# Patient Record
Sex: MALE | Race: WHITE | NOT HISPANIC OR LATINO | Employment: OTHER | URBAN - METROPOLITAN AREA
[De-identification: names, ages, dates, MRNs, and addresses within clinical notes are randomized per-mention and may not be internally consistent; named-entity substitution may affect disease eponyms.]

---

## 2017-01-06 DIAGNOSIS — Z79.4 TYPE 2 DIABETES MELLITUS WITHOUT COMPLICATION, WITH LONG-TERM CURRENT USE OF INSULIN (H): Primary | ICD-10-CM

## 2017-01-06 DIAGNOSIS — E11.9 TYPE 2 DIABETES MELLITUS WITHOUT COMPLICATION, WITH LONG-TERM CURRENT USE OF INSULIN (H): Primary | ICD-10-CM

## 2017-01-06 NOTE — TELEPHONE ENCOUNTER
Novolog          Last Written Prescription Date: 7/13/2016  Last Fill Quantity: 1, # refills: 1  Last Office Visit with G, P or  Health prescribing provider:  12/14/2016   Next 5 appointments (look out 90 days)     Jan 19, 2017  8:00 AM   [facility]ard [facility]    [address] [place] [id number] [phone]                   BP Readings from Last 3 Encounters:   12/19/16 108/72   12/14/16 112/62   11/09/16 130/84     MICROL        9   12/14/2016  No results found for this basename: microalbumin  CREATININE   Date Value Ref Range Status   10/13/2016 0.79 0.66 - 1.25 mg/dL Final   ]  GFR ESTIMATE   Date Value Ref Range Status   10/13/2016 >90  Non  GFR Calc   >60 mL/min/1.7m2 Final   07/13/2016 >90  Non  GFR Calc   >60 mL/min/1.7m2 Final   12/14/2015 >90  Non  GFR Calc   >60 mL/min/1.7m2 Final     GFR ESTIMATE IF BLACK   Date Value Ref Range Status   10/13/2016 >90   GFR Calc   >60 mL/min/1.7m2 Final   07/13/2016 >90   GFR Calc   >60 mL/min/1.7m2 Final   12/14/2015 >90   GFR Calc   >60 mL/min/1.7m2 Final     CHOL      119   12/14/2016  HDL       51   12/14/2016  LDL       49   12/14/2016  TRIG       96   12/14/2016  CHOLHDLRATIO      4.3   11/6/2015  AST       26   12/14/2016  ALT       32   12/14/2016  A1C      8.6   10/13/2016  A1C      8.7   5/12/2016  A1C      9.4   11/8/2015  A1C      9.1   11/6/2015  A1C      9.4   11/5/2015  POTASSIUM   Date Value Ref Range Status   10/13/2016 4.3 3.4 - 5.3 mmol/L Final

## 2017-01-09 RX ORDER — SYRINGE-NEEDLE,INSULIN,0.5 ML 27GX1/2"
SYRINGE, EMPTY DISPOSABLE MISCELLANEOUS
Qty: 100 EACH | Status: SHIPPED | OUTPATIENT
Start: 2017-01-09 | End: 2019-04-10

## 2017-01-09 RX ORDER — INSULIN ASPART 100 [IU]/ML
INJECTION, SOLUTION INTRAVENOUS; SUBCUTANEOUS
Qty: 15 ML | Refills: 1 | Status: SHIPPED | OUTPATIENT
Start: 2017-01-09 | End: 2017-01-11

## 2017-01-09 NOTE — TELEPHONE ENCOUNTER
Routing refill request to provider for review/approval because:  Medication is reported/historical  Aleyda Ma RN

## 2017-01-11 DIAGNOSIS — E11.9 TYPE 2 DIABETES MELLITUS WITHOUT COMPLICATION, WITH LONG-TERM CURRENT USE OF INSULIN (H): Primary | ICD-10-CM

## 2017-01-11 DIAGNOSIS — Z79.4 TYPE 2 DIABETES MELLITUS WITHOUT COMPLICATION, WITH LONG-TERM CURRENT USE OF INSULIN (H): Primary | ICD-10-CM

## 2017-01-11 NOTE — TELEPHONE ENCOUNTER
Patient states he is on 10 - 12 units twice daily of Novolog. Pharmacy wants to know if this is the correct dose. Geneva PUGH

## 2017-01-18 DIAGNOSIS — E11.65 TYPE 2 DIABETES MELLITUS WITH HYPERGLYCEMIA, WITH LONG-TERM CURRENT USE OF INSULIN (H): Primary | ICD-10-CM

## 2017-01-18 DIAGNOSIS — Z79.4 TYPE 2 DIABETES MELLITUS WITH HYPERGLYCEMIA, WITH LONG-TERM CURRENT USE OF INSULIN (H): Primary | ICD-10-CM

## 2017-01-18 NOTE — TELEPHONE ENCOUNTER
LOV  11/9/16  A1C      8.6   10/13/2016  A1C      8.7   5/12/2016  A1C      9.4   11/8/2015  A1C      9.1   11/6/2015  A1C      9.4   11/5/2015

## 2017-01-19 ENCOUNTER — OFFICE VISIT (OUTPATIENT)
Dept: INTERNAL MEDICINE | Facility: CLINIC | Age: 67
End: 2017-01-19
Payer: MEDICARE

## 2017-01-19 VITALS
TEMPERATURE: 97.6 F | HEART RATE: 82 BPM | OXYGEN SATURATION: 99 % | DIASTOLIC BLOOD PRESSURE: 72 MMHG | BODY MASS INDEX: 33.46 KG/M2 | SYSTOLIC BLOOD PRESSURE: 112 MMHG | WEIGHT: 220 LBS

## 2017-01-19 DIAGNOSIS — Z95.2 S/P AORTIC VALVE REPLACEMENT: ICD-10-CM

## 2017-01-19 DIAGNOSIS — E11.9 TYPE 2 DIABETES MELLITUS WITHOUT COMPLICATION, WITH LONG-TERM CURRENT USE OF INSULIN (H): Primary | ICD-10-CM

## 2017-01-19 DIAGNOSIS — R94.30 CARDIAC LV EJECTION FRACTION 30-35%: ICD-10-CM

## 2017-01-19 DIAGNOSIS — I50.42 CHRONIC COMBINED SYSTOLIC AND DIASTOLIC CONGESTIVE HEART FAILURE (H): ICD-10-CM

## 2017-01-19 DIAGNOSIS — Z79.4 TYPE 2 DIABETES MELLITUS WITHOUT COMPLICATION, WITH LONG-TERM CURRENT USE OF INSULIN (H): Primary | ICD-10-CM

## 2017-01-19 DIAGNOSIS — Z13.6 SCREENING FOR AAA (ABDOMINAL AORTIC ANEURYSM): ICD-10-CM

## 2017-01-19 PROCEDURE — 99214 OFFICE O/P EST MOD 30 MIN: CPT | Performed by: INTERNAL MEDICINE

## 2017-01-19 ASSESSMENT — PAIN SCALES - GENERAL: PAINLEVEL: NO PAIN (0)

## 2017-01-19 NOTE — PROGRESS NOTES
Chief Complaint   Patient presents with     Diabetes     CHIEF COMPLAINT:    The patient is a pleasant 66-year-old woman who has a history of diabetes. He also has coronary artery disease and a prior aortic valvuloplasty. He presents today to follow-up on his blood sugars which are markedly improved with his increased dietary restriction. He notes that he did take some liberties over the holidays and these are now resolving. He saw his cardiology/PA in some medication changes were made. We spent an extended period time discussing the medications and the reasoning behind changes. No significant changes were made. He notes a little bit of chest pain but believes it is primarily musculoskeletal. He's had no cardiac symptoms. He denies polyuria or polydipsia. 3 times a day blood sugar log shows most values in the mid 100 range.                         PAST, FAMILY,SOCIAL HISTORY:     Medical  History:   has a past medical history of Aortic valve stenosis (11/2/2015); Personal history of tobacco use, presenting hazards to health (11/2/2015); Type 2 diabetes mellitus without complication (H) (11/2/2015); Hyperlipidemia LDL goal <100 (11/2/2015); Hiatal hernia (11/2/2015); Chest pain, unspecified chest pain type (11/2/2015); and Cardiac LV ejection fraction 30-35% (11/2/2015).     Surgical History:   has past surgical history that includes knee surgery (2009); Replace valve aortic (N/A, 11/7/2015); and Bypass graft artery coronary (N/A, 11/7/2015).     Social History:   reports that he quit smoking about 8 months ago. His smoking use included Pipe. He has never used smokeless tobacco. He reports that he drinks alcohol. He reports that he does not use illicit drugs.     Family History:  family history is not on file.            MEDICATIONS  Current Outpatient Prescriptions   Medication Sig Dispense Refill     insulin pen needle 31G X 8 MM Use three pen needles daily or as directed. 100 each 5     insulin aspart (NOVOLOG  "FLEXPEN) 100 UNIT/ML injection INJECT 10 - 12  UNITS UNDER THE SK IN TWICE A DAY 15 mL 1     insulin syringe-needle U-100 (BD INSULIN SYRINGE ULTRAFINE) 31G X 5/16\" 0.5 ML Use one syringe 3 daily or as directed. 100 each prn     metoprolol (TOPROL-XL) 25 MG 24 hr tablet Take 1 tablet (25 mg) by mouth daily 30 tablet 1     isosorbide mononitrate (IMDUR) 30 MG 24 hr tablet Take half tablet one time daily. 30 tablet 1     insulin glargine (LANTUS) 100 UNIT/ML injection 40 units am, 10 units pm 3 Month 3     senna-docusate (SENOKOT-S;PERICOLACE) 8.6-50 MG per tablet Take 1-2 tablets by mouth 2 times daily 100 tablet 11     lisinopril (PRINIVIL,ZESTRIL) 5 MG tablet TAKE 1 TABLET BY MOUTH EVER Y DAY 30 tablet 7     atorvastatin (LIPITOR) 40 MG tablet TAKE 1 TABLET BY MOUTH DALE Y AT BEDTIME 30 tablet 8     blood glucose monitoring ([employer] CONTOUR MONITOR) meter device kit Use to test blood sugars 3 times daily or as directed. 1 kit 0     order for DME Equipment being ordered: bp cuff 1 Device 0     aspirin EC 81 MG EC tablet Take 1 tablet (81 mg) by mouth daily           --------------------------------------------------------------------------------------------------------------------                          REVIEW OF SYSTEMS:           LUNGS: Pt denies: cough,excess sputum, hemoptysis, or shortness of breath.   HEART: Pt denies: chest pain, arrythmia, syncope, tachy or bradyarrhythmia or excess edema.   GI: Pt denies: nausea, vomitting, diarrhea, constipation, melena, or hematochezia.   NEURO: Pt denies: seizures, strokes, diplopia, weakness, paraesthesias, or paralysis.   SKIN: Pt denies: itching, rashes, discoloration, or specific lesions of concern. Denies recent hair loss.                          EXAMINATION:          Vital Signs:  B/P: 112/72, T: 97.6, P: 82, R: Data Unavailable, BMI: Body mass index is 33.46 kg/(m^2).   Constitutional: The patient appears to be in no acute distress. The patient appears to be " adequately hydrated. No acute respiratory or hemodynamic distress is noted at this time.   LUNGS: clear bilaterally, airflow is brisk, no intercostal retraction or stridor is noted. No coughing is noted during visit.   HEART:  regular without rubs,  gallops, or murmurs. PMI is nondisplaced. Upstrokes are brisk. S1,S2 are heard.    GI: Abdomen is soft, without rebound, guarding or tenderness. Bowel sounds are appropriate. No renal bruits are heard.    NEURO: Pt is alert and appropriate. No neurologic lateralization is noted. Cranial nerves 2-12 are intact. Peripheral sensory function is slightly decreased in the feet                          DECISION MAKING:       #1 type 2 diabetes on long-term insulin care   Continue current insulin dosage   Continue current diet   Continue aspirin, statin, ACE inhibitor  #2 need for abdominal aortic aneurysm screening   Done  #3 chronic congestive heart failure combined with ejection fraction of 35%   Continue beta blocker recently started on Toprol for convenience   Continue ACE inhibitor   Continue lipid control                           FOLLOW UP    I have asked the patient to make an appointment for follow up with me in 3 months or as needed    I have carefully explained the diagnosis and treatment options with the patient. The patient has displayed an understanding of the above, and all subsequent questions were answered.         DO RADHA Horn    Portions of this note were produced using [email or web address]  Although every attempt at real-time proof reading has been made, occasional grammar/syntax errors may have been missed.

## 2017-01-19 NOTE — NURSING NOTE
"Chief Complaint   Patient presents with     Diabetes       Initial /72 mmHg  Pulse 82  Temp(Src) 97.6  F (36.4  C) (Temporal)  Wt 220 lb (99.791 kg)  SpO2 99% Estimated body mass index is 33.46 kg/(m^2) as calculated from the following:    Height as of 12/19/16: 5' 8\" (1.727 m).    Weight as of this encounter: 220 lb (99.791 kg).  BP completed using cuff size: patti PUGH      "

## 2017-01-19 NOTE — MR AVS SNAPSHOT
After Visit Summary   1/19/2017    Reinaldo Barrios    MRN: [id number]           Patient Information     Date Of Birth          1950        Visit Information        Provider Department      1/19/2017 8:00 AM Mark Blanchard DO [facility]        Today's Diagnoses     Type 2 diabetes mellitus without complication, with long-term current use of insulin (H)    -  1     Screening for AAA (abdominal aortic aneurysm)         Cardiac LV ejection fraction 30-35%         Chronic combined systolic and diastolic congestive heart failure (H)         S/P aortic valve replacement            Follow-ups after your visit        Your next 10 appointments already scheduled     Jan 26, 2017  7:30 AM   US ABDOMINAL AORTIC LIMITED with PHUS1   [facility] ([facility])    [address] [place] [id number] [phone]           Please bring a list of your medicines (including vitamins, minerals and over-the-counter drugs). Also, tell your doctor about any allergies you may have. Wear comfortable clothes and leave your valuables at home.  Adults: No eating or drinking for 8 hours before the exam. You may take medicine with a small sip of water.  Children: - Children 6+ years: No food or drink for 6 hours before exam. - Children 1-5 years: No food or drink for 4 hours before exam. - Infants, breast-fed: may have breast milk up to 2 hours before exam. - Infants, formula: may have bottle until 4 hours before exam.  Please call the Imaging Department at your exam site with any questions.              Future tests that were ordered for you today     Open Future Orders        Priority Expected Expires Ordered    US abdominal aorta limited Routine  1/19/2018 1/19/2017            Who to contact     If you have questions or need follow up information about today's clinic visit or your schedule please contact [facility] directly at  "[phone].  Normal or non-critical lab and imaging results will be communicated to you by OluKaihart, letter or phone within 4 business days after the clinic has received the results. If you do not hear from us within 7 days, please contact the clinic through OluKaihart or phone. If you have a critical or abnormal lab result, we will notify you by phone as soon as possible.  Submit refill requests through Guestmob or call your pharmacy and they will forward the refill request to us. Please allow 3 business days for your refill to be completed.          Additional Information About Your Visit        OluKaihart Information     Guestmob lets you send messages to your doctor, view your test results, renew your prescriptions, schedule appointments and more. To sign up, go to [email or web address] . Click on \"Log in\" on the left side of the screen, which will take you to the Welcome page. Then click on \"Sign up Now\" on the right side of the page.     You will be asked to enter the access code listed below, as well as some personal information. Please follow the directions to create your username and password.     Your access code is: [email or web address]  Expires: 2017  7:34 AM     Your access code will  in 90 days. If you need help or a new code, please call your [place] clinic or [phone].        Care EveryWhere ID     This is your Care EveryWhere ID. This could be used by other organizations to access your [place] medical records  [id number]        Your Vitals Were     Pulse Temperature Pulse Oximetry             82 97.6  F (36.4  C) (Temporal) 99%          Blood Pressure from Last 3 Encounters:   17 112/72   16 108/72   16 112/62    Weight from Last 3 Encounters:   17 220 lb (99.791 kg)   16 217 lb 9.6 oz (98.703 kg)   16 219 lb (99.338 kg)               Primary Care Provider    Physician No Ref-Primary       No address on file        Thank you!     Thank you for choosing " "[facility]  for your care. Our goal is always to provide you with excellent care. Hearing back from our patients is one way we can continue to improve our services. Please take a few minutes to complete the written survey that you may receive in the mail after your visit with us. Thank you!             Your Updated Medication List - Protect others around you: Learn how to safely use, store and throw away your medicines at www.disposemymeds.org.          This list is accurate as of: 1/19/17  3:22 PM.  Always use your most recent med list.                   Brand Name Dispense Instructions for use    aspirin 81 MG EC tablet      Take 1 tablet (81 mg) by mouth daily       atorvastatin 40 MG tablet    LIPITOR    30 tablet    TAKE 1 TABLET BY MOUTH DALE Y AT BEDTIME       blood glucose monitoring meter device kit     1 kit    Use to test blood sugars 3 times daily or as directed.       insulin aspart 100 UNIT/ML injection    NovoLOG FLEXPEN    15 mL    INJECT 10 - 12  UNITS UNDER THE SK IN TWICE A DAY       insulin glargine 100 UNIT/ML injection    LANTUS    3 Month    40 units am, 10 units pm       insulin pen needle 31G X 8 MM     100 each    Use three pen needles daily or as directed.       insulin syringe-needle U-100 31G X 5/16\" 0.5 ML    BD insulin syringe ultrafine    100 each    Use one syringe 3 daily or as directed.       isosorbide mononitrate 30 MG 24 hr tablet    IMDUR    30 tablet    Take half tablet one time daily.       lisinopril 5 MG tablet    PRINIVIL/ZESTRIL    30 tablet    TAKE 1 TABLET BY MOUTH EVER Y DAY       metoprolol 25 MG 24 hr tablet    TOPROL-XL    30 tablet    Take 1 tablet (25 mg) by mouth daily       order for DME     1 Device    Equipment being ordered: bp cuff       senna-docusate 8.6-50 MG per tablet    SENOKOT-S;PERICOLACE    100 tablet    Take 1-2 tablets by mouth 2 times daily         "

## 2017-01-27 DIAGNOSIS — E11.65 TYPE 2 DIABETES MELLITUS WITH HYPERGLYCEMIA, WITH LONG-TERM CURRENT USE OF INSULIN (H): Primary | ICD-10-CM

## 2017-01-27 DIAGNOSIS — Z79.4 TYPE 2 DIABETES MELLITUS WITH HYPERGLYCEMIA, WITH LONG-TERM CURRENT USE OF INSULIN (H): Primary | ICD-10-CM

## 2017-01-27 NOTE — TELEPHONE ENCOUNTER
Sergio Tinoco is calling stating that patient came into the [facility] and was told that these would be to the pharmacy in a half hour. He is now at the pharmacy waiting and the pharmacy has not received this yet. Please advise

## 2017-01-31 DIAGNOSIS — E78.5 HYPERLIPIDEMIA LDL GOAL <100: Primary | ICD-10-CM

## 2017-01-31 NOTE — TELEPHONE ENCOUNTER
atorvastatin (LIPITOR) 40 MG tablet     Last Written Prescription Date: 4/28/16  Last Fill Quantity: 30, # refills: 0  Last Office Visit with [facility], [facility] or [facility] prescribing provider: 1/19/17       CHOL      119   12/14/2016  HDL       51   12/14/2016  LDL       49   12/14/2016  TRIG       96   12/14/2016  CHOLHDLRATIO      4.3   11/6/2015

## 2017-02-02 RX ORDER — ATORVASTATIN CALCIUM 40 MG/1
TABLET, FILM COATED ORAL
Qty: 30 TABLET | Refills: 11 | Status: SHIPPED | OUTPATIENT
Start: 2017-02-02 | End: 2018-02-21

## 2017-02-03 NOTE — TELEPHONE ENCOUNTER
Prescription approved per [facility] Refill Protocol............MANNIE Bobo

## 2017-03-02 DIAGNOSIS — I50.42 SYSTOLIC AND DIASTOLIC CHF, CHRONIC (H): ICD-10-CM

## 2017-03-02 DIAGNOSIS — Z79.4 TYPE 2 DIABETES MELLITUS WITHOUT COMPLICATION, WITH LONG-TERM CURRENT USE OF INSULIN (H): ICD-10-CM

## 2017-03-02 DIAGNOSIS — E11.9 TYPE 2 DIABETES MELLITUS WITHOUT COMPLICATION, WITH LONG-TERM CURRENT USE OF INSULIN (H): ICD-10-CM

## 2017-03-02 NOTE — TELEPHONE ENCOUNTER
lisinopril (PRINIVIL,ZESTRIL) 5 MG tablet      Last Written Prescription Date: 6/8/16  Last Fill Quantity: 30, # refills: 7  Last Office Visit with G, P or [facility] prescribing provider: 1/19/17       Potassium   Date Value Ref Range Status   10/13/2016 4.3 3.4 - 5.3 mmol/L Final     Creatinine   Date Value Ref Range Status   10/13/2016 0.79 0.66 - 1.25 mg/dL Final     BP Readings from Last 3 Encounters:   01/19/17 112/72   12/19/16 108/72   12/14/16 112/62

## 2017-03-06 PROBLEM — E11.9 TYPE 2 DIABETES MELLITUS WITHOUT COMPLICATION, WITHOUT LONG-TERM CURRENT USE OF INSULIN (H): Status: ACTIVE | Noted: 2017-03-06

## 2017-03-06 RX ORDER — LISINOPRIL 5 MG/1
TABLET ORAL
Qty: 30 TABLET | Refills: 11 | Status: SHIPPED | OUTPATIENT
Start: 2017-03-06 | End: 2018-03-17

## 2017-03-06 NOTE — TELEPHONE ENCOUNTER
Routing refill request to provider for review/approval because:  Drug not on the [facility] refill protocol for associated diagnosis    Laura Dominguez RN  [facility]

## 2017-05-03 DIAGNOSIS — Z79.4 TYPE 2 DIABETES MELLITUS WITHOUT COMPLICATION, WITH LONG-TERM CURRENT USE OF INSULIN (H): ICD-10-CM

## 2017-05-03 DIAGNOSIS — R07.2 PRECORDIAL PAIN: ICD-10-CM

## 2017-05-03 DIAGNOSIS — E11.9 TYPE 2 DIABETES MELLITUS WITHOUT COMPLICATION, WITH LONG-TERM CURRENT USE OF INSULIN (H): ICD-10-CM

## 2017-05-03 NOTE — TELEPHONE ENCOUNTER
Novolog        Last Written Prescription Date: 1/11/17  Last Fill Quantity: 15, # refills: 1  Last Office Visit with [facility], [facility] or  [employer] prescribing provider:  1/19/17        BP Readings from Last 3 Encounters:   01/19/17 112/72   12/19/16 108/72   12/14/16 112/62     Lab Results   Component Value Date    MICROL 9 12/14/2016     Lab Results   Component Value Date    UMALCR 4.83 12/14/2016     Creatinine   Date Value Ref Range Status   10/13/2016 0.79 0.66 - 1.25 mg/dL Final   ]  GFR Estimate   Date Value Ref Range Status   10/13/2016 >90  Non  GFR Calc   >60 mL/min/1.7m2 Final   07/13/2016 >90  Non  GFR Calc   >60 mL/min/1.7m2 Final   12/14/2015 >90  Non  GFR Calc   >60 mL/min/1.7m2 Final     GFR Estimate If Black   Date Value Ref Range Status   10/13/2016 >90   GFR Calc   >60 mL/min/1.7m2 Final   07/13/2016 >90   GFR Calc   >60 mL/min/1.7m2 Final   12/14/2015 >90   GFR Calc   >60 mL/min/1.7m2 Final     Lab Results   Component Value Date    CHOL 119 12/14/2016     Lab Results   Component Value Date    HDL 51 12/14/2016     Lab Results   Component Value Date    LDL 49 12/14/2016     Lab Results   Component Value Date    TRIG 96 12/14/2016     Lab Results   Component Value Date    CHOLHDLRATIO 4.3 11/06/2015     Lab Results   Component Value Date    AST 26 12/14/2016     Lab Results   Component Value Date    ALT 32 12/14/2016     Lab Results   Component Value Date    A1C 8.6 10/13/2016    A1C 8.7 05/12/2016    A1C 9.4 11/08/2015    A1C 9.1 11/06/2015    A1C 9.4 11/05/2015     Potassium   Date Value Ref Range Status   10/13/2016 4.3 3.4 - 5.3 mmol/L Final       Imdur        Last Written Prescription Date: 2/21/17  Last Fill Quantity: 30,  # refills: 1   Last Office Visit with [facility], [facility] or [employer] prescribing provider: 1/19/17

## 2017-05-05 RX ORDER — INSULIN ASPART 100 [IU]/ML
INJECTION, SOLUTION INTRAVENOUS; SUBCUTANEOUS
Qty: 15 ML | Refills: 0 | Status: SHIPPED | OUTPATIENT
Start: 2017-05-05 | End: 2017-07-03

## 2017-05-05 RX ORDER — ISOSORBIDE MONONITRATE 30 MG/1
TABLET, EXTENDED RELEASE ORAL
Qty: 30 TABLET | Refills: 0 | Status: SHIPPED | OUTPATIENT
Start: 2017-05-05 | End: 2017-06-07

## 2017-05-05 NOTE — TELEPHONE ENCOUNTER
Novolog  Routing refill request to covering provider for review/approval because:  Labs out of range:  Hgb A1C  Patient needs to be seen because:  Last OV 1/19/17 supposed to f/up in 3 months, no f/up    Imdur  Medication is being filled for 1 time refill only due to:  Patient needs to be seen because Last OV 1/19/17 supposed to f/up in 3 months, no f/up.      Marco Antonio Snell RN, BSN

## 2017-06-07 DIAGNOSIS — Z79.4 TYPE 2 DIABETES MELLITUS WITH HYPERGLYCEMIA, WITH LONG-TERM CURRENT USE OF INSULIN (H): ICD-10-CM

## 2017-06-07 DIAGNOSIS — R07.2 PRECORDIAL PAIN: ICD-10-CM

## 2017-06-07 DIAGNOSIS — E11.65 TYPE 2 DIABETES MELLITUS WITH HYPERGLYCEMIA, WITH LONG-TERM CURRENT USE OF INSULIN (H): ICD-10-CM

## 2017-06-07 NOTE — TELEPHONE ENCOUNTER
Chief Complaint   Patient presents with     Medication Refill     Isosorbide Monitrate          Last Written Prescription Date: 3/5/17  Last Fill Quantity: 30,  # refills: 0   Last Office Visit with [facility], [facility] or [facility] prescribing provider: 1/1/9/17

## 2017-06-08 ENCOUNTER — TELEPHONE (OUTPATIENT)
Dept: INTERNAL MEDICINE | Facility: CLINIC | Age: 67
End: 2017-06-08

## 2017-06-08 NOTE — LETTER
[address]   [address]  Tel. [phone] / Fax [phone]    June 26, 2017        Reinaldo Barrios  [address]   [facility] [address]          Dear Reinaldo,    Our records indicate you are due for a colon cancer screening. You are also due for Diabetic follow up.    -Colon Cancer Screening- Recommended every 5-10 years, depending on your history, in order to prevent and detect colon cancer at its earliest stages.  Colon cancer is now the second leading cause of death in the [place] for both men and women and there are over 130,000 new cases and 50,000 deaths per year from colon cancer.  Colonoscopies can prevent 90-95% of these deaths.  Problem lesions can be removed before they ever become cancer.  This test is not only looking for cancer, but also getting rid of precancerious lesions.  You are usually given some sedation which makes the test very comfortable for most people.      If you do not wish to do a colonoscopy or cannot afford to do one, at this time, there is another option. It is called a FIT test or Fecal Immunochemical Occult Blood Test (take home stool sample kit).  It does not replace the colonoscopy for colorectal cancer screening, but it can detect hidden bleeding in the lower colon.  It does need to be repeated every year and if a positive result is obtained, you would be referred for a colonoscopy. The FIT test is really easy to do and does not require any  diet or medication restrictions and involves only one collection sample.      If you have completed either one of these tests or had a flexible sigmoidoscopy in the past five years at another facility, please have the records sent to our clinic so that we can best coordinate your care.  Please call us ([phone]) if you have questions or would like arrange either to do a colonoscopy or obtain the necessary test kit for the Fecal Occult Test.      Sincerely,    Mark  AYO Blanchard.

## 2017-06-08 NOTE — TELEPHONE ENCOUNTER
Attempted outreach to patient: Left message    Patient is due for:  Colonoscopy  Diabetes follow up    If patient had this completed elsewhere, please obtain approximate date, clinic/hospital where test was done and the result (abnormal/normal).    Please assist in scheduling if not completed.      Panel Management Review      Patient has the following on his problem list:     Diabetes    ASA: Passed    Last A1C  Lab Results   Component Value Date    A1C 8.6 10/13/2016    A1C 8.7 05/12/2016    A1C 9.4 11/08/2015    A1C 9.1 11/06/2015    A1C 9.4 11/05/2015     A1C tested: FAILED    Last LDL:    Lab Results   Component Value Date    CHOL 119 12/14/2016     Lab Results   Component Value Date    HDL 51 12/14/2016     Lab Results   Component Value Date    LDL 49 12/14/2016     Lab Results   Component Value Date    TRIG 96 12/14/2016     Lab Results   Component Value Date    CHOLHDLRATIO 4.3 11/06/2015     Lab Results   Component Value Date    NHDL 68 12/14/2016       Is the patient on a Statin? YES             Is the patient on Aspirin? YES    Medications     HMG CoA Reductase Inhibitors    atorvastatin (LIPITOR) 40 MG tablet    Salicylates    aspirin EC 81 MG EC tablet          Last three blood pressure readings:  BP Readings from Last 3 Encounters:   01/19/17 112/72   12/19/16 108/72   12/14/16 112/62       Date of last diabetes office visit: 1/19/17     Tobacco History:     History   Smoking Status     Former Smoker     Types: Pipe     Quit date: 5/15/2016   Smokeless Tobacco     Never Used     Comment: smokes a pipe in good weather           Composite cancer screening  Chart review shows that this patient is due/due soon for the following Colonoscopy  Summary:    Patient is due/failing the following:   A1C and COLONOSCOPY    Action needed:   Patient needs office visit for diabetes.    Type of outreach:    Phone, left message for patient to call back.     Questions for provider review:    None                                                                                                                                     Geneva Goodman A       Chart routed to Care Team .

## 2017-06-12 NOTE — TELEPHONE ENCOUNTER
blood glucose monitoring (NO BRAND SPECIFIED) test strip      Last Written Prescription Date: 1/27/2017  Last Fill Quantity: 100, # refills: 3  Last Office Visit with [facility], [facility] or  Health prescribing provider:  1/19/2017   Next 5 appointments (look out 90 days)     Adama 15, 2017  8:00 AM CDT   Office Visit with Mark Blanchard DO   [facility]    [address] [place] [id number] [phone]                   BP Readings from Last 3 Encounters:   01/19/17 112/72   12/19/16 108/72   12/14/16 112/62     Lab Results   Component Value Date    MICROL 9 12/14/2016     Lab Results   Component Value Date    UMALCR 4.83 12/14/2016     Creatinine   Date Value Ref Range Status   10/13/2016 0.79 0.66 - 1.25 mg/dL Final   ]  GFR Estimate   Date Value Ref Range Status   10/13/2016 >90  Non  GFR Calc   >60 mL/min/1.7m2 Final   07/13/2016 >90  Non  GFR Calc   >60 mL/min/1.7m2 Final   12/14/2015 >90  Non  GFR Calc   >60 mL/min/1.7m2 Final     GFR Estimate If Black   Date Value Ref Range Status   10/13/2016 >90   GFR Calc   >60 mL/min/1.7m2 Final   07/13/2016 >90   GFR Calc   >60 mL/min/1.7m2 Final   12/14/2015 >90   GFR Calc   >60 mL/min/1.7m2 Final     Lab Results   Component Value Date    CHOL 119 12/14/2016     Lab Results   Component Value Date    HDL 51 12/14/2016     Lab Results   Component Value Date    LDL 49 12/14/2016     Lab Results   Component Value Date    TRIG 96 12/14/2016     Lab Results   Component Value Date    CHOLHDLRATIO 4.3 11/06/2015     Lab Results   Component Value Date    AST 26 12/14/2016     Lab Results   Component Value Date    ALT 32 12/14/2016     Lab Results   Component Value Date    A1C 8.6 10/13/2016    A1C 8.7 05/12/2016    A1C 9.4 11/08/2015    A1C 9.1 11/06/2015    A1C 9.4 11/05/2015     Potassium   Date Value Ref Range Status    10/13/2016 4.3 3.4 - 5.3 mmol/L Final     Kaia Mariscal CMA

## 2017-06-13 RX ORDER — ISOSORBIDE MONONITRATE 30 MG/1
TABLET, EXTENDED RELEASE ORAL
Qty: 30 TABLET | Refills: 3 | Status: SHIPPED | OUTPATIENT
Start: 2017-06-13 | End: 2017-10-25

## 2017-06-13 NOTE — TELEPHONE ENCOUNTER
Test strips  Prescription approved per G Refill Protocol.    Imdur  Routing refill request to provider for review/approval because:  A break in medication  Medication is reported/historical    Marco Antonio Snell RN, BSN

## 2017-06-15 ENCOUNTER — OFFICE VISIT (OUTPATIENT)
Dept: INTERNAL MEDICINE | Facility: CLINIC | Age: 67
End: 2017-06-15
Payer: MEDICARE

## 2017-06-15 DIAGNOSIS — Z95.1 S/P CABG (CORONARY ARTERY BYPASS GRAFT): ICD-10-CM

## 2017-06-15 DIAGNOSIS — R06.02 SOB (SHORTNESS OF BREATH): Primary | ICD-10-CM

## 2017-06-15 DIAGNOSIS — I48.4 ATYPICAL ATRIAL FLUTTER (H): ICD-10-CM

## 2017-06-15 DIAGNOSIS — I50.42 CHRONIC COMBINED SYSTOLIC AND DIASTOLIC CONGESTIVE HEART FAILURE (H): ICD-10-CM

## 2017-06-15 DIAGNOSIS — I21.4 NSTEMI (NON-ST ELEVATED MYOCARDIAL INFARCTION) (H): ICD-10-CM

## 2017-06-15 DIAGNOSIS — I10 ESSENTIAL HYPERTENSION WITH GOAL BLOOD PRESSURE LESS THAN 140/90: ICD-10-CM

## 2017-06-15 DIAGNOSIS — E11.9 TYPE 2 DIABETES MELLITUS WITHOUT COMPLICATION, WITHOUT LONG-TERM CURRENT USE OF INSULIN (H): ICD-10-CM

## 2017-06-15 DIAGNOSIS — E78.5 HYPERLIPIDEMIA LDL GOAL <100: ICD-10-CM

## 2017-06-15 DIAGNOSIS — Z95.2 S/P AORTIC VALVE REPLACEMENT: ICD-10-CM

## 2017-06-15 LAB
CHOLEST SERPL-MCNC: 155 MG/DL
CREAT UR-MCNC: 83 MG/DL
HBA1C MFR BLD: 7.7 % (ref 4.3–6)
HDLC SERPL-MCNC: 55 MG/DL
LDLC SERPL CALC-MCNC: 64 MG/DL
MICROALBUMIN UR-MCNC: 6 MG/L
MICROALBUMIN/CREAT UR: 6.88 MG/G CR (ref 0–17)
NONHDLC SERPL-MCNC: 100 MG/DL
TRIGL SERPL-MCNC: 182 MG/DL

## 2017-06-15 PROCEDURE — 82043 UR ALBUMIN QUANTITATIVE: CPT | Performed by: INTERNAL MEDICINE

## 2017-06-15 PROCEDURE — 36415 COLL VENOUS BLD VENIPUNCTURE: CPT | Performed by: INTERNAL MEDICINE

## 2017-06-15 PROCEDURE — 83036 HEMOGLOBIN GLYCOSYLATED A1C: CPT | Performed by: INTERNAL MEDICINE

## 2017-06-15 PROCEDURE — 99214 OFFICE O/P EST MOD 30 MIN: CPT | Performed by: INTERNAL MEDICINE

## 2017-06-15 PROCEDURE — 80061 LIPID PANEL: CPT | Performed by: INTERNAL MEDICINE

## 2017-06-15 NOTE — LETTER
[address] [address]  Phone: [phone]          June 22, 2017    Reinaldo Barrios  [address]   [facility] [address]          Dear Reinaldo,      LAB RESULTS:     The microalbumin is normal.   Cholesterol is well-controlled with an LDL of 64.   The A1c is much better with a value of 7.7.  This shows improved blood sugar control over the previous 9.4 A little over a year ago. If you have any further questions or problems, please contact our office.          Sincerely,      Mark Blanchard, DO

## 2017-06-15 NOTE — MR AVS SNAPSHOT
After Visit Summary   6/15/2017    Reinaldo Barrios    MRN: [id number]           Patient Information     Date Of Birth          1950        Visit Information        Provider Department      6/15/2017 8:00 AM Mark Blanchard DO [facility]        Today's Diagnoses     SOB (shortness of breath)    -  1    Chronic combined systolic and diastolic congestive heart failure (H)        S/P CABG (coronary artery bypass graft)        S/P aortic valve replacement        Type 2 diabetes mellitus without complication, without long-term current use of insulin (H)        NSTEMI (non-ST elevated myocardial infarction) (H)        Hyperlipidemia LDL goal <100        Essential hypertension with goal blood pressure less than 140/90        Atypical atrial flutter (H)           Follow-ups after your visit        Your next 10 appointments already scheduled     Jun 22, 2017  9:00 AM CDT   Pulmonary Function with [facility] RESPIRATORY THERAPY   [facility] ([facility])    [address] [place] [address]   [phone]           No Inhalers for 6 hours prior to test No Smoking 2 hours prior to test              Who to contact     If you have questions or need follow up information about today's clinic visit or your schedule please contact [facility] directly at [phone].  Normal or non-critical lab and imaging results will be communicated to you by MyChart, letter or phone within 4 business days after the clinic has received the results. If you do not hear from us within 7 days, please contact the clinic through MyChart or phone. If you have a critical or abnormal lab result, we will notify you by phone as soon as possible.  Submit refill requests through [employer]linetours or call your pharmacy and they will forward the refill request to us. Please allow 3 business days for your refill to be completed.          Additional Information  "About Your Visit        [employer] Information     [employer] lets you send messages to your doctor, view your test results, renew your prescriptions, schedule appointments and more. To sign up, go to [email or web address] . Click on \"Log in\" on the left side of the screen, which will take you to the Welcome page. Then click on \"Sign up Now\" on the right side of the page.     You will be asked to enter the access code listed below, as well as some personal information. Please follow the directions to create your username and password.     Your access code is: [email or web address]  Expires: 2017  7:49 PM     Your access code will  in 90 days. If you need help or a new code, please call your [place] clinic or [phone].        Care EveryWhere ID     This is your Care EveryWhere ID. This could be used by other organizations to access your [facility] medical records  [id number]         Blood Pressure from Last 3 Encounters:   06/15/17 (P) 148/80   17 112/72   16 108/72    Weight from Last 3 Encounters:   06/15/17 (P) 225 lb (102.1 kg)   17 220 lb (99.8 kg)   16 217 lb 9.6 oz (98.7 kg)              We Performed the Following     Albumin Random Urine Quantitative     Hemoglobin A1c     Lipid Profile        Primary Care Provider    Physician No Ref-Primary       No address on file        Thank you!     Thank you for choosing [facility]  for your care. Our goal is always to provide you with excellent care. Hearing back from our patients is one way we can continue to improve our services. Please take a few minutes to complete the written survey that you may receive in the mail after your visit with us. Thank you!             Your Updated Medication List - Protect others around you: Learn how to safely use, store and throw away your medicines at www.disposemymeds.org.          This list is accurate as of: 6/15/17 11:59 PM.  Always use your most recent med list.                   " "Brand Name Dispense Instructions for use    aspirin 81 MG EC tablet      Take 1 tablet (81 mg) by mouth daily       atorvastatin 40 MG tablet    LIPITOR    30 tablet    TAKE 1 TABLET BY MOUTH DALE Y AT BEDTIME       blood glucose monitoring meter device kit     1 kit    Use to test blood sugars 3 times daily or as directed.       blood glucose monitoring test strip    no brand specified    100 strip    Use to test blood sugars 3 times daily or as directed       carvedilol 3.125 MG tablet    COREG    60 tablet    TAKE 1 TABLET BY MOUTH TWIC E A DAY WITH MEALS       insulin glargine 100 UNIT/ML injection    LANTUS    3 Month    40 units am, 10 units pm       insulin pen needle 31G X 8 MM     100 each    Use three pen needles daily or as directed.       insulin syringe-needle U-100 31G X 5/16\" 0.5 ML    BD insulin syringe ultrafine    100 each    Use one syringe 3 daily or as directed.       * isosorbide mononitrate 30 MG 24 hr tablet    IMDUR    30 tablet    Take half tablet one time daily.       * isosorbide mononitrate 30 MG 24 hr tablet    IMDUR    30 tablet    TAKE 1 TABLET BY MOUTH EVERY DAY       lisinopril 5 MG tablet    PRINIVIL/ZESTRIL    30 tablet    TAKE 1 TABLET BY MOUTH EVER Y DAY       metoprolol 25 MG 24 hr tablet    TOPROL-XL    30 tablet    Take 1 tablet (25 mg) by mouth daily       NovoLOG FLEXPEN 100 UNIT/ML injection   Generic drug:  insulin aspart     15 mL    INJECT 10 - 12 UNITS UNDER THE SKIN TWICE A DAY       order for DME     1 Device    Equipment being ordered: bp cuff       senna-docusate 8.6-50 MG per tablet    SENOKOT-S;PERICOLACE    100 tablet    Take 1-2 tablets by mouth 2 times daily       * Notice:  This list has 2 medication(s) that are the same as other medications prescribed for you. Read the directions carefully, and ask your doctor or other care provider to review them with you.      "

## 2017-06-15 NOTE — PROGRESS NOTES
SUBJECTIVE:                                                    Reinaldo Barrios is a 66 year old male who presents to clinic today for the following health issues:      Diabetes Follow-up    Patient is checking blood sugars: three times daily.   Blood sugar testing frequency justification:   Results are as follows:        Diabetic concerns: None     Symptoms of hypoglycemia (low blood sugar): shaky, dizzy     Paresthesias (numbness or burning in feet) or sores: Yes      Date of last diabetic eye exam: over 2 years ago     Hyperlipidemia Follow-Up      Rate your low fat/cholesterol diet?: fair    Taking statin?  Yes, no muscle aches from statin    Other lipid medications/supplements?:  none     Hypertension Follow-up      Outpatient blood pressures are not being checked.    Low Salt Diet: no added salt         Amount of exercise or physical activity:     Problems taking medications regularly: No    Medication side effects: none    Diet: low salt and low fat/cholesterol    CHIEF COMPLAINT:    The patient is a pleasant 66-year-old gentleman who has a history of coronary artery disease as well as prior bypass, aortic valvuloplasty, and previous myocardial infarction. He is currently on medications as listed in doing fairly well without significant medication side effect.. He notes he is having more and more difficulty with ambulation due to shortness of breath. He states that he has no chest pain with this but his level activity is decreasing. He is unable to determine whether this is a cardiac or pulmonary concern. He notes that he has no significant edema in the lower extremities but does have a little bit toward the end of the day. He does have a documented ejection fraction of only about 30% previously. He states he is doing worse than he had before he questions whether this might be getting worse. With respect to his diabetes, his blood sugars have been fairly well controlled. Denies any polyuria or  "polydipsia. He continues to take both the Lantus and NovoLog twice daily. He is appropriately on a statin as well as aspirin and an ACE inhibitor.                         PAST, FAMILY,SOCIAL HISTORY:     Medical  History:   has a past medical history of Aortic valve stenosis (11/2/2015); Cardiac LV ejection fraction 30-35% (11/2/2015); Chest pain, unspecified chest pain type (11/2/2015); Hiatal hernia (11/2/2015); Hyperlipidemia LDL goal <100 (11/2/2015); Personal history of tobacco use, presenting hazards to health (11/2/2015); and Type 2 diabetes mellitus without complication (H) (11/2/2015).     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     Social History:   reports that he quit smoking about 13 months ago. His smoking use included Pipe. He has never used smokeless tobacco. He reports that he drinks alcohol. He reports that he does not use illicit drugs.     Family History:  family history is not on file.            MEDICATIONS  Current Outpatient Prescriptions   Medication Sig Dispense Refill     isosorbide mononitrate (IMDUR) 30 MG 24 hr tablet TAKE 1 TABLET BY MOUTH EVERY DAY 30 tablet 3     blood glucose monitoring (NO BRAND SPECIFIED) test strip Use to test blood sugars 3 times daily or as directed 100 strip 1     NOVOLOG FLEXPEN 100 UNIT/ML soln INJECT 10 - 12 UNITS UNDER THE SKIN TWICE A DAY 15 mL 0     lisinopril (PRINIVIL/ZESTRIL) 5 MG tablet TAKE 1 TABLET BY MOUTH EVER Y DAY 30 tablet 11     carvedilol (COREG) 3.125 MG tablet TAKE 1 TABLET BY MOUTH TWIC E A DAY WITH MEALS 60 tablet 1     atorvastatin (LIPITOR) 40 MG tablet TAKE 1 TABLET BY MOUTH DALE Y AT BEDTIME 30 tablet 11     insulin pen needle 31G X 8 MM Use three pen needles daily or as directed. 100 each 5     insulin syringe-needle U-100 (BD INSULIN SYRINGE ULTRAFINE) 31G X 5/16\" 0.5 ML Use one syringe 3 daily or as " "directed. 100 each prn     metoprolol (TOPROL-XL) 25 MG 24 hr tablet Take 1 tablet (25 mg) by mouth daily 30 tablet 1     isosorbide mononitrate (IMDUR) 30 MG 24 hr tablet Take half tablet one time daily. 30 tablet 1     insulin glargine (LANTUS) 100 UNIT/ML injection 40 units am, 10 units pm 3 Month 3     senna-docusate (SENOKOT-S;PERICOLACE) 8.6-50 MG per tablet Take 1-2 tablets by mouth 2 times daily 100 tablet 11     blood glucose monitoring (Dr. Z CONTOUR MONITOR) meter device kit Use to test blood sugars 3 times daily or as directed. 1 kit 0     order for DME Equipment being ordered: bp cuff 1 Device 0     aspirin EC 81 MG EC tablet Take 1 tablet (81 mg) by mouth daily           --------------------------------------------------------------------------------------------------------------------                          REVIEW OF SYSTEMS:         LUNGS: Pt denies: cough,excess sputum, hemoptysis, or shortness of breath at rest. He does have dyspnea with any exertion..   HEART: Pt denies: chest pain, arrythmia, syncope, tachy or bradyarrhythmia or excess edema.   GI: Pt denies: nausea, vomitting, diarrhea, constipation, melena, or hematochezia.   NEURO: Pt denies: seizures, strokes, diplopia, weakness, paraesthesias, or paralysis.   SKIN: Pt denies: itching, rashes, discoloration, or specific lesions of concern. Denies recent hair loss.                          EXAMINATION:         BP (P) 148/80 (BP Location: Left arm, Patient Position: Chair, Cuff Size: Adult Large)  Pulse (P) 72  Temp (P) 96.7  F (35.9  C) (Temporal)  Ht (P) 5' 8\" (1.727 m)  Wt (P) 225 lb (102.1 kg)  SpO2 (P) 99%  BMI (P) 34.21 kg/m2   Constitutional: The patient appears to be in no acute distress. The patient appears to be adequately hydrated. No acute respiratory or hemodynamic distress is noted at this time.   LUNGS: Breath sounds are markedly decreased bilaterally, airflow is slow, no intercostal retraction or stridor is noted. No " coughing is noted during visit.   HEART:  regular without rubs,  gallops, or murmurs. PMI is nondisplaced. Upstrokes are brisk. S1,S2 are heard. Bioprosthetic valve is in place. GI: Abdomen is soft, without rebound, guarding or tenderness. Bowel sounds are appropriate. No renal bruits are heard.    NEURO: Pt is alert and appropriate. No neurologic lateralization is noted. Cranial nerves 2-12 are intact. Peripheral sensory and motor function are grossly normal                        DECISION MAKIN. SOB (shortness of breath)  Will rule out primary pulmonary disease such as obstructive lung disease  - General PFT Lab (Please always keep checked); Future  - Pulmonary Function Test; Future    2. Chronic combined systolic and diastolic congestive heart failure (H)  Continue current medications going beta blocker and ACE inhibitor    3. S/P CABG (coronary artery bypass graft)  Currently stable    4. S/P aortic valve replacement  Continue  - Echocardiogram Complete; Future    5. Type 2 diabetes mellitus without complication, without long-term current use of insulin (H)    - Hemoglobin A1c  - Albumin Random Urine Quantitative    6. NSTEMI (non-ST elevated myocardial infarction) (H)      7. Hyperlipidemia LDL goal <100    - Lipid Profile    8. Essential hypertension with goal blood pressure less than 140/90  Continue current medication    9. Atypical atrial flutter (H)  No signs of recurrence.                           FOLLOW UP    I have asked the patient to make an appointment for follow up with me following the testing        I have carefully explained the diagnosis and treatment options with the patient. The patient has displayed an understanding of the above, and all subsequent questions were answered.         DO RADHA Horn    Portions of this note were produced using [employer]  Although every attempt at real-time proof reading has been made, occasional grammar/syntax errors may have been  missed.

## 2017-06-16 ENCOUNTER — HOSPITAL ENCOUNTER (OUTPATIENT)
Dept: CARDIOLOGY | Facility: CLINIC | Age: 67
Discharge: HOME | End: 2017-06-16
Attending: INTERNAL MEDICINE | Admitting: INTERNAL MEDICINE
Payer: MEDICARE

## 2017-06-16 DIAGNOSIS — Z95.2 S/P AORTIC VALVE REPLACEMENT: ICD-10-CM

## 2017-06-16 PROCEDURE — 93306 TTE W/DOPPLER COMPLETE: CPT

## 2017-06-16 PROCEDURE — 93306 TTE W/DOPPLER COMPLETE: CPT | Mod: 26 | Performed by: INTERNAL MEDICINE

## 2017-06-22 ENCOUNTER — TELEPHONE (OUTPATIENT)
Dept: INTERNAL MEDICINE | Facility: CLINIC | Age: 67
End: 2017-06-22

## 2017-06-22 ENCOUNTER — HOSPITAL ENCOUNTER (OUTPATIENT)
Dept: RESPIRATORY THERAPY | Facility: CLINIC | Age: 67
Discharge: HOME | End: 2017-06-22
Attending: INTERNAL MEDICINE | Admitting: INTERNAL MEDICINE
Payer: MEDICARE

## 2017-06-22 DIAGNOSIS — R06.02 SOB (SHORTNESS OF BREATH): ICD-10-CM

## 2017-06-22 PROCEDURE — 94729 DIFFUSING CAPACITY: CPT

## 2017-06-22 PROCEDURE — 94726 PLETHYSMOGRAPHY LUNG VOLUMES: CPT

## 2017-06-22 PROCEDURE — 94060 EVALUATION OF WHEEZING: CPT

## 2017-06-22 PROCEDURE — 25000125 ZZHC RX 250: Performed by: INTERNAL MEDICINE

## 2017-06-22 RX ORDER — ALBUTEROL SULFATE 0.83 MG/ML
2.5 SOLUTION RESPIRATORY (INHALATION)
Status: COMPLETED | OUTPATIENT
Start: 2017-06-22 | End: 2017-06-22

## 2017-06-22 RX ADMIN — ALBUTEROL SULFATE 2.5 MG: 2.5 SOLUTION RESPIRATORY (INHALATION) at 09:13

## 2017-06-22 NOTE — TELEPHONE ENCOUNTER
----- Message from Mark Blanchard DO sent at 6/21/2017  9:53 PM CDT -----    Please contact the patient and notify him of the following:  Cardiac ejection fraction is reduced at 40%.  This suggests a decreased heart function of about half.    Thank you.  Mark Blanchard DO FACOI

## 2017-06-22 NOTE — PROGRESS NOTES
The FVC is reduced, but the FEV1/FVC ratio is increased.  The inspiratory flow rates are reduced.  The slow vital capacity is reduced.  Following administration of bronchodilators, there is no significant response.  The reduced diffusing capacity indicates a mild loss of functional alveolar capillary surface.  However, the diffusing capacity was not corrected for the patient's hemoglobin.    The diffusion defect is consistent with a pulmonary vascular process.  Anemia cannot be excluded as a potential cause of the diffusion defect without correcting the observed diffusing capacity for hemoglobin.    IMPRESSION:  Mild Diffusion Defect -Pulmonary Vascular        This preliminary report should not be used clinically unless reviewed and signed by a physician.

## 2017-06-22 NOTE — PROGRESS NOTES
Please contact the patient and notify him of the following:  The microalbumin is normal.  Cholesterol is well-controlled with an LDL of 64.  The A1c is much better with a value of 7.7.  This shows improved blood sugar control over the previous 9.4 A little over a year ago.  Thank you.  DO RADHA Horn

## 2017-06-22 NOTE — DISCHARGE INSTRUCTIONS
Thank you for completing pulmonary function testing today.  All results will be scanned into your epic results for your doctor to review.  Please resume taking all your current prescribed medications and diet as directed by your provider.   If you have not heard from your provider about your testing within two weeks and do not have a follow-up appointment scheduled with them please contact your provider about any questions you have concerning your testing.   Thank you  [facility] Pulmonary Function Lab

## 2017-06-22 NOTE — PROGRESS NOTES
Pre-Bronch    Post-Bronch         Actual Pred %Pred  Actual %Pred %Chng       ---- SPIROMETRY ----                          FVC (L)  3.04 4.01 75   3.24 80 +6            FEV1 (L)  2.64 3.06 86   2.68 87 +1            FEV1/FVC (%) 87 77     83   -4            FEV1/SVC (%) 88 69                      FEV1/FEV6 (%) 87 78     83   -4            FEF Max (L/sec) 6.69 8.04 83   6.59 81 -1            FEF 25-75% (L/sec) 3.32 2.40 138   3.65 152 +10            FIVC (L) 3.10       2.64   -14            FIF Max (L/sec) 2.15 6.75 31   1.27 18 -40            Expiratory Time (sec) 6.19       7.10   +14            ----LUNG MECHANICS                           MVV (L/min)   123                      MEP (cmH2O)                          MIP (cmH2O)                          ---- LUNG VOLUMES ----                          SVC (L) 3.00 4.44 67                    IC (L) 2.57 3.85 66                    ERV (L) 0.43 0.59 73                    FRC(Pleth) (L)   3.57     3.16 88              RV (Pleth) (L)   2.54                      TLC (Pleth) (L)   6.72                      RV/TLC (Pleth) (%)   41                      ---- DIFFUSION ----                          DLCOunc (ml/min/mmHg) 16.45 25.61 64                    DLCOcor (ml/min/mmHg)   25.61                      DL/VA (ml/min/mmHg/L) 3.45 3.98                      VA (L) 4.76 6.43 74                    IVC (L) 2.87                        Hgb (gm/dL)   12-18                      ---- BLOOD GASES ----

## 2017-06-22 NOTE — PROGRESS NOTES
Please contact the patient and notify him of the following:  Cardiac ejection fraction is reduced at 40%.  This suggests a decreased heart function of about half.    Thank you.  DO RADHA Horn

## 2017-06-28 LAB
DLCOUNC-%PRED-PRE: 64 %
DLCOUNC-PRE: 16.45 ML/MIN/MMHG
DLCOUNC-PRED: 25.61 ML/MIN/MMHG
ERV-%PRED-PRE: 73 %
ERV-PRE: 0.43 L
ERV-PRED: 0.59 L
EXPTIME-PRE: 6.19 SEC
FEF2575-%PRED-POST: 152 %
FEF2575-%PRED-PRE: 138 %
FEF2575-POST: 3.65 L/SEC
FEF2575-PRE: 3.32 L/SEC
FEF2575-PRED: 2.4 L/SEC
FEFMAX-%PRED-PRE: 83 %
FEFMAX-PRE: 6.69 L/SEC
FEFMAX-PRED: 8.04 L/SEC
FEV1-%PRED-PRE: 86 %
FEV1-PRE: 2.64 L
FEV1FEV6-PRE: 87 %
FEV1FEV6-PRED: 78 %
FEV1FVC-PRE: 87 %
FEV1FVC-PRED: 77 %
FEV1SVC-PRE: 88 %
FEV1SVC-PRED: 69 %
FIFMAX-PRE: 2.15 L/SEC
FRCPLETH-PRED: 3.57 L
FVC-%PRED-PRE: 75 %
FVC-PRE: 3.04 L
FVC-PRED: 4.01 L
GAW-PRED: 1.03 L/S/CMH2O
IC-%PRED-PRE: 66 %
IC-PRE: 2.57 L
IC-PRED: 3.85 L
SGAW-PRED: 0.08 1/CMH2O*S
SRAW-PRED: 4.76 CMH2O*S
VA-%PRED-PRE: 74 %
VA-PRE: 4.76 L
VC-%PRED-PRE: 67 %
VC-PRE: 3 L
VC-PRED: 4.44 L

## 2017-07-03 ENCOUNTER — OFFICE VISIT (OUTPATIENT)
Dept: INTERNAL MEDICINE | Facility: CLINIC | Age: 67
End: 2017-07-03
Payer: MEDICARE

## 2017-07-03 ENCOUNTER — HOSPITAL ENCOUNTER (OUTPATIENT)
Dept: GENERAL RADIOLOGY | Facility: CLINIC | Age: 67
Discharge: HOME | End: 2017-07-03
Attending: INTERNAL MEDICINE | Admitting: INTERNAL MEDICINE
Payer: MEDICARE

## 2017-07-03 VITALS
OXYGEN SATURATION: 98 % | SYSTOLIC BLOOD PRESSURE: 130 MMHG | DIASTOLIC BLOOD PRESSURE: 72 MMHG | WEIGHT: 225 LBS | TEMPERATURE: 97.2 F | BODY MASS INDEX: 34.21 KG/M2 | HEART RATE: 74 BPM

## 2017-07-03 DIAGNOSIS — E66.09 NON MORBID OBESITY DUE TO EXCESS CALORIES: ICD-10-CM

## 2017-07-03 DIAGNOSIS — Z95.2 S/P AORTIC VALVE REPLACEMENT: Primary | ICD-10-CM

## 2017-07-03 DIAGNOSIS — R06.09 DOE (DYSPNEA ON EXERTION): ICD-10-CM

## 2017-07-03 DIAGNOSIS — E11.9 TYPE 2 DIABETES MELLITUS WITHOUT COMPLICATION, WITH LONG-TERM CURRENT USE OF INSULIN (H): ICD-10-CM

## 2017-07-03 DIAGNOSIS — Z95.1 S/P CABG (CORONARY ARTERY BYPASS GRAFT): ICD-10-CM

## 2017-07-03 DIAGNOSIS — I10 ESSENTIAL HYPERTENSION WITH GOAL BLOOD PRESSURE LESS THAN 140/90: ICD-10-CM

## 2017-07-03 DIAGNOSIS — Z95.2 S/P AVR (AORTIC VALVE REPLACEMENT): ICD-10-CM

## 2017-07-03 DIAGNOSIS — K59.04 CHRONIC IDIOPATHIC CONSTIPATION: ICD-10-CM

## 2017-07-03 DIAGNOSIS — Z79.4 TYPE 2 DIABETES MELLITUS WITHOUT COMPLICATION, WITH LONG-TERM CURRENT USE OF INSULIN (H): ICD-10-CM

## 2017-07-03 PROCEDURE — 99214 OFFICE O/P EST MOD 30 MIN: CPT | Performed by: INTERNAL MEDICINE

## 2017-07-03 PROCEDURE — 71020 XR CHEST 2 VW: CPT | Mod: TC

## 2017-07-03 RX ORDER — CARVEDILOL 3.12 MG/1
3.12 TABLET ORAL 2 TIMES DAILY WITH MEALS
Qty: 60 TABLET | Refills: 11 | Status: SHIPPED | OUTPATIENT
Start: 2017-07-03 | End: 2018-07-19

## 2017-07-03 RX ORDER — AMOXICILLIN 250 MG
1-2 CAPSULE ORAL 2 TIMES DAILY
Qty: 100 TABLET | Refills: 11 | Status: SHIPPED | OUTPATIENT
Start: 2017-07-03 | End: 2018-08-31

## 2017-07-03 ASSESSMENT — PAIN SCALES - GENERAL: PAINLEVEL: NO PAIN (0)

## 2017-07-03 NOTE — MR AVS SNAPSHOT
"              After Visit Summary   7/3/2017    Reinaldo Barrios    MRN: [id number]           Patient Information     Date Of Birth          1950        Visit Information        Provider Department      7/3/2017 7:40 AM Mark Blanchard DO [facility]        Today's Diagnoses     S/P aortic valve replacement    -  1    MARTINEZ (dyspnea on exertion)        Type 2 diabetes mellitus without complication, with long-term current use of insulin (H)        S/P AVR (aortic valve replacement)        S/P CABG (coronary artery bypass graft)        Non morbid obesity due to excess calories        Essential hypertension with goal blood pressure less than 140/90        Chronic idiopathic constipation           Follow-ups after your visit        Future tests that were ordered for you today     Open Future Orders        Priority Expected Expires Ordered    XR Chest 2 Views Routine 7/3/2017 7/3/2018 7/3/2017            Who to contact     If you have questions or need follow up information about today's clinic visit or your schedule please contact [facility] directly at [phone].  Normal or non-critical lab and imaging results will be communicated to you by [employer], letter or phone within 4 business days after the clinic has received the results. If you do not hear from us within 7 days, please contact the clinic through [email or web address] or phone. If you have a critical or abnormal lab result, we will notify you by phone as soon as possible.  Submit refill requests through [employer] or call your pharmacy and they will forward the refill request to us. Please allow 3 business days for your refill to be completed.          Additional Information About Your Visit        [employer] Information     [employer] lets you send messages to your doctor, view your test results, renew your prescriptions, schedule appointments and more. To sign up, go to [email or web address] . Click on \"Log in\" on the left " "side of the screen, which will take you to the Welcome page. Then click on \"Sign up Now\" on the right side of the page.     You will be asked to enter the access code listed below, as well as some personal information. Please follow the directions to create your username and password.     Your access code is: [email or web address]  Expires: 2017  7:49 PM     Your access code will  in 90 days. If you need help or a new code, please call your [place] clinic or [phone].        Care EveryWhere ID     This is your Care EveryWhere ID. This could be used by other organizations to access your [place] medical records  [id number]        Your Vitals Were     Pulse Temperature Pulse Oximetry BMI (Body Mass Index)          74 97.2  F (36.2  C) (Temporal) 98% 34.21 kg/m2         Blood Pressure from Last 3 Encounters:   17 130/72   06/15/17 (P) 148/80   17 112/72    Weight from Last 3 Encounters:   17 225 lb (102.1 kg)   06/15/17 (P) 225 lb (102.1 kg)   17 220 lb (99.8 kg)                 Today's Medication Changes          These changes are accurate as of: 7/3/17  8:12 AM.  If you have any questions, ask your nurse or doctor.               These medicines have changed or have updated prescriptions.        Dose/Directions    carvedilol 3.125 MG tablet   Commonly known as:  COREG   This may have changed:  See the new instructions.   Used for:  Essential hypertension with goal blood pressure less than 140/90   Changed by:  Mark Blanchard DO        Dose:  3.125 mg   Take 1 tablet (3.125 mg) by mouth 2 times daily (with meals)   Quantity:  60 tablet   Refills:  11       insulin aspart 100 UNIT/ML injection   Commonly known as:  NovoLOG FLEXPEN   This may have changed:  See the new instructions.   Used for:  Type 2 diabetes mellitus without complication, with long-term current use of insulin (H)   Changed by:  Mark Blanchard DO        INJECT 10 - 12 UNITS UNDER THE SKIN TWICE " A DAY   Quantity:  15 mL   Refills:  11            Where to get your medicines      These medications were sent to [id number] - [place], [place] - [address]   [address] [place] [address]    Hours:  M-F 8:30-6:30; Sat 9-4; closed Sunday Phone:  [phone]     carvedilol 3.125 MG tablet    insulin aspart 100 UNIT/ML injection    senna-docusate 8.6-50 MG per tablet                Primary Care Provider Office Phone # Fax #    Mark Blanchard,  [phone]       [address]   [place] [address]        Equal Access to Services     ERICA GUERRERO : Hadii teressa vizcaino hadasho Soomaali, waaxda luqadaha, qaybta kaalmada adeegyada, ansley mcgee. So [facility] [phone].    ATENCIÓN: Si habla español, tiene a templeton disposición servicios gratuitos de asistencia lingüística. Llame al [phone].    We comply with applicable federal civil rights laws and [place] laws. We do not discriminate on the basis of race, color, national origin, age, disability sex, sexual orientation or gender identity.            Thank you!     Thank you for choosing [facility]  for your care. Our goal is always to provide you with excellent care. Hearing back from our patients is one way we can continue to improve our services. Please take a few minutes to complete the written survey that you may receive in the mail after your visit with us. Thank you!             Your Updated Medication List - Protect others around you: Learn how to safely use, store and throw away your medicines at www.disposemymeds.org.          This list is accurate as of: 7/3/17  8:12 AM.  Always use your most recent med list.                   Brand Name Dispense Instructions for use Diagnosis    aspirin 81 MG EC tablet      Take 1 tablet (81 mg) by mouth daily    S/P CABG (coronary artery bypass graft)       atorvastatin 40 MG tablet    LIPITOR    30 tablet    TAKE 1 TABLET BY  "MOUTH DALE Y AT BEDTIME    Hyperlipidemia LDL goal <100       blood glucose monitoring meter device kit     1 kit    Use to test blood sugars 3 times daily or as directed.    Type 2 diabetes mellitus without complication (H)       blood glucose monitoring test strip    no brand specified    100 strip    Use to test blood sugars 3 times daily or as directed    Type 2 diabetes mellitus with hyperglycemia, with long-term current use of insulin (H)       carvedilol 3.125 MG tablet    COREG    60 tablet    Take 1 tablet (3.125 mg) by mouth 2 times daily (with meals)    Essential hypertension with goal blood pressure less than 140/90       insulin aspart 100 UNIT/ML injection    NovoLOG FLEXPEN    15 mL    INJECT 10 - 12 UNITS UNDER THE SKIN TWICE A DAY    Type 2 diabetes mellitus without complication, with long-term current use of insulin (H)       insulin glargine 100 UNIT/ML injection    LANTUS    3 Month    40 units am, 10 units pm    Type 2 diabetes mellitus without complication, with long-term current use of insulin (H)       insulin pen needle 31G X 8 MM     100 each    Use three pen needles daily or as directed.    Type 2 diabetes mellitus with hyperglycemia, with long-term current use of insulin (H)       insulin syringe-needle U-100 31G X 5/16\" 0.5 ML    BD insulin syringe ultrafine    100 each    Use one syringe 3 daily or as directed.    Type 2 diabetes mellitus without complication, with long-term current use of insulin (H)       isosorbide mononitrate 30 MG 24 hr tablet    IMDUR    30 tablet    TAKE 1 TABLET BY MOUTH EVERY DAY    Precordial pain       lisinopril 5 MG tablet    PRINIVIL/ZESTRIL    30 tablet    TAKE 1 TABLET BY MOUTH EVER Y DAY    Type 2 diabetes mellitus without complication, with long-term current use of insulin (H), Systolic and diastolic CHF, chronic (H)       metoprolol 25 MG 24 hr tablet    TOPROL-XL    30 tablet    Take 1 tablet (25 mg) by mouth daily    Ischemic cardiomyopathy       " order for DME     1 Device    Equipment being ordered: bp cuff    Essential hypertension       senna-docusate 8.6-50 MG per tablet    SENOKOT-S;PERICOLACE    100 tablet    Take 1-2 tablets by mouth 2 times daily    Chronic idiopathic constipation

## 2017-07-03 NOTE — PROGRESS NOTES
Chief Complaint   Patient presents with     RECHECK     CHIEF COMPLAINT:    The patient is a pleasant 66-year-old gentleman who presents today for review of his diabetes as well as his previous heart disease, hypertension and some recent dyspnea upon exertion. He notes that the shortness of breath has worsened lately but only occurs after he is pushed mowed his lawn for one half of an hour or so. He states that after his surgery this was not a problem when he is concerned that this might be a change. He does not have any cardiac chest pain but occasionally has some musculoskeletal discomfort with positioning of the sternum. He notes that he has gained significant weight as a result of his eating habits and is working on this aggressively. He notes his blood sugars have been fairly well-controlled though he does have values are so elevated. His most recent glycosylated hemoglobin was actually improved at 7.7%. He is status post aortic valve replacement and quadruple bypass, he notes he is doing well from the standpoint. He takes his medications compliantly and has had no side effects from the medication.                         PAST, FAMILY,SOCIAL HISTORY:     Medical  History:   has a past medical history of Aortic valve stenosis (11/2/2015); Cardiac LV ejection fraction 30-35% (11/2/2015); Chest pain, unspecified chest pain type (11/2/2015); Hiatal hernia (11/2/2015); Hyperlipidemia LDL goal <100 (11/2/2015); Personal history of tobacco use, presenting hazards to health (11/2/2015); and Type 2 diabetes mellitus without complication (H) (11/2/2015).     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     Social History:   reports that he quit smoking about 13 months ago. His smoking use included Pipe. He has never used smokeless tobacco. He reports that he drinks alcohol. He reports that he  "does not use illicit drugs.     Family History:  family history is not on file.            MEDICATIONS  Current Outpatient Prescriptions   Medication Sig Dispense Refill     insulin aspart (NOVOLOG FLEXPEN) 100 UNIT/ML injection INJECT 10 - 12 UNITS UNDER THE SKIN TWICE A DAY 15 mL 11     senna-docusate (SENOKOT-S;PERICOLACE) 8.6-50 MG per tablet Take 1-2 tablets by mouth 2 times daily 100 tablet 11     carvedilol (COREG) 3.125 MG tablet Take 1 tablet (3.125 mg) by mouth 2 times daily (with meals) 60 tablet 11     isosorbide mononitrate (IMDUR) 30 MG 24 hr tablet TAKE 1 TABLET BY MOUTH EVERY DAY 30 tablet 3     blood glucose monitoring (NO BRAND SPECIFIED) test strip Use to test blood sugars 3 times daily or as directed 100 strip 1     lisinopril (PRINIVIL/ZESTRIL) 5 MG tablet TAKE 1 TABLET BY MOUTH EVER Y DAY 30 tablet 11     atorvastatin (LIPITOR) 40 MG tablet TAKE 1 TABLET BY MOUTH DALE Y AT BEDTIME 30 tablet 11     insulin pen needle 31G X 8 MM Use three pen needles daily or as directed. 100 each 5     insulin syringe-needle U-100 (BD INSULIN SYRINGE ULTRAFINE) 31G X 5/16\" 0.5 ML Use one syringe 3 daily or as directed. 100 each prn     metoprolol (TOPROL-XL) 25 MG 24 hr tablet Take 1 tablet (25 mg) by mouth daily 30 tablet 1     insulin glargine (LANTUS) 100 UNIT/ML injection 40 units am, 10 units pm 3 Month 3     blood glucose monitoring ([employer] CONTOUR MONITOR) meter device kit Use to test blood sugars 3 times daily or as directed. 1 kit 0     order for DME Equipment being ordered: bp cuff 1 Device 0     aspirin EC 81 MG EC tablet Take 1 tablet (81 mg) by mouth daily       [DISCONTINUED] NOVOLOG FLEXPEN 100 UNIT/ML soln INJECT 10 - 12 UNITS UNDER THE SKIN TWICE A DAY 15 mL 0     [DISCONTINUED] carvedilol (COREG) 3.125 MG tablet TAKE 1 TABLET BY MOUTH TWIC E A DAY WITH MEALS 60 tablet 1     [DISCONTINUED] isosorbide mononitrate (IMDUR) 30 MG 24 hr tablet Take half tablet one time daily. 30 tablet 1 "         --------------------------------------------------------------------------------------------------------------------                          REVIEW OF SYSTEMS:         LUNGS: Pt denies: cough,excess sputum, hemoptysis, or shortness of breath at rest. Does have a little bit of dyspnea as described with continued exertion..   HEART: Pt denies: chest pain, arrythmia, syncope, tachy or bradyarrhythmia or excess edema.   GI: Pt denies: nausea, vomitting, diarrhea, constipation, melena, or hematochezia.   NEURO: Pt denies: seizures, strokes, diplopia, weakness, paraesthesias, or paralysis.                          EXAMINATION:         /72 (BP Location: Left arm, Patient Position: Chair, Cuff Size: Adult Regular)  Pulse 74  Temp 97.2  F (36.2  C) (Temporal)  Wt 225 lb (102.1 kg)  SpO2 98%  BMI (P) 34.21 kg/m2   Constitutional: The patient appears to be in no acute distress. The patient appears to be adequately hydrated. No acute respiratory or hemodynamic distress is noted at this time.   LUNGS: clear bilaterally, airflow is brisk, no intercostal retraction or stridor is noted. No coughing is noted during visit.   HEART:  regular without rubs, clicks, gallops, or murmurs. PMI is nondisplaced. Upstrokes are brisk. S1,S2 are heard.   GI: Abdomen is soft, without rebound, guarding or tenderness. Bowel sounds are appropriate. No renal bruits are heard. Abdomen is obese.   NEURO: Pt is alert and appropriate. No neurologic lateralization is noted. Cranial nerves 2-12 are intact. Peripheral sensory and motor function are grossly normal   SKIN:  warm and dry. No erythema, or rashes are noted. No specific lesions of concern are noted.                           DECISION MAKIN. MARTINEZ (dyspnea on exertion)  Check chest x-ray  - XR Chest 2 Views; Future    2. Type 2 diabetes mellitus without complication, with long-term current use of insulin (H)  Continue current diabetic/insulin program  - insulin aspart  (NOVOLOG FLEXPEN) 100 UNIT/ML injection; INJECT 10 - 12 UNITS UNDER THE SKIN TWICE A DAY  Dispense: 15 mL; Refill: 11    3. S/P AVR (aortic valve replacement)  Stable  -   4. S/P CABG (coronary artery bypass graft)  Stable  - 5. Non morbid obesity due to excess calories  Recommend caloric restriction and exercise as tolerated    6. Essential hypertension with goal blood pressure less than 140/90    - carvedilol (COREG) 3.125 MG tablet; Take 1 tablet (3.125 mg) by mouth 2 times daily (with meals)  Dispense: 60 tablet; Refill: 11    7. S/P aortic valve replacement                               FOLLOW UP    I have asked the patient to make an appointment for follow up with me in 3 months or as needed        I have carefully explained the diagnosis and treatment options with the patient. The patient has displayed an understanding of the above, and all subsequent questions were answered.         DO RADHA Horn    Portions of this note were produced using [employer]  Although every attempt at real-time proof reading has been made, occasional grammar/syntax errors may have been missed.

## 2017-07-03 NOTE — NURSING NOTE
"Chief Complaint   Patient presents with     RECHECK       Initial /72 (BP Location: Left arm, Patient Position: Chair, Cuff Size: Adult Regular)  Pulse 74  Temp 97.2  F (36.2  C) (Temporal)  Wt 225 lb (102.1 kg)  SpO2 98%  BMI (P) 34.21 kg/m2 Estimated body mass index is 34.21 kg/(m^2) (pended) as calculated from the following:    Height as of 6/15/17: (P) 5' 8\" (1.727 m).    Weight as of this encounter: 225 lb (102.1 kg).  Medication Reconciliation: complete  Health Maintenance reviewed at today's visit patient asked to schedule/complete:   Colon Cancer:  Patient declined   [facility]      "

## 2017-10-18 DIAGNOSIS — Z79.4 TYPE 2 DIABETES MELLITUS WITH HYPERGLYCEMIA, WITH LONG-TERM CURRENT USE OF INSULIN (H): ICD-10-CM

## 2017-10-18 DIAGNOSIS — E11.65 TYPE 2 DIABETES MELLITUS WITH HYPERGLYCEMIA, WITH LONG-TERM CURRENT USE OF INSULIN (H): ICD-10-CM

## 2017-10-18 NOTE — TELEPHONE ENCOUNTER
Test strips      Last Written Prescription Date: 6/13/17  Last Fill Quantity: 100,  # refills: 1   Last Office Visit with [facility], [facility] or [facility] prescribing provider: 7/3/17

## 2017-10-19 NOTE — TELEPHONE ENCOUNTER
Prescription approved per [facility] Refill Protocol.    Laura Dominguez RN  [facility]

## 2017-11-06 ENCOUNTER — APPOINTMENT (OUTPATIENT)
Dept: CT IMAGING | Facility: CLINIC | Age: 67
End: 2017-11-06
Attending: EMERGENCY MEDICINE
Payer: MEDICARE

## 2017-11-06 ENCOUNTER — HOSPITAL ENCOUNTER (EMERGENCY)
Facility: CLINIC | Age: 67
Discharge: HOME | End: 2017-11-06
Attending: EMERGENCY MEDICINE | Admitting: EMERGENCY MEDICINE
Payer: MEDICARE

## 2017-11-06 VITALS
OXYGEN SATURATION: 97 % | BODY MASS INDEX: 34.21 KG/M2 | RESPIRATION RATE: 20 BRPM | SYSTOLIC BLOOD PRESSURE: 176 MMHG | HEART RATE: 80 BPM | WEIGHT: 225 LBS | DIASTOLIC BLOOD PRESSURE: 90 MMHG | TEMPERATURE: 98.2 F

## 2017-11-06 DIAGNOSIS — S22.41XA CLOSED FRACTURE OF MULTIPLE RIBS OF RIGHT SIDE, INITIAL ENCOUNTER: ICD-10-CM
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
## 2017-11-06 LAB
ANION GAP SERPL CALCULATED.3IONS-SCNC: 8 MMOL/L (ref 3–14)
BUN SERPL-MCNC: 13 MG/DL (ref 7–30)
CALCIUM SERPL-MCNC: 8.8 MG/DL (ref 8.5–10.1)
CHLORIDE SERPL-SCNC: 104 MMOL/L (ref 94–109)
CO2 SERPL-SCNC: 26 MMOL/L (ref 20–32)
CREAT SERPL-MCNC: 0.93 MG/DL (ref 0.66–1.25)
GFR SERPL CREATININE-BSD FRML MDRD: 81 ML/MIN/1.7M2
GLUCOSE SERPL-MCNC: 206 MG/DL (ref 70–99)
POTASSIUM SERPL-SCNC: 4 MMOL/L (ref 3.4–5.3)
SODIUM SERPL-SCNC: 138 MMOL/L (ref 133–144)

## 2017-11-06 PROCEDURE — 99285 EMERGENCY DEPT VISIT HI MDM: CPT | Mod: Z6 | Performed by: EMERGENCY MEDICINE

## 2017-11-06 PROCEDURE — 25000125 ZZHC RX 250: Performed by: RADIOLOGY

## 2017-11-06 PROCEDURE — 25000128 H RX IP 250 OP 636: Performed by: EMERGENCY MEDICINE

## 2017-11-06 PROCEDURE — A9270 NON-COVERED ITEM OR SERVICE: HCPCS | Mod: GY | Performed by: EMERGENCY MEDICINE

## 2017-11-06 PROCEDURE — 71260 CT THORAX DX C+: CPT

## 2017-11-06 PROCEDURE — 99285 EMERGENCY DEPT VISIT HI MDM: CPT | Mod: 25 | Performed by: EMERGENCY MEDICINE

## 2017-11-06 PROCEDURE — 25000128 H RX IP 250 OP 636: Performed by: RADIOLOGY

## 2017-11-06 PROCEDURE — 80048 BASIC METABOLIC PNL TOTAL CA: CPT | Performed by: EMERGENCY MEDICINE

## 2017-11-06 PROCEDURE — 25000132 ZZH RX MED GY IP 250 OP 250 PS 637: Mod: GY | Performed by: EMERGENCY MEDICINE

## 2017-11-06 RX ORDER — LIDOCAINE 50 MG/G
1 PATCH TOPICAL ONCE
Status: COMPLETED | OUTPATIENT
Start: 2017-11-06 | End: 2017-11-06

## 2017-11-06 RX ORDER — IOPAMIDOL 755 MG/ML
500 INJECTION, SOLUTION INTRAVASCULAR ONCE
Status: COMPLETED | OUTPATIENT
Start: 2017-11-06 | End: 2017-11-06

## 2017-11-06 RX ORDER — OXYCODONE HYDROCHLORIDE 5 MG/1
5-10 TABLET ORAL EVERY 6 HOURS PRN
Qty: 20 TABLET | Refills: 0 | Status: SHIPPED | OUTPATIENT
Start: 2017-11-06 | End: 2017-11-13

## 2017-11-06 RX ADMIN — SODIUM CHLORIDE 1000 ML: 9 INJECTION, SOLUTION INTRAVENOUS at 13:32

## 2017-11-06 RX ADMIN — IOPAMIDOL 75 ML: 755 INJECTION, SOLUTION INTRAVENOUS at 12:41

## 2017-11-06 RX ADMIN — LIDOCAINE 1 PATCH: 50 PATCH TOPICAL at 13:31

## 2017-11-06 RX ADMIN — SODIUM CHLORIDE 75 ML: 9 INJECTION, SOLUTION INTRAVENOUS at 12:41

## 2017-11-06 NOTE — ED AVS SNAPSHOT
[facility]    [address] [place] [address]    Phone:  [phone]    Fax:  [phone]                                       Reinaldo Barrios   MRN: [id number]    Department:  [facility]   Date of Visit:  11/6/2017           After Visit Summary Signature Page     I have received my discharge instructions, and my questions have been answered. I have discussed any challenges I see with this plan with the nurse or doctor.    ..........................................................................................................................................  Patient/Patient Representative Signature      ..........................................................................................................................................  Patient Representative Print Name and Relationship to Patient    ..................................................               ................................................  Date                                            Time    ..........................................................................................................................................  Reviewed by Signature/Title    ...................................................              ..............................................  Date                                                            Time

## 2017-11-06 NOTE — ED AVS SNAPSHOT
[facility] Emergency Department    [address] [place] [address]    Phone:  [phone]    Fax:  [phone]                                       Reianldo Barrios   MRN: [id number]    Department:  [facility] Emergency Department   Date of Visit:  11/6/2017           Patient Information     Date Of Birth          1950        Your diagnoses for this visit were:     Closed fracture of multiple ribs of right side, initial encounter     Fall due to slipping on ice or snow, initial encounter        You were seen by Geno Flanagan MD.      Follow-up Information     Follow up with Mark Blanchard DO.    Specialty:  Internal Medicine    Contact information:    [address] [place] [id number] [phone]          Discharge Instructions       Oxycodone as needed for severe pain.    Okay to use sparingly use Aleve or ibuprofen for pain.    Lidocaine patch over the area of greatest pain as needed. You can buy this over-the-counter at the pharmacy. Lidocaine 4%.    Use your incentive spirometer hourly.    Rib belt if desired.    Follow-up in clinic at scheduled appointment. Return at any time for uncontrolled pain, worsening, changes or concerns.    I hope you heal quickly!!    Discharge References/Attachments     RIB BELT (ENGLISH)    RIB FRACTURE (ENGLISH)    FRACTURE, RIB (BROKEN RIB) (ENGLISH)      24 Hour Appointment Hotline       To make an appointment at any [facility] clinic, call [phone] ([phone]). If you don't have a family doctor or clinic, we will help you find one. [facility] are conveniently located to serve the needs of you and your family.          ED Discharge Orders     RIB BELT ELASTIC STRGT XL 43                    Review of your medicines      START taking        Dose / Directions Last dose taken    oxyCODONE IR 5 MG tablet   Commonly known as:  ROXICODONE   Dose:  5-10 mg   Quantity:  20 tablet        Take 1-2  "tablets (5-10 mg) by mouth every 6 hours as needed for pain   Refills:  0          Our records show that you are taking the medicines listed below. If these are incorrect, please call your family doctor or clinic.        Dose / Directions Last dose taken    aspirin 81 MG EC tablet   Dose:  81 mg        Take 1 tablet (81 mg) by mouth daily   Refills:  0        atorvastatin 40 MG tablet   Commonly known as:  LIPITOR   Quantity:  30 tablet        TAKE 1 TABLET BY MOUTH DALE Y AT BEDTIME   Refills:  11        blood glucose monitoring meter device kit   Quantity:  1 kit        Use to test blood sugars 3 times daily or as directed.   Refills:  0        * blood glucose monitoring test strip   Commonly known as:  no brand specified   Quantity:  100 strip        Use to test blood sugars 3 times daily or as directed   Refills:  1        * ONETOUCH ULTRA test strip   Quantity:  100 each   Generic drug:  blood glucose monitoring        USE TO TEST BLOOD SUGAR 3 TIMES A DAY   Refills:  3        carvedilol 3.125 MG tablet   Commonly known as:  COREG   Dose:  3.125 mg   Quantity:  60 tablet        Take 1 tablet (3.125 mg) by mouth 2 times daily (with meals)   Refills:  11        insulin aspart 100 UNIT/ML injection   Commonly known as:  NovoLOG FLEXPEN   Quantity:  15 mL        INJECT 10 - 12 UNITS UNDER THE SKIN TWICE A DAY   Refills:  11        insulin glargine 100 UNIT/ML injection   Commonly known as:  LANTUS   Quantity:  3 Month        40 units am, 10 units pm   Refills:  3        insulin pen needle 31G X 8 MM   Quantity:  100 each        Use three pen needles daily or as directed.   Refills:  5        insulin syringe-needle U-100 31G X 5/16\" 0.5 ML   Commonly known as:  BD insulin syringe ultrafine   Quantity:  100 each        Use one syringe 3 daily or as directed.   Refills:  prn        isosorbide mononitrate 30 MG 24 hr tablet   Commonly known as:  IMDUR   Quantity:  30 tablet        TAKE 1 TABLET BY MOUTH EVERY DAY "   Refills:  8        lisinopril 5 MG tablet   Commonly known as:  PRINIVIL/ZESTRIL   Quantity:  30 tablet        TAKE 1 TABLET BY MOUTH EVER Y DAY   Refills:  11        metoprolol 25 MG 24 hr tablet   Commonly known as:  TOPROL-XL   Dose:  25 mg   Quantity:  30 tablet        Take 1 tablet (25 mg) by mouth daily   Refills:  1        order for DME   Quantity:  1 Device        Equipment being ordered: bp cuff   Refills:  0        senna-docusate 8.6-50 MG per tablet   Commonly known as:  SENOKOT-S;PERICOLACE   Dose:  1-2 tablet   Quantity:  100 tablet        Take 1-2 tablets by mouth 2 times daily   Refills:  11        * Notice:  This list has 2 medication(s) that are the same as other medications prescribed for you. Read the directions carefully, and ask your doctor or other care provider to review them with you.            Prescriptions were sent or printed at these locations (1 Prescription)                   Other Prescriptions                Printed at Department/Unit printer (1 of 1)         oxyCODONE IR (ROXICODONE) 5 MG tablet                Procedures and tests performed during your visit     Basic metabolic panel    CT CHEST W CONTRAST      Orders Needing Specimen Collection     None      Pending Results     Date and Time Order Name Status Description    11/6/2017 1125 CT CHEST W CONTRAST Preliminary             Pending Culture Results     No orders found from 11/4/2017 to 11/7/2017.            Pending Results Instructions     If you had any lab results that were not finalized at the time of your Discharge, you can call the ED Lab Result RN at [phone]. You will be contacted by this team for any positive Lab results or changes in treatment. The nurses are available 7 days a week from 10A to 6:30P.  You can leave a message 24 hours per day and they will return your call.        Thank you for choosing [facility]       Thank you for choosing [facility] for your care. Our goal is always to provide you with  "excellent care. Hearing back from our patients is one way we can continue to improve our services. Please take a few minutes to complete the written survey that you may receive in the mail after you visit with us. Thank you!        [employer] Information     [employer] lets you send messages to your doctor, view your test results, renew your prescriptions, schedule appointments and more. To sign up, go to [email or web address] . Click on \"Log in\" on the left side of the screen, which will take you to the Welcome page. Then click on \"Sign up Now\" on the right side of the page.     You will be asked to enter the access code listed below, as well as some personal information. Please follow the directions to create your username and password.     Your access code is: [email or web address]  Expires: 2018  2:22 PM     Your access code will  in 90 days. If you need help or a new code, please call your [facility] clinic or [phone].        Care EveryWhere ID     This is your Care EveryWhere ID. This could be used by other organizations to access your [facility] medical records  [id number]        Equal Access to Services     JAUN GUERRERO : Hadii teressa Syed, vipul browne, qahilary kaalmajennifer wiseman, ansley rodriguez . [facility] [phone].    ATENCIÓN: Si habla español, tiene a [place]ton disposición servicios gratuitos de asistencia lingüística. Llame al [phone].    We comply with applicable federal civil rights laws and [place] laws. We do not discriminate on the basis of race, color, national origin, age, disability, sex, sexual orientation, or gender identity.            After Visit Summary       This is your record. Keep this with you and show to your community pharmacist(s) and doctor(s) at your next visit.                  "

## 2017-11-06 NOTE — DISCHARGE INSTRUCTIONS
Oxycodone as needed for severe pain.    Okay to use sparingly use Aleve or ibuprofen for pain.    Lidocaine patch over the area of greatest pain as needed. You can buy this over-the-counter at the pharmacy. Lidocaine 4%.    Use your incentive spirometer hourly.    Rib belt if desired.    Follow-up in clinic at scheduled appointment. Return at any time for uncontrolled pain, worsening, changes or concerns.    I hope you heal quickly!!

## 2017-11-07 NOTE — ED PROVIDER NOTES
"  History     Chief Complaint   Patient presents with     Fall     The history is provided by the patient and medical records.     This is a 67-year-old male with history of coronary disease status CABG ×4, type II diabetes, hypertension, hyperlipidemia, aortic valve replacement, CHF, presenting after a fall. Patient accidentally slipped on some ice that was on the stairs outside his house today. He landed on his right posterior side. He notes pain in this area. He did hear a \"crack\" when he fell. Pain increases with movement and deep breath. He denies any headache or head trauma, neck pain, shoulder or arm pain, lower extremity pain. No abdominal pain.  No other injuries or complaints.    Problem List:    Patient Active Problem List    Diagnosis Date Noted     Non morbid obesity due to excess calories 07/03/2017     Priority: Medium     Type 2 diabetes mellitus without complication, without long-term current use of insulin (H) 03/06/2017     Priority: Medium     Type 2 diabetes mellitus with hyperglycemia (H) 08/10/2016     Priority: Medium     Essential hypertension with goal blood pressure less than 140/90 05/22/2016     Priority: Medium     S/P aortic valve replacement 04/27/2016     Priority: Medium     Atypical atrial flutter (H) 04/27/2016     Priority: Medium     Long-term (current) use of anticoagulants [Z79.01] 03/16/2016     Priority: Medium     Heart valve replaced [Z95.2] 03/16/2016     Priority: Medium     Advanced directives, counseling/discussion 01/12/2016     Priority: Medium     declined       S/P CABG (coronary artery bypass graft) 12/04/2015     Priority: Medium     Aortic stenosis 11/07/2015     Priority: Medium     CHF (congestive heart failure) (H) 11/05/2015     Priority: Medium     NSTEMI (non-ST elevated myocardial infarction) (H) 11/03/2015     Priority: Medium     ACS (acute coronary syndrome) (H) 11/03/2015     Priority: Medium     Aortic valve stenosis 11/02/2015     Priority: Medium "     Personal history of tobacco use, presenting hazards to health 11/02/2015     Priority: Medium     Hyperlipidemia LDL goal <100 11/02/2015     Priority: Medium     Hiatal hernia 11/02/2015     Priority: Medium     Chest pain, unspecified chest pain type 11/02/2015     Priority: Medium     Cardiac LV ejection fraction 30-35% 11/02/2015     Priority: Medium        Past Medical History:    Past Medical History:   Diagnosis Date     Aortic valve stenosis 11/2/2015     Cardiac LV ejection fraction 30-35% 11/2/2015     Chest pain, unspecified chest pain type 11/2/2015     Hiatal hernia 11/2/2015     Hyperlipidemia LDL goal <100 11/2/2015     Personal history of tobacco use, presenting hazards to health 11/2/2015     Type 2 diabetes mellitus without complication (H) 11/2/2015       Past Surgical History:    Past Surgical History:   Procedure Laterality Date     BYPASS GRAFT ARTERY CORONARY N/A 11/7/2015    Procedure: BYPASS GRAFT ARTERY CORONARY;  Surgeon: Sia Caldera MD;  Location: [facility] OR     KNEE SURGERY  2009     REPLACE VALVE AORTIC N/A 11/7/2015    Procedure: REPLACE VALVE AORTIC;  Surgeon: Sia Caldera MD;  Location:  OR       Family History:    No family history on file.    Social History:  Marital Status:  Single [1]  Social History   Substance Use Topics     Smoking status: Former Smoker     Types: Pipe     Quit date: 5/15/2016     Smokeless tobacco: Never Used      Comment: smokes a pipe in good weather     Alcohol use 0.0 oz/week     0 Standard drinks or equivalent per week      Comment: rare        Medications:      oxyCODONE IR (ROXICODONE) 5 MG tablet   insulin aspart (NOVOLOG FLEXPEN) 100 UNIT/ML injection   senna-docusate (SENOKOT-S;PERICOLACE) 8.6-50 MG per tablet   carvedilol (COREG) 3.125 MG tablet   blood glucose monitoring (NO BRAND SPECIFIED) test strip   lisinopril (PRINIVIL/ZESTRIL) 5 MG tablet   atorvastatin (LIPITOR) 40 MG tablet   aspirin EC 81 MG EC tablet   isosorbide  "mononitrate (IMDUR) 30 MG 24 hr tablet   ONE TOUCH ULTRA test strip   insulin pen needle 31G X 8 MM   insulin syringe-needle U-100 (BD INSULIN SYRINGE ULTRAFINE) 31G X 5/16\" 0.5 ML   metoprolol (TOPROL-XL) 25 MG 24 hr tablet   insulin glargine (LANTUS) 100 UNIT/ML injection   blood glucose monitoring (NAOMI CONTOUR MONITOR) meter device kit   order for DME         Review of Systems   All other ROS reviewed and are negative or non-contributory except as stated in HPI.     Physical Exam   BP: (!) 170/95  Pulse: 80  Temp: 98.2  F (36.8  C)  Resp: 20  Weight: 102.1 kg (225 lb)  SpO2: 97 %      Physical Exam   Constitutional: He appears well-developed and well-nourished.   Very pleasant, talkative, obese older male. Initially walking around in the room.   HENT:   Head: Normocephalic.   Nose: Nose normal.   Eyes: Conjunctivae and EOM are normal.   Neck: Normal range of motion. Neck supple.   No neck pain or tenderness   Cardiovascular: Normal rate, regular rhythm, normal heart sounds and intact distal pulses.    Pulmonary/Chest: He exhibits tenderness (Right lateral and posterior wall tenderness without obvious bony step-off, crepitus, bruising or skin changes.).   Mild splinting. Breath sounds clear.   Abdominal: Soft. There is no tenderness.   Musculoskeletal: Normal range of motion.        Back:    Neurological: He is alert. He exhibits normal muscle tone.   Skin: Skin is warm and dry. He is not diaphoretic.   Psychiatric: He has a normal mood and affect. His behavior is normal.   Vitals reviewed.      ED Course (with Medical Decision Making)    Pt seen and examined by me.  RN and EPIC notes reviewed.      Patient with right posterior chest wall tenderness after a fall on some steps. Contusion versus fractures. IV placed for fluids and pain medications if needed. CT scan.     CT scan shows 3 rib fractures as noted below.      These results were discussed at length with the patient. Lidoderm patch was placed over the " area. Discussed options.   Option #1. Try pain control at home with oral pain medications. Rib belt if desired.   Option #2. Admit observation status for IV pain control with plan for transition to oral meds.   Option #3. Discussed transfer with [facility] for interventional pain management with anesthesia for pain pump.     All these options were evaluated by the patient and he chooses to try to manage at home at this point. He was given an Rx for oxycodone.  We discussed Lidoderm patches that be bought over-the-counter. He notes that he has an incentive spirometer at home. He is given a rib belt to try for comfort.     If the patient has any worsening, changes or concerns he should return to the ED promptly at any time. He is comfortable with this plan.      ED Course     Procedures  Results for orders placed or performed during the hospital encounter of 11/06/17   CT CHEST W CONTRAST    Narrative    CT CHEST WITH CONTRAST November 6, 2017 12:54 PM    HISTORY: Fall; right pain.    TECHNIQUE: Scans obtained from the apices through the diaphragm with  IV contrast. 75mL, Isovue 370 mL injected. Radiation dose for this  scan was reduced using automated exposure control, adjustment of the  mA and/or kV according to patient size, or iterative reconstruction  technique.    COMPARISON: None.    FINDINGS: Mildly comminuted posterior right 10th rib, subtle right  ninth rib and mildly displaced lateral right eighth rib fractures are  noted. There is a tiny right pleural fluid collection which likely  represents hemothorax. No pneumothorax is identified. Lungs are  otherwise clear without nodule, mass, infiltrate, effusion, or  pneumothorax.    Postop changes of the chest likely from prior aortic valve replacement  are noted. There are coronary artery calcifications and/or stents.  Sternal cerclage wires are noted. No mediastinal, hilar, or axillary  lymphadenopathy is seen.    Aorta is grossly of normal caliber. No  central pulmonary artery  filling defects to suggest pulmonary artery embolism. Visualized  portions of the thyroid are normal in appearance.    Visualized portions of the upper abdominal contents are grossly  unremarkable other than atherosclerotic calcifications.      Impression    IMPRESSION:  1. Mildly comminuted and minimally displaced right posterior 10th rib  fracture, subtle posterior right ninth rib fracture and mildly  displaced right lateral eighth rib fracture are noted. Probable tiny  right hemothorax. No pneumothorax.  2. Postoperative changes of the chest as described. Atherosclerosis.  3. No other acute traumatic injury is identified.    CAM DOWNING MD   Basic metabolic panel   Result Value Ref Range    Sodium 138 133 - 144 mmol/L    Potassium 4.0 3.4 - 5.3 mmol/L    Chloride 104 94 - 109 mmol/L    Carbon Dioxide 26 20 - 32 mmol/L    Anion Gap 8 3 - 14 mmol/L    Glucose 206 (H) 70 - 99 mg/dL    Urea Nitrogen 13 7 - 30 mg/dL    Creatinine 0.93 0.66 - 1.25 mg/dL    GFR Estimate 81 >60 mL/min/1.7m2    GFR Estimate If Black >90 >60 mL/min/1.7m2    Calcium 8.8 8.5 - 10.1 mg/dL        Assessments & Plan   I have reviewed the findings, diagnosis, plan and need for follow up with the patient.    Discharge Medication List as of 11/6/2017  2:22 PM      START taking these medications    Details   oxyCODONE IR (ROXICODONE) 5 MG tablet Take 1-2 tablets (5-10 mg) by mouth every 6 hours as needed for pain, Disp-20 tablet, R-0, Local Print             Final diagnoses:   Closed fracture of multiple ribs of right side, initial encounter   Fall due to slipping on ice or snow, initial encounter     Disposition: Patient discharged home in stable condition. Plan as above. Return for concerns.    Note: Chart documentation done in part with Dragon Voice Recognition software. Although reviewed after completion, some word and grammatical errors may remain.     11/6/2017   [facility] EMERGENCY DEPARTMENT     Masha  Geno Mcarthur MD  11/07/17 0041

## 2017-11-13 ENCOUNTER — OFFICE VISIT (OUTPATIENT)
Dept: INTERNAL MEDICINE | Facility: CLINIC | Age: 67
End: 2017-11-13
Payer: MEDICARE

## 2017-11-13 VITALS
HEART RATE: 84 BPM | BODY MASS INDEX: 34.61 KG/M2 | WEIGHT: 227.6 LBS | SYSTOLIC BLOOD PRESSURE: 112 MMHG | OXYGEN SATURATION: 97 % | DIASTOLIC BLOOD PRESSURE: 62 MMHG | TEMPERATURE: 97.1 F

## 2017-11-13 DIAGNOSIS — Z95.2 HEART VALVE REPLACED: ICD-10-CM

## 2017-11-13 DIAGNOSIS — S22.41XD CLOSED FRACTURE OF MULTIPLE RIBS OF RIGHT SIDE WITH ROUTINE HEALING, SUBSEQUENT ENCOUNTER: ICD-10-CM

## 2017-11-13 DIAGNOSIS — E11.9 TYPE 2 DIABETES MELLITUS WITHOUT COMPLICATION, WITHOUT LONG-TERM CURRENT USE OF INSULIN (H): Primary | ICD-10-CM

## 2017-11-13 DIAGNOSIS — Z95.1 S/P CABG (CORONARY ARTERY BYPASS GRAFT): ICD-10-CM

## 2017-11-13 LAB — HBA1C MFR BLD: 7.3 % (ref 4.3–6)

## 2017-11-13 PROCEDURE — 83036 HEMOGLOBIN GLYCOSYLATED A1C: CPT | Performed by: INTERNAL MEDICINE

## 2017-11-13 PROCEDURE — 36415 COLL VENOUS BLD VENIPUNCTURE: CPT | Performed by: INTERNAL MEDICINE

## 2017-11-13 PROCEDURE — 99214 OFFICE O/P EST MOD 30 MIN: CPT | Performed by: INTERNAL MEDICINE

## 2017-11-13 RX ORDER — OXYCODONE HYDROCHLORIDE 5 MG/1
5-10 TABLET ORAL EVERY 6 HOURS PRN
Qty: 30 TABLET | Refills: 0 | Status: SHIPPED | OUTPATIENT
Start: 2017-11-13 | End: 2018-05-03

## 2017-11-13 ASSESSMENT — PAIN SCALES - GENERAL: PAINLEVEL: EXTREME PAIN (8)

## 2017-11-13 NOTE — MR AVS SNAPSHOT
"              After Visit Summary   11/13/2017    Reinaldo Barrios    MRN: [id number]           Patient Information     Date Of Birth          1950        Visit Information        Provider Department      11/13/2017 9:40 AM Mark Blanchard DO [facility]        Today's Diagnoses     Type 2 diabetes mellitus without complication, without long-term current use of insulin (H)    -  1    Closed fracture of multiple ribs of right side with routine healing, subsequent encounter        Heart valve replaced [Z95.2]        S/P CABG (coronary artery bypass graft)           Follow-ups after your visit        Who to contact     If you have questions or need follow up information about today's clinic visit or your schedule please contact [facility] directly at [phone].  Normal or non-critical lab and imaging results will be communicated to you by MyChart, letter or phone within 4 business days after the clinic has received the results. If you do not hear from us within 7 days, please contact the clinic through MyChart or phone. If you have a critical or abnormal lab result, we will notify you by phone as soon as possible.  Submit refill requests through Motionbox or call your pharmacy and they will forward the refill request to us. Please allow 3 business days for your refill to be completed.          Additional Information About Your Visit        MyChart Information     Motionbox lets you send messages to your doctor, view your test results, renew your prescriptions, schedule appointments and more. To sign up, go to [email or web address] . Click on \"Log in\" on the left side of the screen, which will take you to the Welcome page. Then click on \"Sign up Now\" on the right side of the page.     You will be asked to enter the access code listed below, as well as some personal information. Please follow the directions to create your username and password.     Your access code " is: [email or web address]  Expires: 2018  2:22 PM     Your access code will  in 90 days. If you need help or a new code, please call your [facility] clinic or [phone].        Care EveryWhere ID     This is your Care EveryWhere ID. This could be used by other organizations to access your [facility] medical records  [id number]        Your Vitals Were     Pulse Temperature Pulse Oximetry BMI (Body Mass Index)          84 97.1  F (36.2  C) (Temporal) 97% 34.61 kg/m2         Blood Pressure from Last 3 Encounters:   17 112/62   17 176/90   17 130/72    Weight from Last 3 Encounters:   17 227 lb 9.6 oz (103.2 kg)   17 225 lb (102.1 kg)   17 225 lb (102.1 kg)              We Performed the Following     Hemoglobin A1c          Where to get your medicines      Some of these will need a paper prescription and others can be bought over the counter.  Ask your nurse if you have questions.     Bring a paper prescription for each of these medications     oxyCODONE IR 5 MG tablet          Primary Care Provider Office Phone # Fax #    Mark Maciel Blanchard [phone]       [address] [place] [address]        Equal Access to Services     ERICA GUERRERO AH: Hadii teressa vizcaino hadasho Soomaali, waaxda luqadaha, qaybta kaalmada adeegyada, ansley garner hayayde rodriguez . So [facility] [phone].    ATENCIÓN: Si habla español, tiene a templeton disposición servicios gratuitos de asistencia lingüística. Llame al [phone].    We comply with applicable federal civil rights laws and [place] laws. We do not discriminate on the basis of race, color, national origin, age, disability, sex, sexual orientation, or gender identity.            Thank you!     Thank you for choosing [facility]  for your care. Our goal is always to provide you with excellent care. Hearing back from our patients is one way we can continue to improve our services. Please take a few minutes  "to complete the written survey that you may receive in the mail after your visit with us. Thank you!             Your Updated Medication List - Protect others around you: Learn how to safely use, store and throw away your medicines at www.disposemymeds.org.          This list is accurate as of: 11/13/17 11:59 PM.  Always use your most recent med list.                   Brand Name Dispense Instructions for use Diagnosis    aspirin 81 MG EC tablet      Take 1 tablet (81 mg) by mouth daily    S/P CABG (coronary artery bypass graft)       atorvastatin 40 MG tablet    LIPITOR    30 tablet    TAKE 1 TABLET BY MOUTH DALE Y AT BEDTIME    Hyperlipidemia LDL goal <100       blood glucose monitoring test strip    no brand specified    100 strip    Use to test blood sugars 3 times daily or as directed    Type 2 diabetes mellitus with hyperglycemia, with long-term current use of insulin (H)       carvedilol 3.125 MG tablet    COREG    60 tablet    Take 1 tablet (3.125 mg) by mouth 2 times daily (with meals)    Essential hypertension with goal blood pressure less than 140/90       insulin aspart 100 UNIT/ML injection    NovoLOG FLEXPEN    15 mL    INJECT 10 - 12 UNITS UNDER THE SKIN TWICE A DAY    Type 2 diabetes mellitus without complication, with long-term current use of insulin (H)       insulin glargine 100 UNIT/ML injection    LANTUS    3 Month    40 units am, 10 units pm    Type 2 diabetes mellitus without complication, with long-term current use of insulin (H)       insulin pen needle 31G X 8 MM     100 each    Use three pen needles daily or as directed.    Type 2 diabetes mellitus with hyperglycemia, with long-term current use of insulin (H)       insulin syringe-needle U-100 31G X 5/16\" 0.5 ML    BD insulin syringe ultrafine    100 each    Use one syringe 3 daily or as directed.    Type 2 diabetes mellitus without complication, with long-term current use of insulin (H)       isosorbide mononitrate 30 MG 24 hr tablet    " IMDUR    30 tablet    TAKE 1 TABLET BY MOUTH EVERY DAY    Precordial pain       lisinopril 5 MG tablet    PRINIVIL/ZESTRIL    30 tablet    TAKE 1 TABLET BY MOUTH EVER Y DAY    Type 2 diabetes mellitus without complication, with long-term current use of insulin (H), Systolic and diastolic CHF, chronic (H)       metoprolol 25 MG 24 hr tablet    TOPROL-XL    30 tablet    Take 1 tablet (25 mg) by mouth daily    Ischemic cardiomyopathy       oxyCODONE IR 5 MG tablet    ROXICODONE    30 tablet    Take 1-2 tablets (5-10 mg) by mouth every 6 hours as needed for pain    Closed fracture of multiple ribs of right side with routine healing, subsequent encounter       senna-docusate 8.6-50 MG per tablet    SENOKOT-S;PERICOLACE    100 tablet    Take 1-2 tablets by mouth 2 times daily    Chronic idiopathic constipation

## 2017-11-13 NOTE — PROGRESS NOTES
SUBJECTIVE:   Reinaldo Barrios is a 67 year old male who presents to clinic today for the following health issues:    ED/UC Followup:    Facility:  [facility]  Date of visit: 11/6/17  Reason for visit: fracture of ribs  Current Status: still in pain                       Chief Complaint           The patient is a pleasant 67-year-old gentleman who was walking down his front steps when he slipped and fell backwards onto the steps.  He has two broken ribs.  These are noted in the CAT scan performed in the emergency department.  There is no significant displacement and a little bit of posterior combination.  He notes that the pain is ongoing.  He was given a small dose of oxycodone and notes that this did help but was inadequate.  He is having prom sleeping as he cannot lay on either side without pain.  He also has a history of type II diabetes and his blood sugars have been fairly well controlled though he does not check them regularly.  He denies any polyurea or polydipsia.  He denies symptoms of hypoglycemia.  He is Using insulin as described for his management.  He is appropriately on a statin as well as aspirin and And ACE inhibitor.  He has history of coronary artery disease and valvular plasty and these are stable.  Denies any chest pain or dyspnea on exertion.  He went hunting prior to this recent fall and had no problem walking through the woods.                       PAST, FAMILY,SOCIAL HISTORY:     Medical  History:   has a past medical history of Aortic valve stenosis (11/2/2015); Cardiac LV ejection fraction 30-35% (11/2/2015); Chest pain, unspecified chest pain type (11/2/2015); Hiatal hernia (11/2/2015); Hyperlipidemia LDL goal <100 (11/2/2015); Personal history of tobacco use, presenting hazards to health (11/2/2015); and Type 2 diabetes mellitus without complication (H) (11/2/2015).     Surgical History:   has a past surgical history that includes knee surgery (2009); Replace valve  "aortic (N/A, 11/7/2015); and Bypass graft artery coronary (N/A, 11/7/2015).     Social History:   reports that he quit smoking about 17 months ago. His smoking use included Pipe. He has never used smokeless tobacco. He reports that he drinks alcohol. He reports that he does not use illicit drugs.     Family History:  family history is not on file.            MEDICATIONS  Current Outpatient Prescriptions   Medication Sig Dispense Refill     oxyCODONE IR (ROXICODONE) 5 MG tablet Take 1-2 tablets (5-10 mg) by mouth every 6 hours as needed for pain 30 tablet 0     isosorbide mononitrate (IMDUR) 30 MG 24 hr tablet TAKE 1 TABLET BY MOUTH EVERY DAY 30 tablet 8     insulin aspart (NOVOLOG FLEXPEN) 100 UNIT/ML injection INJECT 10 - 12 UNITS UNDER THE SKIN TWICE A DAY 15 mL 11     senna-docusate (SENOKOT-S;PERICOLACE) 8.6-50 MG per tablet Take 1-2 tablets by mouth 2 times daily 100 tablet 11     carvedilol (COREG) 3.125 MG tablet Take 1 tablet (3.125 mg) by mouth 2 times daily (with meals) 60 tablet 11     blood glucose monitoring (NO BRAND SPECIFIED) test strip Use to test blood sugars 3 times daily or as directed 100 strip 1     lisinopril (PRINIVIL/ZESTRIL) 5 MG tablet TAKE 1 TABLET BY MOUTH EVER Y DAY 30 tablet 11     atorvastatin (LIPITOR) 40 MG tablet TAKE 1 TABLET BY MOUTH DALE Y AT BEDTIME 30 tablet 11     insulin pen needle 31G X 8 MM Use three pen needles daily or as directed. 100 each 5     insulin syringe-needle U-100 (BD INSULIN SYRINGE ULTRAFINE) 31G X 5/16\" 0.5 ML Use one syringe 3 daily or as directed. 100 each prn     metoprolol (TOPROL-XL) 25 MG 24 hr tablet Take 1 tablet (25 mg) by mouth daily 30 tablet 1     insulin glargine (LANTUS) 100 UNIT/ML injection 40 units am, 10 units pm 3 Month 3     aspirin EC 81 MG EC tablet Take 1 tablet (81 mg) by mouth daily           --------------------------------------------------------------------------------------------------------------------                  Review " of Systems     LUNGS: Pt denies: cough,excess sputum, hemoptysis, or shortness of breath.   HEART: Pt denies: chest pain, arrythmia, syncope, tachy or bradyarrhythmia.   GI: Pt denies: nausea, vomitting, diarrhea, constipation, melena, or hematochezia.   NEURO: Pt denies: seizures, strokes, diplopia, weakness, paraesthesias, or paralysis.   SKIN: Pt denies: itching, rashes, discoloration, or specific lesions of concern. Denies recent hair loss.                        Examination       Vital Signs:  B/P: 112/62, T: 97.1, P: 84, R: Data Unavailable, BMI: Body mass index is 34.61 kg/(m^2) (pended).   Constitutional: The patient appears to be in no acute distress. The patient appears to be adequately hydrated. No acute respiratory or hemodynamic distress is noted at this time.   LUNGS: clear bilaterally, airflow is brisk, no intercostal retraction or stridor is noted. No coughing is noted during visit.   HEART:  regular without rubs, clicks, gallops, or murmurs. PMI is nondisplaced. Upstrokes are brisk. S1,S2 are heard.   GI: Abdomen is soft, without rebound, guarding or tenderness. Bowel sounds are appropriate. No renal bruits are heard.    MS: Minimal crepitance is noted in the Rib cage. No deformity is present. Muscle strength is appropriate and equal bilaterally. No acute joint erythema or swelling is present.Palpable tenderness over the fractures posteriorly and laterally are noted.                     Decision-Making        1. Closed fracture of multiple ribs of right side with routine healing, subsequent encounter  Will continue the oxycodone for a couple more days.  Recommend moist heat and rest  - oxyCODONE IR (ROXICODONE) 5 MG tablet; Take 1-2 tablets (5-10 mg) by mouth every 6 hours as needed for pain  Dispense: 30 tablet; Refill: 0    2. Type 2 diabetes mellitus without complication, without long-term current use of insulin (H)  Continue current medications as outlined.  Check A1c and adjust as necessary  -  Hemoglobin A1c    3. Heart valve replaced [Z95.2]  Bio prosthetic valve in place and functioning.  Recent echocardiogram reviewed    4. S/P CABG (coronary artery bypass graft)  Stable without chest pain  Continue current medications                         FOLLOW UP   I have asked the patient to make an appointment for followup with me  In one week if not markedly improved        I have carefully explained the diagnosis and treatment options with the patient. The patient has displayed an understanding of the above, and all subsequent questions were answered.               DO RADHA Horn    Portions of this note were produced using rankdesk  Although every attempt at real-time proof reading has been made, occasional grammar/syntax errors may have been missed.

## 2017-11-13 NOTE — NURSING NOTE
"Chief Complaint   Patient presents with     Diabetes     ER F/U       Initial /62 (BP Location: Left arm, Patient Position: Chair, Cuff Size: Adult Regular)  Pulse 84  Temp 97.1  F (36.2  C) (Temporal)  Wt 227 lb 9.6 oz (103.2 kg)  SpO2 97%  BMI (P) 34.61 kg/m2 Estimated body mass index is 34.61 kg/(m^2) (pended) as calculated from the following:    Height as of 6/15/17: (P) 5' 8\" (1.727 m).    Weight as of this encounter: 227 lb 9.6 oz (103.2 kg).  Medication Reconciliation: complete  Health Maintenance reviewed at today's visit patient asked to schedule/complete:   Colon Cancer:  Patient declined   [place] [facility]      "

## 2017-11-15 NOTE — PROGRESS NOTES
Please contact the patient and notify him of the following:  The hemoglobin A1c has decreased from 8.6 one year ago down to 7.3. This is markedly improved.    Thank you.  DO RADHA Horn

## 2017-11-21 ENCOUNTER — RADIANT APPOINTMENT (OUTPATIENT)
Dept: GENERAL RADIOLOGY | Facility: OTHER | Age: 67
End: 2017-11-21
Attending: INTERNAL MEDICINE
Payer: MEDICARE

## 2017-11-21 ENCOUNTER — OFFICE VISIT (OUTPATIENT)
Dept: FAMILY MEDICINE | Facility: OTHER | Age: 67
End: 2017-11-21
Payer: MEDICARE

## 2017-11-21 DIAGNOSIS — E11.9 TYPE 2 DIABETES MELLITUS WITHOUT COMPLICATION, WITHOUT LONG-TERM CURRENT USE OF INSULIN (H): ICD-10-CM

## 2017-11-21 DIAGNOSIS — S22.41XD CLOSED FRACTURE OF MULTIPLE RIBS OF RIGHT SIDE WITH ROUTINE HEALING, SUBSEQUENT ENCOUNTER: ICD-10-CM

## 2017-11-21 DIAGNOSIS — S22.41XD CLOSED FRACTURE OF MULTIPLE RIBS OF RIGHT SIDE WITH ROUTINE HEALING, SUBSEQUENT ENCOUNTER: Primary | ICD-10-CM

## 2017-11-21 DIAGNOSIS — Z23 NEED FOR PROPHYLACTIC VACCINATION AND INOCULATION AGAINST INFLUENZA: ICD-10-CM

## 2017-11-21 PROCEDURE — 71101 X-RAY EXAM UNILAT RIBS/CHEST: CPT | Mod: RT

## 2017-11-21 PROCEDURE — 90662 IIV NO PRSV INCREASED AG IM: CPT | Performed by: INTERNAL MEDICINE

## 2017-11-21 PROCEDURE — 99214 OFFICE O/P EST MOD 30 MIN: CPT | Mod: 25 | Performed by: INTERNAL MEDICINE

## 2017-11-21 PROCEDURE — G0008 ADMIN INFLUENZA VIRUS VAC: HCPCS | Performed by: INTERNAL MEDICINE

## 2017-11-21 ASSESSMENT — PAIN SCALES - GENERAL: PAINLEVEL: EXTREME PAIN (9)

## 2017-11-21 NOTE — MR AVS SNAPSHOT
"              After Visit Summary   11/21/2017    Reinaldo Barrios    MRN: [id number]           Patient Information     Date Of Birth          1950        Visit Information        Provider Department      11/21/2017 9:00 AM Mark Blanchard DO [facility]        Today's Diagnoses     Closed fracture of multiple ribs of right side with routine healing, subsequent encounter    -  1    Need for prophylactic vaccination and inoculation against influenza        Type 2 diabetes mellitus without complication, without long-term current use of insulin (H)           Follow-ups after your visit        Who to contact     If you have questions or need follow up information about today's clinic visit or your schedule please contact [facility] directly at [phone].  Normal or non-critical lab and imaging results will be communicated to you by Vinopolishart, letter or phone within 4 business days after the clinic has received the results. If you do not hear from us within 7 days, please contact the clinic through Vinopolishart or phone. If you have a critical or abnormal lab result, we will notify you by phone as soon as possible.  Submit refill requests through Snaptee or call your pharmacy and they will forward the refill request to us. Please allow 3 business days for your refill to be completed.          Additional Information About Your Visit        MyChart Information     Snaptee lets you send messages to your doctor, view your test results, renew your prescriptions, schedule appointments and more. To sign up, go to [email or web address] . Click on \"Log in\" on the left side of the screen, which will take you to the Welcome page. Then click on \"Sign up Now\" on the right side of the page.     You will be asked to enter the access code listed below, as well as some personal information. Please follow the directions to create your username and password.     Your access code is: " "[email or web address]  Expires: 2018  2:22 PM     Your access code will  in 90 days. If you need help or a new code, please call your [facility] clinic or [phone].        Care EveryWhere ID     This is your Care EveryWhere ID. This could be used by other organizations to access your [facility] medical records  [id number]        Your Vitals Were     Pulse Temperature Respirations Height Pulse Oximetry BMI (Body Mass Index)    80 96.8  F (36  C) (Temporal) 16 5' 8\" (1.727 m) 98% 34.93 kg/m2       Blood Pressure from Last 3 Encounters:   17 138/84   17 112/62   17 176/90    Weight from Last 3 Encounters:   17 229 lb 11.2 oz (104.2 kg)   17 227 lb 9.6 oz (103.2 kg)   17 225 lb (102.1 kg)              We Performed the Following     ADMIN INFLUENZA (For MEDICARE Patients ONLY) []     FLU VACCINE, INCREASED ANTIGEN, PRESV FREE, AGE 65+ [41333]        Primary Care Provider Office Phone # Fax #    Aovhchen Maciel Blanchard [phone]        [address] [place] [address]        Equal Access to Services     ERICA GUERRERO AH: Hadii teressa ku hadasho Soomaali, waaxda luqadaha, qaybta kaalmada adeegyada, waxtoni garner hayayde rodriguez . So [facility] [phone].    ATENCIÓN: Si habla español, tiene a templeton disposición servicios gratuitos de asistencia lingüística. Llame al [phone].    We comply with applicable federal civil rights laws and [place] laws. We do not discriminate on the basis of race, color, national origin, age, disability, sex, sexual orientation, or gender identity.            Thank you!     Thank you for choosing [facility]  for your care. Our goal is always to provide you with excellent care. Hearing back from our patients is one way we can continue to improve our services. Please take a few minutes to complete the written survey that you may receive in the mail after your visit with us. Thank you!             Your Updated " "Medication List - Protect others around you: Learn how to safely use, store and throw away your medicines at www.disposemymeds.org.          This list is accurate as of: 11/21/17 11:59 PM.  Always use your most recent med list.                   Brand Name Dispense Instructions for use Diagnosis    aspirin 81 MG EC tablet      Take 1 tablet (81 mg) by mouth daily    S/P CABG (coronary artery bypass graft)       atorvastatin 40 MG tablet    LIPITOR    30 tablet    TAKE 1 TABLET BY MOUTH DALE Y AT BEDTIME    Hyperlipidemia LDL goal <100       blood glucose monitoring test strip    no brand specified    100 strip    Use to test blood sugars 3 times daily or as directed    Type 2 diabetes mellitus with hyperglycemia, with long-term current use of insulin (H)       carvedilol 3.125 MG tablet    COREG    60 tablet    Take 1 tablet (3.125 mg) by mouth 2 times daily (with meals)    Essential hypertension with goal blood pressure less than 140/90       insulin aspart 100 UNIT/ML injection    NovoLOG FLEXPEN    15 mL    INJECT 10 - 12 UNITS UNDER THE SKIN TWICE A DAY    Type 2 diabetes mellitus without complication, with long-term current use of insulin (H)       insulin glargine 100 UNIT/ML injection    LANTUS    3 Month    40 units am, 10 units pm    Type 2 diabetes mellitus without complication, with long-term current use of insulin (H)       insulin pen needle 31G X 8 MM     100 each    Use three pen needles daily or as directed.    Type 2 diabetes mellitus with hyperglycemia, with long-term current use of insulin (H)       insulin syringe-needle U-100 31G X 5/16\" 0.5 ML    BD insulin syringe ultrafine    100 each    Use one syringe 3 daily or as directed.    Type 2 diabetes mellitus without complication, with long-term current use of insulin (H)       isosorbide mononitrate 30 MG 24 hr tablet    IMDUR    30 tablet    TAKE 1 TABLET BY MOUTH EVERY DAY    Precordial pain       lisinopril 5 MG tablet    PRINIVIL/ZESTRIL    " 30 tablet    TAKE 1 TABLET BY MOUTH EVER Y DAY    Type 2 diabetes mellitus without complication, with long-term current use of insulin (H), Systolic and diastolic CHF, chronic (H)       metoprolol 25 MG 24 hr tablet    TOPROL-XL    30 tablet    Take 1 tablet (25 mg) by mouth daily    Ischemic cardiomyopathy       oxyCODONE IR 5 MG tablet    ROXICODONE    30 tablet    Take 1-2 tablets (5-10 mg) by mouth every 6 hours as needed for pain    Closed fracture of multiple ribs of right side with routine healing, subsequent encounter       senna-docusate 8.6-50 MG per tablet    SENOKOT-S;PERICOLACE    100 tablet    Take 1-2 tablets by mouth 2 times daily    Chronic idiopathic constipation

## 2017-11-21 NOTE — NURSING NOTE
"Chief Complaint   Patient presents with     ER F/U     11/06 [facility]; fracture of multiple ribs on right side       Initial /84  Pulse 80  Temp 96.8  F (36  C) (Temporal)  Resp 16  Ht 5' 8\" (1.727 m)  Wt 229 lb 11.2 oz (104.2 kg)  SpO2 98%  BMI 34.93 kg/m2 Estimated body mass index is 34.93 kg/(m^2) as calculated from the following:    Height as of this encounter: 5' 8\" (1.727 m).    Weight as of this encounter: 229 lb 11.2 oz (104.2 kg).  Medication Reconciliation: complete  Destiney Soriano CMA (AAMA)    "

## 2017-11-21 NOTE — PROGRESS NOTES
"  SUBJECTIVE:   Reinaldo Barrios is a 67 year old male who presents to clinic today for the following health issues:    ED/UC Followup:    Facility:  [facility]   Date of visit: 11/06/2017  Reason for visit: fracture of multiple ribs on right side  Current Status: not improving; feels like the ribs \"shifted\"                     Chief Complaint           The patient is a pleasant 67-year-old gentleman who recently experienced a fracture of a couple ribs.  He was evaluated with CAT scan and no displacement of the ribs was noted.  He now notes that he recently coughed and felt something move and the pain became quite sharp.Concerns regarding the possible displacement of the fracture are entertained.  We discussed the possibility, yet unlikelihood that the bone is now potentially able to cause a pneumothorax. He's had no significant shortness of breath of that associated with the discomfort.  No bruising to the anterior abdomen or flank is noted. Does have a history of type II diabetes which has been fairly well controlled.  His most recent glycosylated hemoglobin was 7.3.                       PAST, FAMILY,SOCIAL HISTORY:     Medical  History:   has a past medical history of Aortic valve stenosis (11/2/2015); Cardiac LV ejection fraction 30-35% (11/2/2015); Chest pain, unspecified chest pain type (11/2/2015); Hiatal hernia (11/2/2015); Hyperlipidemia LDL goal <100 (11/2/2015); Personal history of tobacco use, presenting hazards to health (11/2/2015); and Type 2 diabetes mellitus without complication (H) (11/2/2015).     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     Social History:   reports that he quit smoking about 18 months ago. His smoking use included Pipe. He has never used smokeless tobacco. He reports that he drinks alcohol. He reports that he does not use illicit " "drugs.     Family History:  family history is not on file.            MEDICATIONS  Current Outpatient Prescriptions   Medication Sig Dispense Refill     oxyCODONE IR (ROXICODONE) 5 MG tablet Take 1-2 tablets (5-10 mg) by mouth every 6 hours as needed for pain 30 tablet 0     isosorbide mononitrate (IMDUR) 30 MG 24 hr tablet TAKE 1 TABLET BY MOUTH EVERY DAY 30 tablet 8     insulin aspart (NOVOLOG FLEXPEN) 100 UNIT/ML injection INJECT 10 - 12 UNITS UNDER THE SKIN TWICE A DAY 15 mL 11     senna-docusate (SENOKOT-S;PERICOLACE) 8.6-50 MG per tablet Take 1-2 tablets by mouth 2 times daily 100 tablet 11     carvedilol (COREG) 3.125 MG tablet Take 1 tablet (3.125 mg) by mouth 2 times daily (with meals) 60 tablet 11     lisinopril (PRINIVIL/ZESTRIL) 5 MG tablet TAKE 1 TABLET BY MOUTH EVER Y DAY 30 tablet 11     atorvastatin (LIPITOR) 40 MG tablet TAKE 1 TABLET BY MOUTH DALE Y AT BEDTIME 30 tablet 11     metoprolol (TOPROL-XL) 25 MG 24 hr tablet Take 1 tablet (25 mg) by mouth daily 30 tablet 1     insulin glargine (LANTUS) 100 UNIT/ML injection 40 units am, 10 units pm 3 Month 3     aspirin EC 81 MG EC tablet Take 1 tablet (81 mg) by mouth daily       blood glucose monitoring (NO BRAND SPECIFIED) test strip Use to test blood sugars 3 times daily or as directed 100 strip 1     insulin pen needle 31G X 8 MM Use three pen needles daily or as directed. 100 each 5     insulin syringe-needle U-100 (BD INSULIN SYRINGE ULTRAFINE) 31G X 5/16\" 0.5 ML Use one syringe 3 daily or as directed. 100 each prn         --------------------------------------------------------------------------------------------------------------------                  Review of Systems     LUNGS: Pt john: cough,excess sputum, hemoptysis, or shortness of breath.   HEART: Pt denies: chest pain, arrythmia, syncope, tachy or bradyarrhythmia.   GI: Pt denies: nausea, vomitting, diarrhea, constipation, melena, or hematochezia.   NEURO: Pt denies: seizures, strokes, " diplopia, weakness, paraesthesias, or paralysis.                        Examination       Vital Signs:  B/P: 138/84, T: 96.8, P: 80, R: 16, BMI: Body mass index is 34.93 kg/(m^2).   Constitutional: The patient appears to be in no acute distress. The patient appears to be adequately hydrated. No acute respiratory or hemodynamic distress is noted at this time.   LUNGS: clear bilaterally, airflow is brisk, no intercostal retraction or stridor is noted. No coughing is noted during visit.   HEART:  regular without rubs, clicks, gallops, or murmurs. PMI is nondisplaced. Upstrokes are brisk. S1,S2 are heard.   GI: Abdomen is soft, without rebound, guarding or tenderness. Bowel sounds are appropriate. No renal bruits are heard.    NEURO: Pt is alert and appropriate. No neurologic lateralization is noted. Cranial nerves 2-12 are intact. Peripheral sensory and motor function are grossly normal.                        Decision-Making    1. Closed fracture of multiple ribs of right side with routine healing, subsequent encounter  X-ray confirms minimal distraction of the fragments.  No increased risk for pneumothorax is noted  - XR Ribs & Chest Right G/E 3 Views; Future    2. Need for prophylactic vaccination and inoculation against influenza  Given  - FLU VACCINE, INCREASED ANTIGEN, PRESV FREE, AGE 65+ [07793]  - ADMIN INFLUENZA (For MEDICARE Patients ONLY) []    3. Type 2 diabetes mellitus without complication, without long-term current use of insulin (H)  Continue current medication                             FOLLOW UP   I have asked the patient to make an appointment for followup with me In one month        I have carefully explained the diagnosis and treatment options with the patient. The patient has displayed an understanding of the above, and all subsequent questions were answered.               DO RADHA Horn    Portions of this note were produced using [employer]  Although every attempt at  real-time proof reading has been made, occasional grammar/syntax errors may have been missed.                         Injectable Influenza Immunization Documentation    1.  Is the person to be vaccinated sick today?   No    2. Does the person to be vaccinated have an allergy to a component   of the vaccine?   No  Egg Allergy Algorithm Link    3. Has the person to be vaccinated ever had a serious reaction   to influenza vaccine in the past?   No    4. Has the person to be vaccinated ever had Guillain-Barré syndrome?   No    Form completed by Destiney Soriano CMA ([facility])

## 2017-11-21 NOTE — NURSING NOTE
Prior to injection verified patient identity using patient's name and date of birth.  Per orders of Dr. Blanchard, injection of influenza given by Destiney Soriano CMA. Patient instructed to remain in clinic for 15 minutes afterwards, and to report any adverse reaction to me immediately.

## 2017-11-25 VITALS
TEMPERATURE: 96.8 F | SYSTOLIC BLOOD PRESSURE: 138 MMHG | HEIGHT: 68 IN | DIASTOLIC BLOOD PRESSURE: 84 MMHG | HEART RATE: 80 BPM | RESPIRATION RATE: 16 BRPM | BODY MASS INDEX: 34.81 KG/M2 | OXYGEN SATURATION: 98 % | WEIGHT: 229.7 LBS

## 2017-12-09 ENCOUNTER — HOSPITAL ENCOUNTER (EMERGENCY)
Facility: CLINIC | Age: 67
Discharge: HOME | End: 2017-12-09
Attending: FAMILY MEDICINE | Admitting: FAMILY MEDICINE
Payer: MEDICARE

## 2017-12-09 ENCOUNTER — APPOINTMENT (OUTPATIENT)
Dept: CT IMAGING | Facility: CLINIC | Age: 67
End: 2017-12-09
Attending: FAMILY MEDICINE
Payer: MEDICARE

## 2017-12-09 ENCOUNTER — APPOINTMENT (OUTPATIENT)
Dept: GENERAL RADIOLOGY | Facility: CLINIC | Age: 67
End: 2017-12-09
Attending: FAMILY MEDICINE
Payer: MEDICARE

## 2017-12-09 VITALS
DIASTOLIC BLOOD PRESSURE: 95 MMHG | TEMPERATURE: 98.1 F | OXYGEN SATURATION: 97 % | RESPIRATION RATE: 16 BRPM | HEART RATE: 77 BPM | SYSTOLIC BLOOD PRESSURE: 157 MMHG

## 2017-12-09 DIAGNOSIS — G45.9 TRANSIENT CEREBRAL ISCHEMIA, UNSPECIFIED TYPE: ICD-10-CM

## 2017-12-09 LAB
ALBUMIN SERPL-MCNC: 3.5 G/DL (ref 3.4–5)
ALP SERPL-CCNC: 137 U/L (ref 40–150)
ALT SERPL W P-5'-P-CCNC: 26 U/L (ref 0–70)
ANION GAP SERPL CALCULATED.3IONS-SCNC: 7 MMOL/L (ref 3–14)
APTT PPP: 28 SEC (ref 22–37)
AST SERPL W P-5'-P-CCNC: 20 U/L (ref 0–45)
BASOPHILS # BLD AUTO: 0.1 10E9/L (ref 0–0.2)
BASOPHILS NFR BLD AUTO: 0.9 %
BILIRUB SERPL-MCNC: 0.4 MG/DL (ref 0.2–1.3)
BUN SERPL-MCNC: 18 MG/DL (ref 7–30)
CALCIUM SERPL-MCNC: 8.7 MG/DL (ref 8.5–10.1)
CHLORIDE SERPL-SCNC: 104 MMOL/L (ref 94–109)
CO2 SERPL-SCNC: 26 MMOL/L (ref 20–32)
CREAT SERPL-MCNC: 1 MG/DL (ref 0.66–1.25)
DIFFERENTIAL METHOD BLD: ABNORMAL
EOSINOPHIL # BLD AUTO: 0.3 10E9/L (ref 0–0.7)
EOSINOPHIL NFR BLD AUTO: 4.8 %
ERYTHROCYTE [DISTWIDTH] IN BLOOD BY AUTOMATED COUNT: 12.8 % (ref 10–15)
GFR SERPL CREATININE-BSD FRML MDRD: 74 ML/MIN/1.7M2
GLUCOSE SERPL-MCNC: 163 MG/DL (ref 70–99)
HCT VFR BLD AUTO: 38.5 % (ref 40–53)
HGB BLD-MCNC: 12.4 G/DL (ref 13.3–17.7)
IMM GRANULOCYTES # BLD: 0 10E9/L (ref 0–0.4)
IMM GRANULOCYTES NFR BLD: 0.1 %
INR PPP: 0.86 (ref 0.86–1.14)
LYMPHOCYTES # BLD AUTO: 1.5 10E9/L (ref 0.8–5.3)
LYMPHOCYTES NFR BLD AUTO: 22.1 %
MCH RBC QN AUTO: 29.5 PG (ref 26.5–33)
MCHC RBC AUTO-ENTMCNC: 32.2 G/DL (ref 31.5–36.5)
MCV RBC AUTO: 91 FL (ref 78–100)
MONOCYTES # BLD AUTO: 0.7 10E9/L (ref 0–1.3)
MONOCYTES NFR BLD AUTO: 9.6 %
NEUTROPHILS # BLD AUTO: 4.3 10E9/L (ref 1.6–8.3)
NEUTROPHILS NFR BLD AUTO: 62.5 %
PLATELET # BLD AUTO: 219 10E9/L (ref 150–450)
POTASSIUM SERPL-SCNC: 3.8 MMOL/L (ref 3.4–5.3)
PROT SERPL-MCNC: 7.4 G/DL (ref 6.8–8.8)
RBC # BLD AUTO: 4.21 10E12/L (ref 4.4–5.9)
SODIUM SERPL-SCNC: 137 MMOL/L (ref 133–144)
TROPONIN I SERPL-MCNC: <0.015 UG/L (ref 0–0.04)
WBC # BLD AUTO: 6.9 10E9/L (ref 4–11)

## 2017-12-09 PROCEDURE — 80053 COMPREHEN METABOLIC PANEL: CPT | Performed by: FAMILY MEDICINE

## 2017-12-09 PROCEDURE — 93010 ELECTROCARDIOGRAM REPORT: CPT | Mod: Z6 | Performed by: FAMILY MEDICINE

## 2017-12-09 PROCEDURE — 70450 CT HEAD/BRAIN W/O DYE: CPT

## 2017-12-09 PROCEDURE — 85730 THROMBOPLASTIN TIME PARTIAL: CPT | Performed by: FAMILY MEDICINE

## 2017-12-09 PROCEDURE — 93005 ELECTROCARDIOGRAM TRACING: CPT | Performed by: FAMILY MEDICINE

## 2017-12-09 PROCEDURE — 71010 XR CHEST PORT 1 VW: CPT | Mod: TC

## 2017-12-09 PROCEDURE — 99285 EMERGENCY DEPT VISIT HI MDM: CPT | Mod: 25 | Performed by: FAMILY MEDICINE

## 2017-12-09 PROCEDURE — 85610 PROTHROMBIN TIME: CPT | Performed by: FAMILY MEDICINE

## 2017-12-09 PROCEDURE — 85025 COMPLETE CBC W/AUTO DIFF WBC: CPT | Performed by: FAMILY MEDICINE

## 2017-12-09 PROCEDURE — 84484 ASSAY OF TROPONIN QUANT: CPT | Performed by: FAMILY MEDICINE

## 2017-12-09 ASSESSMENT — ENCOUNTER SYMPTOMS
ABDOMINAL PAIN: 0
HEADACHES: 1
DIZZINESS: 1
NUMBNESS: 1
NAUSEA: 0
VOMITING: 0

## 2017-12-09 NOTE — ED NOTES
Pt arrives to ED with complaints of left facial numbness and left arm numbness started 1 hour ago. Some headache. Numbness getting better.

## 2017-12-09 NOTE — ED NOTES
Dr Mendieta in room doing assessment.States pt does not have to have a stat CT head scan. He is finishing assessment. CT is ready to do scan when ordered.

## 2017-12-09 NOTE — ED NOTES
INITIAL RN STROKE SCREEN    Symptoms:Left face and arm numbness  Has headache, fell one week ago    Last Known Well (time,date):today 1 hour ago.   Code Stroke called at [phone]    IF LAST KNOWN WELL IS < OR = TO 8 HOURS SEND TO CT STAT.  Dr Mendieta to the room 1500.

## 2017-12-09 NOTE — DISCHARGE INSTRUCTIONS
Discharge Instructions for Transient Ischemic Attack (TIA)  You have been diagnosed with a transient ischemic attack (TIA). You can think of a TIA as a temporary or mini-stroke. Blood temporarily could not reach part of your brain. Unlike a stroke, TIAs usually cause no lasting damage. If you think you are having symptoms of a TIA or stroke, get medical help right away--even if the symptoms go away.   Prevention    Take your medicines exactly as directed. Don t skip doses.    Learn to take your blood pressure. Keep a log for your doctor.    Change your diet if your doctor tells you to. Your doctor may suggest that you cut back on salt. If so, here are some tips:    Limit canned, dried, packaged, and fast foods.    Don t add salt to your food at the table.    Season foods with herbs instead of salt when you cook.    Maintain a healthy weight. Get help to lose any extra pounds.    Begin an exercise program. Ask your doctor how to get started. You can benefit from simple activities, such as walking or gardening.    Limit your alcohol intake to no more than 2 drinks a day.    Know your cholesterol level. Follow your doctor s advice about how to keep cholesterol under control.    If you are a smoker, you need to quit now. Enroll in a stop-smoking program to improve your chances of success. Ask your doctor about medicines or other methods to help you quit.    Your healthcare provider will give you information on dietary changes that you may need to make, based on your situation. Your provider may recommend that you see a registered dietitian for help with diet changes. Changes may include:    Reducing fat and cholesterol intake    Reducing sodium (salt) intake, especially if you have high blood pressure    Increasing your intake of fresh vegetables and fruits    Eating lean proteins, such as fish, poultry, and legumes (beans and peas) and eating less red meat and processed meats    Using low-fat dairy products    Using  vegetable and nut oils in limited amounts    Limiting sweets and processed foods such as chips, cookies, and baked goods    If you are overweight, your healthcare provider will work with you to lose weight and lower your body mass index (BMI) to a normal or near-normal level. Making diet changes and increasing physical activity can help.    Begin an exercise program. Ask your doctor how to get started and how much activity you should try to get on a daily or weekly basis. You can benefit from simple activities such as walking or gardening.    Learn stress-management techniques to help you deal with stress in your home and work life.  Follow-up care    Some medicines require blood tests to check for progress or problems. Keep follow-up appointments for any blood tests ordered by your doctors.     When to call 911  Call 911 right away if you have any of the following:    Weakness, tingling, or loss of feeling on one side of your face or body    Sudden double vision, or trouble seeing in one or both eyes    Sudden trouble talking, or slurring your speech    Trouble understanding others    Sudden, severe headache    Dizziness, loss of balance, or a spinning feeling, a sense of falling    Blackouts or seizures   Date Last Reviewed: 5/1/2017 2000-2017 "[employer]. [address] [place] [address], [place] [address]. All rights reserved. This information is not intended as a substitute for professional medical care. Always follow your healthcare professional's instructions.

## 2017-12-09 NOTE — ED PROVIDER NOTES
"  History     Chief Complaint   Patient presents with     Cerebrovascular Accident     The history is provided by the patient.     Reinaldo Barrios is a 67 year old male with a history of Congestive Heart Failure, Atrial Flutter, Acute Coronary Syndrome, Aortic Valve Stenosis, NSTEMI, Hypertension, Hyperlipidemia, Type II Diabetes and SHx of CABG and Aortic Valve Replacement, who presents to the ED complaining of left sided numbness. The patient states that he was sitting at home watching TV and eating lunch about 1 hour PTA when he suddenly developed numbness in his left hand and arm. He attempted to \"flex this\" to see if this numbness would improve but it did not. Soon after, the numbness began to radiate into the left side of his face and the left side of his lip. He attempted to stand up but felt extremely dizzy, \"like his equilibrium was off\". Patient also notes intermittent throbbing in his left temporal region. His numbness improved after about 10 minutes, and his only residual numbness is in his left fingers. Patient did have a fall 1 month ago resulting in 3 broken ribs on the right. Patient denies any chest pain, abdominal pain, nausea, vomiting, visual blurriness, or other associated symptoms.     Problem List:    Patient Active Problem List    Diagnosis Date Noted     Non morbid obesity due to excess calories 07/03/2017     Priority: Medium     Type 2 diabetes mellitus without complication, without long-term current use of insulin (H) 03/06/2017     Priority: Medium     Type 2 diabetes mellitus with hyperglycemia (H) 08/10/2016     Priority: Medium     Essential hypertension with goal blood pressure less than 140/90 05/22/2016     Priority: Medium     S/P aortic valve replacement 04/27/2016     Priority: Medium     Atypical atrial flutter (H) 04/27/2016     Priority: Medium     Long-term (current) use of anticoagulants [Z79.01] 03/16/2016     Priority: Medium     Heart valve replaced [Z95.2] " 03/16/2016     Priority: Medium     Advanced directives, counseling/discussion 01/12/2016     Priority: Medium     declined       S/P CABG (coronary artery bypass graft) 12/04/2015     Priority: Medium     Aortic stenosis 11/07/2015     Priority: Medium     CHF (congestive heart failure) (H) 11/05/2015     Priority: Medium     NSTEMI (non-ST elevated myocardial infarction) (H) 11/03/2015     Priority: Medium     ACS (acute coronary syndrome) (H) 11/03/2015     Priority: Medium     Aortic valve stenosis 11/02/2015     Priority: Medium     Personal history of tobacco use, presenting hazards to health 11/02/2015     Priority: Medium     Hyperlipidemia LDL goal <100 11/02/2015     Priority: Medium     Hiatal hernia 11/02/2015     Priority: Medium     Chest pain, unspecified chest pain type 11/02/2015     Priority: Medium     Cardiac LV ejection fraction 30-35% 11/02/2015     Priority: Medium        Past Medical History:    Past Medical History:   Diagnosis Date     Aortic valve stenosis 11/2/2015     Cardiac LV ejection fraction 30-35% 11/2/2015     Chest pain, unspecified chest pain type 11/2/2015     Hiatal hernia 11/2/2015     Hyperlipidemia LDL goal <100 11/2/2015     Personal history of tobacco use, presenting hazards to health 11/2/2015     Type 2 diabetes mellitus without complication (H) 11/2/2015       Past Surgical History:    Past Surgical History:   Procedure Laterality Date     BYPASS GRAFT ARTERY CORONARY N/A 11/7/2015    Procedure: BYPASS GRAFT ARTERY CORONARY;  Surgeon: Sia Caldera MD;  Location:  OR     KNEE SURGERY  2009     REPLACE VALVE AORTIC N/A 11/7/2015    Procedure: REPLACE VALVE AORTIC;  Surgeon: Sia Caldera MD;  Location:  OR       Family History:    No family history on file.    Social History:  Marital Status:  Single [1]  Social History   Substance Use Topics     Smoking status: Former Smoker     Types: Pipe     Quit date: 5/15/2016     Smokeless tobacco: Never Used       "Comment: smokes a pipe in good weather     Alcohol use 0.0 oz/week     0 Standard drinks or equivalent per week      Comment: rare        Medications:      oxyCODONE IR (ROXICODONE) 5 MG tablet   isosorbide mononitrate (IMDUR) 30 MG 24 hr tablet   insulin aspart (NOVOLOG FLEXPEN) 100 UNIT/ML injection   senna-docusate (SENOKOT-S;PERICOLACE) 8.6-50 MG per tablet   carvedilol (COREG) 3.125 MG tablet   lisinopril (PRINIVIL/ZESTRIL) 5 MG tablet   atorvastatin (LIPITOR) 40 MG tablet   insulin glargine (LANTUS) 100 UNIT/ML injection   aspirin EC 81 MG EC tablet   blood glucose monitoring (NO BRAND SPECIFIED) test strip   insulin pen needle 31G X 8 MM   insulin syringe-needle U-100 (BD INSULIN SYRINGE ULTRAFINE) 31G X 5/16\" 0.5 ML         Review of Systems   Eyes: Negative for visual disturbance.   Cardiovascular: Negative for chest pain.   Gastrointestinal: Negative for abdominal pain, nausea and vomiting.   Neurological: Positive for dizziness, numbness (left sided, arm and face) and headaches (throbbing, left temporal, intermittent).   All other systems reviewed and are negative.      Physical Exam   BP: (!) 132/99  Pulse: 90  Heart Rate: 84  Temp: 98.1  F (36.7  C)  Resp: 16  SpO2: 94 %      Physical Exam   Constitutional: He is oriented to person, place, and time. No distress.   HENT:   Head: Normocephalic and atraumatic.   Right Ear: Tympanic membrane, external ear and ear canal normal.   Left Ear: Tympanic membrane, external ear and ear canal normal.   Nose: Nose normal.   Mouth/Throat: Oropharynx is clear and moist.   Eyes: Conjunctivae and EOM are normal. Pupils are equal, round, and reactive to light.   Neck: Normal range of motion. Neck supple.   Cardiovascular: Normal rate, regular rhythm, normal heart sounds and intact distal pulses.    No murmur heard.  Pulmonary/Chest: Effort normal and breath sounds normal. No respiratory distress. He has no wheezes. He has no rales. He exhibits no tenderness.   Abdominal: " Soft. Bowel sounds are normal. He exhibits no distension. There is no tenderness. There is no rebound and no guarding.   Musculoskeletal: Normal range of motion. He exhibits no edema.   Lymphadenopathy:     He has no cervical adenopathy.   Neurological: He is alert and oriented to person, place, and time. He has normal strength and normal reflexes. No sensory deficit.   Skin: Skin is warm and dry. No rash noted. He is not diaphoretic. No pallor.   Psychiatric: He has a normal mood and affect. His behavior is normal.   Vitals reviewed.      ED Course     ED Course     Procedures                EKG Interpretation:      Interpreted by Kody Mendieta  Time reviewed: now   Symptoms at time of EKG: now   Rhythm: normal sinus   Rate: normal  Axis: NORMAL  Ectopy: none  Conduction: normal  ST Segments/ T Waves: No ST-T wave changes  Q Waves: none  Comparison to prior: No old EKG available    Clinical Impression: normal EKG         Results for orders placed or performed during the hospital encounter of 12/09/17 (from the past 24 hour(s))   CBC with platelets differential   Result Value Ref Range    WBC 6.9 4.0 - 11.0 10e9/L    RBC Count 4.21 (L) 4.4 - 5.9 10e12/L    Hemoglobin 12.4 (L) 13.3 - 17.7 g/dL    Hematocrit 38.5 (L) 40.0 - 53.0 %    MCV 91 78 - 100 fl    MCH 29.5 26.5 - 33.0 pg    MCHC 32.2 31.5 - 36.5 g/dL    RDW 12.8 10.0 - 15.0 %    Platelet Count 219 150 - 450 10e9/L    Diff Method Automated Method     % Neutrophils 62.5 %    % Lymphocytes 22.1 %    % Monocytes 9.6 %    % Eosinophils 4.8 %    % Basophils 0.9 %    % Immature Granulocytes 0.1 %    Absolute Neutrophil 4.3 1.6 - 8.3 10e9/L    Absolute Lymphocytes 1.5 0.8 - 5.3 10e9/L    Absolute Monocytes 0.7 0.0 - 1.3 10e9/L    Absolute Eosinophils 0.3 0.0 - 0.7 10e9/L    Absolute Basophils 0.1 0.0 - 0.2 10e9/L    Abs Immature Granulocytes 0.0 0 - 0.4 10e9/L   Comprehensive metabolic panel   Result Value Ref Range    Sodium 137 133 - 144 mmol/L    Potassium  3.8 3.4 - 5.3 mmol/L    Chloride 104 94 - 109 mmol/L    Carbon Dioxide 26 20 - 32 mmol/L    Anion Gap 7 3 - 14 mmol/L    Glucose 163 (H) 70 - 99 mg/dL    Urea Nitrogen 18 7 - 30 mg/dL    Creatinine 1.00 0.66 - 1.25 mg/dL    GFR Estimate 74 >60 mL/min/1.7m2    GFR Estimate If Black >90 >60 mL/min/1.7m2    Calcium 8.7 8.5 - 10.1 mg/dL    Bilirubin Total 0.4 0.2 - 1.3 mg/dL    Albumin 3.5 3.4 - 5.0 g/dL    Protein Total 7.4 6.8 - 8.8 g/dL    Alkaline Phosphatase 137 40 - 150 U/L    ALT 26 0 - 70 U/L    AST 20 0 - 45 U/L   INR   Result Value Ref Range    INR 0.86 0.86 - 1.14   Partial thromboplastin time   Result Value Ref Range    PTT 28 22 - 37 sec   Troponin I   Result Value Ref Range    Troponin I ES <0.015 0.000 - 0.045 ug/L   CT Head w/o Contrast    Narrative    CT HEAD W/O CONTRAST   12/9/2017 3:18 PM     HISTORY: left arm numbness;     TECHNIQUE: Axial images of the head without IV contrast material.  Radiation dose for this scan was reduced using automated exposure  control, adjustment of the mA and/or kV according to patient size, or  iterative reconstruction technique.    COMPARISON: None.    FINDINGS:  There is generalized atrophy of the brain.  Areas of low  attenuation are present in the white matter of the cerebral  hemispheres that are consistent with small vessel ischemic disease in  this age patient. There is no evidence of intracranial hemorrhage,  mass, acute infarct or anomaly. The visualized portions of the sinuses  and mastoids appear normal. There is no evidence of trauma.      Impression    IMPRESSION:   1. No acute pathology, no bleed, mass, or acute infarcts are seen.  2. Age related changes including diffuse brain atrophy.  White matter  changes consistent with small vessel ischemic disease.    EDWARD HERMAN MD   XR Chest Port 1 View    Narrative    XR CHEST PORT 1 VW  12/9/2017 3:28 PM     HISTORY:  dizziness, left arm numbness;     COMPARISON: Film dated 11/21/2017    FINDINGS:  Midline  sternotomy. Heart and pulmonary vasculature are  normal. Lungs are clear.      Impression    IMPRESSION: No active infiltrate.    EDWARD HERMAN MD     Medications - No data to display     MDM: Reinaldo is a 67 year old male who presents to the ED complaining of left sided facial and arm numbness 1 hour PTA. Patient's diastolic blood pressure is elevated at 132/99 mmHg. He is otherwise afebrile with normal vitals upon presentation to the ED. Exam is completely unremarkable. Patient is neurologically nl, no acute finding noted.  CT was normal.  Patient had no recurrence of the numbness.  Patient states that this did start while he was sitting in a chair, I wonder if it could be more of a peripheral nerve palsy.  This certainly could be a TIA.  Patient has not been taking his aspirin so I meant to have him start back on his aspirin.  He will see his doctor on Monday to discuss more of a workup for this.  They may want to consider ultrasound of the carotid vessels and other TIA workup.  Patient was told to return if any of the symptoms do return.    Assessments & Plan   TIA     I have reviewed the nursing notes.    I have reviewed the findings, diagnosis, plan and need for follow up with the patient.            This document serves as a record of services personally performed by Kody Mendieta MD. It was created on their behalf by Lexy Richards, a trained medical scribe. The creation of this record is based on the provider's personal observations and the statements of the patient. This document has been checked and approved by the attending provider.    Note: Chart documentation done in part with Dragon Voice Recognition software. Although reviewed after completion, some word and grammatical errors may remain.    12/9/2017   [facility] EMERGENCY DEPARTMENT     Kody Mendieta MD  12/09/17 [id number]

## 2017-12-09 NOTE — ED AVS SNAPSHOT
[facility] Emergency Department    [address] [place] [address]    Phone:  [phone]    Fax:  [phone]                                       Reinaldo Barrios   MRN: [id number]    Department:  [facility] Emergency Department   Date of Visit:  12/9/2017           After Visit Summary Signature Page     I have received my discharge instructions, and my questions have been answered. I have discussed any challenges I see with this plan with the nurse or doctor.    ..........................................................................................................................................  Patient/Patient Representative Signature      ..........................................................................................................................................  Patient Representative Print Name and Relationship to Patient    ..................................................               ................................................  Date                                            Time    ..........................................................................................................................................  Reviewed by Signature/Title    ...................................................              ..............................................  Date                                                            Time

## 2017-12-09 NOTE — ED AVS SNAPSHOT
[facility] Emergency Department    [address] [place] [address]    Phone:  [phone]    Fax:  [phone]                                       Reinaldo Barrios   MRN: [id number]    Department:  [facility] Emergency Department   Date of Visit:  12/9/2017           Patient Information     Date Of Birth          1950        Your diagnoses for this visit were:     Transient cerebral ischemia, unspecified type        You were seen by Kody Mendieta MD.      Follow-up Information     Schedule an appointment as soon as possible for a visit with Mark Blanchard DO.    Specialty:  Internal Medicine    Why:  on Monday for follow up at 3pm    Contact information:    [address] [place] [id number] [phone]          Discharge Instructions         Discharge Instructions for Transient Ischemic Attack (TIA)  You have been diagnosed with a transient ischemic attack (TIA). You can think of a TIA as a temporary or mini-stroke. Blood temporarily could not reach part of your brain. Unlike a stroke, TIAs usually cause no lasting damage. If you think you are having symptoms of a TIA or stroke, get medical help right away--even if the symptoms go away.   Prevention    Take your medicines exactly as directed. Don t skip doses.    Learn to take your blood pressure. Keep a log for your doctor.    Change your diet if your doctor tells you to. Your doctor may suggest that you cut back on salt. If so, here are some tips:    Limit canned, dried, packaged, and fast foods.    Don t add salt to your food at the table.    Season foods with herbs instead of salt when you cook.    Maintain a healthy weight. Get help to lose any extra pounds.    Begin an exercise program. Ask your doctor how to get started. You can benefit from simple activities, such as walking or gardening.    Limit your alcohol intake to no more than 2 drinks a day.    Know your cholesterol level. Follow  your doctor s advice about how to keep cholesterol under control.    If you are a smoker, you need to quit now. Enroll in a stop-smoking program to improve your chances of success. Ask your doctor about medicines or other methods to help you quit.    Your healthcare provider will give you information on dietary changes that you may need to make, based on your situation. Your provider may recommend that you see a registered dietitian for help with diet changes. Changes may include:    Reducing fat and cholesterol intake    Reducing sodium (salt) intake, especially if you have high blood pressure    Increasing your intake of fresh vegetables and fruits    Eating lean proteins, such as fish, poultry, and legumes (beans and peas) and eating less red meat and processed meats    Using low-fat dairy products    Using vegetable and nut oils in limited amounts    Limiting sweets and processed foods such as chips, cookies, and baked goods    If you are overweight, your healthcare provider will work with you to lose weight and lower your body mass index (BMI) to a normal or near-normal level. Making diet changes and increasing physical activity can help.    Begin an exercise program. Ask your doctor how to get started and how much activity you should try to get on a daily or weekly basis. You can benefit from simple activities such as walking or gardening.    Learn stress-management techniques to help you deal with stress in your home and work life.  Follow-up care    Some medicines require blood tests to check for progress or problems. Keep follow-up appointments for any blood tests ordered by your doctors.     When to call 911  Call 911 right away if you have any of the following:    Weakness, tingling, or loss of feeling on one side of your face or body    Sudden double vision, or trouble seeing in one or both eyes    Sudden trouble talking, or slurring your speech    Trouble understanding others    Sudden, severe  headache    Dizziness, loss of balance, or a spinning feeling, a sense of falling    Blackouts or seizures   Date Last Reviewed: 5/1/2017 2000-2017 [employer]. [address] [place] [address], [place] [address]. All rights reserved. This information is not intended as a substitute for professional medical care. Always follow your healthcare professional's instructions.          Future Appointments        Provider Department Dept Phone Center    12/11/2017 3:20 PM Mark Blanchard, DO [facility] [phone] [facility]      24 Hour Appointment Hotline       To make an appointment at any [facility], call [phone] ([phone]). If you don't have a family doctor or clinic, we will help you find one. [facility] are conveniently located to serve the needs of you and your family.             Review of your medicines      Our records show that you are taking the medicines listed below. If these are incorrect, please call your family doctor or clinic.        Dose / Directions Last dose taken    aspirin 81 MG EC tablet   Dose:  81 mg        Take 1 tablet (81 mg) by mouth daily   Refills:  0        atorvastatin 40 MG tablet   Commonly known as:  LIPITOR   Quantity:  30 tablet        TAKE 1 TABLET BY MOUTH DALE Y AT BEDTIME   Refills:  11        blood glucose monitoring test strip   Commonly known as:  no brand specified   Quantity:  100 strip        Use to test blood sugars 3 times daily or as directed   Refills:  1        carvedilol 3.125 MG tablet   Commonly known as:  COREG   Dose:  3.125 mg   Quantity:  60 tablet        Take 1 tablet (3.125 mg) by mouth 2 times daily (with meals)   Refills:  11        insulin aspart 100 UNIT/ML injection   Commonly known as:  NovoLOG FLEXPEN   Quantity:  15 mL        INJECT 10 - 12 UNITS UNDER THE SKIN TWICE A DAY   Refills:  11        insulin glargine 100 UNIT/ML injection   Commonly known as:  LANTUS   Quantity:  3 Month        40  "units am, 10 units pm   Refills:  3        insulin pen needle 31G X 8 MM   Quantity:  100 each        Use three pen needles daily or as directed.   Refills:  5        insulin syringe-needle U-100 31G X 5/16\" 0.5 ML   Commonly known as:  BD insulin syringe ultrafine   Quantity:  100 each        Use one syringe 3 daily or as directed.   Refills:  prn        isosorbide mononitrate 30 MG 24 hr tablet   Commonly known as:  IMDUR   Quantity:  30 tablet        TAKE 1 TABLET BY MOUTH EVERY DAY   Refills:  8        lisinopril 5 MG tablet   Commonly known as:  PRINIVIL/ZESTRIL   Quantity:  30 tablet        TAKE 1 TABLET BY MOUTH EVER Y DAY   Refills:  11        oxyCODONE IR 5 MG tablet   Commonly known as:  ROXICODONE   Dose:  5-10 mg   Quantity:  30 tablet        Take 1-2 tablets (5-10 mg) by mouth every 6 hours as needed for pain   Refills:  0        senna-docusate 8.6-50 MG per tablet   Commonly known as:  SENOKOT-S;PERICOLACE   Dose:  1-2 tablet   Quantity:  100 tablet        Take 1-2 tablets by mouth 2 times daily   Refills:  11                Procedures and tests performed during your visit     CBC with platelets differential    CT Head w/o Contrast    Cardiac Continuous Monitoring    Comprehensive metabolic panel    EKG 12-lead, tracing only    INR    Partial thromboplastin time    Peripheral IV: Standard    Troponin I    XR Chest Port 1 View      Orders Needing Specimen Collection     None      Pending Results     No orders found from 12/7/2017 to 12/10/2017.            Pending Culture Results     No orders found from 12/7/2017 to 12/10/2017.            Pending Results Instructions     If you had any lab results that were not finalized at the time of your Discharge, you can call the ED Lab Result RN at [phone]. You will be contacted by this team for any positive Lab results or changes in treatment. The nurses are available 7 days a week from 10A to 6:30P.  You can leave a message 24 hours per day and they will " "return your call.        Thank you for choosing [facility]       Thank you for choosing [facility] for your care. Our goal is always to provide you with excellent care. Hearing back from our patients is one way we can continue to improve our services. Please take a few minutes to complete the written survey that you may receive in the mail after you visit with us. Thank you!        [employer] Information     [employer] lets you send messages to your doctor, view your test results, renew your prescriptions, schedule appointments and more. To sign up, go to [email or web address] . Click on \"Log in\" on the left side of the screen, which will take you to the Welcome page. Then click on \"Sign up Now\" on the right side of the page.     You will be asked to enter the access code listed below, as well as some personal information. Please follow the directions to create your username and password.     Your access code is: [email or web address]  Expires: 2018  2:22 PM     Your access code will  in 90 days. If you need help or a new code, please call your [facility] clinic or [phone].        Care EveryWhere ID     This is your Care EveryWhere ID. This could be used by other organizations to access your [facility] medical records  [id number]        Equal Access to Services     ERICA GUERRERO : Amber Syed, waaxda luqadaha, qaybta kaalmada adeegyada, ansley mcgee. So [facility] [phone].    ATENCIÓN: Si habla español, tiene a templeton disposición servicios gratuitos de asistencia lingüística. Llame al [phone].    We comply with applicable federal civil rights laws and [place] laws. We do not discriminate on the basis of race, color, national origin, age, disability, sex, sexual orientation, or gender identity.            After Visit Summary       This is your record. Keep this with you and show to your community pharmacist(s) and doctor(s) at your next visit.                  "

## 2017-12-11 ENCOUNTER — OFFICE VISIT (OUTPATIENT)
Dept: INTERNAL MEDICINE | Facility: CLINIC | Age: 67
End: 2017-12-11
Payer: MEDICARE

## 2017-12-11 VITALS
SYSTOLIC BLOOD PRESSURE: 132 MMHG | DIASTOLIC BLOOD PRESSURE: 72 MMHG | TEMPERATURE: 97.9 F | BODY MASS INDEX: 34.06 KG/M2 | WEIGHT: 224 LBS | OXYGEN SATURATION: 97 % | HEART RATE: 85 BPM

## 2017-12-11 DIAGNOSIS — E11.9 TYPE 2 DIABETES MELLITUS WITHOUT COMPLICATION, WITHOUT LONG-TERM CURRENT USE OF INSULIN (H): ICD-10-CM

## 2017-12-11 DIAGNOSIS — G45.8 OTHER SPECIFIED TRANSIENT CEREBRAL ISCHEMIAS: Primary | ICD-10-CM

## 2017-12-11 DIAGNOSIS — I10 ESSENTIAL HYPERTENSION WITH GOAL BLOOD PRESSURE LESS THAN 140/90: ICD-10-CM

## 2017-12-11 DIAGNOSIS — Z95.2 S/P AORTIC VALVE REPLACEMENT: ICD-10-CM

## 2017-12-11 PROCEDURE — 99214 OFFICE O/P EST MOD 30 MIN: CPT | Performed by: INTERNAL MEDICINE

## 2017-12-11 ASSESSMENT — PAIN SCALES - GENERAL: PAINLEVEL: MODERATE PAIN (4)

## 2017-12-11 NOTE — PROGRESS NOTES
SUBJECTIVE:   Reinaldo Barrios is a 67 year old male who presents to clinic today for the following health issues:    ED/UC Followup:    Facility:  [facility]  Date of visit: 12/9/17  Reason for visit: Transient cerebral ischemia  Current Status:                      Chief Complaint           The patient is a pleasant 67-year-old gentleman who was sitting on the couch eating Doritos when he began to experience a tingling sensation in his left hand.  This did not seem to alarm him until the tingling sensation started affecting the left side of his face.  At that time, he decided that this was a concern and drove to the emergency department.  Workup there was performed which demonstrated no significant abnormality.  His symptoms spontaneously resolved and this appears to be most likely consistent with a TIA.  He does have a history of coronary artery disease in his post revascularization.  Also has decreased ejection fraction of 35%.  He has A prosthetic valve and is not on anticoagulation.  Overall, he's been feeling quite well up until this episode.  He does have diabetes and does control his blood sugar fairly well. His last A1c was 7.3.                       PAST, FAMILY,SOCIAL HISTORY:     Medical  History:   has a past medical history of Aortic valve stenosis (11/2/2015); Cardiac LV ejection fraction 30-35% (11/2/2015); Chest pain, unspecified chest pain type (11/2/2015); Hiatal hernia (11/2/2015); Hyperlipidemia LDL goal <100 (11/2/2015); Personal history of tobacco use, presenting hazards to health (11/2/2015); and Type 2 diabetes mellitus without complication (H) (11/2/2015).     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     Social History:   reports that he quit smoking about 18 months ago. His smoking use included Pipe. He has never used smokeless tobacco. He  "reports that he drinks alcohol. He reports that he does not use illicit drugs.     Family History:  family history is not on file.            MEDICATIONS  Current Outpatient Prescriptions   Medication Sig Dispense Refill     oxyCODONE IR (ROXICODONE) 5 MG tablet Take 1-2 tablets (5-10 mg) by mouth every 6 hours as needed for pain 30 tablet 0     isosorbide mononitrate (IMDUR) 30 MG 24 hr tablet TAKE 1 TABLET BY MOUTH EVERY DAY 30 tablet 8     insulin aspart (NOVOLOG FLEXPEN) 100 UNIT/ML injection INJECT 10 - 12 UNITS UNDER THE SKIN TWICE A DAY 15 mL 11     senna-docusate (SENOKOT-S;PERICOLACE) 8.6-50 MG per tablet Take 1-2 tablets by mouth 2 times daily 100 tablet 11     carvedilol (COREG) 3.125 MG tablet Take 1 tablet (3.125 mg) by mouth 2 times daily (with meals) 60 tablet 11     blood glucose monitoring (NO BRAND SPECIFIED) test strip Use to test blood sugars 3 times daily or as directed 100 strip 1     lisinopril (PRINIVIL/ZESTRIL) 5 MG tablet TAKE 1 TABLET BY MOUTH EVER Y DAY 30 tablet 11     atorvastatin (LIPITOR) 40 MG tablet TAKE 1 TABLET BY MOUTH DALE Y AT BEDTIME 30 tablet 11     insulin pen needle 31G X 8 MM Use three pen needles daily or as directed. 100 each 5     insulin syringe-needle U-100 (BD INSULIN SYRINGE ULTRAFINE) 31G X 5/16\" 0.5 ML Use one syringe 3 daily or as directed. 100 each prn     insulin glargine (LANTUS) 100 UNIT/ML injection 40 units am, 10 units pm 3 Month 3     aspirin EC 81 MG EC tablet Take 1 tablet (81 mg) by mouth daily           --------------------------------------------------------------------------------------------------------------------                  Review of Systems     LUNGS: Pt denies: cough,excess sputum, hemoptysis, or shortness of breath.   HEART: Pt denies: chest pain, arrythmia, syncope, tachy or bradyarrhythmia.   GI: Pt denies: nausea, vomitting, diarrhea, constipation, melena, or hematochezia.   NEURO: Pt denies: seizures, strokes, diplopia, weakness, " current paraesthesias, or paralysis.   SKIN: Pt denies: itching, rashes, discoloration, or specific lesions of concern. Denies recent hair loss.                        Examination   Vital Signs:  B/P: 132/72, T: 97.9, P: 85, R: Data Unavailable, BMI: Body mass index is 34.06 kg/(m^2).   Constitutional: The patient appears to be in no acute distress. The patient appears to be adequately hydrated. No acute respiratory or hemodynamic distress is noted at this time.   LUNGS: clear bilaterally, airflow is brisk, no intercostal retraction or stridor is noted. No coughing is noted during visit.   HEART:  regular without rubs, clicks, gallops, or murmurs. PMI is nondisplaced. Upstrokes are brisk. S1,S2 are heard.   GI: Abdomen is soft, without rebound, guarding or tenderness. Bowel sounds are appropriate. No renal bruits are heard.    NEURO: Pt is alert and appropriate. No neurologic lateralization is noted. Cranial nerves 2-12 are intact. Peripheral sensory and motor function are grossly normal.    SKIN:  warm and dry. No erythema, or rashes are noted. No specific lesions of concern are noted.                        Decision-Making  1. Other specified transient cerebral ischemias  Restart aspirin, patient had run out five days before this episode.  Continue blood pressure and blood sugar control as current    2. Type 2 diabetes mellitus without complication, without long-term current use of insulin (H)  Continue current blood sugar management  Continued ACE inhibitor, statin, aspirin    3. Essential hypertension with goal blood pressure less than 140/90  Controlled    4. S/P aortic valve replacement  Bio prosthetic valve, not on anticoagulant                           FOLLOW UP   I have asked the patient to make an appointment for followup with me in one month or sooner as needed        I have carefully explained the diagnosis and treatment options with the patient. The patient has displayed an understanding of the above,  and all subsequent questions were answered.               DO RADHA Horn    Portions of this note were produced using [employer]  Although every attempt at real-time proof reading has been made, occasional grammar/syntax errors may have been missed.

## 2017-12-11 NOTE — MR AVS SNAPSHOT
"              After Visit Summary   12/11/2017    Reinaldo Barrios    MRN: [id number]           Patient Information     Date Of Birth          1950        Visit Information        Provider Department      12/11/2017 3:20 PM Mark Blanchard DO [facility]        Today's Diagnoses     Other specified transient cerebral ischemias    -  1    Type 2 diabetes mellitus without complication, without long-term current use of insulin (H)        Essential hypertension with goal blood pressure less than 140/90        S/P aortic valve replacement           Follow-ups after your visit        Who to contact     If you have questions or need follow up information about today's clinic visit or your schedule please contact [facility] directly at [phone].  Normal or non-critical lab and imaging results will be communicated to you by MyChart, letter or phone within 4 business days after the clinic has received the results. If you do not hear from us within 7 days, please contact the clinic through [employer] or phone. If you have a critical or abnormal lab result, we will notify you by phone as soon as possible.  Submit refill requests through [employer] or call your pharmacy and they will forward the refill request to us. Please allow 3 business days for your refill to be completed.          Additional Information About Your Visit        MyChart Information     [employer] lets you send messages to your doctor, view your test results, renew your prescriptions, schedule appointments and more. To sign up, go to [email or web address] . Click on \"Log in\" on the left side of the screen, which will take you to the Welcome page. Then click on \"Sign up Now\" on the right side of the page.     You will be asked to enter the access code listed below, as well as some personal information. Please follow the directions to create your username and password.     Your access code is: " [email or web address]  Expires: 2018  2:22 PM     Your access code will  in 90 days. If you need help or a new code, please call your [place] clinic or [phone].        Care EveryWhere ID     This is your Care EveryWhere ID. This could be used by other organizations to access your [place] medical records  [id number]        Your Vitals Were     Pulse Temperature Pulse Oximetry BMI (Body Mass Index)          85 97.9  F (36.6  C) (Temporal) 97% 34.06 kg/m2         Blood Pressure from Last 3 Encounters:   17 132/72   17 (!) 157/95   17 138/84    Weight from Last 3 Encounters:   17 224 lb (101.6 kg)   17 229 lb 11.2 oz (104.2 kg)   17 227 lb 9.6 oz (103.2 kg)              Today, you had the following     No orders found for display       Primary Care Provider Office Phone # Fax #    Mark Maciel Blanchard,  [phone] [address] [place] [address]        Equal Access to Services     [facility] PeninsulaJ[facility] AH: Hadii aad ku hadasho Soomaali, waaxda luqadaha, qaybta kaalmada adeegyada, ansley mcgee. So [facility] [phone].    ATENCIÓN: Si habla español, tiene a templeton disposición servicios gratuitos de asistencia lingüística. Llame al [phone].    We comply with applicable federal civil rights laws and [place] laws. We do not discriminate on the basis of race, color, national origin, age, disability, sex, sexual orientation, or gender identity.            Thank you!     Thank you for choosing [facility]  for your care. Our goal is always to provide you with excellent care. Hearing back from our patients is one way we can continue to improve our services. Please take a few minutes to complete the written survey that you may receive in the mail after your visit with us. Thank you!             Your Updated Medication List - Protect others around you: Learn how to safely use, store and throw away your medicines at  "www.disposemymeds.org.          This list is accurate as of: 12/11/17  4:39 PM.  Always use your most recent med list.                   Brand Name Dispense Instructions for use Diagnosis    aspirin 81 MG EC tablet      Take 1 tablet (81 mg) by mouth daily    S/P CABG (coronary artery bypass graft)       atorvastatin 40 MG tablet    LIPITOR    30 tablet    TAKE 1 TABLET BY MOUTH DALE Y AT BEDTIME    Hyperlipidemia LDL goal <100       blood glucose monitoring test strip    no brand specified    100 strip    Use to test blood sugars 3 times daily or as directed    Type 2 diabetes mellitus with hyperglycemia, with long-term current use of insulin (H)       carvedilol 3.125 MG tablet    COREG    60 tablet    Take 1 tablet (3.125 mg) by mouth 2 times daily (with meals)    Essential hypertension with goal blood pressure less than 140/90       insulin aspart 100 UNIT/ML injection    NovoLOG FLEXPEN    15 mL    INJECT 10 - 12 UNITS UNDER THE SKIN TWICE A DAY    Type 2 diabetes mellitus without complication, with long-term current use of insulin (H)       insulin glargine 100 UNIT/ML injection    LANTUS    3 Month    40 units am, 10 units pm    Type 2 diabetes mellitus without complication, with long-term current use of insulin (H)       insulin pen needle 31G X 8 MM     100 each    Use three pen needles daily or as directed.    Type 2 diabetes mellitus with hyperglycemia, with long-term current use of insulin (H)       insulin syringe-needle U-100 31G X 5/16\" 0.5 ML    BD insulin syringe ultrafine    100 each    Use one syringe 3 daily or as directed.    Type 2 diabetes mellitus without complication, with long-term current use of insulin (H)       isosorbide mononitrate 30 MG 24 hr tablet    IMDUR    30 tablet    TAKE 1 TABLET BY MOUTH EVERY DAY    Precordial pain       lisinopril 5 MG tablet    PRINIVIL/ZESTRIL    30 tablet    TAKE 1 TABLET BY MOUTH EVER Y DAY    Type 2 diabetes mellitus without complication, with " long-term current use of insulin (H), Systolic and diastolic CHF, chronic (H)       oxyCODONE IR 5 MG tablet    ROXICODONE    30 tablet    Take 1-2 tablets (5-10 mg) by mouth every 6 hours as needed for pain    Closed fracture of multiple ribs of right side with routine healing, subsequent encounter       senna-docusate 8.6-50 MG per tablet    SENOKOT-S;PERICOLACE    100 tablet    Take 1-2 tablets by mouth 2 times daily    Chronic idiopathic constipation

## 2017-12-11 NOTE — NURSING NOTE
"Chief Complaint   Patient presents with     ER F/U       Initial /72 (BP Location: Right arm, Patient Position: Chair, Cuff Size: Adult Regular)  Pulse 85  Temp 97.9  F (36.6  C) (Temporal)  Wt 224 lb (101.6 kg)  SpO2 97%  BMI 34.06 kg/m2 Estimated body mass index is 34.06 kg/(m^2) as calculated from the following:    Height as of 11/21/17: 5' 8\" (1.727 m).    Weight as of this encounter: 224 lb (101.6 kg).  Medication Reconciliation: complete  Health Maintenance reviewed at today's visit patient asked to schedule/complete:   Colon Cancer:  Patient declined   [facility]      "

## 2018-02-21 DIAGNOSIS — E78.5 HYPERLIPIDEMIA LDL GOAL <100: ICD-10-CM

## 2018-02-21 RX ORDER — ATORVASTATIN CALCIUM 40 MG/1
TABLET, FILM COATED ORAL
Qty: 30 TABLET | Refills: 1 | Status: SHIPPED | OUTPATIENT
Start: 2018-02-21 | End: 2018-04-17

## 2018-02-21 NOTE — TELEPHONE ENCOUNTER
Routing refill request to provider for review/approval because:  Labs out of range:  FLP in June 2017  Inez Garcia RN

## 2018-03-17 DIAGNOSIS — Z79.4 TYPE 2 DIABETES MELLITUS WITHOUT COMPLICATION, WITH LONG-TERM CURRENT USE OF INSULIN (H): ICD-10-CM

## 2018-03-17 DIAGNOSIS — E11.9 TYPE 2 DIABETES MELLITUS WITHOUT COMPLICATION, WITH LONG-TERM CURRENT USE OF INSULIN (H): ICD-10-CM

## 2018-03-17 DIAGNOSIS — I50.42 SYSTOLIC AND DIASTOLIC CHF, CHRONIC (H): ICD-10-CM

## 2018-03-17 DIAGNOSIS — E11.65 TYPE 2 DIABETES MELLITUS WITH HYPERGLYCEMIA, WITH LONG-TERM CURRENT USE OF INSULIN (H): ICD-10-CM

## 2018-03-17 DIAGNOSIS — Z79.4 TYPE 2 DIABETES MELLITUS WITH HYPERGLYCEMIA, WITH LONG-TERM CURRENT USE OF INSULIN (H): ICD-10-CM

## 2018-03-19 NOTE — TELEPHONE ENCOUNTER
"Requested Prescriptions   Pending Prescriptions Disp Refills     ONETOUCH ULTRA test strip [Pharmacy Med Name: ONETOUCH ULTRA TEST STRIP] 100 each      Sig: USE TO TEST BLOOD SUGAR 3 TIMES A DAY    Diabetic Supplies Protocol Passed    3/17/2018 10:02 AM       Passed - Patient is 18 years of age or older       Passed - Recent (6 mo) or future (30 days) visit within the authorizing provider's specialty    Patient had office visit in the last 6 months or has a visit in the next 30 days with authorizing provider.  See \"Patient Info\" tab in inbasket, or \"Choose Columns\" in Meds & Orders section of the refill encounter.            lisinopril (PRINIVIL/ZESTRIL) 5 MG tablet [Pharmacy Med Name: LISINOPRIL 5MG TAB] 30 tablet      Sig: TAKE 1 TABLET BY MOUTH EVERY DAY    ACE Inhibitors (Including Combos) Protocol Passed    3/17/2018 10:02 AM       Passed - Blood pressure under 140/90 in past 12 months    BP Readings from Last 3 Encounters:   12/11/17 132/72   12/09/17 (!) 157/95   11/21/17 138/84                Passed - Recent (12 mo) or future (30 days) visit within the authorizing provider's specialty    Patient had office visit in the last 12 months or has a visit in the next 30 days with authorizing provider or within the authorizing provider's specialty.  See \"Patient Info\" tab in inbasket, or \"Choose Columns\" in Meds & Orders section of the refill encounter.           Passed - Patient is age 18 or older       Passed - Normal serum creatinine on file in past 12 months    Recent Labs   Lab Test  12/09/17   1509   CR  1.00            Passed - Normal serum potassium on file in past 12 months    Recent Labs   Lab Test  12/09/17   1509   POTASSIUM  3.8               "

## 2018-03-20 DIAGNOSIS — E11.9 TYPE 2 DIABETES MELLITUS WITHOUT COMPLICATION, WITH LONG-TERM CURRENT USE OF INSULIN (H): ICD-10-CM

## 2018-03-20 DIAGNOSIS — I50.42 SYSTOLIC AND DIASTOLIC CHF, CHRONIC (H): ICD-10-CM

## 2018-03-20 DIAGNOSIS — Z79.4 TYPE 2 DIABETES MELLITUS WITHOUT COMPLICATION, WITH LONG-TERM CURRENT USE OF INSULIN (H): ICD-10-CM

## 2018-03-20 RX ORDER — LISINOPRIL 5 MG/1
TABLET ORAL
Qty: 30 TABLET | Refills: 8 | Status: SHIPPED | OUTPATIENT
Start: 2018-03-20 | End: 2018-04-13

## 2018-03-20 RX ORDER — LISINOPRIL 5 MG/1
TABLET ORAL
Qty: 30 TABLET | Refills: 11 | OUTPATIENT
Start: 2018-03-20

## 2018-03-20 NOTE — TELEPHONE ENCOUNTER
Prescription approved per [facility] Refill Protocol.  Lynn Wade RN

## 2018-03-20 NOTE — TELEPHONE ENCOUNTER
"Requested Prescriptions   Pending Prescriptions Disp Refills     lisinopril (PRINIVIL/ZESTRIL) 5 MG tablet 30 tablet 11    ACE Inhibitors (Including Combos) Protocol Passed    3/20/2018 11:48 AM       Passed - Blood pressure under 140/90 in past 12 months    BP Readings from Last 3 Encounters:   12/11/17 132/72   12/09/17 (!) 157/95   11/21/17 138/84                Passed - Recent (12 mo) or future (30 days) visit within the authorizing provider's specialty    Patient had office visit in the last 12 months or has a visit in the next 30 days with authorizing provider or within the authorizing provider's specialty.  See \"Patient Info\" tab in inbasket, or \"Choose Columns\" in Meds & Orders section of the refill encounter.           Passed - Patient is age 18 or older       Passed - Normal serum creatinine on file in past 12 months    Recent Labs   Lab Test  12/09/17   1509   CR  1.00            Passed - Normal serum potassium on file in past 12 months    Recent Labs   Lab Test  12/09/17   1509   POTASSIUM  3.8               Last Written Prescription Date:  3/6/17  Last Fill Quantity: 30,  # refills: 11   Last Office Visit with G, P or [facility] prescribing provider:  11/21/17   Future Office Visit:       "

## 2018-03-30 DIAGNOSIS — Z79.4 TYPE 2 DIABETES MELLITUS WITHOUT COMPLICATION, WITH LONG-TERM CURRENT USE OF INSULIN (H): ICD-10-CM

## 2018-03-30 DIAGNOSIS — E11.9 TYPE 2 DIABETES MELLITUS WITHOUT COMPLICATION, WITH LONG-TERM CURRENT USE OF INSULIN (H): ICD-10-CM

## 2018-03-30 NOTE — TELEPHONE ENCOUNTER
"Requested Prescriptions   Pending Prescriptions Disp Refills     LANTUS SOLOSTAR 100 UNIT/ML soln [Pharmacy Med Name: LANTUS SOLOSTAR 100UNIT/ML PEN] 45 mL      Sig: INJECT 40 UNITS UNDER THE SKIN EVERY MORNING AND 10 UNITS EVERY EVENING    Long Acting Insulin Protocol Passed    3/30/2018  9:59 AM       Passed - Blood pressure less than 140/90 in past 6 months    BP Readings from Last 3 Encounters:   12/11/17 132/72   12/09/17 (!) 157/95   11/21/17 138/84                Passed - LDL on file in past 12 months    Recent Labs   Lab Test  06/15/17   0852   LDL  64            Passed - Microalbumin on file in past 12 months    Recent Labs   Lab Test  06/15/17   0902   MICROL  6   UMALCR  6.88            Passed - Serum creatinine on file in past 12 months    Recent Labs   Lab Test  12/09/17   1509   CR  1.00            Passed - HgbA1C in past 3 or 6 months    Recent Labs   Lab Test  11/13/17   1011   A1C  7.3*            Passed - Patient is age 18 or older       Passed - Recent (6 mo) or future (30 days) visit within the authorizing provider's specialty    Patient had office visit in the last 6 months or has a visit in the next 30 days with authorizing provider or within the authorizing provider's specialty.  See \"Patient Info\" tab in inbasket, or \"Choose Columns\" in Meds & Orders section of the refill encounter.              Last Written Prescription Date:  12/14/16  Last Fill Quantity: 3,  # refills: 3   Last Office Visit with [facility], [facility] or [facility] prescribing provider:  **12/11/18*   Future Office Visit:       "

## 2018-04-03 RX ORDER — INSULIN GLARGINE 100 [IU]/ML
INJECTION, SOLUTION SUBCUTANEOUS
Qty: 45 ML | Refills: 1 | Status: SHIPPED | OUTPATIENT
Start: 2018-04-03 | End: 2018-04-04

## 2018-04-03 NOTE — TELEPHONE ENCOUNTER
Routing refill request to provider for review/approval because:  A break in medication- last prescribed 12/14/16  Aleyda Ma, RN, BSN

## 2018-04-04 ENCOUNTER — TELEPHONE (OUTPATIENT)
Dept: FAMILY MEDICINE | Facility: OTHER | Age: 68
End: 2018-04-04

## 2018-04-04 DIAGNOSIS — Z79.4 TYPE 2 DIABETES MELLITUS WITHOUT COMPLICATION, WITH LONG-TERM CURRENT USE OF INSULIN (H): ICD-10-CM

## 2018-04-04 DIAGNOSIS — E11.9 TYPE 2 DIABETES MELLITUS WITHOUT COMPLICATION, WITH LONG-TERM CURRENT USE OF INSULIN (H): ICD-10-CM

## 2018-04-04 RX ORDER — INSULIN GLARGINE 100 [IU]/ML
INJECTION, SOLUTION SUBCUTANEOUS
Qty: 45 ML | Refills: 1 | Status: SHIPPED | OUTPATIENT
Start: 2018-04-04 | End: 2018-05-03

## 2018-04-11 ENCOUNTER — TELEPHONE (OUTPATIENT)
Dept: FAMILY MEDICINE | Facility: OTHER | Age: 68
End: 2018-04-11

## 2018-04-13 DIAGNOSIS — E11.9 TYPE 2 DIABETES MELLITUS WITHOUT COMPLICATION, WITH LONG-TERM CURRENT USE OF INSULIN (H): ICD-10-CM

## 2018-04-13 DIAGNOSIS — Z79.4 TYPE 2 DIABETES MELLITUS WITHOUT COMPLICATION, WITH LONG-TERM CURRENT USE OF INSULIN (H): ICD-10-CM

## 2018-04-13 DIAGNOSIS — I50.42 SYSTOLIC AND DIASTOLIC CHF, CHRONIC (H): ICD-10-CM

## 2018-04-13 RX ORDER — LISINOPRIL 5 MG/1
5 TABLET ORAL DAILY
Qty: 90 TABLET | Refills: 2 | Status: SHIPPED | OUTPATIENT
Start: 2018-04-13 | End: 2018-09-13

## 2018-04-17 DIAGNOSIS — E78.5 HYPERLIPIDEMIA LDL GOAL <100: ICD-10-CM

## 2018-04-17 RX ORDER — ATORVASTATIN CALCIUM 40 MG/1
TABLET, FILM COATED ORAL
Qty: 30 TABLET | Refills: 0 | Status: SHIPPED | OUTPATIENT
Start: 2018-04-17 | End: 2018-05-03

## 2018-04-23 NOTE — TELEPHONE ENCOUNTER
[facility] [employer] calling and states that the patient does not want anything other than the Lantus. Please advise and Sameer will also be faxing a form to the [facility].

## 2018-04-23 NOTE — TELEPHONE ENCOUNTER
"It would seem that his insurance carrier does not cover the Lantus. As such, there is always grimm.    Santo MITCHELL.......Is \"Sameer\" a typographical error?  "

## 2018-05-03 ENCOUNTER — OFFICE VISIT (OUTPATIENT)
Dept: INTERNAL MEDICINE | Facility: CLINIC | Age: 68
End: 2018-05-03
Payer: MEDICARE

## 2018-05-03 VITALS
TEMPERATURE: 97.4 F | DIASTOLIC BLOOD PRESSURE: 70 MMHG | OXYGEN SATURATION: 97 % | HEART RATE: 71 BPM | BODY MASS INDEX: 34.14 KG/M2 | WEIGHT: 224.5 LBS | SYSTOLIC BLOOD PRESSURE: 112 MMHG

## 2018-05-03 DIAGNOSIS — Z95.2 S/P AORTIC VALVE REPLACEMENT: ICD-10-CM

## 2018-05-03 DIAGNOSIS — R94.30 CARDIAC LV EJECTION FRACTION 30-35%: ICD-10-CM

## 2018-05-03 DIAGNOSIS — E11.9 TYPE 2 DIABETES MELLITUS WITHOUT COMPLICATION, WITH LONG-TERM CURRENT USE OF INSULIN (H): ICD-10-CM

## 2018-05-03 DIAGNOSIS — E11.65 TYPE 2 DIABETES MELLITUS WITH HYPERGLYCEMIA, WITH LONG-TERM CURRENT USE OF INSULIN (H): Primary | ICD-10-CM

## 2018-05-03 DIAGNOSIS — Z79.4 TYPE 2 DIABETES MELLITUS WITHOUT COMPLICATION, WITH LONG-TERM CURRENT USE OF INSULIN (H): ICD-10-CM

## 2018-05-03 DIAGNOSIS — E78.5 HYPERLIPIDEMIA LDL GOAL <100: ICD-10-CM

## 2018-05-03 DIAGNOSIS — Z95.1 S/P CABG (CORONARY ARTERY BYPASS GRAFT): ICD-10-CM

## 2018-05-03 DIAGNOSIS — Z79.4 TYPE 2 DIABETES MELLITUS WITH HYPERGLYCEMIA, WITH LONG-TERM CURRENT USE OF INSULIN (H): Primary | ICD-10-CM

## 2018-05-03 LAB
ANION GAP SERPL CALCULATED.3IONS-SCNC: 6 MMOL/L (ref 3–14)
BUN SERPL-MCNC: 17 MG/DL (ref 7–30)
CALCIUM SERPL-MCNC: 8.6 MG/DL (ref 8.5–10.1)
CHLORIDE SERPL-SCNC: 106 MMOL/L (ref 94–109)
CO2 SERPL-SCNC: 28 MMOL/L (ref 20–32)
CREAT SERPL-MCNC: 0.95 MG/DL (ref 0.66–1.25)
CREAT UR-MCNC: 53 MG/DL
GFR SERPL CREATININE-BSD FRML MDRD: 79 ML/MIN/1.7M2
GLUCOSE SERPL-MCNC: 163 MG/DL (ref 70–99)
HBA1C MFR BLD: 6.8 % (ref 0–6.4)
MICROALBUMIN UR-MCNC: <5 MG/L
MICROALBUMIN/CREAT UR: NORMAL MG/G CR (ref 0–17)
POTASSIUM SERPL-SCNC: 4.1 MMOL/L (ref 3.4–5.3)
SODIUM SERPL-SCNC: 140 MMOL/L (ref 133–144)

## 2018-05-03 PROCEDURE — 82043 UR ALBUMIN QUANTITATIVE: CPT | Performed by: INTERNAL MEDICINE

## 2018-05-03 PROCEDURE — 99207 C FOOT EXAM  NO CHARGE: CPT | Performed by: INTERNAL MEDICINE

## 2018-05-03 PROCEDURE — 80048 BASIC METABOLIC PNL TOTAL CA: CPT | Performed by: INTERNAL MEDICINE

## 2018-05-03 PROCEDURE — 99214 OFFICE O/P EST MOD 30 MIN: CPT | Performed by: INTERNAL MEDICINE

## 2018-05-03 PROCEDURE — 83036 HEMOGLOBIN GLYCOSYLATED A1C: CPT | Performed by: INTERNAL MEDICINE

## 2018-05-03 PROCEDURE — 36415 COLL VENOUS BLD VENIPUNCTURE: CPT | Performed by: INTERNAL MEDICINE

## 2018-05-03 RX ORDER — INSULIN GLARGINE 100 [IU]/ML
INJECTION, SOLUTION SUBCUTANEOUS
Qty: 45 ML | Refills: 1 | Status: SHIPPED | OUTPATIENT
Start: 2018-05-03 | End: 2018-05-18 | Stop reason: SINTOL

## 2018-05-03 RX ORDER — ATORVASTATIN CALCIUM 40 MG/1
40 TABLET, FILM COATED ORAL DAILY
Qty: 90 TABLET | Refills: 3 | Status: SHIPPED | OUTPATIENT
Start: 2018-05-03 | End: 2019-04-10

## 2018-05-03 ASSESSMENT — PAIN SCALES - GENERAL: PAINLEVEL: NO PAIN (0)

## 2018-05-03 NOTE — MR AVS SNAPSHOT
After Visit Summary   5/3/2018    Reinaldo Barrios    MRN: [id number]           Patient Information     Date Of Birth          1950        Visit Information        Provider Department      5/3/2018 8:20 AM Mark Blanchard DO [facility]        Today's Diagnoses     Type 2 diabetes mellitus with hyperglycemia, with long-term current use of insulin (H)    -  1    Hyperlipidemia LDL goal <100        Cardiac LV ejection fraction 30-35%        S/P CABG (coronary artery bypass graft)        S/P aortic valve replacement        Type 2 diabetes mellitus without complication, with long-term current use of insulin (H)           Follow-ups after your visit        Follow-up notes from your care team     Return in about 2 weeks (around 5/17/2018).      Future tests that were ordered for you today     Open Future Orders        Priority Expected Expires Ordered    Echocardiogram Complete Routine  5/3/2019 5/3/2018            Who to contact     If you have questions or need follow up information about today's clinic visit or your schedule please contact [facility] directly at [phone].  Normal or non-critical lab and imaging results will be communicated to you by MyChart, letter or phone within 4 business days after the clinic has received the results. If you do not hear from us within 7 days, please contact the clinic through [employer] or phone. If you have a critical or abnormal lab result, we will notify you by phone as soon as possible.  Submit refill requests through [employer] or call your pharmacy and they will forward the refill request to us. Please allow 3 business days for your refill to be completed.          Additional Information About Your Visit        MyChart Information     [employer] lets you send messages to your doctor, view your test results, renew your prescriptions, schedule appointments and more. To sign up, go to [email or web address] . Click  "on \"Log in\" on the left side of the screen, which will take you to the Welcome page. Then click on \"Sign up Now\" on the right side of the page.     You will be asked to enter the access code listed below, as well as some personal information. Please follow the directions to create your username and password.     Your access code is: [email or web address]  Expires: 2018  9:05 AM     Your access code will  in 90 days. If you need help or a new code, please call your [place] clinic or [phone].        Care EveryWhere ID     This is your Care EveryWhere ID. This could be used by other organizations to access your [place] medical records  [id number]        Your Vitals Were     Pulse Temperature Pulse Oximetry BMI (Body Mass Index)          71 97.4  F (36.3  C) (Temporal) 97% 34.14 kg/m2         Blood Pressure from Last 3 Encounters:   18 112/70   17 132/72   17 (!) 157/95    Weight from Last 3 Encounters:   18 224 lb 8 oz (101.8 kg)   17 224 lb (101.6 kg)   17 229 lb 11.2 oz (104.2 kg)              We Performed the Following     Albumin Random Urine Quantitative with Creat Ratio     Basic metabolic panel     FOOT EXAM     Hemoglobin A1c          Today's Medication Changes          These changes are accurate as of 5/3/18  9:05 AM.  If you have any questions, ask your nurse or doctor.               These medicines have changed or have updated prescriptions.        Dose/Directions    atorvastatin 40 MG tablet   Commonly known as:  LIPITOR   This may have changed:  See the new instructions.   Used for:  Hyperlipidemia LDL goal <100   Changed by:  Mark Blanchard DO        Dose:  40 mg   Take 1 tablet (40 mg) by mouth daily   Quantity:  90 tablet   Refills:  3       BASAGLAR 100 UNIT/ML injection   This may have changed:  additional instructions   Used for:  Type 2 diabetes mellitus without complication, with long-term current use of insulin (H)   Changed by:  Santo" Mark [address] DO        20 units every morning   Quantity:  45 mL   Refills:  1            Where to get your medicines      These medications were sent to [facility] - [place], [place] - [address]   [address] [place] [address]    Hours:  M-F 8:30-6:30; Sat 9-4; closed Sunday Phone:  [phone]     atorvastatin 40 MG tablet    BASAGLAR 100 UNIT/ML injection                Primary Care Provider Office Phone # Fax #    Mark Blanchard  [phone]       [address] [place] [address]        Equal Access to Services     [facility]: Hadii aad ku hadasho Soomaali, waaxda luqadaha, qaybta kaalmada adeegyada, waxay idiin hayaan adeeg andrea rodriguez . So [facility] [phone].    ATENCIÓN: Si habla español, tiene a templeton disposición servicios gratuitos de asistencia lingüística. [facility] [phone].    We comply with applicable federal civil rights laws and [place] laws. We do not discriminate on the basis of race, color, national origin, age, disability, sex, sexual orientation, or gender identity.            Thank you!     Thank you for choosing [facility]  for your care. Our goal is always to provide you with excellent care. Hearing back from our patients is one way we can continue to improve our services. Please take a few minutes to complete the written survey that you may receive in the mail after your visit with us. Thank you!             Your Updated Medication List - Protect others around you: Learn how to safely use, store and throw away your medicines at www.disposemymeds.org.          This list is accurate as of 5/3/18  9:05 AM.  Always use your most recent med list.                   Brand Name Dispense Instructions for use Diagnosis    aspirin 81 MG EC tablet      Take 1 tablet (81 mg) by mouth daily    S/P CABG (coronary artery bypass graft)       atorvastatin 40 MG tablet    LIPITOR    90 tablet    Take 1 tablet (40 mg) by mouth daily     "Hyperlipidemia LDL goal <100       BASAGLAR 100 UNIT/ML injection     45 mL    20 units every morning    Type 2 diabetes mellitus without complication, with long-term current use of insulin (H)       * blood glucose monitoring test strip    no brand specified    100 strip    Use to test blood sugars 3 times daily or as directed    Type 2 diabetes mellitus with hyperglycemia, with long-term current use of insulin (H)       * ONETOUCH ULTRA test strip   Generic drug:  blood glucose monitoring     100 each    USE TO TEST BLOOD SUGAR 3 TIMES A DAY    Type 2 diabetes mellitus with hyperglycemia, with long-term current use of insulin (H)       carvedilol 3.125 MG tablet    COREG    60 tablet    Take 1 tablet (3.125 mg) by mouth 2 times daily (with meals)    Essential hypertension with goal blood pressure less than 140/90       insulin aspart 100 UNIT/ML injection    NovoLOG FLEXPEN    15 mL    INJECT 10 - 12 UNITS UNDER THE SKIN TWICE A DAY    Type 2 diabetes mellitus without complication, with long-term current use of insulin (H)       insulin pen needle 31G X 8 MM    B-D U/F    100 each    USE THREE PEN NEEDLES DAILY OR AS DIRECTED.  Appointment needed for additional refills.    Type 2 diabetes mellitus with hyperglycemia, with long-term current use of insulin (H)       insulin syringe-needle U-100 31G X 5/16\" 0.5 ML    BD insulin syringe ultrafine    100 each    Use one syringe 3 daily or as directed.    Type 2 diabetes mellitus without complication, with long-term current use of insulin (H)       isosorbide mononitrate 30 MG 24 hr tablet    IMDUR    30 tablet    TAKE 1 TABLET BY MOUTH EVERY DAY    Precordial pain       lisinopril 5 MG tablet    PRINIVIL/ZESTRIL    90 tablet    Take 1 tablet (5 mg) by mouth daily    Type 2 diabetes mellitus without complication, with long-term current use of insulin (H), Systolic and diastolic CHF, chronic (H)       senna-docusate 8.6-50 MG per tablet    SENOKOT-S;PERICOLACE    100 " tablet    Take 1-2 tablets by mouth 2 times daily    Chronic idiopathic constipation       * Notice:  This list has 2 medication(s) that are the same as other medications prescribed for you. Read the directions carefully, and ask your doctor or other care provider to review them with you.

## 2018-05-03 NOTE — LETTER
May 4, 2018      Reinaldo Barrios     [facility] [address]        Dear ChrissieSophie,    We are writing to inform you of your test results.    Your test results fall within the expected range(s) or remain unchanged from previous results.  Please continue with current treatment plan.    Resulted Orders   Hemoglobin A1c   Result Value Ref Range    Hemoglobin A1C 6.8 (H) 0 - 6.4 %      Comment:      Normal <5.7% Prediabetes 5.7-6.4%  Diabetes 6.5% or higher - adopted from ADA   consensus guidelines.     Albumin Random Urine Quantitative with Creat Ratio   Result Value Ref Range    Creatinine Urine 53 mg/dL    Albumin Urine mg/L <5 mg/L    Albumin Urine mg/g Cr Unable to calculate due to low value 0 - 17 mg/g Cr   Basic metabolic panel   Result Value Ref Range    Sodium 140 133 - 144 mmol/L    Potassium 4.1 3.4 - 5.3 mmol/L    Chloride 106 94 - 109 mmol/L    Carbon Dioxide 28 20 - 32 mmol/L    Anion Gap 6 3 - 14 mmol/L    Glucose 163 (H) 70 - 99 mg/dL    Urea Nitrogen 17 7 - 30 mg/dL    Creatinine 0.95 0.66 - 1.25 mg/dL    GFR Estimate 79 >60 mL/min/1.7m2      Comment:      Non  GFR Calc    GFR Estimate If Black >90 >60 mL/min/1.7m2      Comment:       GFR Calc    Calcium 8.6 8.5 - 10.1 mg/dL       If you have any questions or concerns, please call the clinic at the number listed above.       Sincerely,        Mark Blanchard,

## 2018-05-03 NOTE — NURSING NOTE
"Chief Complaint   Patient presents with     Diabetes       Initial /70 (BP Location: Right arm, Patient Position: Chair, Cuff Size: Adult Regular)  Pulse 71  Temp 97.4  F (36.3  C) (Temporal)  Wt 224 lb 8 oz (101.8 kg)  SpO2 97%  BMI 34.14 kg/m2 Estimated body mass index is 34.14 kg/(m^2) as calculated from the following:    Height as of 11/21/17: 5' 8\" (1.727 m).    Weight as of this encounter: 224 lb 8 oz (101.8 kg).  Medication Reconciliation: complete  Health Maintenance reviewed at today's visit patient asked to schedule/complete:   Colon Cancer:  Patient declined   [facility]      "

## 2018-05-04 NOTE — PROGRESS NOTES
Please contact the patient and notify him of the following:    Percy normal.  The chemistry panel shows elevated blood sugar 163 with normal kidney function.  The glycosylated hemoglobin is down from 8.6 one year ago down to 6.8.  This is a substantial improvement.  Blood sugar management is markedly improved.  Thank you.   DO RADHA Horn

## 2018-05-09 ENCOUNTER — HOSPITAL ENCOUNTER (OUTPATIENT)
Dept: CARDIOLOGY | Facility: CLINIC | Age: 68
Discharge: HOME | End: 2018-05-09
Attending: INTERNAL MEDICINE | Admitting: INTERNAL MEDICINE
Payer: MEDICARE

## 2018-05-09 DIAGNOSIS — Z95.2 S/P AORTIC VALVE REPLACEMENT: ICD-10-CM

## 2018-05-09 DIAGNOSIS — R94.30 CARDIAC LV EJECTION FRACTION 30-35%: ICD-10-CM

## 2018-05-09 PROCEDURE — 93306 TTE W/DOPPLER COMPLETE: CPT

## 2018-05-09 PROCEDURE — 93306 TTE W/DOPPLER COMPLETE: CPT | Mod: 26 | Performed by: INTERNAL MEDICINE

## 2018-05-09 NOTE — LETTER
May 14, 2018      Nga Barrios  [address]   [place] [address]        Dear ,    We are writing to inform you of your test results.    Your test results fall within the expected range(s) or remain unchanged from previous results.  Please continue with current treatment plan.    Resulted Orders   Echocardiogram Complete    Narrative    [id number]  [id number]  [id number]  [id number]^MOISE^GRAY^AMINAH           [facility]  Echocardiography Laboratory  [address] [place] [address]        Name: NGA BARRIOS  MRN: [id number]  : 1950  Study Date: 2018 09:57 AM  Age: 67 yrs  Gender: Male  Patient Location: [facility]  Reason For Study: S/P aortic valve replacement, Cardiac LV ejection fraction  30-  History: AS, Hyperlipid, NSTEMI, CHF, ACS, S/P CABG, AVR, HTN, DM  Ordering Physician: GRAY PHIPPS  Referring Physician: GRAY PHIPPS  Performed By: Jenny Carbajal     BSA: 2.1 m2  Height: 68 in  Weight: 224 lb  HR: 69  BP: 104/68 mmHg  _____________________________________________________________________________  __        Procedure  Complete Echo Adult.  _____________________________________________________________________________  __        Interpretation Summary     The left ventricle is normal in size.  There is mild to moderate concentric left ventricular hypertrophy.  The visual ejection fraction is estimated at 35-40%.  Grade I or early diastolic dysfunction.  Septal motion is consistent with post-operative state.  The right ventricle is normal in structure, function and size.  There is normal right ventricular wall thickness.  The left atrium is moderately dilated.  The mitral valve leaflets are mildly thickened.  There is trace mitral regurgitation.  There is trace tricuspid regurgitation.  The right ventricular systolic pressure is approximated at 13.2mmHg plus the  right atrial pressure.  There is a bioprosthetic aortic  valve.  The peak AoV pressure gradient is 12.7mmHg.  The mean AoV pressure gradient is 6.6mmHg.  The calculated aortic valve area is 2.5cm2.  Sinus rhythm was noted.  The study was technically difficult. Limited views were obtained. No contrast  used due to history of reaction. Compared to prior study, there is no  significant change.  _____________________________________________________________________________  __        Left Ventricle  The left ventricle is normal in size. There is mild to moderate concentric  left ventricular hypertrophy. The visual ejection fraction is estimated at 35-  40%. Left ventricular systolic function is moderately reduced. Grade I or  early diastolic dysfunction. Septal motion is consistent with post-operative  state. There is no thrombus seen in the left ventricle.     Right Ventricle  The right ventricle is normal in structure, function and size. There is normal  right ventricular wall thickness.     Atria  The left atrium is moderately dilated. Right atrial size is normal. Intact  atrial septum. Interatrial septal thickening is noted.     Mitral Valve  The mitral valve leaflets are mildly thickened. There is trace mitral  regurgitation.        Tricuspid Valve  The tricuspid valve is not well visualized, but is grossly normal. There is  trace tricuspid regurgitation. The right ventricular systolic pressure is  approximated at 13.2mmHg plus the right atrial pressure. Right ventricular  systolic pressure is normal.     Aortic Valve  No aortic regurgitation is present. The peak AoV pressure gradient is  12.7mmHg. The mean AoV pressure gradient is 6.6mmHg. The calculated aortic  valve area is 2.5cm2. There is a bioprosthetic aortic valve. The prosthetic  aortic valve is well-seated. The gradient is normal for this prosthetic aortic  valve. The prosthetic aortic valve is not well visualized.     Pulmonic Valve  The pulmonic valve is not well visualized. There is no pulmonic  valvular  regurgitation.     Vessels  The aortic root is normal size. Normal size ascending aorta. The inferior vena  cava is not dilated. Pulmonary arteries not well visualized.     Pericardium  The pericardium appears normal. There is no pericardial effusion.        Rhythm  Sinus rhythm was noted.  _____________________________________________________________________________  __  MMode/2D Measurements & Calculations  IVSd: 1.5 cm     LVIDd: 3.8 cm  LVIDs: 3.3 cm  LVPWd: 1.4 cm  FS: 14.2 %  LV mass(C)d: 198.7 grams  LV mass(C)dI: 92.6 grams/m2  Ao root diam: 3.0 cm  LA dimension: 5.4 cm  asc Aorta Diam: 3.1 cm  LA/Ao: 1.8  LVOT diam: 2.2 cm  LVOT area: 3.7 cm2  LA Volume (BP): 76.9 ml  LA Volume Index (BP): 35.9 ml/m2  RWT: 0.74           Doppler Measurements & Calculations  MV E max danny: 91.7 cm/sec  MV A max danny: 114.5 cm/sec  MV E/A: 0.80  MV dec time: 0.33 sec  Ao V2 max: 178.4 cm/sec  Ao max P.7 mmHg  Ao V2 mean: 118.5 cm/sec  Ao mean P.6 mmHg  Ao V2 VTI: 36.3 cm  TALIB(I,D): 2.6 cm2  TALIB(V,D): 2.5 cm2  LV V1 max P.6 mmHg  LV V1 max: 118.4 cm/sec  LV V1 VTI: 25.7 cm  SV(LVOT): 95.7 ml  SI(LVOT): 44.6 ml/m2  PA acc time: 0.13 sec  TR max danny: 181.9 cm/sec  TR max P.2 mmHg  AV Danny Ratio (DI): 0.66  TALIB Index (cm2/m2): 1.2  E/E' av.5  Lateral E/e': 8.2  Medial E/e': 18.7              _____________________________________________________________________________  __        Report approved by: Killian Gutierrez 2018 01:11 PM          If you have any questions or concerns, please call the clinic at the number listed above.       Sincerely,        [facility] ECHO ROOM 1

## 2018-05-14 NOTE — PROGRESS NOTES
Please contact the patient and notify him of the following:  Cardiac ejection fraction is 35-40%.  No significant change from previous echo is noted.    Thank you.   DO RADHA Horn

## 2018-05-18 ENCOUNTER — TELEPHONE (OUTPATIENT)
Dept: FAMILY MEDICINE | Facility: OTHER | Age: 68
End: 2018-05-18

## 2018-05-18 DIAGNOSIS — E11.65 TYPE 2 DIABETES MELLITUS WITH HYPERGLYCEMIA, WITH LONG-TERM CURRENT USE OF INSULIN (H): Primary | ICD-10-CM

## 2018-05-18 DIAGNOSIS — Z79.4 TYPE 2 DIABETES MELLITUS WITH HYPERGLYCEMIA, WITH LONG-TERM CURRENT USE OF INSULIN (H): Primary | ICD-10-CM

## 2018-05-18 NOTE — TELEPHONE ENCOUNTER
Reason for Call:  Other call back    Detailed comments: Pt states he was seen on 5/3 and you prescribed Basaglar due to insurance not paying for his Lantas anymore. He started taking Basaglar on 5/4 and has been sick since. He's vomiting, stomach turning all the time. He stopped taking this morning and wondering if you can prescribe something else? Do you need to see him? Can you work him in today if you do need to see him? He's wondering about Levemir and a dosage? Or can you do a PA for the Lantas? Please call him back and advise.     Phone Number Patient can be reached at: Home number on file [phone] (home)    Best Time: anytime    Can we leave a detailed message on this number? YES    Call taken on 5/18/2018 at 9:24 AM by Mariaa Doran

## 2018-05-18 NOTE — TELEPHONE ENCOUNTER
A prescription for Levemir has been sent to his pharmacy.    He should continue the 20 units daily.    Santo

## 2018-06-21 DIAGNOSIS — E11.65 TYPE 2 DIABETES MELLITUS WITH HYPERGLYCEMIA, WITH LONG-TERM CURRENT USE OF INSULIN (H): Primary | ICD-10-CM

## 2018-06-21 DIAGNOSIS — Z79.4 TYPE 2 DIABETES MELLITUS WITH HYPERGLYCEMIA, WITH LONG-TERM CURRENT USE OF INSULIN (H): Primary | ICD-10-CM

## 2018-06-21 RX ORDER — INSULIN DETEMIR 100 [IU]/ML
INJECTION, SOLUTION SUBCUTANEOUS
Qty: 20 ML | Refills: 1 | Status: SHIPPED | OUTPATIENT
Start: 2018-06-21 | End: 2018-06-28

## 2018-06-21 NOTE — TELEPHONE ENCOUNTER
"Requested Prescriptions   Pending Prescriptions Disp Refills     LEVEMIR FLEXTOUCH 100 UNIT/ML injection [Pharmacy Med Name: LEVEMIR FLEXTOUCH SOLN FOR INJ] 20 mL      Sig: INJECT 20 UNITS UNDER THE SKIN EVERY DAY    Long Acting Insulin Protocol Failed    6/21/2018 10:53 AM       Failed - LDL on file in past 12 months    Recent Labs   Lab Test  06/15/17   0852   LDL  64            Passed - Blood pressure less than 140/90 in past 6 months    BP Readings from Last 3 Encounters:   05/03/18 112/70   12/11/17 132/72   12/09/17 (!) 157/95                Passed - Microalbumin on file in past 12 months    Recent Labs   Lab Test  05/03/18   0905   MICROL  <5   UMALCR  Unable to calculate due to low value            Passed - Serum creatinine on file in past 12 months    Recent Labs   Lab Test  05/03/18   0900   CR  0.95            Passed - HgbA1C in past 3 or 6 months    If HgbA1C is 8 or greater, it needs to be on file within the past 3 months.  If less than 8, must be on file within the past 6 months.     Recent Labs   Lab Test  05/03/18   0900   A1C  6.8*            Passed - Patient is age 18 or older       Passed - Recent (6 mo) or future (30 days) visit within the authorizing provider's specialty    Patient had office visit in the last 6 months or has a visit in the next 30 days with authorizing provider or within the authorizing provider's specialty.  See \"Patient Info\" tab in inbasket, or \"Choose Columns\" in Meds & Orders section of the refill encounter.            Last Written Prescription Date:  5/18/18  Last Fill Quantity: 10,  # refills: 1   Last office visit: 11/21/2017 with prescribing provider:     Future Office Visit:      "

## 2018-06-21 NOTE — TELEPHONE ENCOUNTER
Routing refill request to provider for review/approval because:  Labs not current:  GONZALES Dominguez, RN  [facility]

## 2018-06-28 ENCOUNTER — OFFICE VISIT (OUTPATIENT)
Dept: INTERNAL MEDICINE | Facility: CLINIC | Age: 68
End: 2018-06-28
Payer: MEDICARE

## 2018-06-28 VITALS
TEMPERATURE: 96.7 F | HEART RATE: 73 BPM | OXYGEN SATURATION: 97 % | HEIGHT: 67 IN | BODY MASS INDEX: 34.11 KG/M2 | DIASTOLIC BLOOD PRESSURE: 70 MMHG | SYSTOLIC BLOOD PRESSURE: 108 MMHG | WEIGHT: 217.3 LBS

## 2018-06-28 DIAGNOSIS — I10 ESSENTIAL HYPERTENSION WITH GOAL BLOOD PRESSURE LESS THAN 140/90: ICD-10-CM

## 2018-06-28 DIAGNOSIS — Z79.4 TYPE 2 DIABETES MELLITUS WITH HYPERGLYCEMIA, WITH LONG-TERM CURRENT USE OF INSULIN (H): ICD-10-CM

## 2018-06-28 DIAGNOSIS — E11.65 TYPE 2 DIABETES MELLITUS WITH HYPERGLYCEMIA, WITH LONG-TERM CURRENT USE OF INSULIN (H): ICD-10-CM

## 2018-06-28 DIAGNOSIS — Z95.1 S/P CABG (CORONARY ARTERY BYPASS GRAFT): Primary | ICD-10-CM

## 2018-06-28 DIAGNOSIS — Z95.2 S/P AORTIC VALVE REPLACEMENT: ICD-10-CM

## 2018-06-28 PROCEDURE — 99214 OFFICE O/P EST MOD 30 MIN: CPT | Performed by: INTERNAL MEDICINE

## 2018-06-28 ASSESSMENT — PAIN SCALES - GENERAL: PAINLEVEL: NO PAIN (0)

## 2018-06-28 NOTE — PROGRESS NOTES
SUBJECTIVE:   Reinaldo Barrios is a 67 year old male who presents to clinic today for the following health issues:    Diabetes Follow-up    Patient is checking blood sugars: three times daily.   Blood sugar testing frequency justification: Uncontrolled diabetes, Adjustment of medication(s) and Risk of hypoglycemia with medication(s)  Results are as follows:         Higher than normal on there Levimir    Diabetic concerns: blood sugar frequently over 200     Symptoms of hypoglycemia (low blood sugar): none     Paresthesias (numbness or burning in feet) or sores: Yes      Date of last diabetic eye exam: 5 or 6 years    BP Readings from Last 2 Encounters:   06/28/18 108/70   05/03/18 112/70     Hemoglobin A1C (%)   Date Value   05/03/2018 6.8 (H)   11/13/2017 7.3 (H)     LDL Cholesterol Calculated (mg/dL)   Date Value   06/15/2017 64   12/14/2016 49       Diabetes Management Resources    Amount of exercise or physical activity: None    Problems taking medications regularly: No    Medication side effects: none    Diet: carbohydrate counting                     Chief Complaint    The patient is a pleasant 67-year-old gentleman who has a history of type 2 diabetes.  He was previously on background insulin and coverage.  There is some changes in his insurance, he was subsequently placed on Basaglar.  He did not tolerate this and has severe nausea and vomiting and subsequently had to discontinue it.  He was then placed on Levemir but notes that this did not work in the same potency as the Lantus did previously.  Subsequently, he has had to increase the dose from 20 units to 40 units and has run out prematurely.  He states that his insurance company would not give him more insulin so he has been off the basal insulin now for a week or so.  He has been increasing his regular coverage with the NovoLog and maintaining blood sugars generally in the mid 200 range.  He has a very good understanding of his diabetes and  treatment.  He has been auto adjusting his insulin in the past for years with generally successful results.  He has had no significant hypoglycemia or symptoms of acidosis.  He is on an ACE inhibitor as well as a statin and aspirin.  He does have a history of coronary artery disease which is stable, he has had no chest pain, arrhythmia or syncope.                       PAST, FAMILY,SOCIAL HISTORY:     Medical  History:   has a past medical history of Aortic valve stenosis (11/2/2015); Cardiac LV ejection fraction 30-35% (11/2/2015); Chest pain, unspecified chest pain type (11/2/2015); Hiatal hernia (11/2/2015); Hyperlipidemia LDL goal <100 (11/2/2015); Personal history of tobacco use, presenting hazards to health (11/2/2015); and Type 2 diabetes mellitus without complication (H) (11/2/2015).     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     Social History:   reports that he quit smoking about 2 years ago. His smoking use included Pipe. He has never used smokeless tobacco. He reports that he drinks alcohol. He reports that he does not use illicit drugs.     Family History:  family history is not on file.            MEDICATIONS  Current Outpatient Prescriptions   Medication Sig Dispense Refill     insulin detemir (LEVEMIR FLEXTOUCH) 100 UNIT/ML injection INJECT 60 UNITS UNDER THE SKIN EVERY DAY 30 mL 1     aspirin EC 81 MG EC tablet Take 1 tablet (81 mg) by mouth daily       atorvastatin (LIPITOR) 40 MG tablet Take 1 tablet (40 mg) by mouth daily 90 tablet 3     blood glucose monitoring (NO BRAND SPECIFIED) test strip Use to test blood sugars 3 times daily or as directed 100 strip 1     carvedilol (COREG) 3.125 MG tablet Take 1 tablet (3.125 mg) by mouth 2 times daily (with meals) 60 tablet 11     insulin aspart (NOVOLOG FLEXPEN) 100 UNIT/ML injection INJECT 10 - 12 UNITS UNDER THE SKIN  "TWICE A DAY 15 mL 11     insulin detemir (LEVEMIR VIAL) 100 UNIT/ML injection Inject 20 Units Subcutaneous daily 10 mL 1     insulin pen needle (B-D U/F) 31G X 8 MM USE THREE PEN NEEDLES DAILY OR AS DIRECTED.  Appointment needed for additional refills. 100 each 0     insulin syringe-needle U-100 (BD INSULIN SYRINGE ULTRAFINE) 31G X 5/16\" 0.5 ML Use one syringe 3 daily or as directed. 100 each prn     isosorbide mononitrate (IMDUR) 30 MG 24 hr tablet TAKE 1 TABLET BY MOUTH EVERY DAY 30 tablet 8     lisinopril (PRINIVIL/ZESTRIL) 5 MG tablet Take 1 tablet (5 mg) by mouth daily 90 tablet 2     ONETOUCH ULTRA test strip USE TO TEST BLOOD SUGAR 3 TIMES A  each 11     senna-docusate (SENOKOT-S;PERICOLACE) 8.6-50 MG per tablet Take 1-2 tablets by mouth 2 times daily 100 tablet 11     [DISCONTINUED] LEVEMIR FLEXTOUCH 100 UNIT/ML injection INJECT 20 UNITS UNDER THE SKIN EVERY DAY 20 mL 1         --------------------------------------------------------------------------------------------------------------------                              Review of Systems     LUNGS: Pt denies: cough,excess sputum, hemoptysis, or shortness of breath.   HEART: Pt denies: chest pain, arrythmia, syncope, tachy or bradyarrhythmia.   GI: Pt denies: nausea, vomitting, diarrhea, constipation, melena, or hematochezia.   NEURO: Pt denies: seizures, strokes, diplopia, weakness, decreased sensation in the feet is consistent with diabetic neuropathy.  Or paralysis.   SKIN: Pt denies: itching, rashes, discoloration, or specific lesions of concern. Denies recent hair loss.                                     Examination    /70 (BP Location: Left arm, Patient Position: Chair, Cuff Size: Adult Regular)  Pulse 73  Temp 96.7  F (35.9  C) (Temporal)  Ht 5' 7\" (1.702 m)  Wt 217 lb 4.8 oz (98.6 kg)  SpO2 97%  BMI 34.03 kg/m2   Constitutional: The patient appears to be in no acute distress. The patient appears to be adequately hydrated. No acute " respiratory or hemodynamic distress is noted at this time.   LUNGS: clear bilaterally, airflow is brisk, no intercostal retraction or stridor is noted. No coughing is noted during visit.   HEART:  regular without rubs, clicks, gallops, or murmurs. PMI is nondisplaced. Upstrokes are brisk. S1,S2 are heard.   GI: Abdomen is soft, without rebound, guarding or tenderness. Bowel sounds are appropriate. No renal bruits are heard.   NEURO: Pt is alert and appropriate. No neurologic lateralization is noted. Cranial nerves 2-12 are intact. Peripheral motor function is intact.  Sensation is decreased in the feet.                                        Decision Making    1. Type 2 diabetes mellitus with hyperglycemia, with long-term current use of insulin (H)  Restart Levemir.  Patient will start with his previous 40 units and we will titrate upward.  Anticipate a goal of 60 units daily based on history.  We will continue twice daily NovoLog coverage and adjust this based on his results  - insulin detemir (LEVEMIR FLEXTOUCH) 100 UNIT/ML injection; INJECT 60 UNITS UNDER THE SKIN EVERY DAY  Dispense: 30 mL; Refill: 1    2. S/P CABG (coronary artery bypass graft)  Currently stable.  No chest pain noted    3. S/P aortic valve replacement  Continue current medication.  Bioprosthetic valve not requiring anticoagulation    4. Essential hypertension with goal blood pressure less than 140/90  Controlled.  Continue current medications.                        FOLLOW UP   I have asked the patient to make an appointment for followup with me in 3-4 weeks with blood sugar log        I have carefully explained the diagnosis and treatment options to the patient.  The patient has displayed an understanding of the above, and all subsequent questions were answered.      DO RADHA Horn    Portions of this note were produced using [employer]  Although every attempt at real-time proof reading has been made, occasional  grammar/syntax errors may have been missed.

## 2018-06-28 NOTE — MR AVS SNAPSHOT
After Visit Summary   6/28/2018    Reinaldo Barrios    MRN: [id number]           Patient Information     Date Of Birth          1950        Visit Information        Provider Department      6/28/2018 8:00 AM Mark Blanchard DO [facility]        Today's Diagnoses     S/P CABG (coronary artery bypass graft)    -  1    Type 2 diabetes mellitus with hyperglycemia, with long-term current use of insulin (H)        S/P aortic valve replacement        Essential hypertension with goal blood pressure less than 140/90           Follow-ups after your visit        Follow-up notes from your care team     Return in about 1 month (around 7/28/2018).      Your next 10 appointments already scheduled     Jul 19, 2018  8:00 AM CDT   Office Visit with Mark Blanchard DO   [facility] ([facility]    [address] [place] [address] [id number] [phone]           Bring a current list of meds and any records pertaining to this visit. For Physicals, please bring immunization records and any forms needing to be filled out. Please arrive 10 minutes early to complete paperwork.              Who to contact     If you have questions or need follow up information about today's clinic visit or your schedule please contact [facility] directly at [phone].  Normal or non-critical lab and imaging results will be communicated to you by MyChart, letter or phone within 4 business days after the clinic has received the results. If you do not hear from us within 7 days, please contact the clinic through MyChart or phone. If you have a critical or abnormal lab result, we will notify you by phone as soon as possible.  Submit refill requests through [employer] or call your pharmacy and they will forward the refill request to us. Please allow 3 business days for your refill to be completed.          Additional Information About Your Visit    "     Care EveryWhere ID     This is your Care EveryWhere ID. This could be used by other organizations to access your [facility] medical records  [id number]        Your Vitals Were     Pulse Temperature Height Pulse Oximetry BMI (Body Mass Index)       73 96.7  F (35.9  C) (Temporal) 5' 7\" (1.702 m) 97% 34.03 kg/m2        Blood Pressure from Last 3 Encounters:   06/28/18 108/70   05/03/18 112/70   12/11/17 132/72    Weight from Last 3 Encounters:   06/28/18 217 lb 4.8 oz (98.6 kg)   05/03/18 224 lb 8 oz (101.8 kg)   12/11/17 224 lb (101.6 kg)              Today, you had the following     No orders found for display         Today's Medication Changes          These changes are accurate as of 6/28/18  9:02 AM.  If you have any questions, ask your nurse or doctor.               These medicines have changed or have updated prescriptions.        Dose/Directions    * insulin detemir 100 UNIT/ML injection   Commonly known as:  LEVEMIR VIAL   This may have changed:  Another medication with the same name was changed. Make sure you understand how and when to take each.   Used for:  Type 2 diabetes mellitus with hyperglycemia, with long-term current use of insulin (H)        Dose:  20 Units   Inject 20 Units Subcutaneous daily   Quantity:  10 mL   Refills:  1       * insulin detemir 100 UNIT/ML injection   Commonly known as:  LEVEMIR FLEXTOUCH   This may have changed:  See the new instructions.   Used for:  Type 2 diabetes mellitus with hyperglycemia, with long-term current use of insulin (H)        INJECT 60 UNITS UNDER THE SKIN EVERY DAY   Quantity:  30 mL   Refills:  1       * Notice:  This list has 2 medication(s) that are the same as other medications prescribed for you. Read the directions carefully, and ask your doctor or other care provider to review them with you.         Where to get your medicines      These medications were sent to [id number] - [place], [place] - [address] [place] [address] Avenue   [address] [place] [address] " [id number]    Hours:  M-F 8:30-6:30; Sat 9-4; closed Sunday Phone:  [phone]     insulin detemir 100 UNIT/ML injection                Primary Care Provider Office Phone # Fax #    Mark Blanchard [phone]       [address] [place] [address]        Equal Access to Services     [facility]: Hadii aad ku hadasho Soomaali, waaxda luqadaha, qaybta kaalmada adeegyada, waxay idiin hayaan adeeg kharash la'aan ah. So [facility] [phone].    ATENCIÓN: Si habla español, tiene a templeton disposición servicios gratuitos de asistencia lingüística. Llame al [phone].    We comply with applicable federal civil rights laws and [place] laws. We do not discriminate on the basis of race, color, national origin, age, disability, sex, sexual orientation, or gender identity.            Thank you!     Thank you for choosing [facility]  for your care. Our goal is always to provide you with excellent care. Hearing back from our patients is one way we can continue to improve our services. Please take a few minutes to complete the written survey that you may receive in the mail after your visit with us. Thank you!             Your Updated Medication List - Protect others around you: Learn how to safely use, store and throw away your medicines at www.disposemymeds.org.          This list is accurate as of 6/28/18  9:02 AM.  Always use your most recent med list.                   Brand Name Dispense Instructions for use Diagnosis    aspirin 81 MG EC tablet      Take 1 tablet (81 mg) by mouth daily    S/P CABG (coronary artery bypass graft)       atorvastatin 40 MG tablet    LIPITOR    90 tablet    Take 1 tablet (40 mg) by mouth daily    Hyperlipidemia LDL goal <100       * blood glucose monitoring test strip    no brand specified    100 strip    Use to test blood sugars 3 times daily or as directed    Type 2 diabetes mellitus with hyperglycemia, with long-term current use of insulin (H)        "* ONETOUCH ULTRA test strip   Generic drug:  blood glucose monitoring     100 each    USE TO TEST BLOOD SUGAR 3 TIMES A DAY    Type 2 diabetes mellitus with hyperglycemia, with long-term current use of insulin (H)       carvedilol 3.125 MG tablet    COREG    60 tablet    Take 1 tablet (3.125 mg) by mouth 2 times daily (with meals)    Essential hypertension with goal blood pressure less than 140/90       insulin aspart 100 UNIT/ML injection    NovoLOG FLEXPEN    15 mL    INJECT 10 - 12 UNITS UNDER THE SKIN TWICE A DAY    Type 2 diabetes mellitus without complication, with long-term current use of insulin (H)       * insulin detemir 100 UNIT/ML injection    LEVEMIR VIAL    10 mL    Inject 20 Units Subcutaneous daily    Type 2 diabetes mellitus with hyperglycemia, with long-term current use of insulin (H)       * insulin detemir 100 UNIT/ML injection    LEVEMIR FLEXTOUCH    30 mL    INJECT 60 UNITS UNDER THE SKIN EVERY DAY    Type 2 diabetes mellitus with hyperglycemia, with long-term current use of insulin (H)       insulin pen needle 31G X 8 MM    B-D U/F    100 each    USE THREE PEN NEEDLES DAILY OR AS DIRECTED.  Appointment needed for additional refills.    Type 2 diabetes mellitus with hyperglycemia, with long-term current use of insulin (H)       insulin syringe-needle U-100 31G X 5/16\" 0.5 ML    BD insulin syringe ultrafine    100 each    Use one syringe 3 daily or as directed.    Type 2 diabetes mellitus without complication, with long-term current use of insulin (H)       isosorbide mononitrate 30 MG 24 hr tablet    IMDUR    30 tablet    TAKE 1 TABLET BY MOUTH EVERY DAY    Precordial pain       lisinopril 5 MG tablet    PRINIVIL/ZESTRIL    90 tablet    Take 1 tablet (5 mg) by mouth daily    Type 2 diabetes mellitus without complication, with long-term current use of insulin (H), Systolic and diastolic CHF, chronic (H)       senna-docusate 8.6-50 MG per tablet    SENOKOT-S;PERICOLACE    100 tablet    Take 1-2 " tablets by mouth 2 times daily    Chronic idiopathic constipation       * Notice:  This list has 4 medication(s) that are the same as other medications prescribed for you. Read the directions carefully, and ask your doctor or other care provider to review them with you.

## 2018-07-19 ENCOUNTER — OFFICE VISIT (OUTPATIENT)
Dept: INTERNAL MEDICINE | Facility: CLINIC | Age: 68
End: 2018-07-19
Payer: MEDICARE

## 2018-07-19 VITALS
OXYGEN SATURATION: 99 % | SYSTOLIC BLOOD PRESSURE: 112 MMHG | HEART RATE: 68 BPM | BODY MASS INDEX: 33.67 KG/M2 | TEMPERATURE: 96.3 F | WEIGHT: 215 LBS | DIASTOLIC BLOOD PRESSURE: 70 MMHG

## 2018-07-19 DIAGNOSIS — Z79.4 TYPE 2 DIABETES MELLITUS WITH HYPERGLYCEMIA, WITH LONG-TERM CURRENT USE OF INSULIN (H): ICD-10-CM

## 2018-07-19 DIAGNOSIS — R94.30 CARDIAC LV EJECTION FRACTION 30-35%: ICD-10-CM

## 2018-07-19 DIAGNOSIS — Z95.2 S/P AORTIC VALVE REPLACEMENT: ICD-10-CM

## 2018-07-19 DIAGNOSIS — I10 ESSENTIAL HYPERTENSION WITH GOAL BLOOD PRESSURE LESS THAN 140/90: ICD-10-CM

## 2018-07-19 DIAGNOSIS — Z95.1 S/P CABG (CORONARY ARTERY BYPASS GRAFT): Primary | ICD-10-CM

## 2018-07-19 DIAGNOSIS — Z79.01 LONG-TERM (CURRENT) USE OF ANTICOAGULANTS: ICD-10-CM

## 2018-07-19 DIAGNOSIS — E11.65 TYPE 2 DIABETES MELLITUS WITH HYPERGLYCEMIA, WITH LONG-TERM CURRENT USE OF INSULIN (H): ICD-10-CM

## 2018-07-19 DIAGNOSIS — R07.2 PRECORDIAL PAIN: ICD-10-CM

## 2018-07-19 PROCEDURE — 99214 OFFICE O/P EST MOD 30 MIN: CPT | Performed by: INTERNAL MEDICINE

## 2018-07-19 RX ORDER — ISOSORBIDE MONONITRATE 30 MG/1
30 TABLET, EXTENDED RELEASE ORAL DAILY
Qty: 90 TABLET | Refills: 3 | Status: SHIPPED | OUTPATIENT
Start: 2018-07-19 | End: 2019-07-25

## 2018-07-19 ASSESSMENT — PAIN SCALES - GENERAL: PAINLEVEL: NO PAIN (0)

## 2018-07-19 NOTE — PROGRESS NOTES
SUBJECTIVE:   Reinaldo Barrios is a 67 year old male who presents to clinic today for the following health issues:  Health Maintenance reviewed at today's visit patient asked to schedule/complete:   Colon Cancer:  Patient declined      Diabetes Follow-up    Patient is checking blood sugars: three times daily.   Blood sugar testing frequency justification: ,  Results are as follows:         varies    Diabetic concerns: other - insulin is not good     Symptoms of hypoglycemia (low blood sugar): none     Paresthesias (numbness or burning in feet) or sores: Yes      Date of last diabetic eye exam: due    BP Readings from Last 2 Encounters:   07/19/18 112/70   06/28/18 108/70     Hemoglobin A1C (%)   Date Value   05/03/2018 6.8 (H)   11/13/2017 7.3 (H)     LDL Cholesterol Calculated (mg/dL)   Date Value   06/15/2017 64   12/14/2016 49       Diabetes Management Resources    Amount of exercise or physical activity: None    Problems taking medications regularly: No    Medication side effects: none                      Chief Complaint         The patient is a pleasant 67-year-old gentleman who presents today for follow-up of his diabetes.  He has an extensive history of aortic valvuloplasty and coronary artery disease.  Continues his anticoagulation and has had no chest pain.  His blood sugar log shows values generally around 150.  He notes that he has been taking only 45 units of the Levemir as he was concerned regarding the possibility of hypoglycemia.  That has not been the case and I recommended that he increases in 5 unit decrements until blood sugar is more controlled.                       PAST, FAMILY,SOCIAL HISTORY:     Medical  History:   has a past medical history of Aortic valve stenosis (11/2/2015); Cardiac LV ejection fraction 30-35% (11/2/2015); Chest pain, unspecified chest pain type (11/2/2015); Hiatal hernia (11/2/2015); Hyperlipidemia LDL goal <100 (11/2/2015); Personal history of tobacco use,  "presenting hazards to health (11/2/2015); and Type 2 diabetes mellitus without complication (H) (11/2/2015).     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     Social History:   reports that he quit smoking about 2 years ago. His smoking use included Pipe. He has never used smokeless tobacco. He reports that he drinks alcohol. He reports that he does not use illicit drugs.     Family History:  family history is not on file.            MEDICATIONS  Current Outpatient Prescriptions   Medication Sig Dispense Refill     aspirin EC 81 MG EC tablet Take 1 tablet (81 mg) by mouth daily       atorvastatin (LIPITOR) 40 MG tablet Take 1 tablet (40 mg) by mouth daily 90 tablet 3     blood glucose monitoring (NO BRAND SPECIFIED) test strip Use to test blood sugars 3 times daily or as directed 100 strip 1     carvedilol (COREG) 3.125 MG tablet Take 1 tablet (3.125 mg) by mouth 2 times daily (with meals) 60 tablet 11     insulin aspart (NOVOLOG FLEXPEN) 100 UNIT/ML injection INJECT 10 - 12 UNITS UNDER THE SKIN TWICE A DAY 15 mL 11     insulin detemir (LEVEMIR FLEXTOUCH) 100 UNIT/ML injection INJECT 60 UNITS UNDER THE SKIN EVERY DAY 30 mL 1     insulin detemir (LEVEMIR VIAL) 100 UNIT/ML injection Inject 20 Units Subcutaneous daily 10 mL 1     insulin pen needle (B-D U/F) 31G X 8 MM USE THREE PEN NEEDLES DAILY OR AS DIRECTED.  Appointment needed for additional refills. 100 each 0     insulin syringe-needle U-100 (BD INSULIN SYRINGE ULTRAFINE) 31G X 5/16\" 0.5 ML Use one syringe 3 daily or as directed. 100 each prn     isosorbide mononitrate (IMDUR) 30 MG 24 hr tablet Take 1 tablet (30 mg) by mouth daily 90 tablet 3     lisinopril (PRINIVIL/ZESTRIL) 5 MG tablet Take 1 tablet (5 mg) by mouth daily 90 tablet 2     ONETOUCH ULTRA test strip USE TO TEST BLOOD SUGAR 3 TIMES A  each 11     senna-docusate " (SENOKOT-S;PERICOLACE) 8.6-50 MG per tablet Take 1-2 tablets by mouth 2 times daily 100 tablet 11     [DISCONTINUED] isosorbide mononitrate (IMDUR) 30 MG 24 hr tablet TAKE 1 TABLET BY MOUTH EVERY DAY 30 tablet 8         --------------------------------------------------------------------------------------------------------------------                              Review of Systems     LUNGS: Pt denies: cough,excess sputum, hemoptysis, or shortness of breath at rest.  He does have a little dyspnea with exertion.  This is associated with his decreased ejection fraction of 35%..   HEART: Pt denies: chest pain, arrythmia, syncope, tachy or bradyarrhythmia.   GI: Pt denies: nausea, vomitting, diarrhea, constipation, melena, or hematochezia.   NEURO: Pt denies: seizures, strokes, diplopia, weakness,or paralysis.  Some modest diabetic neuropathy in the feet is noted.                                     Examination      /70 (BP Location: Left arm, Patient Position: Chair, Cuff Size: Adult Regular)  Pulse 68  Temp 96.3  F (35.7  C) (Temporal)  Wt 215 lb (97.5 kg)  SpO2 99%  BMI 33.67 kg/m2   Constitutional: The patient appears to be in no acute distress. The patient appears to be adequately hydrated. No acute respiratory or hemodynamic distress is noted at this time.   LUNGS: clear bilaterally, airflow is brisk, no intercostal retraction or stridor is noted. No coughing is noted during visit.   HEART:  regular without rubs, gallops, or murmurs. PMI is nondisplaced. Upstrokes are brisk. S1,S2 are heard.  Crisp valvular click is consistent with aortic valvuloplasty.   NEURO: Pt is alert and appropriate. No neurologic lateralization is noted. Cranial nerves 2-12 are intact. Peripheral  motor function is grossly normal.  Decreased sensation in the feet is noted.   SKIN:  warm and dry. No erythema, or rashes are noted. No specific lesions of concern are noted.                                           Decision  Making    1. S/P CABG (coronary artery bypass graft)  Continue current medications including statin, beta-blocker, Imdur.     2. Long-term (current) use of anticoagulants [Z79.01]   follow INR    3. S/P aortic valve replacement  Continue anticoagulation    4. Type 2 diabetes mellitus with hyperglycemia, with long-term current use of insulin (H)  Increase insulin to 50 units    5. Precordial pain  Continue nitrates  - isosorbide mononitrate (IMDUR) 30 MG 24 hr tablet; Take 1 tablet (30 mg) by mouth daily  Dispense: 90 tablet; Refill: 3    6. Cardiac LV ejection fraction 30-35%  Currently stable.                        FOLLOW UP   I have asked the patient to make an appointment for followup with me in 2 months or as needed        I have carefully explained the diagnosis and treatment options to the patient.  The patient has displayed an understanding of the above, and all subsequent questions were answered.      DO RADHA Horn    Portions of this note were produced using Pins  Although every attempt at real-time proof reading has been made, occasional grammar/syntax errors may have been missed.

## 2018-07-19 NOTE — MR AVS SNAPSHOT
After Visit Summary   7/19/2018    Reinaldo Barrios    MRN: [id number]           Patient Information     Date Of Birth          1950        Visit Information        Provider Department      7/19/2018 8:00 AM Mark Blanchard DO [facility]        Today's Diagnoses     S/P CABG (coronary artery bypass graft)    -  1    Long-term (current) use of anticoagulants [Z79.01]        S/P aortic valve replacement        Type 2 diabetes mellitus with hyperglycemia, with long-term current use of insulin (H)        Precordial pain        Cardiac LV ejection fraction 30-35%           Follow-ups after your visit        Your next 10 appointments already scheduled     Sep 13, 2018  8:00 AM CDT   [facility]    [address] [place] [id number] [phone]           Bring a current list of meds and any records pertaining to this visit. For Physicals, please bring immunization records and any forms needing to be filled out. Please arrive 10 minutes early to complete paperwork.              Who to contact     If you have questions or need follow up information about today's clinic visit or your schedule please contact [facility] directly at [phone].  Normal or non-critical lab and imaging results will be communicated to you by MyChart, letter or phone within 4 business days after the clinic has received the results. If you do not hear from us within 7 days, please contact the clinic through MyChart or phone. If you have a critical or abnormal lab result, we will notify you by phone as soon as possible.  Submit refill requests through [employer] or call your pharmacy and they will forward the refill request to us. Please allow 3 business days for your refill to be completed.          Additional Information About Your Visit        Care EveryWhere ID     This is  your Care EveryWhere ID. This could be used by other organizations to access your [facility] medical records  [id number]        Your Vitals Were     Pulse Temperature Pulse Oximetry BMI (Body Mass Index)          68 96.3  F (35.7  C) (Temporal) 99% 33.67 kg/m2         Blood Pressure from Last 3 Encounters:   07/19/18 112/70   06/28/18 108/70   05/03/18 112/70    Weight from Last 3 Encounters:   07/19/18 215 lb (97.5 kg)   06/28/18 217 lb 4.8 oz (98.6 kg)   05/03/18 224 lb 8 oz (101.8 kg)              Today, you had the following     No orders found for display         Today's Medication Changes          These changes are accurate as of 7/19/18  8:30 AM.  If you have any questions, ask your nurse or doctor.               These medicines have changed or have updated prescriptions.        Dose/Directions    isosorbide mononitrate 30 MG 24 hr tablet   Commonly known as:  IMDUR   This may have changed:  See the new instructions.   Used for:  Precordial pain   Changed by:  Mark Blanchard DO        Dose:  30 mg   Take 1 tablet (30 mg) by mouth daily   Quantity:  90 tablet   Refills:  3            Where to get your medicines      These medications were sent to [id number] - [place], [place] - [address] [place] [id number]    Hours:  M-F 8:30-6:30; Sat 9-4; closed Sunday Phone:  [phone]     isosorbide mononitrate 30 MG 24 hr tablet                Primary Care Provider Office Phone # Fax #    Mark Blanchard [phone] [address] [place] [address]        Equal Access to Services     JAUN GUERRERO AH: Hadii teressa Syed, waminervada luqadaha, qaybta kaalmada adeyessicayada, ansley mcgee. So [facility] [phone].    ATENCIÓN: Si habla español, tiene a templeton disposición servicios gratuitos de asistencia lingüística. Llame al [phone].    We comply with applicable federal civil rights laws and [place] laws. We do not  discriminate on the basis of race, color, national origin, age, disability, sex, sexual orientation, or gender identity.            Thank you!     Thank you for choosing [facility]  for your care. Our goal is always to provide you with excellent care. Hearing back from our patients is one way we can continue to improve our services. Please take a few minutes to complete the written survey that you may receive in the mail after your visit with us. Thank you!             Your Updated Medication List - Protect others around you: Learn how to safely use, store and throw away your medicines at www.disposemymeds.org.          This list is accurate as of 7/19/18  8:30 AM.  Always use your most recent med list.                   Brand Name Dispense Instructions for use Diagnosis    aspirin 81 MG EC tablet      Take 1 tablet (81 mg) by mouth daily    S/P CABG (coronary artery bypass graft)       atorvastatin 40 MG tablet    LIPITOR    90 tablet    Take 1 tablet (40 mg) by mouth daily    Hyperlipidemia LDL goal <100       * blood glucose monitoring test strip    no brand specified    100 strip    Use to test blood sugars 3 times daily or as directed    Type 2 diabetes mellitus with hyperglycemia, with long-term current use of insulin (H)       * ONETOUCH ULTRA test strip   Generic drug:  blood glucose monitoring     100 each    USE TO TEST BLOOD SUGAR 3 TIMES A DAY    Type 2 diabetes mellitus with hyperglycemia, with long-term current use of insulin (H)       carvedilol 3.125 MG tablet    COREG    60 tablet    Take 1 tablet (3.125 mg) by mouth 2 times daily (with meals)    Essential hypertension with goal blood pressure less than 140/90       insulin aspart 100 UNIT/ML injection    NovoLOG FLEXPEN    15 mL    INJECT 10 - 12 UNITS UNDER THE SKIN TWICE A DAY    Type 2 diabetes mellitus without complication, with long-term current use of insulin (H)       * insulin detemir 100 UNIT/ML injection    LEVEMIR VIAL     "10 mL    Inject 20 Units Subcutaneous daily    Type 2 diabetes mellitus with hyperglycemia, with long-term current use of insulin (H)       * insulin detemir 100 UNIT/ML injection    LEVEMIR FLEXTOUCH    30 mL    INJECT 60 UNITS UNDER THE SKIN EVERY DAY    Type 2 diabetes mellitus with hyperglycemia, with long-term current use of insulin (H)       insulin pen needle 31G X 8 MM    B-D U/F    100 each    USE THREE PEN NEEDLES DAILY OR AS DIRECTED.  Appointment needed for additional refills.    Type 2 diabetes mellitus with hyperglycemia, with long-term current use of insulin (H)       insulin syringe-needle U-100 31G X 5/16\" 0.5 ML    BD insulin syringe ultrafine    100 each    Use one syringe 3 daily or as directed.    Type 2 diabetes mellitus without complication, with long-term current use of insulin (H)       isosorbide mononitrate 30 MG 24 hr tablet    IMDUR    90 tablet    Take 1 tablet (30 mg) by mouth daily    Precordial pain       lisinopril 5 MG tablet    PRINIVIL/ZESTRIL    90 tablet    Take 1 tablet (5 mg) by mouth daily    Type 2 diabetes mellitus without complication, with long-term current use of insulin (H), Systolic and diastolic CHF, chronic (H)       senna-docusate 8.6-50 MG per tablet    SENOKOT-S;PERICOLACE    100 tablet    Take 1-2 tablets by mouth 2 times daily    Chronic idiopathic constipation       * Notice:  This list has 4 medication(s) that are the same as other medications prescribed for you. Read the directions carefully, and ask your doctor or other care provider to review them with you.      "

## 2018-07-19 NOTE — TELEPHONE ENCOUNTER
"Requested Prescriptions   Pending Prescriptions Disp Refills     carvedilol (COREG) 3.125 MG tablet [Pharmacy Med Name: CARVEDILOL 3.125MG TAB] 60 tablet     Last Written Prescription Date:  07/03/2017  Last Fill Quantity: 60,  # refills: 11   Last office visit: 7/19/2018 with prescribing provider:  07/19/2018  Future Office Visit:   Next 5 appointments (look out 90 days)     Sep 13, 2018  8:00 AM CDT   Office Visit with Mark Blanchard DO   [facility] ([address] [address] [phone]                  Sig: TAKE 1 TABLET (3.125 MG) BY MOUTH 2 TIMES DAILY (WITH MEALS)    Beta-Blockers Protocol Passed    7/19/2018 10:14 AM       Passed - Blood pressure under 140/90 in past 12 months    BP Readings from Last 3 Encounters:   07/19/18 112/70   06/28/18 108/70   05/03/18 112/70                Passed - Patient is age 6 or older       Passed - Recent (12 mo) or future (30 days) visit within the authorizing provider's specialty    Patient had office visit in the last 12 months or has a visit in the next 30 days with authorizing provider or within the authorizing provider's specialty.  See \"Patient Info\" tab in inbasket, or \"Choose Columns\" in Meds & Orders section of the refill encounter.              "

## 2018-07-20 RX ORDER — CARVEDILOL 3.12 MG/1
TABLET ORAL
Qty: 60 TABLET | Refills: 5 | Status: SHIPPED | OUTPATIENT
Start: 2018-07-20 | End: 2019-04-10

## 2018-07-20 NOTE — TELEPHONE ENCOUNTER
Prescription approved per [facility] Refill Protocol.    Laura Dominguez RN  [facility]

## 2018-07-27 DIAGNOSIS — Z79.4 TYPE 2 DIABETES MELLITUS WITHOUT COMPLICATION, WITH LONG-TERM CURRENT USE OF INSULIN (H): ICD-10-CM

## 2018-07-27 DIAGNOSIS — E11.9 TYPE 2 DIABETES MELLITUS WITHOUT COMPLICATION, WITH LONG-TERM CURRENT USE OF INSULIN (H): ICD-10-CM

## 2018-07-27 NOTE — TELEPHONE ENCOUNTER
"Requested Prescriptions   Pending Prescriptions Disp Refills     NOVOLOG FLEXPEN 100 UNIT/ML soln [Pharmacy Med Name: insulin aspart (NOVOLOG FLEXPEN) 100 UNIT/ML injection] 15 mL     Last Written Prescription Date:  07/03/2017  Last Fill Quantity: 15ml,  # refills: 11   Last office visit: 7/19/2018 with prescribing provider:  07/19/2018   Future Office Visit:   Next 5 appointments (look out 90 days)     Sep 13, 2018  8:00 AM CDT   Office Visit with Mark Blanchard DO   [facility]    [address] [place] [id number] [phone]                  Sig: INJECT 10 - 12 UNITS UNDER THE SKIN TWICE A DAY    Short Acting Insulin Protocol Failed    7/27/2018 11:20 AM       Failed - LDL on file in past 12 months    Recent Labs   Lab Test  06/15/17   0852   LDL  64            Passed - Blood pressure less than 140/90 in past 6 months    BP Readings from Last 3 Encounters:   07/19/18 112/70   06/28/18 108/70   05/03/18 112/70                Passed - Microalbumin on file in past 12 months    Recent Labs   Lab Test  05/03/18   0905   MICROL  <5   UMALCR  Unable to calculate due to low value            Passed - Serum creatinine on file in past 12 months    Recent Labs   Lab Test  05/03/18   0900   CR  0.95            Passed - HgbA1C in past 3 or 6 months    If HgbA1C is 8 or greater, it needs to be on file within the past 3 months.  If less than 8, must be on file within the past 6 months.     Recent Labs   Lab Test  05/03/18   0900   A1C  6.8*            Passed - Patient is age 18 or older       Passed - Recent (6 mo) or future (30 days) visit within the authorizing provider's specialty    Patient had office visit in the last 6 months or has a visit in the next 30 days with authorizing provider or within the authorizing provider's specialty.  See \"Patient Info\" tab in inbasket, or \"Choose Columns\" in Meds & Orders section of the refill encounter.              "

## 2018-07-31 RX ORDER — INSULIN ASPART 100 [IU]/ML
INJECTION, SOLUTION INTRAVENOUS; SUBCUTANEOUS
Qty: 15 ML | Refills: 2 | Status: SHIPPED | OUTPATIENT
Start: 2018-07-31 | End: 2019-02-22

## 2018-08-03 ENCOUNTER — TRANSFERRED RECORDS (OUTPATIENT)
Dept: HEALTH INFORMATION MANAGEMENT | Facility: CLINIC | Age: 68
End: 2018-08-03

## 2018-08-31 DIAGNOSIS — K59.04 CHRONIC IDIOPATHIC CONSTIPATION: ICD-10-CM

## 2018-08-31 NOTE — TELEPHONE ENCOUNTER
Prescription approved per [facility] Refill Protocol.    TERI HairstonN, RN  [facility]

## 2018-08-31 NOTE — TELEPHONE ENCOUNTER
"Requested Prescriptions   Pending Prescriptions Disp Refills     DOK PLUS 50-8.6 MG per tablet [Pharmacy Med Name: DOK PLUS TAB] 100 tablet     Last Written Prescription Date:  7/3/17  Last Fill Quantity: 100,  # refills: 11   Last office visit: 7/19/2018 with prescribing provider:  7/19/18   Future Office Visit:   Next 5 appointments (look out 90 days)     Sep 13, 2018  8:00 AM CDT   Office Visit with Mark Blanchard DO   [facility] ([address] [address]   [phone]                Sig: TAKE 1-2 TABLETS BY MOUTH 2 TIMES DAILY    Laxatives Protocol Passed    8/31/2018  8:55 AM       Passed - Patient is age 6 or older       Passed - Recent (12 mo) or future (30 days) visit within the authorizing provider's specialty    Patient had office visit in the last 12 months or has a visit in the next 30 days with authorizing provider or within the authorizing provider's specialty.  See \"Patient Info\" tab in inbasket, or \"Choose Columns\" in Meds & Orders section of the refill encounter.            "

## 2018-09-13 ENCOUNTER — OFFICE VISIT (OUTPATIENT)
Dept: INTERNAL MEDICINE | Facility: CLINIC | Age: 68
End: 2018-09-13
Payer: MEDICARE

## 2018-09-13 VITALS
DIASTOLIC BLOOD PRESSURE: 70 MMHG | TEMPERATURE: 96.3 F | HEART RATE: 60 BPM | RESPIRATION RATE: 20 BRPM | WEIGHT: 216 LBS | BODY MASS INDEX: 33.83 KG/M2 | OXYGEN SATURATION: 98 % | SYSTOLIC BLOOD PRESSURE: 110 MMHG

## 2018-09-13 DIAGNOSIS — Z12.11 SPECIAL SCREENING FOR MALIGNANT NEOPLASMS, COLON: ICD-10-CM

## 2018-09-13 DIAGNOSIS — Z79.4 TYPE 2 DIABETES MELLITUS WITH HYPERGLYCEMIA, WITH LONG-TERM CURRENT USE OF INSULIN (H): ICD-10-CM

## 2018-09-13 DIAGNOSIS — Z95.1 S/P CABG (CORONARY ARTERY BYPASS GRAFT): ICD-10-CM

## 2018-09-13 DIAGNOSIS — E11.65 TYPE 2 DIABETES MELLITUS WITH HYPERGLYCEMIA, WITH LONG-TERM CURRENT USE OF INSULIN (H): ICD-10-CM

## 2018-09-13 DIAGNOSIS — I50.42 SYSTOLIC AND DIASTOLIC CHF, CHRONIC (H): ICD-10-CM

## 2018-09-13 DIAGNOSIS — I10 ESSENTIAL HYPERTENSION WITH GOAL BLOOD PRESSURE LESS THAN 140/90: ICD-10-CM

## 2018-09-13 DIAGNOSIS — Z95.2 S/P AORTIC VALVE REPLACEMENT: Primary | ICD-10-CM

## 2018-09-13 LAB
ALBUMIN SERPL-MCNC: 3.4 G/DL (ref 3.4–5)
ALP SERPL-CCNC: 124 U/L (ref 40–150)
ALT SERPL W P-5'-P-CCNC: 31 U/L (ref 0–70)
ANION GAP SERPL CALCULATED.3IONS-SCNC: 8 MMOL/L (ref 3–14)
AST SERPL W P-5'-P-CCNC: 19 U/L (ref 0–45)
BILIRUB DIRECT SERPL-MCNC: 0.1 MG/DL (ref 0–0.2)
BILIRUB SERPL-MCNC: 0.5 MG/DL (ref 0.2–1.3)
BUN SERPL-MCNC: 19 MG/DL (ref 7–30)
CALCIUM SERPL-MCNC: 8.8 MG/DL (ref 8.5–10.1)
CHLORIDE SERPL-SCNC: 103 MMOL/L (ref 94–109)
CHOLEST SERPL-MCNC: 138 MG/DL
CO2 SERPL-SCNC: 29 MMOL/L (ref 20–32)
CREAT SERPL-MCNC: 0.99 MG/DL (ref 0.66–1.25)
GFR SERPL CREATININE-BSD FRML MDRD: 75 ML/MIN/1.7M2
GLUCOSE SERPL-MCNC: 191 MG/DL (ref 70–99)
HBA1C MFR BLD: 7.5 % (ref 0–5.6)
HDLC SERPL-MCNC: 47 MG/DL
LDLC SERPL CALC-MCNC: 63 MG/DL
NONHDLC SERPL-MCNC: 91 MG/DL
POTASSIUM SERPL-SCNC: 3.9 MMOL/L (ref 3.4–5.3)
PROT SERPL-MCNC: 7.2 G/DL (ref 6.8–8.8)
SODIUM SERPL-SCNC: 140 MMOL/L (ref 133–144)
TRIGL SERPL-MCNC: 140 MG/DL

## 2018-09-13 PROCEDURE — 80076 HEPATIC FUNCTION PANEL: CPT | Performed by: INTERNAL MEDICINE

## 2018-09-13 PROCEDURE — 99214 OFFICE O/P EST MOD 30 MIN: CPT | Performed by: INTERNAL MEDICINE

## 2018-09-13 PROCEDURE — 83036 HEMOGLOBIN GLYCOSYLATED A1C: CPT | Performed by: INTERNAL MEDICINE

## 2018-09-13 PROCEDURE — 80061 LIPID PANEL: CPT | Performed by: INTERNAL MEDICINE

## 2018-09-13 PROCEDURE — 36415 COLL VENOUS BLD VENIPUNCTURE: CPT | Performed by: INTERNAL MEDICINE

## 2018-09-13 PROCEDURE — 80048 BASIC METABOLIC PNL TOTAL CA: CPT | Performed by: INTERNAL MEDICINE

## 2018-09-13 RX ORDER — LISINOPRIL 5 MG/1
5 TABLET ORAL DAILY
Qty: 90 TABLET | Refills: 3 | Status: SHIPPED | OUTPATIENT
Start: 2018-09-13 | End: 2019-04-10

## 2018-09-13 ASSESSMENT — PAIN SCALES - GENERAL: PAINLEVEL: NO PAIN (0)

## 2018-09-13 NOTE — PROGRESS NOTES
Health Maintenance reviewed at today's visit patient asked to schedule/complete:   Colon Cancer:  Patient declined

## 2018-09-13 NOTE — LETTER
September 17, 2018      Reinaldo Barrios  [address]   [facility] [address]        Dear ,    We are writing to inform you of your test results.    The blood sugar somewhat elevated at 191 but the kidney function is normal.   Cholesterol is well controlled with an LDL of 63.   Liver tests are normal.   Hemoglobin A1c has gone up slightly from 6.8 only 4 months ago to the current 7.5.  This is not bad but would suggest worsening of the blood sugar control.     Resulted Orders   Hemoglobin A1c   Result Value Ref Range    Hemoglobin A1C 7.5 (H) 0 - 5.6 %      Comment:      Normal <5.7% Prediabetes 5.7-6.4%  Diabetes 6.5% or higher - adopted from ADA   consensus guidelines.     Basic metabolic panel   Result Value Ref Range    Sodium 140 133 - 144 mmol/L    Potassium 3.9 3.4 - 5.3 mmol/L    Chloride 103 94 - 109 mmol/L    Carbon Dioxide 29 20 - 32 mmol/L    Anion Gap 8 3 - 14 mmol/L    Glucose 191 (H) 70 - 99 mg/dL      Comment:      Non Fasting    Urea Nitrogen 19 7 - 30 mg/dL    Creatinine 0.99 0.66 - 1.25 mg/dL    GFR Estimate 75 >60 mL/min/1.7m2      Comment:      Non  GFR Calc    GFR Estimate If Black >90 >60 mL/min/1.7m2      Comment:       GFR Calc    Calcium 8.8 8.5 - 10.1 mg/dL   Lipid Profile   Result Value Ref Range    Cholesterol 138 <200 mg/dL    Triglycerides 140 <150 mg/dL      Comment:      Non Fasting    HDL Cholesterol 47 >39 mg/dL    LDL Cholesterol Calculated 63 <100 mg/dL      Comment:      Desirable:       <100 mg/dl    Non HDL Cholesterol 91 <130 mg/dL   Hepatic panel   Result Value Ref Range    Bilirubin Direct 0.1 0.0 - 0.2 mg/dL    Bilirubin Total 0.5 0.2 - 1.3 mg/dL    Albumin 3.4 3.4 - 5.0 g/dL    Protein Total 7.2 6.8 - 8.8 g/dL    Alkaline Phosphatase 124 40 - 150 U/L    ALT 31 0 - 70 U/L    AST 19 0 - 45 U/L       If you have any questions or concerns, please call the clinic at the number listed above.       Sincerely,        Mark St  DO [place]

## 2018-09-13 NOTE — MR AVS SNAPSHOT
After Visit Summary   9/13/2018    Reinaldo Barrios    MRN: [id number]           Patient Information     Date Of Birth          1950        Visit Information        Provider Department      9/13/2018 8:00 AM Mark Blanchard DO [facility]        Today's Diagnoses     S/P aortic valve replacement    -  1    Systolic and diastolic CHF, chronic (H)        Type 2 diabetes mellitus with hyperglycemia, with long-term current use of insulin (H)        Essential hypertension with goal blood pressure less than 140/90        S/P CABG (coronary artery bypass graft)        Special screening for malignant neoplasms, colon           Follow-ups after your visit        Follow-up notes from your care team     Return in about 1 month (around 10/13/2018) for Follow up.      Who to contact     If you have questions or need follow up information about today's clinic visit or your schedule please contact [facility] directly at [phone].  Normal or non-critical lab and imaging results will be communicated to you by MyChart, letter or phone within 4 business days after the clinic has received the results. If you do not hear from us within 7 days, please contact the clinic through MyChart or phone. If you have a critical or abnormal lab result, we will notify you by phone as soon as possible.  Submit refill requests through [employer] or call your pharmacy and they will forward the refill request to us. Please allow 3 business days for your refill to be completed.          Additional Information About Your Visit        Care EveryWhere ID     This is your Care EveryWhere ID. This could be used by other organizations to access your [place] medical records  [id number]        Your Vitals Were     Pulse Temperature Respirations Pulse Oximetry BMI (Body Mass Index)       60 96.3  F (35.7  C) (Temporal) 20 98% 33.83 kg/m2        Blood Pressure from Last 3 Encounters:    09/13/18 110/70   07/19/18 112/70   06/28/18 108/70    Weight from Last 3 Encounters:   09/13/18 216 lb (98 kg)   07/19/18 215 lb (97.5 kg)   06/28/18 217 lb 4.8 oz (98.6 kg)              We Performed the Following     Basic metabolic panel     Hemoglobin A1c     Hepatic panel     Lipid Profile          Today's Medication Changes          These changes are accurate as of 9/13/18 11:59 PM.  If you have any questions, ask your nurse or doctor.               These medicines have changed or have updated prescriptions.        Dose/Directions    insulin detemir 100 UNIT/ML injection   Commonly known as:  LEVEMIR FLEXTOUCH   This may have changed:  Another medication with the same name was removed. Continue taking this medication, and follow the directions you see here.   Used for:  Type 2 diabetes mellitus with hyperglycemia, with long-term current use of insulin (H)   Changed by:  Mark Blanchard DO        INJECT 60 UNITS UNDER THE SKIN EVERY DAY   Quantity:  60 mL   Refills:  3            Where to get your medicines      These medications were sent to [id number] - [place], [place] - [address] [place] [address]  [address] [place] [id number]    Hours:  M-F 8:30-6:30; Sat 9-4; closed Sunday Phone:  [phone]     insulin detemir 100 UNIT/ML injection    lisinopril 5 MG tablet                Primary Care Provider Office Phone # Fax #    Mark Blanchard [phone]       [address] [place] [address]        Equal Access to Services     JAUN GUERRERO AH: Hadii aad ku hadasho Soomaali, waaxda luqadaha, qaybta kaalmada adeegyada, waxay idiin hayninoskan terrell mcgee. So [facility] [phone].    ATENCIÓN: Si habla español, tiene a templeton disposición servicios gratuitos de asistencia lingüística. Llame al [phone].    We comply with applicable federal civil rights laws and [place] laws. We do not discriminate on the basis of race, color, national origin, age, disability, sex,  sexual orientation, or gender identity.            Thank you!     Thank you for choosing [facility]  for your care. Our goal is always to provide you with excellent care. Hearing back from our patients is one way we can continue to improve our services. Please take a few minutes to complete the written survey that you may receive in the mail after your visit with us. Thank you!             Your Updated Medication List - Protect others around you: Learn how to safely use, store and throw away your medicines at www.disposemymeds.org.          This list is accurate as of 9/13/18 11:59 PM.  Always use your most recent med list.                   Brand Name Dispense Instructions for use Diagnosis    aspirin 81 MG EC tablet      Take 1 tablet (81 mg) by mouth daily    S/P CABG (coronary artery bypass graft)       atorvastatin 40 MG tablet    LIPITOR    90 tablet    Take 1 tablet (40 mg) by mouth daily    Hyperlipidemia LDL goal <100       * blood glucose monitoring test strip    no brand specified    100 strip    Use to test blood sugars 3 times daily or as directed    Type 2 diabetes mellitus with hyperglycemia, with long-term current use of insulin (H)       * ONETOUCH ULTRA test strip   Generic drug:  blood glucose monitoring     100 each    USE TO TEST BLOOD SUGAR 3 TIMES A DAY    Type 2 diabetes mellitus with hyperglycemia, with long-term current use of insulin (H)       carvedilol 3.125 MG tablet    COREG    60 tablet    TAKE 1 TABLET (3.125 MG) BY MOUTH 2 TIMES DAILY (WITH MEALS)    Essential hypertension with goal blood pressure less than 140/90       DOK PLUS 8.6-50 MG per tablet   Generic drug:  senna-docusate     100 tablet    TAKE 1-2 TABLETS BY MOUTH 2 TIMES DAILY    Chronic idiopathic constipation       insulin detemir 100 UNIT/ML injection    LEVEMIR FLEXTOUCH    60 mL    INJECT 60 UNITS UNDER THE SKIN EVERY DAY    Type 2 diabetes mellitus with hyperglycemia, with long-term current use of  "insulin (H)       insulin pen needle 31G X 8 MM    B-D U/F    100 each    USE THREE PEN NEEDLES DAILY OR AS DIRECTED.  Appointment needed for additional refills.    Type 2 diabetes mellitus with hyperglycemia, with long-term current use of insulin (H)       insulin syringe-needle U-100 31G X 5/16\" 0.5 ML    BD insulin syringe ultrafine    100 each    Use one syringe 3 daily or as directed.    Type 2 diabetes mellitus without complication, with long-term current use of insulin (H)       isosorbide mononitrate 30 MG 24 hr tablet    IMDUR    90 tablet    Take 1 tablet (30 mg) by mouth daily    Precordial pain       lisinopril 5 MG tablet    PRINIVIL/ZESTRIL    90 tablet    Take 1 tablet (5 mg) by mouth daily    Systolic and diastolic CHF, chronic (H)       NovoLOG FLEXPEN 100 UNIT/ML injection   Generic drug:  insulin aspart     15 mL    INJECT 10 - 12 UNITS UNDER THE SKIN TWICE A DAY    Type 2 diabetes mellitus without complication, with long-term current use of insulin (H)       * Notice:  This list has 2 medication(s) that are the same as other medications prescribed for you. Read the directions carefully, and ask your doctor or other care provider to review them with you.      "

## 2018-09-13 NOTE — PROGRESS NOTES
SUBJECTIVE:   Reinaldo Barrios is a 67 year old male who presents to clinic today for the following health issues:    Diabetes Follow-up    Patient is checking blood sugars: three times daily.   Blood sugar testing frequency justification: Risk of hypoglycemia with medication(s)  Results are as follows:         200 +    Diabetic concerns: blood sugar frequently over 200     Symptoms of hypoglycemia (low blood sugar): none     Paresthesias (numbness or burning in feet) or sores: Yes feet tingle all the time     Date of last diabetic eye exam: with in 1 year    BP Readings from Last 2 Encounters:   09/13/18 110/70   07/19/18 112/70     Hemoglobin A1C (%)   Date Value   05/03/2018 6.8 (H)   11/13/2017 7.3 (H)     LDL Cholesterol Calculated (mg/dL)   Date Value   06/15/2017 64   12/14/2016 49       Diabetes Management Resources    Amount of exercise or physical activity: walks    Problems taking medications regularly: No    Medication side effects: none    Diet: regular (no restrictions)                     Chief Complaint    The patient is a pleasant 67-year-old gentleman who presents today for follow-up of his diabetes and coronary artery disease.  He notes no chest pain since his bypass, he takes his medications compliantly.  He is status post aortic valvuloplasty and has a bioprosthetic valve in place not requiring anticoagulation.  His blood sugars have been somewhat higher per his recollection, he believes that the Levemir is not as effective as the Lantus was.  He has had no hypoglycemic episodes however.  He continues his ACE inhibitor and statin regularly.  He is on aspirin daily.                         PAST, FAMILY,SOCIAL HISTORY:     Medical  History:   has a past medical history of Aortic valve stenosis (11/2/2015); Cardiac LV ejection fraction 30-35% (11/2/2015); Chest pain, unspecified chest pain type (11/2/2015); Hiatal hernia (11/2/2015); Hyperlipidemia LDL goal <100 (11/2/2015); Personal history  "of tobacco use, presenting hazards to health (11/2/2015); and Type 2 diabetes mellitus without complication (H) (11/2/2015).     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     Social History:   reports that he quit smoking about 2 years ago. His smoking use included Pipe. He has never used smokeless tobacco. He reports that he drinks alcohol. He reports that he does not use illicit drugs.     Family History:  family history is not on file.            MEDICATIONS  Current Outpatient Prescriptions   Medication Sig Dispense Refill     aspirin EC 81 MG EC tablet Take 1 tablet (81 mg) by mouth daily       atorvastatin (LIPITOR) 40 MG tablet Take 1 tablet (40 mg) by mouth daily 90 tablet 3     blood glucose monitoring (NO BRAND SPECIFIED) test strip Use to test blood sugars 3 times daily or as directed 100 strip 1     carvedilol (COREG) 3.125 MG tablet TAKE 1 TABLET (3.125 MG) BY MOUTH 2 TIMES DAILY (WITH MEALS) 60 tablet 5     DOK PLUS 50-8.6 MG per tablet TAKE 1-2 TABLETS BY MOUTH 2 TIMES DAILY 100 tablet 5     insulin detemir (LEVEMIR FLEXTOUCH) 100 UNIT/ML injection INJECT 60 UNITS UNDER THE SKIN EVERY DAY 60 mL 3     insulin pen needle (B-D U/F) 31G X 8 MM USE THREE PEN NEEDLES DAILY OR AS DIRECTED.  Appointment needed for additional refills. 100 each 0     insulin syringe-needle U-100 (BD INSULIN SYRINGE ULTRAFINE) 31G X 5/16\" 0.5 ML Use one syringe 3 daily or as directed. 100 each prn     isosorbide mononitrate (IMDUR) 30 MG 24 hr tablet Take 1 tablet (30 mg) by mouth daily 90 tablet 3     lisinopril (PRINIVIL/ZESTRIL) 5 MG tablet Take 1 tablet (5 mg) by mouth daily 90 tablet 3     NOVOLOG FLEXPEN 100 UNIT/ML soln INJECT 10 - 12 UNITS UNDER THE SKIN TWICE A DAY 15 mL 2     ONETOUCH ULTRA test strip USE TO TEST BLOOD SUGAR 3 TIMES A  each 11     [DISCONTINUED] insulin detemir " (LEVEMIR FLEXTOUCH) 100 UNIT/ML injection INJECT 60 UNITS UNDER THE SKIN EVERY DAY 30 mL 1     [DISCONTINUED] insulin detemir (LEVEMIR VIAL) 100 UNIT/ML injection Inject 20 Units Subcutaneous daily 10 mL 1     [DISCONTINUED] lisinopril (PRINIVIL/ZESTRIL) 5 MG tablet Take 1 tablet (5 mg) by mouth daily 90 tablet 2         --------------------------------------------------------------------------------------------------------------------                              Review of Systems     LUNGS: Pt denies: cough,excess sputum, hemoptysis, or shortness of breath.   HEART: Pt denies: chest pain, arrythmia, syncope, tachy or bradyarrhythmia.   GI: Pt denies: nausea, vomitting, diarrhea, constipation, melena, or hematochezia.   NEURO: Pt denies: seizures, strokes, diplopia, weakness,  or paralysis.  Patient does have some neuropathy in the feet.  Previously noted in his hands has resolved.  He is currently not on any gabapentin or therapy for this.   SKIN: Pt denies: itching, rashes, discoloration, or specific lesions of concern. Denies recent hair loss.                                     Examination      /70 (BP Location: Right arm, Patient Position: Chair, Cuff Size: Adult Regular)  Pulse 60  Temp 96.3  F (35.7  C) (Temporal)  Resp 20  Wt 216 lb (98 kg)  SpO2 98%  BMI 33.83 kg/m2   Constitutional: The patient appears to be in no acute distress. The patient appears to be adequately hydrated. No acute respiratory or hemodynamic distress is noted at this time.   LUNGS: clear bilaterally, airflow is brisk, no intercostal retraction or stridor is noted. No coughing is noted during visit.   HEART:  regular without rubs, clicks, gallops, or murmurs. PMI is nondisplaced. Upstrokes are brisk. S1,S2 are heard.   GI: Abdomen is soft, without rebound, guarding or tenderness. Bowel sounds are appropriate. No renal bruits are heard.   NEURO: Pt is alert and appropriate. No neurologic lateralization is noted. Cranial  nerves 2-12 are intact. Peripheral sensory function is decreased in the feet. SKIN:  warm and dry. No erythema, or rashes are noted. No specific lesions of concern are noted.                                           Decision Making  1. Systolic and diastolic CHF, chronic (H)  Currently controlled, continue medications including ACE inhibitor and beta-blocker  - lisinopril (PRINIVIL/ZESTRIL) 5 MG tablet; Take 1 tablet (5 mg) by mouth daily  Dispense: 90 tablet; Refill: 3    2. Type 2 diabetes mellitus with hyperglycemia, with long-term current use of insulin (H)  Will adjust insulin as needed.  Recommend blood sugar log and A1c evaluation.  - insulin detemir (LEVEMIR FLEXTOUCH) 100 UNIT/ML injection; INJECT 60 UNITS UNDER THE SKIN EVERY DAY  Dispense: 60 mL; Refill: 3  - Hemoglobin A1c  - Basic metabolic panel  - Lipid Profile  - Hepatic panel    3. S/P aortic valve replacement  Doing well, anticipate repeat echocardiogram next year.  Current ejection fraction 35-40%    4. Essential hypertension with goal blood pressure less than 140/90  Controlled with current medication.    5. S/P CABG (coronary artery bypass graft)  No chest pain, continue statin and blood pressure control.  Continue aspirin    6. Special screening for malignant neoplasms, colon  Refusing colonoscopy, recommend fecal occult blood screening  - Fecal colorectal cancer screen (FIT); Future                            FOLLOW UP   I have asked the patient to make an appointment for followup with me as predicated by his lab results.  Anticipate 3 months maximum.        I have carefully explained the diagnosis and treatment options to the patient.  The patient has displayed an understanding of the above, and all subsequent questions were answered.      DO RADHA Horn    Portions of this note were produced using [employer]  Although every attempt at real-time proof reading has been made, occasional grammar/syntax errors may have been  missed.

## 2018-09-14 NOTE — PROGRESS NOTES
Please contact the patient and notify him of the following:  The blood sugar somewhat elevated at 191 but the kidney function is normal.  Cholesterol is well controlled with an LDL of 63.  Liver tests are normal.  Hemoglobin A1c has gone up slightly from 6.8 only 4 months ago to the current 7.5.  This is not bad but would suggest worsening of the blood sugar control.    Thank you.   DO RADHA Horn

## 2019-02-22 DIAGNOSIS — Z79.4 TYPE 2 DIABETES MELLITUS WITHOUT COMPLICATION, WITH LONG-TERM CURRENT USE OF INSULIN (H): ICD-10-CM

## 2019-02-22 DIAGNOSIS — E11.9 TYPE 2 DIABETES MELLITUS WITHOUT COMPLICATION, WITH LONG-TERM CURRENT USE OF INSULIN (H): ICD-10-CM

## 2019-02-25 RX ORDER — INSULIN ASPART 100 [IU]/ML
INJECTION, SOLUTION INTRAVENOUS; SUBCUTANEOUS
Qty: 15 ML | Refills: 1 | Status: SHIPPED | OUTPATIENT
Start: 2019-02-25 | End: 2019-06-25

## 2019-02-25 NOTE — TELEPHONE ENCOUNTER
"Requested Prescriptions   Pending Prescriptions Disp Refills     NOVOLOG FLEXPEN 100 UNIT/ML soln [Pharmacy Med Name: insulin aspart (NOVOLOG FLEXPEN) 100 UNIT/ML injection] 15 mL 2    Last Written Prescription Date:  7/31/18  Last Fill Quantity: 15 mL,  # refills: 2   Last office visit: 9/13/2018 with prescribing provider:  9/13/18   Future Office Visit:     Sig: INJECT 10 - 12 UNITS UNDER THE SKIN TWICE A DAY    Short Acting Insulin Protocol Passed - 2/22/2019  1:20 PM       Passed - Blood pressure less than 140/90 in past 6 months    BP Readings from Last 3 Encounters:   09/13/18 110/70   07/19/18 112/70   06/28/18 108/70                Passed - LDL on file in past 12 months    Recent Labs   Lab Test 09/13/18  0844   LDL 63            Passed - Microalbumin on file in past 12 months    Recent Labs   Lab Test 05/03/18  0905   MICROL <5   UMALCR Unable to calculate due to low value            Passed - Serum creatinine on file in past 12 months    Recent Labs   Lab Test 09/13/18  0844   CR 0.99            Passed - HgbA1C in past 3 or 6 months    If HgbA1C is 8 or greater, it needs to be on file within the past 3 months.  If less than 8, must be on file within the past 6 months.     Recent Labs   Lab Test 09/13/18  0844   A1C 7.5*            Passed - Medication is active on med list       Passed - Patient is age 18 or older       Passed - Recent (6 mo) or future (30 days) visit within the authorizing provider's specialty    Patient had office visit in the last 6 months or has a visit in the next 30 days with authorizing provider or within the authorizing provider's specialty.  See \"Patient Info\" tab in inbasket, or \"Choose Columns\" in Meds & Orders section of the refill encounter.            "

## 2019-03-27 DIAGNOSIS — Z79.4 TYPE 2 DIABETES MELLITUS WITH HYPERGLYCEMIA, WITH LONG-TERM CURRENT USE OF INSULIN (H): ICD-10-CM

## 2019-03-27 DIAGNOSIS — E11.65 TYPE 2 DIABETES MELLITUS WITH HYPERGLYCEMIA, WITH LONG-TERM CURRENT USE OF INSULIN (H): ICD-10-CM

## 2019-03-27 NOTE — TELEPHONE ENCOUNTER
Medication is being filled for 1 time refill only due to:  Patient needs to be seen because overdue for 6 month diabetic check.     Will route to scheduling to call and schedule.     TERI HairstonN, RN  [facility]

## 2019-03-27 NOTE — TELEPHONE ENCOUNTER
"Requested Prescriptions   Pending Prescriptions Disp Refills     ONETOUCH ULTRA test strip [Pharmacy Med Name: ONETOUCH ULTRA TEST STRIP] 100 each 11    Last Written Prescription Date:  3/20/18  Last Fill Quantity: 100,  # refills: 11   Last office visit: 9/13/2018 with prescribing provider:  9/13/18   Future Office Visit:     Sig: USE TO TEST BLOOD SUGAR 3 TIMES A DAY    Diabetic Supplies Protocol Failed - 3/27/2019 10:39 AM       Failed - Recent (6 mo) or future (30 days) visit within the authorizing provider's specialty    Patient had office visit in the last 6 months or has a visit in the next 30 days with authorizing provider.  See \"Patient Info\" tab in inbasket, or \"Choose Columns\" in Meds & Orders section of the refill encounter.           Passed - Medication is active on med list       Passed - Patient is 18 years of age or older        "

## 2019-03-28 NOTE — TELEPHONE ENCOUNTER
Left message for patient to call back and speak with any .        Shannon Wade ~ Patient Representative  [address] [address]  [email or web address]  [email or web address]  Office:  [phone]  Fax:  [phone]

## 2019-04-10 ENCOUNTER — OFFICE VISIT (OUTPATIENT)
Dept: FAMILY MEDICINE | Facility: OTHER | Age: 69
End: 2019-04-10
Payer: MEDICARE

## 2019-04-10 VITALS
WEIGHT: 225 LBS | HEART RATE: 74 BPM | OXYGEN SATURATION: 98 % | BODY MASS INDEX: 35.31 KG/M2 | HEIGHT: 67 IN | TEMPERATURE: 98.1 F | RESPIRATION RATE: 18 BRPM | SYSTOLIC BLOOD PRESSURE: 120 MMHG | DIASTOLIC BLOOD PRESSURE: 60 MMHG

## 2019-04-10 DIAGNOSIS — Z95.2 S/P AORTIC VALVE REPLACEMENT: ICD-10-CM

## 2019-04-10 DIAGNOSIS — Z12.11 SPECIAL SCREENING FOR MALIGNANT NEOPLASMS, COLON: ICD-10-CM

## 2019-04-10 DIAGNOSIS — I10 ESSENTIAL HYPERTENSION WITH GOAL BLOOD PRESSURE LESS THAN 140/90: ICD-10-CM

## 2019-04-10 DIAGNOSIS — E66.01 MORBID OBESITY (H): ICD-10-CM

## 2019-04-10 DIAGNOSIS — E11.65 TYPE 2 DIABETES MELLITUS WITH HYPERGLYCEMIA, WITH LONG-TERM CURRENT USE OF INSULIN (H): Primary | ICD-10-CM

## 2019-04-10 DIAGNOSIS — I50.42 SYSTOLIC AND DIASTOLIC CHF, CHRONIC (H): ICD-10-CM

## 2019-04-10 DIAGNOSIS — E78.5 HYPERLIPIDEMIA LDL GOAL <100: ICD-10-CM

## 2019-04-10 DIAGNOSIS — R94.30 CARDIAC LV EJECTION FRACTION 30-35%: ICD-10-CM

## 2019-04-10 DIAGNOSIS — Z79.4 TYPE 2 DIABETES MELLITUS WITH HYPERGLYCEMIA, WITH LONG-TERM CURRENT USE OF INSULIN (H): Primary | ICD-10-CM

## 2019-04-10 DIAGNOSIS — Z13.89 SCREENING FOR DIABETIC PERIPHERAL NEUROPATHY: ICD-10-CM

## 2019-04-10 DIAGNOSIS — G45.9 TIA (TRANSIENT ISCHEMIC ATTACK): ICD-10-CM

## 2019-04-10 DIAGNOSIS — I48.4 ATYPICAL ATRIAL FLUTTER (H): ICD-10-CM

## 2019-04-10 DIAGNOSIS — Z95.1 S/P CABG (CORONARY ARTERY BYPASS GRAFT): ICD-10-CM

## 2019-04-10 DIAGNOSIS — Z23 NEED FOR PROPHYLACTIC VACCINATION WITH TETANUS-DIPHTHERIA (TD): ICD-10-CM

## 2019-04-10 LAB
ANION GAP SERPL CALCULATED.3IONS-SCNC: 5 MMOL/L (ref 3–14)
BUN SERPL-MCNC: 20 MG/DL (ref 7–30)
CALCIUM SERPL-MCNC: 8.4 MG/DL (ref 8.5–10.1)
CHLORIDE SERPL-SCNC: 104 MMOL/L (ref 94–109)
CO2 SERPL-SCNC: 27 MMOL/L (ref 20–32)
CREAT SERPL-MCNC: 1.01 MG/DL (ref 0.66–1.25)
CREAT UR-MCNC: 160 MG/DL
ERYTHROCYTE [DISTWIDTH] IN BLOOD BY AUTOMATED COUNT: 12.7 % (ref 10–15)
GFR SERPL CREATININE-BSD FRML MDRD: 75 ML/MIN/{1.73_M2}
GLUCOSE SERPL-MCNC: 175 MG/DL (ref 70–99)
HBA1C MFR BLD: 8.2 % (ref 0–5.6)
HCT VFR BLD AUTO: 38.7 % (ref 40–53)
HGB BLD-MCNC: 12.6 G/DL (ref 13.3–17.7)
MCH RBC QN AUTO: 29.9 PG (ref 26.5–33)
MCHC RBC AUTO-ENTMCNC: 32.6 G/DL (ref 31.5–36.5)
MCV RBC AUTO: 92 FL (ref 78–100)
MICROALBUMIN UR-MCNC: 9 MG/L
MICROALBUMIN/CREAT UR: 5.82 MG/G CR (ref 0–17)
PLATELET # BLD AUTO: 201 10E9/L (ref 150–450)
POTASSIUM SERPL-SCNC: 4.3 MMOL/L (ref 3.4–5.3)
RBC # BLD AUTO: 4.22 10E12/L (ref 4.4–5.9)
SODIUM SERPL-SCNC: 136 MMOL/L (ref 133–144)
TSH SERPL DL<=0.005 MIU/L-ACNC: 2.96 MU/L (ref 0.4–4)
WBC # BLD AUTO: 6.8 10E9/L (ref 4–11)

## 2019-04-10 PROCEDURE — 80048 BASIC METABOLIC PNL TOTAL CA: CPT | Performed by: INTERNAL MEDICINE

## 2019-04-10 PROCEDURE — 90471 IMMUNIZATION ADMIN: CPT | Performed by: INTERNAL MEDICINE

## 2019-04-10 PROCEDURE — 36415 COLL VENOUS BLD VENIPUNCTURE: CPT | Performed by: INTERNAL MEDICINE

## 2019-04-10 PROCEDURE — 82043 UR ALBUMIN QUANTITATIVE: CPT | Performed by: INTERNAL MEDICINE

## 2019-04-10 PROCEDURE — 99214 OFFICE O/P EST MOD 30 MIN: CPT | Mod: 25 | Performed by: INTERNAL MEDICINE

## 2019-04-10 PROCEDURE — 85027 COMPLETE CBC AUTOMATED: CPT | Performed by: INTERNAL MEDICINE

## 2019-04-10 PROCEDURE — 99207 C FOOT EXAM  NO CHARGE: CPT | Performed by: INTERNAL MEDICINE

## 2019-04-10 PROCEDURE — 83036 HEMOGLOBIN GLYCOSYLATED A1C: CPT | Performed by: INTERNAL MEDICINE

## 2019-04-10 PROCEDURE — 90715 TDAP VACCINE 7 YRS/> IM: CPT | Performed by: INTERNAL MEDICINE

## 2019-04-10 PROCEDURE — 84443 ASSAY THYROID STIM HORMONE: CPT | Performed by: INTERNAL MEDICINE

## 2019-04-10 RX ORDER — LISINOPRIL 5 MG/1
5 TABLET ORAL DAILY
Qty: 90 TABLET | Refills: 3 | Status: SHIPPED | OUTPATIENT
Start: 2019-04-10 | End: 2020-06-05

## 2019-04-10 RX ORDER — CARVEDILOL 3.12 MG/1
TABLET ORAL
Qty: 60 TABLET | Refills: 5 | Status: SHIPPED | OUTPATIENT
Start: 2019-04-10 | End: 2020-06-30

## 2019-04-10 RX ORDER — SYRINGE-NEEDLE,INSULIN,0.5 ML 27GX1/2"
SYRINGE, EMPTY DISPOSABLE MISCELLANEOUS
Qty: 100 EACH | Status: SHIPPED | OUTPATIENT
Start: 2019-04-10

## 2019-04-10 RX ORDER — ATORVASTATIN CALCIUM 40 MG/1
40 TABLET, FILM COATED ORAL DAILY
Qty: 90 TABLET | Refills: 3 | Status: SHIPPED | OUTPATIENT
Start: 2019-04-10 | End: 2020-03-18

## 2019-04-10 ASSESSMENT — PAIN SCALES - GENERAL: PAINLEVEL: NO PAIN (0)

## 2019-04-10 ASSESSMENT — MIFFLIN-ST. JEOR: SCORE: 1739.22

## 2019-04-10 NOTE — PROGRESS NOTES
SUBJECTIVE:   Reinaldo Barrios is a 68 year old male who presents to clinic today for the following   health issues:      Diabetes Follow-up    Patient is checking blood sugars: three times daily.   Blood sugar testing frequency justification: Uncontrolled diabetes  Results are as follows:       Some  highs    Diabetic concerns: None     Symptoms of hypoglycemia (low blood sugar): none     Paresthesias (numbness or burning in feet) or sores: Yes      Date of last diabetic eye exam: utd    BP Readings from Last 2 Encounters:   04/10/19 120/60   09/13/18 110/70     Hemoglobin A1C (%)   Date Value   09/13/2018 7.5 (H)   05/03/2018 6.8 (H)     LDL Cholesterol Calculated (mg/dL)   Date Value   09/13/2018 63   06/15/2017 64       Diabetes Management Resources                        Chief Complaint         The patient is a pleasant 68-year-old gentleman who presents today for follow-up of his congestive heart failure.  He does have a history of type 2 diabetes with some moderate control.  He is concerned that he is not getting the right type of insulin due to his insurance company.  He is going to investigate this.  He notes that he does have variable blood sugars and does have occasional hypoglycemic spells which he treats with candy and sugar.  He does have a history of previous aortic valvuloplasty with a bioprosthetic valve and has been maintaining adequate cardiac output since this time.  Prior to that, he had significant cardiac ischemia as well as cardiomyopathy and a decreased ejection fraction.  He is able to maintain his normal activities of daily living with modest compromise.  He states that he cannot do the things that he used to such as shovel his driveway etc.                         PAST, FAMILY,SOCIAL HISTORY:     Medical  History:   has a past medical history of Aortic valve stenosis (11/2/2015), Cardiac LV ejection fraction 30-35% (11/2/2015), Chest pain, unspecified chest pain type (11/2/2015),  "Hiatal hernia (11/2/2015), Hyperlipidemia LDL goal <100 (11/2/2015), Personal history of tobacco use, presenting hazards to health (11/2/2015), and Type 2 diabetes mellitus without complication (H) (11/2/2015).     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     Social History:   reports that he quit smoking about 2 years ago. His smoking use included pipe. He has never used smokeless tobacco. He reports that he drinks alcohol. He reports that he does not use drugs.     Family History:  family history is not on file.            MEDICATIONS  Current Outpatient Medications   Medication Sig Dispense Refill     aspirin EC 81 MG EC tablet Take 1 tablet (81 mg) by mouth daily       atorvastatin (LIPITOR) 40 MG tablet Take 1 tablet (40 mg) by mouth daily 90 tablet 3     blood glucose (NO BRAND SPECIFIED) test strip Use to test blood sugars 3 times daily or as directed 100 strip 1     carvedilol (COREG) 3.125 MG tablet TAKE 1 TABLET (3.125 MG) BY MOUTH 2 TIMES DAILY (WITH MEALS) 60 tablet 5     DOK PLUS 50-8.6 MG per tablet TAKE 1-2 TABLETS BY MOUTH 2 TIMES DAILY 100 tablet 5     insulin detemir (LEVEMIR FLEXTOUCH) 100 UNIT/ML injection INJECT 60 UNITS UNDER THE SKIN EVERY DAY 60 mL 3     insulin pen needle (B-D U/F) 31G X 8 MM USE THREE PEN NEEDLES DAILY OR AS DIRECTED.  Appointment needed for additional refills. 100 each 0     insulin syringe-needle U-100 (BD INSULIN SYRINGE ULTRAFINE) 31G X 5/16\" 0.5 ML miscellaneous Use one syringe 3 daily or as directed. 100 each prn     isosorbide mononitrate (IMDUR) 30 MG 24 hr tablet Take 1 tablet (30 mg) by mouth daily 90 tablet 3     lisinopril (PRINIVIL/ZESTRIL) 5 MG tablet Take 1 tablet (5 mg) by mouth daily 90 tablet 3     NOVOLOG FLEXPEN 100 UNIT/ML soln INJECT 10 - 12 UNITS UNDER THE SKIN TWICE A DAY 15 mL 1     ONETOUCH ULTRA test strip USE TO TEST " "BLOOD SUGAR 3 TIMES A  each 0         --------------------------------------------------------------------------------------------------------------------                              Review of Systems       LUNGS: Pt denies: cough, excess sputum, hemoptysis, or shortness of breath.   HEART: Pt denies: chest pain, arrhythmia, syncope, tachy or bradyarrhythmia.   GI: Pt denies: nausea, vomiting, diarrhea, constipation, melena, or hematochezia.   NEURO: Pt denies: seizures, strokes, diplopia, weakness, paraesthesias, or paralysis.  Patient has episodes of intermittent vertigo and sometimes has tingling in the left side of his face and left hand.  He does have a history of previous TIA.  These episodes last only for moments   SKIN: Pt denies: itching, rashes, discoloration, or specific lesions of concern. Denies recent hair loss.   PSYCH: The patient denies significant depression, anxiety, mood imbalance. Specifically denies any suicidal ideation.                                     Examination      /60 (BP Location: Left arm, Patient Position: Sitting, Cuff Size: Adult Large)   Pulse 74   Temp 98.1  F (36.7  C) (Temporal)   Resp 18   Ht 1.702 m (5' 7\")   Wt 102.1 kg (225 lb)   SpO2 98%   BMI 35.24 kg/m     Constitutional: The patient appears to be in no acute distress. The patient appears to be adequately hydrated. No acute respiratory or hemodynamic distress is noted at this time.   LUNGS: clear bilaterally, airflow is brisk, no intercostal retraction or stridor is noted. No coughing is noted during visit.   HEART:  regular without rubs, or gallops. PMI is nondisplaced. Upstrokes are brisk. S1,S2 are heard.  A slight systolic ejection murmur is heard with a valvular snap.   GI: Abdomen is soft, without rebound, guarding or tenderness. Bowel sounds are appropriate. No renal bruits are heard.   NEURO: Pt is alert and appropriate. No neurologic lateralization is noted. Cranial nerves 2-12 are intact. " "Peripheral sensory and motor function are grossly normal.    SKIN:  warm and dry. No erythema, or rashes are noted. No specific lesions of concern are noted.    PSYCH: The patient appears grossly appropriate. Maintains good eye contact, does not have any jittery or atypical motion. Displays appropriate affect.                                           Decision Making    1. Type 2 diabetes mellitus with hyperglycemia, with long-term current use of insulin (H)  Check lab work, continue current insulin program and adjust accordingly  - BASIC METABOLIC PANEL  - HEMOGLOBIN A1C  - Albumin Random Urine Quantitative with Creat Ratio  - CBC with Platelets    - blood glucose (NO BRAND SPECIFIED) test strip; Use to test blood sugars 3 times daily or as directed  Dispense: 100 strip; Refill: 1  - insulin syringe-needle U-100 (BD INSULIN SYRINGE ULTRAFINE) 31G X 5/16\" 0.5 ML miscellaneous; Use one syringe 3 daily or as directed.  Dispense: 100 each; Refill: prn    2. Hyperlipidemia LDL goal <100  Continue statin and check liver function  - atorvastatin (LIPITOR) 40 MG tablet; Take 1 tablet (40 mg) by mouth daily  Dispense: 90 tablet; Refill: 3    3. Essential hypertension with goal blood pressure less than 140/90  Continue current medications, check thyroid  - TSH WITH FREE T4 REFLEX  - carvedilol (COREG) 3.125 MG tablet; TAKE 1 TABLET (3.125 MG) BY MOUTH 2 TIMES DAILY (WITH MEALS)  Dispense: 60 tablet; Refill: 5    4. Systolic and diastolic CHF, chronic (H)  Set up echo, continue current medication  - lisinopril (PRINIVIL/ZESTRIL) 5 MG tablet; Take 1 tablet (5 mg) by mouth daily  Dispense: 90 tablet; Refill: 3    5. Morbid obesity (H)  Recommend weight loss    6. Atypical atrial flutter (H)  No recent recurrence    7. S/P aortic valve replacement  Check echo, anticoagulation not necessary  - Echocardiogram Complete; Future    8. S/P CABG (coronary artery bypass graft)  Stable, continue antiplatelet therapy, statin etc.    9. " Cardiac LV ejection fraction 30-35%  As above    10. Need for prophylactic vaccination with tetanus-diphtheria (Td)  Given  - TDAP VACCINE (ADACEL)    11. Screening for diabetic peripheral neuropathy  Done  - FOOT EXAM  NO CHARGE [87551.114]    12. Special screening for malignant neoplasms, colon  Given  - Fecal colorectal cancer screen (FIT); Future    13. TIA (transient ischemic attack)  Ordered  - US Carotid Bilateral; Future                           FOLLOW UP   I have asked the patient to make an appointment for followup with me in 2 weeks        I have carefully explained the diagnosis and treatment options to the patient.  The patient has displayed an understanding of the above, and all subsequent questions were answered.      DO RADHA Horn    Portions of this note were produced using [employer]  Although every attempt at real-time proof reading has been made, occasional grammar/syntax errors may have been missed.

## 2019-04-17 ENCOUNTER — HOSPITAL ENCOUNTER (OUTPATIENT)
Dept: ULTRASOUND IMAGING | Facility: CLINIC | Age: 69
Discharge: HOME | End: 2019-04-17
Attending: INTERNAL MEDICINE | Admitting: INTERNAL MEDICINE
Payer: MEDICARE

## 2019-04-17 ENCOUNTER — HOSPITAL ENCOUNTER (OUTPATIENT)
Dept: CARDIOLOGY | Facility: CLINIC | Age: 69
Discharge: HOME | End: 2019-04-17
Attending: INTERNAL MEDICINE | Admitting: INTERNAL MEDICINE
Payer: MEDICARE

## 2019-04-17 DIAGNOSIS — G45.9 TIA (TRANSIENT ISCHEMIC ATTACK): ICD-10-CM

## 2019-04-17 DIAGNOSIS — Z95.2 S/P AORTIC VALVE REPLACEMENT: ICD-10-CM

## 2019-04-17 PROCEDURE — 93306 TTE W/DOPPLER COMPLETE: CPT

## 2019-04-17 PROCEDURE — 93880 EXTRACRANIAL BILAT STUDY: CPT

## 2019-04-17 PROCEDURE — 93306 TTE W/DOPPLER COMPLETE: CPT | Mod: 26 | Performed by: INTERNAL MEDICINE

## 2019-04-17 NOTE — LETTER
April 29, 2019      Reinaldo Barrios  [address]   [facility] [address]        Dear Reinaldo, your recent test results are attached.   The carotid Doppler studies demonstrate less than 50% disease bilaterally.  This means normal.     Feel free to contact me via the office or My Chart if you have any questions regarding the above.   Resulted Orders   US Carotid Bilateral    Narrative    BILATERAL CAROTID ULTRASOUND   4/17/2019 1:18 PM     HISTORY:  TIA (transient ischemic attack).    COMPARISON: 11/4/2015    RIGHT CAROTID FINDINGS:  Minimal plaque.    Right ICA PSV:  63 cm/sec.  Right ICA EDV:  13 cm/sec.  Right ICA/CCA PSV Ratio:  1.2    These indicate less than 50% diameter stenosis of the right ICA.    Right Vertebral: Antegrade flow.   Right ECA: Antegrade flow.     LEFT CAROTID FINDINGS:  Minimal plaque.    Left ICA PSV:  70 cm/sec.  Left ICA EDV:  20 cm/sec.  Left ICA/CCA PSV Ratio:  1.6    These indicate less than 50% diameter stenosis of the left ICA.    Left Vertebral: Antegrade flow.   Left ECA: Antegrade flow.     Causes of Decreased Accuracy:   None.       Impression    IMPRESSION:    1. Less than 50% diameter stenosis of the right ICA relative to the  distal ICA diameter.   2. Less than 50% diameter stenosis of the left ICA relative to the  distal ICA diameter.     NAINA HAGAN MD       Sincerely,        Mark Blanchard DO

## 2019-04-19 PROCEDURE — 82274 ASSAY TEST FOR BLOOD FECAL: CPT | Performed by: INTERNAL MEDICINE

## 2019-04-19 NOTE — RESULT ENCOUNTER NOTE
Dear Reinaldo, your recent test results are attached.  The echocardiogram shows an ejection fraction of 35-40% which is decreased.  Evidence of the previous aortic valvuloplasty is noted.  No significant changes are noted from last year    Feel free to contact me via the office or My Chart if you have any questions regarding the above.

## 2019-04-21 LAB — HEMOCCULT STL QL IA: NEGATIVE

## 2019-04-22 DIAGNOSIS — Z12.11 SPECIAL SCREENING FOR MALIGNANT NEOPLASMS, COLON: ICD-10-CM

## 2019-04-22 NOTE — LETTER
April 29, 2019      Reinaldo Barrios  [address]   [facility] [address]        Dear ,    We are writing to inform you of your test results.    There was no blood in the stool sample as tested     Feel free to contact me via the office or My Chart if you have any questions regarding the above.     Resulted Orders   Fecal colorectal cancer screen (FIT)   Result Value Ref Range    Occult Blood Scn FIT Negative NEG^Negative       If you have any questions or concerns, please call the clinic at the number listed above.       Sincerely,        [facility]

## 2019-04-26 ENCOUNTER — HOSPITAL ENCOUNTER (OUTPATIENT)
Dept: CARDIOLOGY | Facility: CLINIC | Age: 69
Discharge: HOME | End: 2019-04-26
Attending: INTERNAL MEDICINE | Admitting: INTERNAL MEDICINE
Payer: MEDICARE

## 2019-04-26 ENCOUNTER — OFFICE VISIT (OUTPATIENT)
Dept: FAMILY MEDICINE | Facility: OTHER | Age: 69
End: 2019-04-26
Payer: MEDICARE

## 2019-04-26 VITALS
HEART RATE: 68 BPM | TEMPERATURE: 98.4 F | DIASTOLIC BLOOD PRESSURE: 80 MMHG | OXYGEN SATURATION: 97 % | WEIGHT: 228.1 LBS | RESPIRATION RATE: 18 BRPM | BODY MASS INDEX: 35.73 KG/M2 | SYSTOLIC BLOOD PRESSURE: 130 MMHG

## 2019-04-26 DIAGNOSIS — I35.0 NONRHEUMATIC AORTIC VALVE STENOSIS: ICD-10-CM

## 2019-04-26 DIAGNOSIS — Z95.2 HEART VALVE REPLACED: ICD-10-CM

## 2019-04-26 DIAGNOSIS — E11.65 TYPE 2 DIABETES MELLITUS WITH HYPERGLYCEMIA, WITH LONG-TERM CURRENT USE OF INSULIN (H): ICD-10-CM

## 2019-04-26 DIAGNOSIS — R55 SYNCOPE, UNSPECIFIED SYNCOPE TYPE: Primary | ICD-10-CM

## 2019-04-26 DIAGNOSIS — R55 SYNCOPE, UNSPECIFIED SYNCOPE TYPE: ICD-10-CM

## 2019-04-26 DIAGNOSIS — Z79.4 TYPE 2 DIABETES MELLITUS WITH HYPERGLYCEMIA, WITH LONG-TERM CURRENT USE OF INSULIN (H): ICD-10-CM

## 2019-04-26 DIAGNOSIS — Z79.01 LONG TERM CURRENT USE OF ANTICOAGULANT THERAPY: ICD-10-CM

## 2019-04-26 PROCEDURE — 93227 XTRNL ECG REC<48 HR R&I: CPT | Performed by: INTERNAL MEDICINE

## 2019-04-26 PROCEDURE — 99214 OFFICE O/P EST MOD 30 MIN: CPT | Performed by: INTERNAL MEDICINE

## 2019-04-26 PROCEDURE — 93226 XTRNL ECG REC<48 HR SCAN A/R: CPT

## 2019-04-26 ASSESSMENT — PAIN SCALES - GENERAL: PAINLEVEL: NO PAIN (0)

## 2019-04-26 NOTE — PROGRESS NOTES
Chief Complaint   Patient presents with     Results     echo     Refills on ONETOUCH                      Chief Complaint         The patient is a pleasant 68-year-old gentleman who has a history of dilated cardiomyopathy with intermittent congestive heart failure.  He is recently been having some lightheaded spells and near syncope.  Echocardiogram shows no worsening of his ejection fraction.  He does have a history of a prosthetic heart valve and this is looking well as well.  He has a history of diabetes and his A1c is slightly elevated though he is now much more physically active and is adjusting his insulins accordingly.  He notes the next A1c should be much better due to summer.                         PAST, FAMILY,SOCIAL HISTORY:     Medical  History:   has a past medical history of Aortic valve stenosis (11/2/2015), Cardiac LV ejection fraction 30-35% (11/2/2015), Chest pain, unspecified chest pain type (11/2/2015), Hiatal hernia (11/2/2015), Hyperlipidemia LDL goal <100 (11/2/2015), Personal history of tobacco use, presenting hazards to health (11/2/2015), and Type 2 diabetes mellitus without complication (H) (11/2/2015).     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     Social History:   reports that he quit smoking about 2 years ago. His smoking use included pipe. He has never used smokeless tobacco. He reports that he drinks alcohol. He reports that he does not use drugs.     Family History:  family history is not on file.            MEDICATIONS  Current Outpatient Medications   Medication Sig Dispense Refill     aspirin EC 81 MG EC tablet Take 1 tablet (81 mg) by mouth daily       atorvastatin (LIPITOR) 40 MG tablet Take 1 tablet (40 mg) by mouth daily 90 tablet 3     blood glucose (NO BRAND SPECIFIED) test strip Use to test blood sugars 3 times daily or as directed 100  "strip 1     carvedilol (COREG) 3.125 MG tablet TAKE 1 TABLET (3.125 MG) BY MOUTH 2 TIMES DAILY (WITH MEALS) 60 tablet 5     DOK PLUS 50-8.6 MG per tablet TAKE 1-2 TABLETS BY MOUTH 2 TIMES DAILY 100 tablet 5     insulin detemir (LEVEMIR FLEXTOUCH) 100 UNIT/ML injection INJECT 60 UNITS UNDER THE SKIN EVERY DAY 60 mL 3     insulin pen needle (B-D U/F) 31G X 8 MM USE THREE PEN NEEDLES DAILY OR AS DIRECTED.  Appointment needed for additional refills. 100 each 0     insulin syringe-needle U-100 (BD INSULIN SYRINGE ULTRAFINE) 31G X 5/16\" 0.5 ML miscellaneous Use one syringe 3 daily or as directed. 100 each prn     isosorbide mononitrate (IMDUR) 30 MG 24 hr tablet Take 1 tablet (30 mg) by mouth daily 90 tablet 3     lisinopril (PRINIVIL/ZESTRIL) 5 MG tablet Take 1 tablet (5 mg) by mouth daily 90 tablet 3     NOVOLOG FLEXPEN 100 UNIT/ML soln INJECT 10 - 12 UNITS UNDER THE SKIN TWICE A DAY 15 mL 1     ONETOUCH ULTRA test strip USE TO TEST BLOOD SUGAR 3 TIMES A  each 0         --------------------------------------------------------------------------------------------------------------------                              Review of Systems       LUNGS: Pt denies: cough, excess sputum, hemoptysis, or shortness of breath.   HEART: Pt denies: chest pain, arrhythmia, syncope, tachy or bradyarrhythmia.   GI: Pt denies: nausea, vomiting, diarrhea, constipation, melena, or hematochezia.   NEURO: Pt denies: seizures, strokes, diplopia, weakness, paraesthesias, or paralysis.   SKIN: Pt denies: itching, rashes, discoloration, or specific lesions of concern. Denies recent hair loss.   PSYCH: The patient denies significant depression, anxiety, mood imbalance. Specifically denies any suicidal ideation.                                     Examination         LUNGS: clear bilaterally, airflow is brisk, no intercostal retraction or stridor is noted. No coughing is noted during visit.   HEART:  regular without rubs, clicks, gallops, or " murmurs. PMI is nondisplaced. Upstrokes are brisk. S1,S2 are heard.   GI: Abdomen is soft, without rebound, guarding or tenderness. Bowel sounds are appropriate. No renal bruits are heard.   NEURO: Pt is alert and appropriate. No neurologic lateralization is noted. Cranial nerves 2-12 are intact. Peripheral sensory and motor function are grossly normal.    SKIN:  warm and dry. No erythema, or rashes are noted. No specific lesions of concern are noted.    PSYCH: The patient appears grossly appropriate. Maintains good eye contact, does not have any jittery or atypical motion. Displays appropriate affect.                                           Decision Making    1. Syncope, unspecified syncope type  Rule out arrhythmia/sinus pauses  - Holter Monitor 48 hour Adult Pediatric; Future    2. Type 2 diabetes mellitus with hyperglycemia, with long-term current use of insulin (H)  Continue current dose of insulin and increase activity    3.  Nonrheumatic aortic valve stenosis      5. Heart valve replaced [Z95.2]  Bioprosthetic valve not requiring anticoagulation.                           FOLLOW UP   I have asked the patient to make an appointment for followup with me following the Holter monitor        I have carefully explained the diagnosis and treatment options to the patient.  The patient has displayed an understanding of the above, and all subsequent questions were answered.          DO RADHA Horn    Portions of this note were produced using [employer]  Although every attempt at real-time proof reading has been made, occasional grammar/syntax errors may have been missed.

## 2019-04-29 ENCOUNTER — TELEPHONE (OUTPATIENT)
Dept: FAMILY MEDICINE | Facility: OTHER | Age: 69
End: 2019-04-29

## 2019-04-29 NOTE — TELEPHONE ENCOUNTER
----- Message from Mark Blanchard DO sent at 4/28/2019  8:21 PM CDT -----  Dear Reinaldo, your recent test results are attached.    The microalbumin is normal.  Thyroid is well adjusted.  Chemistry panel demonstrates elevated blood sugar 175.  Kidney function is normal.  The glycosylated hemoglobin has jumped from 7.5 up to 8.2.  This suggests a worsening in blood sugar control.  Please set up appointment so we may discuss further changes to improve your blood sugar control.  Feel free to contact me via the office or My Chart if you have any questions regarding the above.

## 2019-04-29 NOTE — TELEPHONE ENCOUNTER
I have attempted to contact this patient by phone with the following results: left message to return my call on answering machine.    Jeanna Swanson MA     4/29/2019

## 2019-04-29 NOTE — TELEPHONE ENCOUNTER
Reinaldo returns call.  He states he was just here on Friday, and does not want to come back in.  He states he felt like his A1C numbers were better controlled when using Lantus.   His Insurance does not want to pay for this. Also states due to financial reasons, he does not always eat the best, more carbs as they are affordable.   By end of conversation he states will schedule.

## 2019-04-29 NOTE — RESULT ENCOUNTER NOTE
Dear Reinaldo, your recent test results are attached.  The carotid Doppler studies demonstrate less than 50% disease bilaterally.  This means normal.    Feel free to contact me via the office or My Chart if you have any questions regarding the above.

## 2019-04-29 NOTE — RESULT ENCOUNTER NOTE
Dear Reinaldo, your recent test results are attached.  There was no blood in the stool sample as tested    Feel free to contact me via the office or My Chart if you have any questions regarding the above.

## 2019-04-29 NOTE — RESULT ENCOUNTER NOTE
Dear Reinaldo, your recent test results are attached.    The microalbumin is normal.  Thyroid is well adjusted.  Chemistry panel demonstrates elevated blood sugar 175.  Kidney function is normal.  The glycosylated hemoglobin has jumped from 7.5 up to 8.2.  This suggests a worsening in blood sugar control.  Please set up appointment so we may discuss further changes to improve your blood sugar control.  Feel free to contact me via the office or My Chart if you have any questions regarding the above.

## 2019-05-08 NOTE — RESULT ENCOUNTER NOTE
Dear eRinaldo, your recent test results are attached.    The Holter monitor is reviewed and demonstrates minimal significant cardiac arrhythmia.  Feel free to contact me via the office or My Chart if you have any questions regarding the above.  Sincerely,  DO RADHA Horn

## 2019-05-09 ENCOUNTER — OFFICE VISIT (OUTPATIENT)
Dept: FAMILY MEDICINE | Facility: OTHER | Age: 69
End: 2019-05-09
Payer: MEDICARE

## 2019-05-09 VITALS
TEMPERATURE: 97.1 F | WEIGHT: 226.1 LBS | DIASTOLIC BLOOD PRESSURE: 70 MMHG | RESPIRATION RATE: 18 BRPM | OXYGEN SATURATION: 100 % | HEART RATE: 68 BPM | SYSTOLIC BLOOD PRESSURE: 110 MMHG | BODY MASS INDEX: 35.41 KG/M2

## 2019-05-09 DIAGNOSIS — Z95.2 S/P AORTIC VALVE REPLACEMENT: ICD-10-CM

## 2019-05-09 DIAGNOSIS — I10 ESSENTIAL HYPERTENSION WITH GOAL BLOOD PRESSURE LESS THAN 140/90: ICD-10-CM

## 2019-05-09 DIAGNOSIS — I50.82 BIVENTRICULAR CONGESTIVE HEART FAILURE (H): ICD-10-CM

## 2019-05-09 DIAGNOSIS — E78.5 HYPERLIPIDEMIA LDL GOAL <100: ICD-10-CM

## 2019-05-09 DIAGNOSIS — Z79.4 TYPE 2 DIABETES MELLITUS WITH HYPERGLYCEMIA, WITH LONG-TERM CURRENT USE OF INSULIN (H): Primary | ICD-10-CM

## 2019-05-09 DIAGNOSIS — Z95.2 HEART VALVE REPLACED: ICD-10-CM

## 2019-05-09 DIAGNOSIS — E11.65 TYPE 2 DIABETES MELLITUS WITH HYPERGLYCEMIA, WITH LONG-TERM CURRENT USE OF INSULIN (H): Primary | ICD-10-CM

## 2019-05-09 DIAGNOSIS — Z87.891 PERSONAL HISTORY OF TOBACCO USE, PRESENTING HAZARDS TO HEALTH: ICD-10-CM

## 2019-05-09 PROCEDURE — 99213 OFFICE O/P EST LOW 20 MIN: CPT | Performed by: INTERNAL MEDICINE

## 2019-05-09 ASSESSMENT — PAIN SCALES - GENERAL: PAINLEVEL: NO PAIN (0)

## 2019-05-09 NOTE — PROGRESS NOTES
SUBJECTIVE:   Reinaldo Barrios is a 68 year old male who presents to clinic today for the following   health issues:        Chief Complaint   Patient presents with     Follow Up     heart test and A1C                            Chief Complaint         The patient is a pleasant 68-year-old gentleman who is been having some problems with intermittent lightheadedness.  He describes it not as a vertigo but as brief episodes where he feels almost like passing out.  Recent carotid Dopplers demonstrate no pathologic disease.  Holter monitor demonstrated no significant pathologic arrhythmia.  Additionally, echocardiogram shows that he has an ejection fraction of about 45% consistent with his known history of congestive heart failure.  He does have type 2 diabetes and notes that his blood sugars are very variable depending upon his exercise.  He attributes this to not being able to get Lantus from his insurance company.  He is currently negotiating with them.  At this time he is doing well and has had no further episodes.  He describes the episodes usually associated with short episodes of minimal exertion.                       PAST, FAMILY,SOCIAL HISTORY:     Medical  History:   has a past medical history of Aortic valve stenosis (11/2/2015), Cardiac LV ejection fraction 30-35% (11/2/2015), Chest pain, unspecified chest pain type (11/2/2015), Hiatal hernia (11/2/2015), Hyperlipidemia LDL goal <100 (11/2/2015), Personal history of tobacco use, presenting hazards to health (11/2/2015), and Type 2 diabetes mellitus without complication (H) (11/2/2015).     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     Social History:   reports that he quit smoking about 2 years ago. His smoking use included pipe. He has never used smokeless tobacco. He reports that he drinks alcohol. He reports that he does  "not use drugs.     Family History:  family history is not on file.            MEDICATIONS  Current Outpatient Medications   Medication Sig Dispense Refill     aspirin EC 81 MG EC tablet Take 1 tablet (81 mg) by mouth daily       atorvastatin (LIPITOR) 40 MG tablet Take 1 tablet (40 mg) by mouth daily 90 tablet 3     blood glucose (NO BRAND SPECIFIED) test strip Use to test blood sugars 3 times daily or as directed 100 strip 1     carvedilol (COREG) 3.125 MG tablet TAKE 1 TABLET (3.125 MG) BY MOUTH 2 TIMES DAILY (WITH MEALS) 60 tablet 5     DOK PLUS 50-8.6 MG per tablet TAKE 1-2 TABLETS BY MOUTH 2 TIMES DAILY 100 tablet 5     insulin detemir (LEVEMIR FLEXTOUCH) 100 UNIT/ML injection INJECT 60 UNITS UNDER THE SKIN EVERY DAY 60 mL 3     insulin pen needle (B-D U/F) 31G X 8 MM USE THREE PEN NEEDLES DAILY OR AS DIRECTED.  Appointment needed for additional refills. 100 each 0     insulin syringe-needle U-100 (BD INSULIN SYRINGE ULTRAFINE) 31G X 5/16\" 0.5 ML miscellaneous Use one syringe 3 daily or as directed. 100 each prn     isosorbide mononitrate (IMDUR) 30 MG 24 hr tablet Take 1 tablet (30 mg) by mouth daily 90 tablet 3     lisinopril (PRINIVIL/ZESTRIL) 5 MG tablet Take 1 tablet (5 mg) by mouth daily 90 tablet 3     NOVOLOG FLEXPEN 100 UNIT/ML soln INJECT 10 - 12 UNITS UNDER THE SKIN TWICE A DAY 15 mL 1     ONETOUCH ULTRA test strip USE TO TEST BLOOD SUGAR 3 TIMES A  each 0         --------------------------------------------------------------------------------------------------------------------                              Review of Systems       LUNGS: Pt denies: cough, excess sputum, hemoptysis, or shortness of breath.   HEART: Pt denies: chest pain, arrhythmia, syncope, tachy or bradyarrhythmia.   GI: Pt denies: nausea, vomiting, diarrhea, constipation, melena, or hematochezia.   NEURO: Pt denies: seizures, strokes, diplopia, weakness, paraesthesias, or paralysis.   SKIN: Pt denies: itching, rashes, " discoloration, or specific lesions of concern. Denies recent hair loss.   PSYCH: The patient denies significant depression, anxiety, mood imbalance. Specifically denies any suicidal ideation.                                     Examination      /70 (BP Location: Left arm, Patient Position: Sitting, Cuff Size: Adult Regular)   Pulse 68   Temp 97.1  F (36.2  C) (Temporal)   Resp 18   Wt 102.6 kg (226 lb 1.6 oz)   SpO2 100%   BMI 35.41 kg/m     Constitutional: The patient appears to be in no acute distress. The patient appears to be adequately hydrated. No acute respiratory or hemodynamic distress is noted at this time.   LUNGS: clear bilaterally, airflow is brisk, no intercostal retraction or stridor is noted. No coughing is noted during visit.   HEART:  regular without rubs, clicks, gallops, or murmurs. PMI is nondisplaced. Upstrokes are brisk. S1,S2 are heard.   GI: Abdomen is soft, without rebound, guarding or tenderness. Bowel sounds are appropriate. No renal bruits are heard.   NEURO: Pt is alert and appropriate. No neurologic lateralization is noted. Cranial nerves 2-12 are intact. Peripheral sensory and motor function are grossly normal.    SKIN:  warm and dry. No erythema, or rashes are noted. No specific lesions of concern are noted.    PSYCH: The patient appears grossly appropriate. Maintains good eye contact, does not have any jittery or atypical motion. Displays appropriate affect.                                           Decision Making    1. Type 2 diabetes mellitus with hyperglycemia, with long-term current use of insulin (H)  Recommend that he increases his Levemir by 5 units    2. S/P aortic valve replacement  Doing well post bioprosthetic valve without need for anticoagulation    3. Essential hypertension with goal blood pressure less than 140/90  Controlled    4. Hyperlipidemia LDL goal <100  Stable with ongoing statin usage    5. Personal history of tobacco use, presenting hazards to  "health  Discussed smoking cessation once again    6. Heart valve replaced [Z95.2]  Attached a redundant    7. Biventricular congestive heart failure (H)  Stable with ejection fraction of 45%                             FOLLOW UP   I have asked the patient to make an appointment for followup with me in 1 month for \"Annual Medicare Wellness Visit\".  I requested a blood sugar log.            I have carefully explained the diagnosis and treatment options to the patient.  The patient has displayed an understanding of the above, and all subsequent questions were answered.      DO RADHA Horn    Portions of this note were produced using [employer]  Although every attempt at real-time proof reading has been made, occasional grammar/syntax errors may have been missed.             "

## 2019-06-10 ENCOUNTER — OFFICE VISIT (OUTPATIENT)
Dept: FAMILY MEDICINE | Facility: OTHER | Age: 69
End: 2019-06-10
Payer: MEDICARE

## 2019-06-10 VITALS
DIASTOLIC BLOOD PRESSURE: 68 MMHG | BODY MASS INDEX: 34.77 KG/M2 | WEIGHT: 222 LBS | OXYGEN SATURATION: 97 % | TEMPERATURE: 97 F | SYSTOLIC BLOOD PRESSURE: 122 MMHG | HEART RATE: 68 BPM | RESPIRATION RATE: 18 BRPM

## 2019-06-10 DIAGNOSIS — Z79.4 TYPE 2 DIABETES MELLITUS WITH HYPERGLYCEMIA, WITH LONG-TERM CURRENT USE OF INSULIN (H): ICD-10-CM

## 2019-06-10 DIAGNOSIS — H90.6 MIXED CONDUCTIVE AND SENSORINEURAL HEARING LOSS OF BOTH EARS: Primary | ICD-10-CM

## 2019-06-10 DIAGNOSIS — E11.65 TYPE 2 DIABETES MELLITUS WITH HYPERGLYCEMIA, WITH LONG-TERM CURRENT USE OF INSULIN (H): ICD-10-CM

## 2019-06-10 DIAGNOSIS — Z13.6 SCREENING FOR AAA (ABDOMINAL AORTIC ANEURYSM): ICD-10-CM

## 2019-06-10 PROCEDURE — 99214 OFFICE O/P EST MOD 30 MIN: CPT | Performed by: INTERNAL MEDICINE

## 2019-06-10 ASSESSMENT — PAIN SCALES - GENERAL: PAINLEVEL: NO PAIN (0)

## 2019-06-10 NOTE — PROGRESS NOTES
Subjective     Reinaldo Barrios is a 68 year old male who presents to clinic today for the following health issues:    HPI   Chief Complaint   Patient presents with     Follow Up                       Chief Complaint         The patient is a pleasant 68-year-old gentleman who presents today for follow-up of his diabetes.  He notes his blood sugars have been markedly improved.  He brought in a very thorough blood sugar log which does show significant variability from day-to-day.  Most values are under 170.  Occasionally they go as high as a low he denies any double vision or blurred vision.  But he does have decreased hearing and notes that he would like to see an audiologist.  He states that it does not reach the point where it is becoming problematic.  He continues his medications compliantly and is on a statin, ACE inhibitor, and low-dose aspirin.  His blood sugars are controlled with background insulin using Levemir with NovoLog coverage.                         PAST, FAMILY,SOCIAL HISTORY:     Medical  History:   has a past medical history of Aortic valve stenosis (11/2/2015), Cardiac LV ejection fraction 30-35% (11/2/2015), Chest pain, unspecified chest pain type (11/2/2015), Hiatal hernia (11/2/2015), Hyperlipidemia LDL goal <100 (11/2/2015), Personal history of tobacco use, presenting hazards to health (11/2/2015), and Type 2 diabetes mellitus without complication (H) (11/2/2015).     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     Social History:   reports that he quit smoking about 3 years ago. His smoking use included pipe. He has never used smokeless tobacco. He reports that he drinks alcohol. He reports that he does not use drugs.     Family History:  family history is not on file.            MEDICATIONS  Current Outpatient Medications   Medication Sig Dispense Refill      "aspirin EC 81 MG EC tablet Take 1 tablet (81 mg) by mouth daily       atorvastatin (LIPITOR) 40 MG tablet Take 1 tablet (40 mg) by mouth daily 90 tablet 3     blood glucose (NO BRAND SPECIFIED) test strip Use to test blood sugars 3 times daily or as directed 100 strip 11     carvedilol (COREG) 3.125 MG tablet TAKE 1 TABLET (3.125 MG) BY MOUTH 2 TIMES DAILY (WITH MEALS) 60 tablet 5     DOK PLUS 50-8.6 MG per tablet TAKE 1-2 TABLETS BY MOUTH 2 TIMES DAILY 100 tablet 5     insulin detemir (LEVEMIR FLEXTOUCH) 100 UNIT/ML injection INJECT 60 UNITS UNDER THE SKIN EVERY DAY 60 mL 3     insulin pen needle (B-D U/F) 31G X 8 MM USE THREE PEN NEEDLES DAILY OR AS DIRECTED.  Appointment needed for additional refills. 100 each 0     insulin syringe-needle U-100 (BD INSULIN SYRINGE ULTRAFINE) 31G X 5/16\" 0.5 ML miscellaneous Use one syringe 3 daily or as directed. 100 each prn     isosorbide mononitrate (IMDUR) 30 MG 24 hr tablet Take 1 tablet (30 mg) by mouth daily 90 tablet 3     lisinopril (PRINIVIL/ZESTRIL) 5 MG tablet Take 1 tablet (5 mg) by mouth daily 90 tablet 3     NOVOLOG FLEXPEN 100 UNIT/ML soln INJECT 10 - 12 UNITS UNDER THE SKIN TWICE A DAY 15 mL 1     ONETOUCH ULTRA test strip USE TO TEST BLOOD SUGAR 3 TIMES A  each 0         --------------------------------------------------------------------------------------------------------------------                              Review of Systems       LUNGS: Pt denies: cough, excess sputum, hemoptysis, or shortness of breath.   HEART: Pt denies: chest pain, arrhythmia, syncope, tachy or bradyarrhythmia.   GI: Pt denies: nausea, vomiting, diarrhea, constipation, melena, or hematochezia.   NEURO: Pt denies: seizures, strokes, diplopia, weakness, paraesthesias, or paralysis.   SKIN: Pt denies: itching, rashes, discoloration, or specific lesions of concern. Denies recent hair loss.   PSYCH: The patient denies significant depression, anxiety, mood imbalance. Specifically " denies any suicidal ideation.                                     Examination       Constitutional: The patient appears to be in no acute distress. The patient appears to be adequately hydrated. No acute respiratory or hemodynamic distress is noted at this time.   LUNGS: clear bilaterally, airflow is brisk, no intercostal retraction or stridor is noted. No coughing is noted during visit.   HEART:  regular without rubs, clicks, gallops, or murmurs. PMI is nondisplaced. Upstrokes are brisk. S1,S2 are heard.   GI: Abdomen is soft, without rebound, guarding or tenderness. Bowel sounds are appropriate. No renal bruits are heard.   NEURO: Pt is alert and appropriate. No neurologic lateralization is noted. Cranial nerves 2-12 are intact. Peripheral sensory function is slightly decreased in the distal feet.    SKIN:  warm and dry. No erythema, or rashes are noted. No specific lesions of concern are noted.    PSYCH: The patient appears grossly appropriate. Maintains good eye contact, does not have any jittery or atypical motion. Displays appropriate affect.                                           Decision Making    /68 (BP Location: Left arm, Patient Position: Sitting, Cuff Size: Adult Regular)   Pulse 68   Temp 97  F (36.1  C) (Temporal)   Resp 18   Wt 100.7 kg (222 lb)   SpO2 97%   BMI 34.77 kg/m    1. Type 2 diabetes mellitus with hyperglycemia, with long-term current use of insulin (H)  Continue current medication.  Recheck A1c in 2 months.  - blood glucose (NO BRAND SPECIFIED) test strip; Use to test blood sugars 3 times daily or as directed  Dispense: 100 strip; Refill: 11    2. Mixed conductive and sensorineural hearing loss of both ears  Set up for audiology eval  - AUDIOLOGY ADULT REFERRAL                               FOLLOW UP   I have asked the patient to make an appointment for followup with me 2 months        I have carefully explained the diagnosis and treatment options to the patient.  The  patient has displayed an understanding of the above, and all subsequent questions were answered.      DO RADHA Horn    Portions of this note were produced using [employer]  Although every attempt at real-time proof reading has been made, occasional grammar/syntax errors may have been missed.

## 2019-06-19 ENCOUNTER — HOSPITAL ENCOUNTER (OUTPATIENT)
Dept: ULTRASOUND IMAGING | Facility: CLINIC | Age: 69
Discharge: HOME | End: 2019-06-19
Attending: INTERNAL MEDICINE | Admitting: INTERNAL MEDICINE
Payer: MEDICARE

## 2019-06-19 DIAGNOSIS — Z13.6 SCREENING FOR AAA (ABDOMINAL AORTIC ANEURYSM): ICD-10-CM

## 2019-06-19 PROCEDURE — 76775 US EXAM ABDO BACK WALL LIM: CPT

## 2019-06-25 DIAGNOSIS — Z79.4 TYPE 2 DIABETES MELLITUS WITHOUT COMPLICATION, WITH LONG-TERM CURRENT USE OF INSULIN (H): ICD-10-CM

## 2019-06-25 DIAGNOSIS — E11.9 TYPE 2 DIABETES MELLITUS WITHOUT COMPLICATION, WITH LONG-TERM CURRENT USE OF INSULIN (H): ICD-10-CM

## 2019-06-26 RX ORDER — INSULIN ASPART 100 [IU]/ML
INJECTION, SOLUTION INTRAVENOUS; SUBCUTANEOUS
Qty: 15 ML | Refills: 1 | Status: SHIPPED | OUTPATIENT
Start: 2019-06-26 | End: 2019-10-25

## 2019-06-26 NOTE — TELEPHONE ENCOUNTER
Pres[place] approved per [facility] [place].    TERI HairtsonN, RN  [facility]

## 2019-06-26 NOTE — TELEPHONE ENCOUNTER
"Requested Prescriptions   Pending Prescriptions Disp Refills     NOVOLOG FLEXPEN 100 UNIT/ML soln [Pharmacy Med Name: insulin aspart (NOVOLOG FLEXPEN) 100 UNIT/ML injection] 15 mL 1     Sig: INJECT 10 - 12 UNITS UNDER THE SKIN TWICE A DAY   Last Written Prescription Date:  2/25/19  Last Fill Quantity: 15 mL,  # refills: 1   Last office visit: 9/13/2018 with prescribing provider:  6/10/19   Future Office Visit:   Next 5 appointments (look out 90 days)    Aug 12, 2019  7:40 AM CDT  SHORT with Mark Blanchard DO  [facility] [address] [facility] [place] [address]  [phone]           Short Acting Insulin Protocol Passed - 6/25/2019 12:43 PM        Passed - Blood pressure less than 140/90 in past 6 months     BP Readings from Last 3 Encounters:   06/10/19 122/68   05/09/19 110/70   04/26/19 130/80                 Passed - LDL on file in past 12 months     Recent Labs   Lab Test 09/13/18  0844   LDL 63             Passed - Microalbumin on file in past 12 months     Recent Labs   Lab Test 04/10/19  0825   MICROL 9   UMALCR 5.82             Passed - Serum creatinine on file in past 12 months     Recent Labs   Lab Test 04/10/19  0815   CR 1.01             Passed - HgbA1C in past 3 or 6 months     If HgbA1C is 8 or greater, it needs to be on file within the past 3 months.  If less than 8, must be on file within the past 6 months.     Recent Labs   Lab Test 04/10/19  0815   A1C 8.2*             Passed - Medication is active on med list        Passed - Patient is age 18 or older        Passed - Recent (6 mo) or future (30 days) visit within the authorizing provider's specialty     Patient had office visit in the last 6 months or has a visit in the next 30 days with authorizing provider or within the authorizing provider's specialty.  See \"Patient Info\" tab in inbasket, or \"Choose Columns\" in Meds & Orders section of the refill encounter.            "

## 2019-06-28 ENCOUNTER — OFFICE VISIT (OUTPATIENT)
Dept: AUDIOLOGY | Facility: CLINIC | Age: 69
End: 2019-06-28
Payer: MEDICARE

## 2019-06-28 DIAGNOSIS — H90.3 SENSORINEURAL HEARING LOSS, BILATERAL: Primary | ICD-10-CM

## 2019-06-28 PROCEDURE — 99207 ZZC NO CHARGE LOS: CPT | Performed by: AUDIOLOGIST

## 2019-06-28 PROCEDURE — 92557 COMPREHENSIVE HEARING TEST: CPT | Performed by: AUDIOLOGIST

## 2019-06-28 PROCEDURE — 92550 TYMPANOMETRY & REFLEX THRESH: CPT | Performed by: AUDIOLOGIST

## 2019-06-28 NOTE — PROGRESS NOTES
"AUDIOLOGY REPORT    SUBJECTIVE:  Reinaldo Barrios is a 68 year old male who was seen in the Audiology Clinic at the [facility] for audiologic evaluation, referred by SHA Blanchard D.O. The patient reports a decreased hearing \"bad for years\", previous evaluation over 25 years ago revealed likely minimal high frequency hearing loss per patient description. The patient denies bilateral tinnitus, bilateral otalgia and bilateral drainage.  He reported brief infrequent episodes of lightheadedness which is currently being evaluated by Dr. Blanchard (currently looking at interaction / side affects of blood pressure and insulin medications). 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 The patient notes difficulty with communication in a variety of listening situations, including: mis-hears conversation especially children's voices, doesn't hear conversation clearly, difficulty in group situations and difficulty without visual cues.  They were accompanied today by their self.    OBJECTIVE:  Abuse Screening:  Do you feel unsafe at home or work/school? No  Do you feel threatened by someone? No  Does anyone try to keep you from having contact with others, or doing things outside of your home? No  Physical signs of abuse present? No     Otoscopic exam indicates ears are clear of cerumen bilaterally     Pure Tone Thresholds assessed using conventional audiometry with good  reliability from 250-8000 Hz bilaterally using insert earphones     RIGHT:  mild sloping to moderate-severe mixed at 250 Hz but primarily sensorineural beyond 500 Hz, right ear worse than left from 250 Hz to 1000 Hz    LEFT:    mild sloping to moderate-severe sensorineural hearing loss    Tympanogram:    RIGHT: restricted eardrum mobility     LEFT:   restricted eardrum mobility     Reflexes (reported by stimulus ear):  RIGHT: Ipsilateral is present at normal " levels  RIGHT: Contralateral is could not test equip issue  LEFT:   Ipsilateral is present at normal levels  LEFT:   Contralateral is could not test equip issue      Speech Reception Threshold:    RIGHT: 60 dB HL    LEFT:   35 dB HL    Word Recognition Score:     RIGHT: 64% at 90 dB HL using NU-6 recorded word list.    LEFT:   72% at 95 dB HL using NU-6 recorded word list.    BINURAL: 80% at 85 dB HL using NU-6 recorded work list.    Speech in Noise: QuickSIN:    BINAURAL: 80 dB HL scored 11 dB SNR loss, may require directional microphones.      ASSESSMENT:     ICD-10-CM    1. Sensorineural hearing loss, bilateral H90.3 COMPREHENSIVE HEARING TEST     TYMPANOMETRY AND REFLEX THRESHOLD MEASUREMENTS     Today s results were discussed with the patient in detail.     PLAN:    Patient was counseled regarding hearing loss and impact on communication.  Patient is a good candidate for amplification at this time. Handout on good communication strategies, and hearing aid use was given to patient. It is recommended that the patient schedule an ENT consultation given asymmetric low frequency hearing right ear worse than left for medical clearance and schedule hearing aid consultation appointment after.  Please call this clinic with questions regarding these results or recommendations.        Otto Chavez Licensed Audiologist #[id number]

## 2019-07-02 ENCOUNTER — TELEPHONE (OUTPATIENT)
Dept: FAMILY MEDICINE | Facility: OTHER | Age: 69
End: 2019-07-02

## 2019-07-02 NOTE — TELEPHONE ENCOUNTER
** Patient Call Attempt With Normal Lab or Test Results**    I have attempted to contact this patient by phone with the following results: left message to return my call on answering machine.    When patient returns call, please advise of the following result.  If patient has further questions or concerns route call back to team, thank you.    Dear Reinaldo, your recent test results are attached.  Ultrasound of the aorta demonstrates no aneurysm.  It was not completely visualized due to bowel gas.    Feel free to contact me via the office or My Chart if you have any questions regarding the above.    Destiney Soriano CMA ([facility])

## 2019-07-02 NOTE — RESULT ENCOUNTER NOTE
Dear Reinaldo, your recent test results are attached.  Ultrasound of the aorta demonstrates no aneurysm.  It was not completely visualized due to bowel gas.    Feel free to contact me via the office or My Chart if you have any questions regarding the above.

## 2019-07-19 NOTE — PROGRESS NOTES
ENT Consultation    Reinaldo Barrios who is a 68 year old male seen in consultation at the request of self.      History of Present Illness - Reinaldo Barrios is a 68 year old male presents for evaluation of of gradual hearing loss.  Given 20 years ago apparently he was told that he had significant hearing loss in both ears.  He experiences intermittent tinnitus.  He denies any constant tinnitus or vertigo.  Denies any history of otologic infections.  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  There is some family history of hearing loss on father's side.  He has a lot of problems with a higher frequency noises especially children's voice is difficult to understand.      Past Medical History -   Past Medical History:   Diagnosis Date     Aortic valve stenosis 11/2/2015     Cardiac LV ejection fraction 30-35% 11/2/2015     Chest pain, unspecified chest pain type 11/2/2015     Hiatal hernia 11/2/2015     Hyperlipidemia LDL goal <100 11/2/2015     Personal history of tobacco use, presenting hazards to health 11/2/2015     Type 2 diabetes mellitus without complication (H) 11/2/2015       Current Medications -   Current Outpatient Medications:      aspirin EC 81 MG EC tablet, Take 1 tablet (81 mg) by mouth daily, Disp: , Rfl:      atorvastatin (LIPITOR) 40 MG tablet, Take 1 tablet (40 mg) by mouth daily, Disp: 90 tablet, Rfl: 3     blood glucose (NO BRAND SPECIFIED) test strip, Use to test blood sugars 3 times daily or as directed, Disp: 100 strip, Rfl: 11     carvedilol (COREG) 3.125 MG tablet, TAKE 1 TABLET (3.125 MG) BY MOUTH 2 TIMES DAILY (WITH MEALS), Disp: 60 tablet, Rfl: 5     DOK PLUS 50-8.6 MG per tablet, TAKE 1-2 TABLETS BY MOUTH 2 TIMES DAILY, Disp: 100 tablet, Rfl: 5     insulin detemir (LEVEMIR FLEXTOUCH) 100 UNIT/ML injection, INJECT 60 UNITS UNDER THE SKIN EVERY DAY, Disp: 60 mL, Rfl: 3     insulin pen needle (B-D U/F) 31G X 8 MM, USE  "THREE PEN NEEDLES DAILY OR AS DIRECTED.  Appointment needed for additional refills., Disp: 100 each, Rfl: 0     insulin syringe-needle U-100 (BD INSULIN SYRINGE ULTRAFINE) 31G X 5/16\" 0.5 ML miscellaneous, Use one syringe 3 daily or as directed., Disp: 100 each, Rfl: prn     isosorbide mononitrate (IMDUR) 30 MG 24 hr tablet, Take 1 tablet (30 mg) by mouth daily, Disp: 90 tablet, Rfl: 3     lisinopril (PRINIVIL/ZESTRIL) 5 MG tablet, Take 1 tablet (5 mg) by mouth daily, Disp: 90 tablet, Rfl: 3     NOVOLOG FLEXPEN 100 UNIT/ML soln, INJECT 10 - 12 UNITS UNDER THE SKIN TWICE A DAY, Disp: 15 mL, Rfl: 1     ONETOUCH ULTRA test strip, USE TO TEST BLOOD SUGAR 3 TIMES A DAY, Disp: 100 each, Rfl: 0    Allergies -   Allergies   Allergen Reactions     Sulfa Drugs      Optison Unknown     Pt states he \"felt like crap later\" after he was given Optison in 2016.       Social History -   Social History     Socioeconomic History     Marital status: Single     Spouse name: Not on file     Number of children: Not on file     Years of education: Not on file     Highest education level: Not on file   Occupational History     Not on file   Social Needs     Financial resource strain: Not on file     Food insecurity:     Worry: Not on file     Inability: Not on file     Transportation needs:     Medical: Not on file     Non-medical: Not on file   Tobacco Use     Smoking status: Former Smoker     Types: Pipe     Last attempt to quit: 5/15/2016     Years since quitting: 3.1     Smokeless tobacco: Never Used     Tobacco comment: smokes a pipe in good weather   Substance and Sexual Activity     Alcohol use: Yes     Alcohol/week: 0.0 oz     Comment: rare     Drug use: No     Sexual activity: Not Currently   Lifestyle     Physical activity:     Days per week: Not on file     Minutes per session: Not on file     Stress: Not on file   Relationships     Social connections:     Talks on phone: Not on file     Gets together: Not on file     Attends " Islam service: Not on file     Active member of club or organization: Not on file     Attends meetings of clubs or organizations: Not on file     Relationship status: Not on file     Intimate partner violence:     Fear of current or ex partner: Not on file     Emotionally abused: Not on file     Physically abused: Not on file     Forced sexual activity: Not on file   Other Topics Concern     Parent/sibling w/ CABG, MI or angioplasty before 65F 55M? Not Asked   Social History Narrative     Not on file       Family History - No family history on file.    Review of Systems - As per HPI and PMHx, otherwise review of system review of the head and neck negative. Otherwise 10+ review of system is negative    Physical Exam  There were no vitals taken for this visit.  BMI: There is no height or weight on file to calculate BMI.    General - The patient is well nourished and well developed, and appears to have good nutritional status.  Alert and oriented to person and place, answers questions and cooperates with examination appropriately.    SKIN - No suspicious lesions or rashes.  Respiration - No respiratory distress.  Head and Face - Normocephalic and atraumatic, with no gross asymmetry noted of the contour of the facial features.  The facial nerve is intact, with strong symmetric movements.    Voice and Breathing - The patient was breathing comfortably without the use of accessory muscles. The patients voice was clear and strong, and had appropriate pitch and quality.    Ears - Bilateral pinna and EACs with normal appearing overlying skin. Tympanic membrane intact with good mobility on pneumatic otoscopy bilaterally. Bony landmarks of the ossicular chain are normal. The tympanic membranes are normal in appearance. No retraction, perforation, or masses.  No fluid or purulence was seen in the external canal or the middle ear.     Eyes - Extraocular movements intact.  Sclera were not icteric or injected, conjunctiva were  pink and moist.    Mouth - Examination of the oral cavity showed pink, healthy oral mucosa. No lesions or ulcerations noted.  The tongue was mobile and midline, and the dentition were in good condition.      Throat - The walls of the oropharynx were smooth, pink, moist, symmetric, and had no lesions or ulcerations.  The tonsillar pillars and soft palate were symmetric.  The uvula was midline on elevation.    Neck - Normal midline excursion of the laryngotracheal complex during swallowing.  Full range of motion on passive movement.  Palpation of the occipital, submental, submandibular, internal jugular chain, and supraclavicular nodes did not demonstrate any abnormal lymph nodes or masses.  The carotid pulse was palpable bilaterally.  Palpation of the thyroid was soft and smooth, with no nodules or goiter appreciated.  The trachea was mobile and midline.    Nose - External contour is symmetric, no gross deflection or scars.  Nasal mucosa is pink and moist with no abnormal mucus.  The septum was midline and non-obstructive, turbinates of normal size and position.  No polyps, masses, or purulence noted on examination.    Neuro - Nonfocal neuro exam is normal, CN 2 through 12 intact, normal gait and muscle tone.      Performed in clinic today:  Audiologic Studies - An audiogram and tympanogram were performed today as part of the evaluation and personally reviewed. The tympanogram shows Type A curves on the right and Type A curves on the left, with Normal canal volumes and middle ear pressures.  The audiogram showed Moderate to severe on the right and Mild to severeon the left.      Word recognition score decreased to 64% on the right and 72% on the left.  A/P - Reinaldo Barrios is a 68 year old male with significant bilateral sensorineural hearing loss.  Is relatively symmetrical.  We discussed amplification.  Discussed risks of not amplified his ears and further decline especially of word recognition of the base  of that.  Hearing protection also discussed.  He will start inquiring into hearing aids and recheck hearing in a year.      Wes Dash MD

## 2019-07-25 DIAGNOSIS — R07.2 PRECORDIAL PAIN: ICD-10-CM

## 2019-07-26 RX ORDER — ISOSORBIDE MONONITRATE 30 MG/1
TABLET, EXTENDED RELEASE ORAL
Qty: 90 TABLET | Refills: 2 | Status: SHIPPED | OUTPATIENT
Start: 2019-07-26 | End: 2020-03-18

## 2019-07-26 NOTE — TELEPHONE ENCOUNTER
"Requested Prescriptions   Pending Prescriptions Disp Refills     isosorbide mononitrate (IMDUR) 30 MG 24 hr tablet [Pharmacy Med Name: ISOSORBIDE MONO 30MG ER TAB] 90 tablet 3     Sig: TAKE 1 TABLET BY MOUTH EVERY DAY   Last Written Prescription Date:  7/19/2018  Last Fill Quantity: 90,  # refills: 3   Last office visit: 6/10/2019 with prescribing provider:     Future Office Visit:   Next 5 appointments (look out 90 days)    Aug 12, 2019  7:40 AM CDT  [facility]ard DO  [facility] [address] [facility] [id number] [phone]             Nitrates Passed - 7/25/2019  1:36 PM        Passed - Blood pressure under 140/90 in past 12 months     BP Readings from Last 3 Encounters:   06/10/19 122/68   05/09/19 110/70   04/26/19 130/80           Passed - Pt is not on erectile dysfunction medications        Passed - Recent (12 mo) or future (30 days) visit within the authorizing provider's specialty     Patient had office visit in the last 12 months or has a visit in the next 30 days with authorizing provider or within the authorizing provider's specialty.  See \"Patient Info\" tab in inbasket, or \"Choose Columns\" in Meds & Orders section of the refill encounter.              Passed - Medication is active on med list        Passed - Patient is age 18 or older        Prescription approved per [facility] Refill Protocol.  Martha Dooley RN    "

## 2019-07-29 ENCOUNTER — OFFICE VISIT (OUTPATIENT)
Dept: OTOLARYNGOLOGY | Facility: CLINIC | Age: 69
End: 2019-07-29
Payer: MEDICARE

## 2019-07-29 VITALS
DIASTOLIC BLOOD PRESSURE: 62 MMHG | SYSTOLIC BLOOD PRESSURE: 124 MMHG | HEIGHT: 68 IN | WEIGHT: 222 LBS | BODY MASS INDEX: 33.65 KG/M2

## 2019-07-29 DIAGNOSIS — H90.3 BILATERAL SENSORINEURAL HEARING LOSS: Primary | ICD-10-CM

## 2019-07-29 PROCEDURE — 99203 OFFICE O/P NEW LOW 30 MIN: CPT | Performed by: OTOLARYNGOLOGY

## 2019-07-29 ASSESSMENT — MIFFLIN-ST. JEOR: SCORE: 1741.49

## 2019-07-29 ASSESSMENT — PAIN SCALES - GENERAL: PAINLEVEL: NO PAIN (0)

## 2019-07-29 NOTE — LETTER
7/29/2019         RE: Reinaldo Barrios  [address]  [facility] [address]        Dear Colleague,    Thank you for referring your patient, Reinaldo Barrios, to the [facility]. Please see a copy of my visit note below.    ENT Consultation    Reinaldo Barrios who is a 68 year old male seen in consultation at the request of self.      History of Present Illness - Reinaldo Barrios is a 68 year old male presents for evaluation of of gradual hearing loss.  Given 20 years ago apparently he was told that he had significant hearing loss in both ears.  He experiences intermittent tinnitus.  He denies any constant tinnitus or vertigo.  Denies any history of otologic infections.  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  There is some family history of hearing loss on father's side.  He has a lot of problems with a higher frequency noises especially children's voice is difficult to understand.      Past Medical History -   Past Medical History:   Diagnosis Date     Aortic valve stenosis 11/2/2015     Cardiac LV ejection fraction 30-35% 11/2/2015     Chest pain, unspecified chest pain type 11/2/2015     Hiatal hernia 11/2/2015     Hyperlipidemia LDL goal <100 11/2/2015     Personal history of tobacco use, presenting hazards to health 11/2/2015     Type 2 diabetes mellitus without complication (H) 11/2/2015       Current Medications -   Current Outpatient Medications:      aspirin EC 81 MG EC tablet, Take 1 tablet (81 mg) by mouth daily, Disp: , Rfl:      atorvastatin (LIPITOR) 40 MG tablet, Take 1 tablet (40 mg) by mouth daily, Disp: 90 tablet, Rfl: 3     blood glucose (NO BRAND SPECIFIED) test strip, Use to test blood sugars 3 times daily or as directed, Disp: 100 strip, Rfl: 11     carvedilol (COREG) 3.125 MG tablet, TAKE 1 TABLET (3.125 MG) BY MOUTH 2 TIMES DAILY (WITH MEALS), Disp: 60 tablet, Rfl: 5     DOK PLUS 50-8.6 MG per tablet,  "TAKE 1-2 TABLETS BY MOUTH 2 TIMES DAILY, Disp: 100 tablet, Rfl: 5     insulin detemir (LEVEMIR FLEXTOUCH) 100 UNIT/ML injection, INJECT 60 UNITS UNDER THE SKIN EVERY DAY, Disp: 60 mL, Rfl: 3     insulin pen needle (B-D U/F) 31G X 8 MM, USE THREE PEN NEEDLES DAILY OR AS DIRECTED.  Appointment needed for additional refills., Disp: 100 each, Rfl: 0     insulin syringe-needle U-100 (BD INSULIN SYRINGE ULTRAFINE) 31G X 5/16\" 0.5 ML miscellaneous, Use one syringe 3 daily or as directed., Disp: 100 each, Rfl: prn     isosorbide mononitrate (IMDUR) 30 MG 24 hr tablet, Take 1 tablet (30 mg) by mouth daily, Disp: 90 tablet, Rfl: 3     lisinopril (PRINIVIL/ZESTRIL) 5 MG tablet, Take 1 tablet (5 mg) by mouth daily, Disp: 90 tablet, Rfl: 3     NOVOLOG FLEXPEN 100 UNIT/ML soln, INJECT 10 - 12 UNITS UNDER THE SKIN TWICE A DAY, Disp: 15 mL, Rfl: 1     ONETOUCH ULTRA test strip, USE TO TEST BLOOD SUGAR 3 TIMES A DAY, Disp: 100 each, Rfl: 0    Allergies -   Allergies   Allergen Reactions     Sulfa Drugs      Optison Unknown     Pt states he \"felt like crap later\" after he was given Optison in 2016.       Social History -   Social History     Socioeconomic History     Marital status: Single     Spouse name: Not on file     Number of children: Not on file     Years of education: Not on file     Highest education level: Not on file   Occupational History     Not on file   Social Needs     Financial resource strain: Not on file     Food insecurity:     Worry: Not on file     Inability: Not on file     Transportation needs:     Medical: Not on file     Non-medical: Not on file   Tobacco Use     Smoking status: Former Smoker     Types: Pipe     Last attempt to quit: 5/15/2016     Years since quitting: 3.1     Smokeless tobacco: Never Used     Tobacco comment: smokes a pipe in good weather   Substance and Sexual Activity     Alcohol use: Yes     Alcohol/week: 0.0 oz     Comment: rare     Drug use: No     Sexual activity: Not Currently "   Lifestyle     Physical activity:     Days per week: Not on file     Minutes per session: Not on file     Stress: Not on file   Relationships     Social connections:     Talks on phone: Not on file     Gets together: Not on file     Attends Judaism service: Not on file     Active member of club or organization: Not on file     Attends meetings of clubs or organizations: Not on file     Relationship status: Not on file     Intimate partner violence:     Fear of current or ex partner: Not on file     Emotionally abused: Not on file     Physically abused: Not on file     Forced sexual activity: Not on file   Other Topics Concern     Parent/sibling w/ CABG, MI or angioplasty before 65F 55M? Not Asked   Social History Narrative     Not on file       Family History - No family history on file.    Review of Systems - As per HPI and PMHx, otherwise review of system review of the head and neck negative. Otherwise 10+ review of system is negative    Physical Exam  There were no vitals taken for this visit.  BMI: There is no height or weight on file to calculate BMI.    General - The patient is well nourished and well developed, and appears to have good nutritional status.  Alert and oriented to person and place, answers questions and cooperates with examination appropriately.    SKIN - No suspicious lesions or rashes.  Respiration - No respiratory distress.  Head and Face - Normocephalic and atraumatic, with no gross asymmetry noted of the contour of the facial features.  The facial nerve is intact, with strong symmetric movements.    Voice and Breathing - The patient was breathing comfortably without the use of accessory muscles. The patients voice was clear and strong, and had appropriate pitch and quality.    Ears - Bilateral pinna and EACs with normal appearing overlying skin. Tympanic membrane intact with good mobility on pneumatic otoscopy bilaterally. Bony landmarks of the ossicular chain are normal. The tympanic  membranes are normal in appearance. No retraction, perforation, or masses.  No fluid or purulence was seen in the external canal or the middle ear.     Eyes - Extraocular movements intact.  Sclera were not icteric or injected, conjunctiva were pink and moist.    Mouth - Examination of the oral cavity showed pink, healthy oral mucosa. No lesions or ulcerations noted.  The tongue was mobile and midline, and the dentition were in good condition.      Throat - The walls of the oropharynx were smooth, pink, moist, symmetric, and had no lesions or ulcerations.  The tonsillar pillars and soft palate were symmetric.  The uvula was midline on elevation.    Neck - Normal midline excursion of the laryngotracheal complex during swallowing.  Full range of motion on passive movement.  Palpation of the occipital, submental, submandibular, internal jugular chain, and supraclavicular nodes did not demonstrate any abnormal lymph nodes or masses.  The carotid pulse was palpable bilaterally.  Palpation of the thyroid was soft and smooth, with no nodules or goiter appreciated.  The trachea was mobile and midline.    Nose - External contour is symmetric, no gross deflection or scars.  Nasal mucosa is pink and moist with no abnormal mucus.  The septum was midline and non-obstructive, turbinates of normal size and position.  No polyps, masses, or purulence noted on examination.    Neuro - Nonfocal neuro exam is normal, CN 2 through 12 intact, normal gait and muscle tone.      Performed in clinic today:  Audiologic Studies - An audiogram and tympanogram were performed today as part of the evaluation and personally reviewed. The tympanogram shows Type A curves on the right and Type A curves on the left, with Normal canal volumes and middle ear pressures.  The audiogram showed Moderate to severe on the right and Mild to severeon the left.      Word recognition score decreased to 64% on the right and 72% on the left.  ADRI/FIOR - Reinaldo PHAM  Sophie is a 68 year old male with significant bilateral sensorineural hearing loss.  Is relatively symmetrical.  We discussed amplification.  Discussed risks of not amplified his ears and further decline especially of word recognition of the base of that.  Hearing protection also discussed.  He will start inquiring into hearing aids and recheck hearing in a year.      Wes Dash MD    Again, thank you for allowing me to participate in the care of your patient.        Sincerely,        Wes Dash MD, MD

## 2019-08-12 ENCOUNTER — OFFICE VISIT (OUTPATIENT)
Dept: FAMILY MEDICINE | Facility: OTHER | Age: 69
End: 2019-08-12
Payer: MEDICARE

## 2019-08-12 VITALS
OXYGEN SATURATION: 97 % | RESPIRATION RATE: 16 BRPM | WEIGHT: 223 LBS | SYSTOLIC BLOOD PRESSURE: 120 MMHG | BODY MASS INDEX: 33.91 KG/M2 | DIASTOLIC BLOOD PRESSURE: 64 MMHG | HEART RATE: 70 BPM | TEMPERATURE: 98.2 F

## 2019-08-12 DIAGNOSIS — I10 ESSENTIAL HYPERTENSION WITH GOAL BLOOD PRESSURE LESS THAN 140/90: ICD-10-CM

## 2019-08-12 DIAGNOSIS — Z79.01 LONG TERM CURRENT USE OF ANTICOAGULANT THERAPY: ICD-10-CM

## 2019-08-12 DIAGNOSIS — E11.65 TYPE 2 DIABETES MELLITUS WITH HYPERGLYCEMIA, WITH LONG-TERM CURRENT USE OF INSULIN (H): Primary | ICD-10-CM

## 2019-08-12 DIAGNOSIS — I50.82 BIVENTRICULAR CONGESTIVE HEART FAILURE (H): ICD-10-CM

## 2019-08-12 DIAGNOSIS — Z95.2 S/P AORTIC VALVE REPLACEMENT: ICD-10-CM

## 2019-08-12 DIAGNOSIS — Z79.4 TYPE 2 DIABETES MELLITUS WITH HYPERGLYCEMIA, WITH LONG-TERM CURRENT USE OF INSULIN (H): Primary | ICD-10-CM

## 2019-08-12 DIAGNOSIS — Z95.1 S/P CABG (CORONARY ARTERY BYPASS GRAFT): ICD-10-CM

## 2019-08-12 DIAGNOSIS — R94.30 CARDIAC LV EJECTION FRACTION 30-35%: ICD-10-CM

## 2019-08-12 LAB
ANION GAP SERPL CALCULATED.3IONS-SCNC: 4 MMOL/L (ref 3–14)
BUN SERPL-MCNC: 18 MG/DL (ref 7–30)
CALCIUM SERPL-MCNC: 8.5 MG/DL (ref 8.5–10.1)
CHLORIDE SERPL-SCNC: 105 MMOL/L (ref 94–109)
CO2 SERPL-SCNC: 29 MMOL/L (ref 20–32)
CREAT SERPL-MCNC: 1.06 MG/DL (ref 0.66–1.25)
GFR SERPL CREATININE-BSD FRML MDRD: 70 ML/MIN/{1.73_M2}
GLUCOSE SERPL-MCNC: 187 MG/DL (ref 70–99)
HBA1C MFR BLD: 8.3 % (ref 0–5.6)
POTASSIUM SERPL-SCNC: 4.1 MMOL/L (ref 3.4–5.3)
SODIUM SERPL-SCNC: 138 MMOL/L (ref 133–144)

## 2019-08-12 PROCEDURE — 36415 COLL VENOUS BLD VENIPUNCTURE: CPT | Performed by: INTERNAL MEDICINE

## 2019-08-12 PROCEDURE — 99214 OFFICE O/P EST MOD 30 MIN: CPT | Mod: 25 | Performed by: INTERNAL MEDICINE

## 2019-08-12 PROCEDURE — 83036 HEMOGLOBIN GLYCOSYLATED A1C: CPT | Performed by: INTERNAL MEDICINE

## 2019-08-12 PROCEDURE — G0439 PPPS, SUBSEQ VISIT: HCPCS | Performed by: INTERNAL MEDICINE

## 2019-08-12 PROCEDURE — 80048 BASIC METABOLIC PNL TOTAL CA: CPT | Performed by: INTERNAL MEDICINE

## 2019-08-12 ASSESSMENT — PAIN SCALES - GENERAL: PAINLEVEL: NO PAIN (0)

## 2019-08-12 NOTE — LETTER
August 28, 2019      Reinaldo Barrios  [address]   [facility] [address]        Dear Reinaldo, your recent test results are attached.   The blood sugar is elevated at 187.   The hemoglobin A1c is elevated at 8.3.  This is up substantially over the last year.   Please set up a follow-up appointment so we may discuss alternate and improved blood sugar management techniques.     Feel free to contact me via the office or My Chart if you have any questions regarding the above.     Resulted Orders   Hemoglobin A1c   Result Value Ref Range    Hemoglobin A1C 8.3 (H) 0 - 5.6 %      Comment:      Normal <5.7% Prediabetes 5.7-6.4%  Diabetes 6.5% or higher - adopted from ADA   consensus guidelines.     Basic metabolic panel   Result Value Ref Range    Sodium 138 133 - 144 mmol/L    Potassium 4.1 3.4 - 5.3 mmol/L    Chloride 105 94 - 109 mmol/L    Carbon Dioxide 29 20 - 32 mmol/L    Anion Gap 4 3 - 14 mmol/L    Glucose 187 (H) 70 - 99 mg/dL    Urea Nitrogen 18 7 - 30 mg/dL    Creatinine 1.06 0.66 - 1.25 mg/dL    GFR Estimate 70 >60 mL/min/[1.73_m2]      Comment:      Non  GFR Calc  Starting 12/18/2018, serum creatinine based estimated GFR (eGFR) will be   calculated using the Chronic Kidney Disease Epidemiology Collaboration   (CKD-EPI) equation.      GFR Estimate If Black 82 >60 mL/min/[1.73_m2]      Comment:       GFR Calc  Starting 12/18/2018, serum creatinine based estimated GFR (eGFR) will be   calculated using the Chronic Kidney Disease Epidemiology Collaboration   (CKD-EPI) equation.      Calcium 8.5 8.5 - 10.1 mg/dL       If you have any questions or concerns, please call the clinic at the number listed above.       Sincerely,        Mark Blanchard, DO

## 2019-08-12 NOTE — PROGRESS NOTES
"Subjective     Reinaldo Barrios is a 68 year old male who presents to clinic today for the following health issues:    HPI   Annual Wellness Visit    Are you in the first 12 months of your Medicare Part B coverage?  No    Physical Health:    In general, how would you rate your overall physical health? excellent  If you drink alcohol do you typically have >3 drinks per day or >7 drinks per week? Yes - AUDIT SCORE:           Do you usually eat at least 4 servings of fruit and vegetables a day, include whole grains & fiber and avoid regularly eating high fat or \"junk\" foods? Yes    Needs assistance for the following daily activities: no assistance needed    Which of the following safety concerns are present in your home?  none identified     Hearing impairment: Yes, Difficult to understand a speaker at a public meeting or [employer] service.    Need to ask people to speak up or repeat themselves.    Difficulty understanding soft or whispered speech.    Difficulty understanding speech on the telephone.    In the past 6 months, have you been bothered by leaking of urine? no    Mental Health:    In general, how would you rate your overall mental or emotional health? excellent  PHQ-2 Score:      Do you feel safe in your environment? Yes    Do you have a Health Care Directive? Yes: Advance Directive has been received and scanned.    Fall risk:       Cognitive Screenin) Repeat 3 items (Leader, Season, Table)    2) Clock draw: NORMAL  3) 3 item recall: Recalls 3 objects  Results: 3 items recalled: COGNITIVE IMPAIRMENT LESS LIKELY    Mini-CogTM Copyright S More[place]. Licensed by the author for use in [facility]; reprinted with permission ([email or web address]). All rights reserved.      Current providers sharing in care for this patient include:   Patient Care Team:  Mark Blanchard DO as PCP - General (Internal Medicine)  Mark Blanchard DO as Assigned PCP        Do you have sleep apnea, " excessive snoring or daytime drowsiness?: no  -------------------------------------    Patient Active Problem List   Diagnosis     Aortic valve stenosis     Personal history of tobacco use, presenting hazards to health     Hyperlipidemia LDL goal <100     Hiatal hernia     Chest pain, unspecified chest pain type     Cardiac LV ejection fraction 30-35%     NSTEMI (non-ST elevated myocardial infarction) (H)     ACS (acute coronary syndrome) (H)     CHF (congestive heart failure) (H)     Aortic stenosis     S/P CABG (coronary artery bypass graft)     Advanced directives, counseling/discussion     Long term current use of anticoagulant therapy     Heart valve replaced [Z95.2]     S/P aortic valve replacement     Atypical atrial flutter (H)     Essential hypertension with goal blood pressure less than 140/90     Non morbid obesity due to excess calories     Type 2 diabetes mellitus with hyperglycemia, with long-term current use of insulin (H)     Obesity (BMI 35.0-39.9) with comorbidity (H)     Past Surgical History:   Procedure Laterality Date     BYPASS GRAFT ARTERY CORONARY N/A 11/7/2015    Procedure: BYPASS GRAFT ARTERY CORONARY;  Surgeon: Sia Caldera MD;  Location:  OR     KNEE SURGERY  2009     REPLACE VALVE AORTIC N/A 11/7/2015    Procedure: REPLACE VALVE AORTIC;  Surgeon: Sia Caldera MD;  Location:  OR       Social History     Tobacco Use     Smoking status: Former Smoker     Types: Pipe     Last attempt to quit: 5/15/2016     Years since quitting: 3.2     Smokeless tobacco: Never Used     Tobacco comment: smokes a pipe in good weather   Substance Use Topics     Alcohol use: Yes     Alcohol/week: 0.0 oz     Comment: rare     History reviewed. No pertinent family history.                        Chief Complaint         The patient is a pleasant 68-year-old gentleman who presents today for follow-up of his type 2 diabetes.  He is currently taking Levemir insulin notes that it is not working  nearly as well as his Lantus insulin has worked in the past.  He does have a history of elevated blood sugars and notes that he has been working on his diet fairly well but has not lost any significant weight.  He takes his medications compliantly.  He has a history of coronary artery disease with previous bypass as well as aortic valvuloplasty.  He has a history of bioprosthetic valve and is not on anticoagulation at this time.  Does have a history of decreased ejection fraction and most recent values were 35 to 40%.  He does have decreased exercise capability but this is not overly interfere with his decreased level of activity.  He denies pain in the chest or dyspnea with short exertion                         PAST, FAMILY,SOCIAL HISTORY:     Medical  History:   has a past medical history of Aortic valve stenosis (11/2/2015), Cardiac LV ejection fraction 30-35% (11/2/2015), Chest pain, unspecified chest pain type (11/2/2015), Hiatal hernia (11/2/2015), Hyperlipidemia LDL goal <100 (11/2/2015), Personal history of tobacco use, presenting hazards to health (11/2/2015), and Type 2 diabetes mellitus without complication (H) (11/2/2015).     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     Social History:   reports that he quit smoking about 3 years ago. His smoking use included pipe. He has never used smokeless tobacco. He reports that he drinks alcohol. He reports that he does not use drugs.     Family History:  family history is not on file.            MEDICATIONS  Current Outpatient Medications   Medication Sig Dispense Refill     aspirin EC 81 MG EC tablet Take 1 tablet (81 mg) by mouth daily       atorvastatin (LIPITOR) 40 MG tablet Take 1 tablet (40 mg) by mouth daily 90 tablet 3     blood glucose (NO BRAND SPECIFIED) test strip Use to test blood sugars 3 times daily or as directed 100  "strip 11     carvedilol (COREG) 3.125 MG tablet TAKE 1 TABLET (3.125 MG) BY MOUTH 2 TIMES DAILY (WITH MEALS) 60 tablet 5     DOK PLUS 50-8.6 MG per tablet TAKE 1-2 TABLETS BY MOUTH 2 TIMES DAILY 100 tablet 5     insulin detemir (LEVEMIR FLEXTOUCH) 100 UNIT/ML injection INJECT 60 UNITS UNDER THE SKIN EVERY DAY 60 mL 3     insulin pen needle (B-D U/F) 31G X 8 MM USE THREE PEN NEEDLES DAILY OR AS DIRECTED.  Appointment needed for additional refills. 100 each 0     insulin syringe-needle U-100 (BD INSULIN SYRINGE ULTRAFINE) 31G X 5/16\" 0.5 ML miscellaneous Use one syringe 3 daily or as directed. 100 each prn     isosorbide mononitrate (IMDUR) 30 MG 24 hr tablet TAKE 1 TABLET BY MOUTH EVERY DAY 90 tablet 2     lisinopril (PRINIVIL/ZESTRIL) 5 MG tablet Take 1 tablet (5 mg) by mouth daily 90 tablet 3     NOVOLOG FLEXPEN 100 UNIT/ML soln INJECT 10 - 12 UNITS UNDER THE SKIN TWICE A DAY 15 mL 1     ONETOUCH ULTRA test strip USE TO TEST BLOOD SUGAR 3 TIMES A  each 0         --------------------------------------------------------------------------------------------------------------------                              Review of Systems       LUNGS: Pt denies: cough, excess sputum, hemoptysis, or shortness of breath.   HEART: Pt denies: chest pain, arrhythmia, syncope, tachy or bradyarrhythmia.   GI: Pt denies: nausea, vomiting, diarrhea, constipation, melena, or hematochezia.   NEURO: Pt denies: seizures, strokes, diplopia, weakness, paraesthesias, or paralysis.   SKIN: Pt denies: itching, rashes, discoloration, or specific lesions of concern. Denies recent hair loss.   PSYCH: The patient denies significant depression, anxiety, mood imbalance. Specifically denies any suicidal ideation.                                     Examination    /64 (BP Location: Right arm, Patient Position: Sitting, Cuff Size: Adult Large)   Pulse 70   Temp 98.2  F (36.8  C) (Temporal)   Resp 16   Wt 101.2 kg (223 lb)   SpO2 97%   BMI " 33.91 kg/m       Constitutional: The patient appears to be in no acute distress. The patient appears to be adequately hydrated. No acute respiratory or hemodynamic distress is noted at this time.   LUNGS: clear bilaterally, airflow is brisk, no intercostal retraction or stridor is noted. No coughing is noted during visit.   HEART:  regular without rubs, clicks, gallops, or murmurs. PMI is nondisplaced. Upstrokes are brisk. S1,S2 are heard.   GI: Abdomen is soft, without rebound, guarding or tenderness. Bowel sounds are appropriate. No renal bruits are heard.   NEURO: Pt is alert and appropriate. No neurologic lateralization is noted. Cranial nerves 2-12 are intact. Peripheral sensory and motor function are grossly normal.    SKIN:  warm and dry. No erythema, or rashes are noted. No specific lesions of concern are noted.    PSYCH: The patient appears grossly appropriate. Maintains good eye contact, does not have any jittery or atypical motion. Displays appropriate affect.                                             Decision Making    1. Type 2 diabetes mellitus with hyperglycemia, with long-term current use of insulin (H)  Obtain A1c, check renal function  - Hemoglobin A1c  - Basic metabolic panel  - OFFICE/OUTPT VISIT,EST,LEVL IV    2. Essential hypertension with goal blood pressure less than 140/90  Continue current blood pressure management    3. S/P aortic valve replacement    - HEART FAILURE ACTION PLAN  - OFFICE/OUTPT VISIT,EST,LEVL IV    4. Biventricular congestive heart failure (H)  Recent echocardiogram reviewed  - HEART FAILURE ACTION PLAN  - OFFICE/OUTPT VISIT,EST,LEVL IV    5. Cardiac LV ejection fraction 30-35%  As above    6. S/P CABG (coronary artery bypass graft)  Continue medications as listed                Diagnostic Test Results:  No results found for this or any previous visit (from the past 24 hour(s)).                               FOLLOW UP   I have asked the patient to make an appointment for  followup with me in 2 weeks to adjust blood sugars        I have carefully explained the diagnosis and treatment options to the patient.  The patient has displayed an understanding of the above, and all subsequent questions were answered.      DO RADHA Horn    Portions of this note were produced using [employer]  Although every attempt at real-time proof reading has been made, occasional grammar/syntax errors may have been missed.

## 2019-08-28 NOTE — RESULT ENCOUNTER NOTE
Dear Reinaldo, your recent test results are attached.  The blood sugar is elevated at 187.  The hemoglobin A1c is elevated at 8.3.  This is up substantially over the last year.  Please set up a follow-up appointment so we may discuss alternate and improved blood sugar management techniques.    Feel free to contact me via the office or My Chart if you have any questions regarding the above.

## 2019-09-23 DIAGNOSIS — Z79.4 TYPE 2 DIABETES MELLITUS WITH HYPERGLYCEMIA, WITH LONG-TERM CURRENT USE OF INSULIN (H): ICD-10-CM

## 2019-09-23 DIAGNOSIS — E11.65 TYPE 2 DIABETES MELLITUS WITH HYPERGLYCEMIA, WITH LONG-TERM CURRENT USE OF INSULIN (H): ICD-10-CM

## 2019-09-24 RX ORDER — INSULIN DETEMIR 100 [IU]/ML
INJECTION, SOLUTION SUBCUTANEOUS
Qty: 60 ML | Refills: 3 | Status: SHIPPED | OUTPATIENT
Start: 2019-09-24 | End: 2020-09-25

## 2019-09-24 NOTE — TELEPHONE ENCOUNTER
"Routing to PCP for refill if appropriate, lab order pended.     Levemir Pen  Last Written Prescription Date:  09/13/2018  Last Fill Quantity: 60mL,  # refills: 3   Last office visit: 08/12/2019 with prescribing provider:  gt   Future Office Visit:  None  Routing refill request to provider for review/approval because:  Labs not current:  LDL    Requested Prescriptions   Pending Prescriptions Disp Refills     LEVEMIR FLEXTOUCH 100 UNIT/ML pen [Pharmacy Med Name: insulin detemir (LEVEMIR FLEXTOUCH)100 UNIT/ML injection] 60 mL 3     Sig: INJECT 60 UNITS UNDER THE SKIN EVERY DAY       Long Acting Insulin Protocol Failed - 9/23/2019 10:55 AM        Failed - LDL on file in past 12 months     Recent Labs   Lab Test 09/13/18  0844   LDL 63           Passed - Blood pressure less than 140/90 in past 6 months     BP Readings from Last 3 Encounters:   08/12/19 120/64   07/29/19 124/62   06/10/19 122/68           Passed - Microalbumin on file in past 12 months     Recent Labs   Lab Test 04/10/19  0825   MICROL 9   UMALCR 5.82           Passed - Serum creatinine on file in past 12 months     Recent Labs   Lab Test 08/12/19  0806   CR 1.06           Passed - HgbA1C in past 3 or 6 months     If HgbA1C is 8 or greater, it needs to be on file within the past 3 months.  If less than 8, must be on file within the past 6 months.     Recent Labs   Lab Test 08/12/19  0806   A1C 8.3*           Passed - Medication is active on med list        Passed - Patient is age 18 or older        Passed - Recent (6 mo) or future (30 days) visit within the authorizing provider's specialty     Patient had office visit in the last 6 months or has a visit in the next 30 days with authorizing provider or within the authorizing provider's specialty.  See \"Patient Info\" tab in inbasket, or \"Choose Columns\" in Meds & Orders section of the refill encounter.        Adrianna Conley RN   "

## 2019-10-15 ENCOUNTER — OFFICE VISIT (OUTPATIENT)
Dept: FAMILY MEDICINE | Facility: OTHER | Age: 69
End: 2019-10-15
Payer: MEDICARE

## 2019-10-15 VITALS
RESPIRATION RATE: 16 BRPM | OXYGEN SATURATION: 98 % | DIASTOLIC BLOOD PRESSURE: 68 MMHG | WEIGHT: 227.4 LBS | BODY MASS INDEX: 34.58 KG/M2 | SYSTOLIC BLOOD PRESSURE: 124 MMHG | TEMPERATURE: 98.4 F | HEART RATE: 80 BPM

## 2019-10-15 DIAGNOSIS — Z79.4 TYPE 2 DIABETES MELLITUS WITH HYPERGLYCEMIA, WITH LONG-TERM CURRENT USE OF INSULIN (H): Primary | ICD-10-CM

## 2019-10-15 DIAGNOSIS — E78.5 HYPERLIPIDEMIA LDL GOAL <100: ICD-10-CM

## 2019-10-15 DIAGNOSIS — Z23 NEED FOR PROPHYLACTIC VACCINATION AND INOCULATION AGAINST INFLUENZA: ICD-10-CM

## 2019-10-15 DIAGNOSIS — E66.01 MORBID OBESITY (H): ICD-10-CM

## 2019-10-15 DIAGNOSIS — Z95.1 S/P CABG (CORONARY ARTERY BYPASS GRAFT): ICD-10-CM

## 2019-10-15 DIAGNOSIS — Z95.2 S/P AORTIC VALVE REPLACEMENT: ICD-10-CM

## 2019-10-15 DIAGNOSIS — K59.04 CHRONIC IDIOPATHIC CONSTIPATION: ICD-10-CM

## 2019-10-15 DIAGNOSIS — E11.65 TYPE 2 DIABETES MELLITUS WITH HYPERGLYCEMIA, WITH LONG-TERM CURRENT USE OF INSULIN (H): Primary | ICD-10-CM

## 2019-10-15 DIAGNOSIS — R53.83 FATIGUE, UNSPECIFIED TYPE: ICD-10-CM

## 2019-10-15 DIAGNOSIS — I50.42 SYSTOLIC AND DIASTOLIC CHF, CHRONIC (H): ICD-10-CM

## 2019-10-15 DIAGNOSIS — I10 ESSENTIAL HYPERTENSION WITH GOAL BLOOD PRESSURE LESS THAN 140/90: ICD-10-CM

## 2019-10-15 LAB
ALBUMIN SERPL-MCNC: 3.4 G/DL (ref 3.4–5)
ALP SERPL-CCNC: 108 U/L (ref 40–150)
ALT SERPL W P-5'-P-CCNC: 26 U/L (ref 0–70)
ANION GAP SERPL CALCULATED.3IONS-SCNC: 1 MMOL/L (ref 3–14)
AST SERPL W P-5'-P-CCNC: 17 U/L (ref 0–45)
BILIRUB SERPL-MCNC: 0.5 MG/DL (ref 0.2–1.3)
BUN SERPL-MCNC: 20 MG/DL (ref 7–30)
CALCIUM SERPL-MCNC: 8.5 MG/DL (ref 8.5–10.1)
CHLORIDE SERPL-SCNC: 104 MMOL/L (ref 94–109)
CHOLEST SERPL-MCNC: 129 MG/DL
CO2 SERPL-SCNC: 31 MMOL/L (ref 20–32)
CREAT SERPL-MCNC: 1.02 MG/DL (ref 0.66–1.25)
ERYTHROCYTE [DISTWIDTH] IN BLOOD BY AUTOMATED COUNT: 12.8 % (ref 10–15)
GFR SERPL CREATININE-BSD FRML MDRD: 74 ML/MIN/{1.73_M2}
GLUCOSE SERPL-MCNC: 310 MG/DL (ref 70–99)
HBA1C MFR BLD: 8.6 % (ref 0–5.6)
HCT VFR BLD AUTO: 38.3 % (ref 40–53)
HDLC SERPL-MCNC: 53 MG/DL
HGB BLD-MCNC: 12.9 G/DL (ref 13.3–17.7)
LDLC SERPL CALC-MCNC: 55 MG/DL
MCH RBC QN AUTO: 30.9 PG (ref 26.5–33)
MCHC RBC AUTO-ENTMCNC: 33.7 G/DL (ref 31.5–36.5)
MCV RBC AUTO: 92 FL (ref 78–100)
NONHDLC SERPL-MCNC: 76 MG/DL
PLATELET # BLD AUTO: 213 10E9/L (ref 150–450)
POTASSIUM SERPL-SCNC: 4.4 MMOL/L (ref 3.4–5.3)
PROT SERPL-MCNC: 7.1 G/DL (ref 6.8–8.8)
RBC # BLD AUTO: 4.17 10E12/L (ref 4.4–5.9)
SODIUM SERPL-SCNC: 136 MMOL/L (ref 133–144)
TRIGL SERPL-MCNC: 106 MG/DL
TSH SERPL DL<=0.005 MIU/L-ACNC: 3.71 MU/L (ref 0.4–4)
WBC # BLD AUTO: 7.9 10E9/L (ref 4–11)

## 2019-10-15 PROCEDURE — 80053 COMPREHEN METABOLIC PANEL: CPT | Performed by: INTERNAL MEDICINE

## 2019-10-15 PROCEDURE — 80061 LIPID PANEL: CPT | Performed by: INTERNAL MEDICINE

## 2019-10-15 PROCEDURE — 90662 IIV NO PRSV INCREASED AG IM: CPT | Performed by: INTERNAL MEDICINE

## 2019-10-15 PROCEDURE — 80048 BASIC METABOLIC PNL TOTAL CA: CPT | Performed by: INTERNAL MEDICINE

## 2019-10-15 PROCEDURE — 36415 COLL VENOUS BLD VENIPUNCTURE: CPT | Performed by: INTERNAL MEDICINE

## 2019-10-15 PROCEDURE — 85027 COMPLETE CBC AUTOMATED: CPT | Performed by: INTERNAL MEDICINE

## 2019-10-15 PROCEDURE — 99214 OFFICE O/P EST MOD 30 MIN: CPT | Performed by: INTERNAL MEDICINE

## 2019-10-15 PROCEDURE — G0008 ADMIN INFLUENZA VIRUS VAC: HCPCS | Performed by: INTERNAL MEDICINE

## 2019-10-15 PROCEDURE — 83036 HEMOGLOBIN GLYCOSYLATED A1C: CPT | Performed by: INTERNAL MEDICINE

## 2019-10-15 PROCEDURE — 84443 ASSAY THYROID STIM HORMONE: CPT | Performed by: INTERNAL MEDICINE

## 2019-10-15 RX ORDER — AMOXICILLIN 250 MG
CAPSULE ORAL
Qty: 100 TABLET | Refills: 3 | Status: SHIPPED | OUTPATIENT
Start: 2019-10-15 | End: 2020-05-06

## 2019-10-15 ASSESSMENT — PAIN SCALES - GENERAL: PAINLEVEL: NO PAIN (0)

## 2019-10-15 NOTE — RESULT ENCOUNTER NOTE
Dear Reinaldo, your recent test results are attached.  The thyroid is well adjusted.  The blood sugar is elevated at 310.  The kidney and liver tests are normal.  The cholesterol is well controlled with an LDL of 55.  The hemoglobin is 12.9 which is slightly decreased but improved over previous and slowly coming up.  The hemoglobin A1c is 8.6 suggesting a slight worsening in the overall blood sugar control.  Please set up an appointment so we may discuss further and more aggressive blood sugar management techniques and medications.    Feel free to contact me via the office or My Chart if you have any questions regarding the above.

## 2019-10-15 NOTE — PROGRESS NOTES
Subjective     Reinaldo Barrios is a 68 year old male who presents to clinic today for the following health issues:    HPI   Diabetes Follow-up      How often are you checking your blood sugar? Three times daily    What time of day are you checking your blood sugars (select all that apply)?  Before meals    Have you had any blood sugars above 200?  Yes     Have you had any blood sugars below 70?  No-once in a while    What symptoms do you notice when your blood sugar is low?  Shaky and Dizzy    What concerns do you have today about your diabetes? None     Do you have any of these symptoms? (Select all that apply)  Numbness in feet and Burning in feet     Have you had a diabetic eye exam in the last 12 months? Yes- Date of last eye exam: a few months ago    BP Readings from Last 2 Encounters:   08/12/19 120/64   07/29/19 124/62     Hemoglobin A1C (%)   Date Value   08/12/2019 8.3 (H)   04/10/2019 8.2 (H)     LDL Cholesterol Calculated (mg/dL)   Date Value   09/13/2018 63   06/15/2017 64       Diabetes Management Resources      How many servings of fruits and vegetables do you eat daily?  0-1    On average, how many sweetened beverages do you drink each day (soda, juice, sweet tea, etc)?   0    How many days per week do you miss taking your medication? 0                    Chief Complaint         The patient is a pleasant 68-year-old gentleman who presents today for follow-up of his diabetes and congestive heart failure.  He has extensive history of coronary ischemia, cardiomyopathy, previous valvuloplasty.  He notes that some days he has increased activity tolerance and some days he does not.  We discussed possible causes for this in detail.  He notes that he has had brief chest discomfort but it is very much pleuritic, lasts only seconds, and is positional.  He has had no radiation, diaphoresis or other symptoms associated with cardiac ischemia.  He notes that he has been watching his blood sugars somewhat.  He  states that he anticipates the A1c will have gone up as his exercise has decreased.  He notes that he does not have the money to afford the gas to drive to the gym.  We have recommended walking on the road.  This is relatively cheap.                       PAST, FAMILY,SOCIAL HISTORY:     Medical  History:   has a past medical history of Aortic valve stenosis (11/2/2015), Cardiac LV ejection fraction 30-35% (11/2/2015), Chest pain, unspecified chest pain type (11/2/2015), Hiatal hernia (11/2/2015), Hyperlipidemia LDL goal <100 (11/2/2015), Personal history of tobacco use, presenting hazards to health (11/2/2015), and Type 2 diabetes mellitus without complication (H) (11/2/2015).     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     Social History:   reports that he quit smoking about 3 years ago. His smoking use included pipe. He has never used smokeless tobacco. He reports current alcohol use. He reports that he does not use drugs.     Family History:  family history is not on file.            MEDICATIONS  Current Outpatient Medications   Medication Sig Dispense Refill     aspirin EC 81 MG EC tablet Take 1 tablet (81 mg) by mouth daily       atorvastatin (LIPITOR) 40 MG tablet Take 1 tablet (40 mg) by mouth daily 90 tablet 3     carvedilol (COREG) 3.125 MG tablet TAKE 1 TABLET (3.125 MG) BY MOUTH 2 TIMES DAILY (WITH MEALS) 60 tablet 5     DOK PLUS 50-8.6 MG per tablet TAKE 1-2 TABLETS BY MOUTH 2 TIMES DAILY 100 tablet 5     isosorbide mononitrate (IMDUR) 30 MG 24 hr tablet TAKE 1 TABLET BY MOUTH EVERY DAY 90 tablet 2     LEVEMIR FLEXTOUCH 100 UNIT/ML pen INJECT 60 UNITS UNDER THE SKIN EVERY DAY 60 mL 3     lisinopril (PRINIVIL/ZESTRIL) 5 MG tablet Take 1 tablet (5 mg) by mouth daily 90 tablet 3     NOVOLOG FLEXPEN 100 UNIT/ML soln INJECT 10 - 12 UNITS UNDER THE SKIN TWICE A DAY 15 mL 1      "blood glucose (NO BRAND SPECIFIED) test strip Use to test blood sugars 3 times daily or as directed 100 strip 11     insulin pen needle (B-D U/F) 31G X 8 MM USE THREE PEN NEEDLES DAILY OR AS DIRECTED.  Appointment needed for additional refills. 100 each 0     insulin syringe-needle U-100 (BD INSULIN SYRINGE ULTRAFINE) 31G X 5/16\" 0.5 ML miscellaneous Use one syringe 3 daily or as directed. 100 each prn     ONETOUCH ULTRA test strip USE TO TEST BLOOD SUGAR 3 TIMES A  each 0         --------------------------------------------------------------------------------------------------------------------                              Review of Systems       LUNGS: Pt denies: cough, excess sputum, hemoptysis, or shortness of breath at rest.  Patient oftentimes has dyspnea with modest exertion..   HEART: Pt denies: Cardiac chest pain, arrhythmia, syncope, tachy or bradyarrhythmia.   GI: Pt denies: nausea, vomiting, diarrhea, constipation, melena, or hematochezia.   NEURO: Pt denies: seizures, strokes, diplopia, weakness, paraesthesias, or paralysis.   SKIN: Pt denies: itching, rashes, discoloration, or specific lesions of concern. Denies recent hair loss.   PSYCH: The patient denies significant depression, anxiety, mood imbalance. Specifically denies any suicidal ideation.                                     Examination  /68   Pulse 80   Temp 98.4  F (36.9  C) (Temporal)   Resp 16   Wt 103.1 kg (227 lb 6.4 oz)   SpO2 98%   BMI 34.58 kg/m     Constitutional: The patient appears to be in no acute distress. The patient appears to be adequately hydrated. No acute respiratory or hemodynamic distress is noted at this time.   LUNGS: clear bilaterally, airflow is brisk, no intercostal retraction or stridor is noted. No coughing is noted during visit.   HEART:  regular without rubs,  gallops, or murmurs. PMI is nondisplaced. Upstrokes are brisk. S1,S2 are heard.  Valvular click is consistent with his known aortic " valvular plasty.   NEURO: Pt is alert and appropriate. No neurologic lateralization is noted. Cranial nerves 2-12 are intact. Peripheral sensory and motor function are grossly normal.    SKIN:  warm and dry. No erythema, or rashes are noted. No specific lesions of concern are noted.                                            Decision Making    1. Type 2 diabetes mellitus with hyperglycemia, with long-term current use of insulin (H)  We will check A1c for progression of diabetic management.  - Comprehensive metabolic panel (BMP + Alb, Alk Phos, ALT, AST, Total. Bili, TP)    2. Essential hypertension with goal blood pressure less than 140/90  Check renal function and electrolytes  - Comprehensive metabolic panel (BMP + Alb, Alk Phos, ALT, AST, Total. Bili, TP)    3. S/P CABG (coronary artery bypass graft)  Currently stable.  Continue medications as listed    4. S/P aortic valve replacement  Continue anticoagulation    5. Hyperlipidemia LDL goal <100  Check lipids and adjust statins accordingly  - Lipid panel reflex to direct LDL Fasting    6. Morbid obesity (H)  Recommend weight loss to responsible caloric restriction and exercise as tolerated    7. Systolic and diastolic CHF, chronic (H)  Currently stable, no significant edema.  Continue beta-blocker and diuretic    8. Fatigue, unspecified type  Suspect as result of cardiac dysfunction.  We will rule out anemia and hypothyroidism  - TSH  - CBC with platelets                               FOLLOW UP   I have asked the patient to make an appointment for followup with me in 1 month predicated upon results        I have carefully explained the diagnosis and treatment options to the patient.  The patient has displayed an understanding of the above, and all subsequent questions were answered.      DO RADHA Horn    Portions of this note were produced using [employer]  Although every attempt at real-time proof reading has been made, occasional  grammar/syntax errors may have been missed.

## 2019-10-15 NOTE — TELEPHONE ENCOUNTER
"Senna   Last Written Prescription Date:  8/31/2018  Last Fill Quantity: 100,  # refills: 5   Last office visit: 10/15/2019 with prescribing provider:  [place]   Future Office Visit:   Next 5 appointments (look out 90 days)    Nov 15, 2019  7:40 AM CST  Office Visit with Mark Blanchard DO  [facility] [address] [facility] [address] [phone]           Requested Prescriptions   Pending Prescriptions Disp Refills     senna-docusate (DOK PLUS) 8.6-50 MG tablet 100 tablet 5       Laxatives Protocol Passed - 10/15/2019 11:11 AM        Passed - Patient is age 6 or older        Passed - Recent (12 mo) or future (30 days) visit within the authorizing provider's specialty     Patient has had an office visit with the authorizing provider or a provider within the authorizing providers department within the previous 12 mos or has a future within next 30 days. See \"Patient Info\" tab in inbasket, or \"Choose Columns\" in Meds & Orders section of the refill encounter.              Passed - Medication is active on med list      Prescription approved per RN refill protocol.  Aleyda Ma RN on 10/15/2019 at 11:34 AM    "

## 2019-10-16 ENCOUNTER — TELEPHONE (OUTPATIENT)
Dept: FAMILY MEDICINE | Facility: OTHER | Age: 69
End: 2019-10-16

## 2019-10-16 NOTE — TELEPHONE ENCOUNTER
----- Message from Mark Blanchard DO sent at 10/15/2019  5:03 PM CDT -----  Dear Reinaldo, your recent test results are attached.  The thyroid is well adjusted.  The blood sugar is elevated at 310.  The kidney and liver tests are normal.  The cholesterol is well controlled with an LDL of 55.  The hemoglobin is 12.9 which is slightly decreased but improved over previous and slowly coming up.  The hemoglobin A1c is 8.6 suggesting a slight worsening in the overall blood sugar control.  Please set up an appointment so we may discuss further and more aggressive blood sugar management techniques and medications.    Feel free to contact me via the office or My Chart if you have any questions regarding the above.

## 2019-10-16 NOTE — TELEPHONE ENCOUNTER
Attempted to reach patient line is busy. Will try again later. If patient calls back. Please relay results below to patient.     Myesha Edwards MA

## 2019-10-16 NOTE — TELEPHONE ENCOUNTER
Patient called back, relayed message above.        Shannon Wade ~ Patient Representative  [address] [address]  [email or web address]  [email or web address]  Office:  [phone]  Fax:  [phone]

## 2019-10-25 DIAGNOSIS — E11.9 TYPE 2 DIABETES MELLITUS WITHOUT COMPLICATION, WITH LONG-TERM CURRENT USE OF INSULIN (H): ICD-10-CM

## 2019-10-25 DIAGNOSIS — Z79.4 TYPE 2 DIABETES MELLITUS WITHOUT COMPLICATION, WITH LONG-TERM CURRENT USE OF INSULIN (H): ICD-10-CM

## 2019-10-25 RX ORDER — INSULIN ASPART 100 [IU]/ML
INJECTION, SOLUTION INTRAVENOUS; SUBCUTANEOUS
Qty: 15 ML | Refills: 1 | Status: SHIPPED | OUTPATIENT
Start: 2019-10-25 | End: 2020-03-16

## 2019-10-25 NOTE — TELEPHONE ENCOUNTER
"Requested Prescriptions   Pending Prescriptions Disp Refills     NOVOLOG FLEXPEN 100 UNIT/ML soln [Pharmacy Med Name: insulin aspart (NOVOLOG FLEXPEN) 100 UNIT/ML injection] 15 mL 1     Sig: INJECT 10 - 12 UNITS UNDER THE SKIN TWICE A DAY   Last Written Prescription Date:  6/26/19  Last Fill Quantity: 15 mL,  # refills: 1   Last office visit: 9/13/2018 with prescribing provider:  10/15/19   Future Office Visit:   Next 5 appointments (look out 90 days)    Nov 15, 2019  7:40 AM CST  Office Visit with Mark Blanchard DO  [facility] [address] [facility] [id number] [phone]           Short Acting Insulin Protocol Passed - 10/25/2019 10:27 AM        Passed - Blood pressure less than 140/90 in past 6 months     BP Readings from Last 3 Encounters:   10/15/19 124/68   08/12/19 120/64   07/29/19 124/62                 Passed - LDL on file in past 12 months     Recent Labs   Lab Test 10/15/19  0806   LDL 55             Passed - Microalbumin on file in past 12 months     Recent Labs   Lab Test 04/10/19  0825   MICROL 9   UMALCR 5.82             Passed - Serum creatinine on file in past 12 months     Recent Labs   Lab Test 10/15/19  0806   CR 1.02             Passed - HgbA1C in past 3 or 6 months     If HgbA1C is 8 or greater, it needs to be on file within the past 3 months.  If less than 8, must be on file within the past 6 months.     Recent Labs   Lab Test 10/15/19  0806   A1C 8.6*             Passed - Medication is active on med list        Passed - Patient is age 18 or older        Passed - Recent (6 mo) or future (30 days) visit within the authorizing provider's specialty     Patient had office visit in the last 6 months or has a visit in the next 30 days with authorizing provider or within the authorizing provider's specialty.  See \"Patient Info\" tab in inbasket, or \"Choose Columns\" in Meds & Orders section of the refill encounter.            "

## 2019-10-25 NOTE — TELEPHONE ENCOUNTER
Prescription approved per [facility] [place].    TERI HairstonN, RN  [facility]

## 2019-11-15 ENCOUNTER — OFFICE VISIT (OUTPATIENT)
Dept: FAMILY MEDICINE | Facility: OTHER | Age: 69
End: 2019-11-15
Payer: MEDICARE

## 2019-11-15 VITALS
OXYGEN SATURATION: 96 % | RESPIRATION RATE: 18 BRPM | BODY MASS INDEX: 34.67 KG/M2 | TEMPERATURE: 98.7 F | SYSTOLIC BLOOD PRESSURE: 130 MMHG | DIASTOLIC BLOOD PRESSURE: 70 MMHG | WEIGHT: 228 LBS | HEART RATE: 74 BPM

## 2019-11-15 DIAGNOSIS — Z79.4 TYPE 2 DIABETES MELLITUS WITH HYPERGLYCEMIA, WITH LONG-TERM CURRENT USE OF INSULIN (H): ICD-10-CM

## 2019-11-15 DIAGNOSIS — Z95.2 S/P AORTIC VALVE REPLACEMENT: ICD-10-CM

## 2019-11-15 DIAGNOSIS — I50.82 BIVENTRICULAR CONGESTIVE HEART FAILURE (H): Primary | ICD-10-CM

## 2019-11-15 DIAGNOSIS — E11.65 TYPE 2 DIABETES MELLITUS WITH HYPERGLYCEMIA, WITH LONG-TERM CURRENT USE OF INSULIN (H): ICD-10-CM

## 2019-11-15 DIAGNOSIS — I10 ESSENTIAL HYPERTENSION WITH GOAL BLOOD PRESSURE LESS THAN 140/90: ICD-10-CM

## 2019-11-15 PROCEDURE — 99214 OFFICE O/P EST MOD 30 MIN: CPT | Performed by: INTERNAL MEDICINE

## 2019-11-15 ASSESSMENT — PAIN SCALES - GENERAL: PAINLEVEL: NO PAIN (0)

## 2019-11-15 NOTE — PROGRESS NOTES
"Subjective     Reinaldo Barrios is a 69 year old male who presents to clinic today for the following health issues:    HPI   Diabetes Follow-up    How often are you checking your blood sugar? Three times daily  Blood sugar testing frequency justification:  Uncontrolled diabetes  What time of day are you checking your blood sugars (select all that apply)?  Before and after meals  Have you had any blood sugars above 200?  Yes   Have you had any blood sugars below 70?  Yes     What symptoms do you notice when your blood sugar is low?  None      BP Readings from Last 2 Encounters:   10/15/19 124/68   08/12/19 120/64     Hemoglobin A1C (%)   Date Value   10/15/2019 8.6 (H)   08/12/2019 8.3 (H)     LDL Cholesterol Calculated (mg/dL)   Date Value   10/15/2019 55   09/13/2018 63       Diabetes Management Resources                        Chief Complaint         The patient is a pleasant 69-year-old gentleman who has a history of type 2 diabetes and coronary artery disease.  He also has a history of prior valvular plasty.  He is seen today for follow-up of his diabetes.  He is noting that he takes his medications compliantly.  He has decreased his Levemir from 60 to 50 units as he was having occasional hypoglycemia.  He notes that the hypoglycemia generally followed a day where he would miss a meal or be extremely active.  He has most of his blood sugars in the upper 100 to lower 200 range.  He does attribute this somewhat to his diet.  He notes that he does have \"cravings\" for candy and does have a couple cookies or candy bar daily.  We discussed the need for appropriate moderation.  He denies any chest pain, syncope or near syncope.  He does have occasional swelling in the lower extremities but this is modest.  His congestive heart failure is well controlled at this time.                         PAST, FAMILY,SOCIAL HISTORY:     Medical  History:   has a past medical history of Aortic valve stenosis (11/2/2015), Cardiac " "LV ejection fraction 30-35% (11/2/2015), Chest pain, unspecified chest pain type (11/2/2015), Hiatal hernia (11/2/2015), Hyperlipidemia LDL goal <100 (11/2/2015), Personal history of tobacco use, presenting hazards to health (11/2/2015), and Type 2 diabetes mellitus without complication (H) (11/2/2015).     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     Social History:   reports that he quit smoking about 3 years ago. His smoking use included pipe. He has never used smokeless tobacco. He reports current alcohol use. He reports that he does not use drugs.     Family History:  family history is not on file.            MEDICATIONS  Current Outpatient Medications   Medication Sig Dispense Refill     aspirin EC 81 MG EC tablet Take 1 tablet (81 mg) by mouth daily       atorvastatin (LIPITOR) 40 MG tablet Take 1 tablet (40 mg) by mouth daily 90 tablet 3     blood glucose (NO BRAND SPECIFIED) test strip Use to test blood sugars 3 times daily or as directed 100 strip 11     carvedilol (COREG) 3.125 MG tablet TAKE 1 TABLET (3.125 MG) BY MOUTH 2 TIMES DAILY (WITH MEALS) 60 tablet 5     insulin pen needle (B-D U/F) 31G X 8 MM USE THREE PEN NEEDLES DAILY OR AS DIRECTED.  Appointment needed for additional refills. 100 each 0     insulin syringe-needle U-100 (BD INSULIN SYRINGE ULTRAFINE) 31G X 5/16\" 0.5 ML miscellaneous Use one syringe 3 daily or as directed. 100 each prn     isosorbide mononitrate (IMDUR) 30 MG 24 hr tablet TAKE 1 TABLET BY MOUTH EVERY DAY 90 tablet 2     LEVEMIR FLEXTOUCH 100 UNIT/ML pen INJECT 60 UNITS UNDER THE SKIN EVERY DAY 60 mL 3     lisinopril (PRINIVIL/ZESTRIL) 5 MG tablet Take 1 tablet (5 mg) by mouth daily 90 tablet 3     NOVOLOG FLEXPEN 100 UNIT/ML soln INJECT 10 - 12 UNITS UNDER THE SKIN TWICE A DAY 15 mL 1     ONETOUCH ULTRA test strip USE TO TEST BLOOD SUGAR 3 TIMES A "  each 0     senna-docusate (DOK PLUS) 8.6-50 MG tablet TAKE 1-2 TABLETS BY MOUTH 2 TIMES DAILY 100 tablet 3         --------------------------------------------------------------------------------------------------------------------                              Review of Systems       LUNGS: Pt denies: cough, excess sputum, hemoptysis, or shortness of breath at rest.  He does have some dyspnea upon exertion..   HEART: Pt denies: chest pain, arrhythmia, syncope, tachy or bradyarrhythmia.   GI: Pt denies: nausea, vomiting, diarrhea, constipation, melena, or hematochezia.   NEURO: Pt denies: seizures, strokes, diplopia, weakness,  or paralysis.  Mild paresthesias involving the feet are noted bilaterally.   SKIN: Pt denies: itching, rashes, discoloration, or specific lesions of concern. Denies recent hair loss.   PSYCH: The patient denies significant depression, anxiety, mood imbalance. Specifically denies any suicidal ideation.                                     Examination    /70 (BP Location: Right arm, Patient Position: Sitting, Cuff Size: Adult Large)   Pulse 74   Temp 98.7  F (37.1  C) (Temporal)   Resp 18   Wt 103.4 kg (228 lb)   SpO2 96%   BMI 34.67 kg/m       Constitutional: The patient appears to be in no acute distress. The patient appears to be adequately hydrated. No acute respiratory or hemodynamic distress is noted at this time.   LUNGS: clear bilaterally, airflow is brisk, no intercostal retraction or stridor is noted. No coughing is noted during visit.   HEART:  regular without rubs, clicks, gallops, or murmurs. PMI is nondisplaced. Upstrokes are brisk. S1,S2 are heard.  Valvular closure is crisp.   GI: Abdomen is soft, without rebound, guarding or tenderness. Bowel sounds are appropriate. No renal bruits are heard.  Abdomen is obese.   NEURO: Pt is alert and appropriate. No neurologic lateralization is noted. Cranial nerves 2-12 are intact. Peripheral sensory and motor function are  grossly normal.    SKIN:  warm and dry. No erythema, or rashes are noted. No specific lesions of concern are noted.  Minimal stasis changes are noted in the lower extremities.   PSYCH: The patient appears grossly appropriate. Maintains good eye contact, does not have any jittery or atypical motion. Displays appropriate affect.                                           Decision Making    1. Type 2 diabetes mellitus with hyperglycemia, with long-term current use of insulin (H)  Recommend checking a 4 times daily blood sugar log for the next month.  (May alternate 2 blood sugar checks daily at 4 various times of the day)    2. Biventricular congestive heart failure (H)  Continue medications including carvedilol and aspirin    3. Essential hypertension with goal blood pressure less than 140/90  Stable at this time.  Continue current medication    4. S/P aortic valve replacement  Stable porcine valve.  Not requiring anticoagulation                             FOLLOW UP   I have asked the patient to make an appointment for followup with me in 1 month        I have carefully explained the diagnosis and treatment options to the patient.  The patient has displayed an understanding of the above, and all subsequent questions were answered.      DO RADHA Horn    Portions of this note were produced using [address]  Although every attempt at real-time proof reading has been made, occasional grammar/syntax errors may have been missed.

## 2019-11-21 ENCOUNTER — OFFICE VISIT (OUTPATIENT)
Dept: FAMILY MEDICINE | Facility: OTHER | Age: 69
End: 2019-11-21
Payer: MEDICARE

## 2019-11-21 ENCOUNTER — OFFICE VISIT (OUTPATIENT)
Dept: SURGERY | Facility: OTHER | Age: 69
End: 2019-11-21
Payer: MEDICARE

## 2019-11-21 VITALS
DIASTOLIC BLOOD PRESSURE: 68 MMHG | WEIGHT: 228 LBS | BODY MASS INDEX: 34.67 KG/M2 | HEART RATE: 70 BPM | RESPIRATION RATE: 20 BRPM | TEMPERATURE: 98.6 F | SYSTOLIC BLOOD PRESSURE: 128 MMHG

## 2019-11-21 VITALS
DIASTOLIC BLOOD PRESSURE: 68 MMHG | BODY MASS INDEX: 34.56 KG/M2 | HEIGHT: 68 IN | WEIGHT: 228 LBS | SYSTOLIC BLOOD PRESSURE: 128 MMHG | TEMPERATURE: 98.6 F

## 2019-11-21 DIAGNOSIS — L02.212 CUTANEOUS ABSCESS OF BACK EXCLUDING BUTTOCKS: Primary | ICD-10-CM

## 2019-11-21 DIAGNOSIS — L72.0 EPIDERMAL INCLUSION CYST: Primary | ICD-10-CM

## 2019-11-21 PROCEDURE — 11406 EXC TR-EXT B9+MARG >4.0 CM: CPT | Performed by: SURGERY

## 2019-11-21 PROCEDURE — 99203 OFFICE O/P NEW LOW 30 MIN: CPT | Mod: 25 | Performed by: SURGERY

## 2019-11-21 PROCEDURE — 99207 ZZC NO BILLABLE SERVICE THIS VISIT: CPT | Performed by: INTERNAL MEDICINE

## 2019-11-21 PROCEDURE — 12032 INTMD RPR S/A/T/EXT 2.6-7.5: CPT | Mod: 51 | Performed by: SURGERY

## 2019-11-21 ASSESSMENT — MIFFLIN-ST. JEOR: SCORE: 1763.7

## 2019-11-21 ASSESSMENT — PAIN SCALES - GENERAL: PAINLEVEL: NO PAIN (0)

## 2019-11-21 NOTE — PROGRESS NOTES
Subjective     Reinaldo Barrios is a 69 year old male who presents to clinic today for the following health issues:    HPI   Chief Complaint   Patient presents with     Derm Problem     painful cyst on back, had some drainage      The patient has a large, tender, red, painful sebaceous cyst/abscess in the middle of his back at the mid thoracic area.  Evidence of previous incision and drainage has been noted.  He states he has had no fever or chills, his blood sugars have been stable.  In light of this, we discussed various treatment options including incision and drainage versus removal of the abscess/cystic sac.  I have decided that since we had a perfectly good surgeon in the clinic, it would be best to refer him to that perfectly good surgeon.  I see no reason for me to cut into this gentleman as I am not a perfectly good surgeon.  Referral is made for later this afternoon.  There is no charge for this visit.    [place]

## 2019-11-21 NOTE — PROGRESS NOTES
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 No overt infection and no pus but he says he bumped the area and white cheesy material came out. About 10 years ago he had the same thing and he thought that they might have excised it at that time but hes not sure. Could have just been drained. It is in the same spot. No blood thinning medications. Only allergy is Sulfas.    Review Of Systems    Skin: as above  Ears/Nose/Throat: negative  Respiratory: No shortness of breath, dyspnea on exertion, cough, or hemoptysis  Cardiovascular: negative  Gastrointestinal: negative  Genitourinary: negative  Musculoskeletal: negative  Neurologic: negative  Hematologic/Lymphatic/Immunologic: negative  Endocrine: negative      Past Medical History:   Diagnosis Date     Aortic valve stenosis 11/2/2015     Cardiac LV ejection fraction 30-35% 11/2/2015     Chest pain, unspecified chest pain type 11/2/2015     Hiatal hernia 11/2/2015     Hyperlipidemia LDL goal <100 11/2/2015     Personal history of tobacco use, presenting hazards to health 11/2/2015     Type 2 diabetes mellitus without complication (H) 11/2/2015       Past Surgical History:   Procedure Laterality Date     BYPASS GRAFT ARTERY CORONARY N/A 11/7/2015    Procedure: BYPASS GRAFT ARTERY CORONARY;  Surgeon: Sia Caldera MD;  Location: [facility]     KNEE SURGERY  2009     REPLACE VALVE AORTIC N/A 11/7/2015    Procedure: REPLACE VALVE AORTIC;  Surgeon: Sia Caldera MD;  Location:  OR       No family history on file.    Social History     Socioeconomic History     Marital status: Single     Spouse name: Not on file     Number of children: Not on file     Years of education: Not on file     Highest education level: Not on file   Occupational History     Not on file   Social Needs      Financial resource strain: Not on file     Food insecurity:     Worry: Not on file     Inability: Not on file     Transportation needs:     Medical: Not on file     Non-medical: Not on file   Tobacco Use     Smoking status: Former Smoker     Types: Pipe     Last attempt to quit: 5/15/2016     Years since quitting: 3.5     Smokeless tobacco: Never Used     Tobacco comment: smokes a pipe in good weather   Substance and Sexual Activity     Alcohol use: Yes     Alcohol/week: 0.0 standard drinks     Comment: rare     Drug use: No     Sexual activity: Not Currently     Partners: Female   Lifestyle     Physical activity:     Days per week: Not on file     Minutes per session: Not on file     Stress: Not on file   Relationships     Social connections:     Talks on phone: Not on file     Gets together: Not on file     Attends Yazidism service: Not on file     Active member of club or organization: Not on file     Attends meetings of clubs or organizations: Not on file     Relationship status: Not on file     Intimate partner violence:     Fear of current or ex partner: Not on file     Emotionally abused: Not on file     Physically abused: Not on file     Forced sexual activity: Not on file   Other Topics Concern     Parent/sibling w/ CABG, MI or angioplasty before 65F 55M? Not Asked   Social History Narrative     Not on file       Current Outpatient Medications   Medication Sig Dispense Refill     aspirin EC 81 MG EC tablet Take 1 tablet (81 mg) by mouth daily       atorvastatin (LIPITOR) 40 MG tablet Take 1 tablet (40 mg) by mouth daily 90 tablet 3     blood glucose (NO BRAND SPECIFIED) test strip Use to test blood sugars 3 times daily or as directed 100 strip 11     carvedilol (COREG) 3.125 MG tablet TAKE 1 TABLET (3.125 MG) BY MOUTH 2 TIMES DAILY (WITH MEALS) 60 tablet 5     insulin pen needle (B-D U/F) 31G X 8 MM USE THREE PEN NEEDLES DAILY OR AS DIRECTED.  Appointment needed for additional refills. 100 each 0      "insulin syringe-needle U-100 (BD INSULIN SYRINGE ULTRAFINE) 31G X 5/16\" 0.5 ML miscellaneous Use one syringe 3 daily or as directed. 100 each prn     isosorbide mononitrate (IMDUR) 30 MG 24 hr tablet TAKE 1 TABLET BY MOUTH EVERY DAY 90 tablet 2     LEVEMIR FLEXTOUCH 100 UNIT/ML pen INJECT 60 UNITS UNDER THE SKIN EVERY DAY 60 mL 3     lisinopril (PRINIVIL/ZESTRIL) 5 MG tablet Take 1 tablet (5 mg) by mouth daily 90 tablet 3     NOVOLOG FLEXPEN 100 UNIT/ML soln INJECT 10 - 12 UNITS UNDER THE SKIN TWICE A DAY 15 mL 1     ONETOUCH ULTRA test strip USE TO TEST BLOOD SUGAR 3 TIMES A  each 0     senna-docusate (DOK PLUS) 8.6-50 MG tablet TAKE 1-2 TABLETS BY MOUTH 2 TIMES DAILY 100 tablet 3       Medications and history reviewed    Physical exam:  Vitals: /68   Temp 98.6  F (37  C) (Temporal)   Ht 1.727 m (5' 8\")   Wt 103.4 kg (228 lb)   BMI 34.67 kg/m    BMI= Body mass index is 34.67 kg/m .    Constitutional: Healthy, alert, non-distressed   Head: Normo-cephalic, atraumatic, no lesions, masses or tenderness   Cardiovascular: RRR, no new murmurs, +S1, +S2 heart sounds, no clicks, rubs or gallops   Respiratory: CTAB, no rales, rhonchi or wheezing, equal chest rise, good respiratory effort   Gastrointestinal: Soft, non-tender, non distended, no rebound rigidity or guarding, no masses or hernias palpated   : Deferred  Musculoskeletal: Moves all extremities, normal  strength, no deformities noted   Skin: large, non erythematous subcutaneous mass consistent with cyst with small opening centrally with sebum coming out. No fluctuance or induration to suggest infection  Psychiatric: Mentation appears normal, affect appropriate   Hematologic/Lymphatic/Immunologic: Normal cervical and supraclavicular lymph nodes   Patient able to get up on table without difficulty.    Labs show:  No results found for this or any previous visit (from the past 24 hour(s)).    Imaging shows:  none    Assessment:     ICD-10-CM    1. " Epidermal inclusion cyst L72.0      Plan: ok for in office excision today given no overt signs of infections. I described the procedure and the after care and he's eager to have this removed. We closed with     Clarence Hutchison DO    PROCEDURE:   Written consent was obtained    The back area was prepped and appropriately anesthetized with 1% lidocaine with epinephrine. Using the usual technique, excision of epidermal cyst was performed. The total area excised, including lesion and margins was 5 x 5 x 4 cm.  Closure was accomplished with interrupted 3-0 vicryl for the dermal layer and vertical mattress 3-0 nylon for the skin. Total length of the incision after closure was 5 cm. An appropriate dressing was applied.  The procedure was well tolerated and without complications. Specimen was not sent to Pathology.

## 2019-11-21 NOTE — LETTER
11/21/2019         RE: Reinaldo Barrios  [address]  [facility] [address]        Dear Colleague,    Thank you for referring your patient, Reinaldo Barrios, to the [facility]. Please see a copy of my visit note below.    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 No overt infection and no pus but he says he bumped the area and white cheesy material came out. About 10 years ago he had the same thing and he thought that they might have excised it at that time but hes not sure. Could have just been drained. It is in the same spot. No blood thinning medications. Only allergy is Sulfas.    Review Of Systems    Skin: as above  Ears/Nose/Throat: negative  Respiratory: No shortness of breath, dyspnea on exertion, cough, or hemoptysis  Cardiovascular: negative  Gastrointestinal: negative  Genitourinary: negative  Musculoskeletal: negative  Neurologic: negative  Hematologic/Lymphatic/Immunologic: negative  Endocrine: negative      Past Medical History:   Diagnosis Date     Aortic valve stenosis 11/2/2015     Cardiac LV ejection fraction 30-35% 11/2/2015     Chest pain, unspecified chest pain type 11/2/2015     Hiatal hernia 11/2/2015     Hyperlipidemia LDL goal <100 11/2/2015     Personal history of tobacco use, presenting hazards to health 11/2/2015     Type 2 diabetes mellitus without complication (H) 11/2/2015       Past Surgical History:   Procedure Laterality Date     BYPASS GRAFT ARTERY CORONARY N/A 11/7/2015    Procedure: BYPASS GRAFT ARTERY CORONARY;  Surgeon: Sia Caldera MD;  Location: [facility] OR     KNEE SURGERY  2009     REPLACE VALVE AORTIC N/A 11/7/2015    Procedure: REPLACE VALVE AORTIC;  Surgeon: Sia Caldera MD;  Location:  OR       No family history on file.    Social History      Socioeconomic History     Marital status: Single     Spouse name: Not on file     Number of children: Not on file     Years of education: Not on file     Highest education level: Not on file   Occupational History     Not on file   Social Needs     Financial resource strain: Not on file     Food insecurity:     Worry: Not on file     Inability: Not on file     Transportation needs:     Medical: Not on file     Non-medical: Not on file   Tobacco Use     Smoking status: Former Smoker     Types: Pipe     Last attempt to quit: 5/15/2016     Years since quitting: 3.5     Smokeless tobacco: Never Used     Tobacco comment: smokes a pipe in good weather   Substance and Sexual Activity     Alcohol use: Yes     Alcohol/week: 0.0 standard drinks     Comment: rare     Drug use: No     Sexual activity: Not Currently     Partners: Female   Lifestyle     Physical activity:     Days per week: Not on file     Minutes per session: Not on file     Stress: Not on file   Relationships     Social connections:     Talks on phone: Not on file     Gets together: Not on file     Attends Scientologist service: Not on file     Active member of club or organization: Not on file     Attends meetings of clubs or organizations: Not on file     Relationship status: Not on file     Intimate partner violence:     Fear of current or ex partner: Not on file     Emotionally abused: Not on file     Physically abused: Not on file     Forced sexual activity: Not on file   Other Topics Concern     Parent/sibling w/ CABG, MI or angioplasty before 65F 55M? Not Asked   Social History Narrative     Not on file       Current Outpatient Medications   Medication Sig Dispense Refill     aspirin EC 81 MG EC tablet Take 1 tablet (81 mg) by mouth daily       atorvastatin (LIPITOR) 40 MG tablet Take 1 tablet (40 mg) by mouth daily 90 tablet 3     blood glucose (NO BRAND SPECIFIED) test strip Use to test blood sugars 3 times daily or as directed 100 strip 11      "carvedilol (COREG) 3.125 MG tablet TAKE 1 TABLET (3.125 MG) BY MOUTH 2 TIMES DAILY (WITH MEALS) 60 tablet 5     insulin pen needle (B-D U/F) 31G X 8 MM USE THREE PEN NEEDLES DAILY OR AS DIRECTED.  Appointment needed for additional refills. 100 each 0     insulin syringe-needle U-100 (BD INSULIN SYRINGE ULTRAFINE) 31G X 5/16\" 0.5 ML miscellaneous Use one syringe 3 daily or as directed. 100 each prn     isosorbide mononitrate (IMDUR) 30 MG 24 hr tablet TAKE 1 TABLET BY MOUTH EVERY DAY 90 tablet 2     LEVEMIR FLEXTOUCH 100 UNIT/ML pen INJECT 60 UNITS UNDER THE SKIN EVERY DAY 60 mL 3     lisinopril (PRINIVIL/ZESTRIL) 5 MG tablet Take 1 tablet (5 mg) by mouth daily 90 tablet 3     NOVOLOG FLEXPEN 100 UNIT/ML soln INJECT 10 - 12 UNITS UNDER THE SKIN TWICE A DAY 15 mL 1     ONETOUCH ULTRA test strip USE TO TEST BLOOD SUGAR 3 TIMES A  each 0     senna-docusate (DOK PLUS) 8.6-50 MG tablet TAKE 1-2 TABLETS BY MOUTH 2 TIMES DAILY 100 tablet 3       Medications and history reviewed    Physical exam:  Vitals: /68   Temp 98.6  F (37  C) (Temporal)   Ht 1.727 m (5' 8\")   Wt 103.4 kg (228 lb)   BMI 34.67 kg/m     BMI= Body mass index is 34.67 kg/m .    Constitutional: Healthy, alert, non-distressed   Head: Normo-cephalic, atraumatic, no lesions, masses or tenderness   Cardiovascular: RRR, no new murmurs, +S1, +S2 heart sounds, no clicks, rubs or gallops   Respiratory: CTAB, no rales, rhonchi or wheezing, equal chest rise, good respiratory effort   Gastrointestinal: Soft, non-tender, non distended, no rebound rigidity or guarding, no masses or hernias palpated   : Deferred  Musculoskeletal: Moves all extremities, normal  strength, no deformities noted   Skin: large, non erythematous subcutaneous mass consistent with cyst with small opening centrally with sebum coming out. No fluctuance or induration to suggest infection  Psychiatric: Mentation appears normal, affect appropriate "   Hematologic/Lymphatic/Immunologic: Normal cervical and supraclavicular lymph nodes   Patient able to get up on table without difficulty.    Labs show:  No results found for this or any previous visit (from the past 24 hour(s)).    Imaging shows:  none    Assessment:     ICD-10-CM    1. Epidermal inclusion cyst L72.0      Plan: ok for in office excision today given no overt signs of infections. I described the procedure and the after care and he's eager to have this removed. We closed with     Clarence Hutchison DO    PROCEDURE:   Written consent was obtained    The back area was prepped and appropriately anesthetized with 1% lidocaine with epinephrine. Using the usual technique, excision of epidermal cyst was performed. The total area excised, including lesion and margins was 5 x 5 x 4 cm.  Closure was accomplished with interrupted 3-0 vicryl for the dermal layer and vertical mattress 3-0 nylon for the skin. Total length of the incision after closure was 5 cm. An appropriate dressing was applied.  The procedure was well tolerated and without complications. Specimen was not sent to Pathology.        Again, thank you for allowing me to participate in the care of your patient.        Sincerely,        Clarence Hutchison DO

## 2019-11-25 ENCOUNTER — OFFICE VISIT (OUTPATIENT)
Dept: SURGERY | Facility: CLINIC | Age: 69
End: 2019-11-25
Payer: MEDICARE

## 2019-11-25 VITALS
TEMPERATURE: 97.9 F | BODY MASS INDEX: 34.56 KG/M2 | SYSTOLIC BLOOD PRESSURE: 104 MMHG | HEIGHT: 68 IN | WEIGHT: 228 LBS | DIASTOLIC BLOOD PRESSURE: 60 MMHG

## 2019-11-25 DIAGNOSIS — L03.818 CELLULITIS OF OTHER SPECIFIED SITE: Primary | ICD-10-CM

## 2019-11-25 PROCEDURE — 99024 POSTOP FOLLOW-UP VISIT: CPT | Performed by: SURGERY

## 2019-11-25 RX ORDER — CEPHALEXIN 500 MG/1
500 CAPSULE ORAL 2 TIMES DAILY
Qty: 10 CAPSULE | Refills: 0 | Status: SHIPPED | OUTPATIENT
Start: 2019-11-25 | End: 2020-04-16

## 2019-11-25 ASSESSMENT — MIFFLIN-ST. JEOR: SCORE: 1763.7

## 2019-11-25 NOTE — PROGRESS NOTES
General Surgery Follow Up    Pt returns for follow up visit after cyst excision last thursday    HPI:  Reinaldo returns today after we excised a large epidermal cyst last Thursday. He was instructed to removed the bandage over the weekend but he states that the pain was so bad that he did not do this. Today, the bandage was removed. It was not saturated and some small amount of old blood was drained. He denies fevers or chills but reports a tugging and tight sensation to the area.    Review Of Systems    Skin: as above  Ears/Nose/Throat: negative  Respiratory: No shortness of breath, dyspnea on exertion, cough, or hemoptysis  Cardiovascular: negative  Gastrointestinal: negative  Genitourinary: negative  Musculoskeletal: negative  Neurologic: negative  Hematologic/Lymphatic/Immunologic: negative  Endocrine: negative      Past Medical History:   Diagnosis Date     Aortic valve stenosis 11/2/2015     Cardiac LV ejection fraction 30-35% 11/2/2015     Chest pain, unspecified chest pain type 11/2/2015     Hiatal hernia 11/2/2015     Hyperlipidemia LDL goal <100 11/2/2015     Personal history of tobacco use, presenting hazards to health 11/2/2015     Type 2 diabetes mellitus without complication (H) 11/2/2015       Past Surgical History:   Procedure Laterality Date     BYPASS GRAFT ARTERY CORONARY N/A 11/7/2015    Procedure: BYPASS GRAFT ARTERY CORONARY;  Surgeon: Sia Caldera MD;  Location: [facility] OR     KNEE SURGERY  2009     REPLACE VALVE AORTIC N/A 11/7/2015    Procedure: REPLACE VALVE AORTIC;  Surgeon: Sia Caldera MD;  [facility]       Social History     Socioeconomic History     Marital status: Single     Spouse name: Not on file     Number of children: Not on file     Years of education: Not on file     Highest education level: Not on file   Occupational History     Not on file   Social Needs     Financial resource strain: Not on file     Food insecurity:     Worry: Not on file     Inability: Not on  file     Transportation needs:     Medical: Not on file     Non-medical: Not on file   Tobacco Use     Smoking status: Former Smoker     Types: Pipe     Last attempt to quit: 5/15/2016     Years since quitting: 3.5     Smokeless tobacco: Never Used     Tobacco comment: smokes a pipe in good weather   Substance and Sexual Activity     Alcohol use: Yes     Alcohol/week: 0.0 standard drinks     Comment: rare     Drug use: No     Sexual activity: Not Currently     Partners: Female   Lifestyle     Physical activity:     Days per week: Not on file     Minutes per session: Not on file     Stress: Not on file   Relationships     Social connections:     Talks on phone: Not on file     Gets together: Not on file     Attends Confucianism service: Not on file     Active member of club or organization: Not on file     Attends meetings of clubs or organizations: Not on file     Relationship status: Not on file     Intimate partner violence:     Fear of current or ex partner: Not on file     Emotionally abused: Not on file     Physically abused: Not on file     Forced sexual activity: Not on file   Other Topics Concern     Parent/sibling w/ CABG, MI or angioplasty before 65F 55M? Not Asked   Social History Narrative     Not on file       Current Outpatient Medications   Medication Sig Dispense Refill     cephALEXin (KEFLEX) 500 MG capsule Take 1 capsule (500 mg) by mouth 2 times daily for 5 days 10 capsule 0     aspirin EC 81 MG EC tablet Take 1 tablet (81 mg) by mouth daily       atorvastatin (LIPITOR) 40 MG tablet Take 1 tablet (40 mg) by mouth daily 90 tablet 3     blood glucose (NO BRAND SPECIFIED) test strip Use to test blood sugars 3 times daily or as directed 100 strip 11     carvedilol (COREG) 3.125 MG tablet TAKE 1 TABLET (3.125 MG) BY MOUTH 2 TIMES DAILY (WITH MEALS) 60 tablet 5     insulin pen needle (B-D U/F) 31G X 8 MM USE THREE PEN NEEDLES DAILY OR AS DIRECTED.  Appointment needed for additional refills. 100 each 0      "insulin syringe-needle U-100 (BD INSULIN SYRINGE ULTRAFINE) 31G X 5/16\" 0.5 ML miscellaneous Use one syringe 3 daily or as directed. 100 each prn     isosorbide mononitrate (IMDUR) 30 MG 24 hr tablet TAKE 1 TABLET BY MOUTH EVERY DAY 90 tablet 2     LEVEMIR FLEXTOUCH 100 UNIT/ML pen INJECT 60 UNITS UNDER THE SKIN EVERY DAY 60 mL 3     lisinopril (PRINIVIL/ZESTRIL) 5 MG tablet Take 1 tablet (5 mg) by mouth daily 90 tablet 3     NOVOLOG FLEXPEN 100 UNIT/ML soln INJECT 10 - 12 UNITS UNDER THE SKIN TWICE A DAY 15 mL 1     ONETOUCH ULTRA test strip USE TO TEST BLOOD SUGAR 3 TIMES A  each 0     senna-docusate (DOK PLUS) 8.6-50 MG tablet TAKE 1-2 TABLETS BY MOUTH 2 TIMES DAILY 100 tablet 3       Medications and history reviewed    Physical exam:  Vitals: /60   Temp 97.9  F (36.6  C) (Temporal)   Ht 1.727 m (5' 8\")   Wt 103.4 kg (228 lb)   BMI 34.67 kg/m    BMI= Body mass index is 34.67 kg/m .    Constitutional: Healthy, alert, non-distressed   Head: Normo-cephalic, atraumatic, no lesions, masses or tenderness   Cardiovascular: RRR, no new murmurs, +S1, +S2 heart sounds, no clicks, rubs or gallops   Respiratory: CTAB, no rales, rhonchi or wheezing, equal chest rise, good respiratory effort   Gastrointestinal: Soft, non-tender, non distended, no rebound rigidity or guarding, no masses or hernias palpated   : Deferred  Musculoskeletal: Moves all extremities, normal  strength, no deformities noted   Skin: back with mattress sutures intact, no purulence or fluctuance. Old blood draining from incision, small amount. Some induration on the edges as well as mild cellulitis   Psychiatric: Mentation appears normal, affect appropriate   Hematologic/Lymphatic/Immunologic: Normal cervical and supraclavicular lymph nodes   Patient able to get up on table without difficulty.      Assessment:     ICD-10-CM    1. Cellulitis of other specified site L03.818 cephALEXin (KEFLEX) 500 MG capsule     Plan: He has mild " cellulitis with induration so for skin prophylaxis/treatment will give short course of keflex. Told him to change dressing daily or prn saturation of blood or other drainage. Otherwise, he is schedule to return in 10 days for suture removal.    Clarence Hutchison, DO

## 2019-11-25 NOTE — LETTER
11/25/2019         RE: Reinaldo Barrios  [address]  [facility] [address]        Dear Colleague,    Thank you for referring your patient, Reinaldo Barrios, to the [facility]. Please see a copy of my visit note below.    General Surgery Follow Up    Pt returns for follow up visit after cyst excision last thursday    HPI:  Reinaldo returns today after we excised a large epidermal cyst last Thursday. He was instructed to removed the bandage over the weekend but he states that the pain was so bad that he did not do this. Today, the bandage was removed. It was not saturated and some small amount of old blood was drained. He denies fevers or chills but reports a tugging and tight sensation to the area.    Review Of Systems    Skin: as above  Ears/Nose/Throat: negative  Respiratory: No shortness of breath, dyspnea on exertion, cough, or hemoptysis  Cardiovascular: negative  Gastrointestinal: negative  Genitourinary: negative  Musculoskeletal: negative  Neurologic: negative  Hematologic/Lymphatic/Immunologic: negative  Endocrine: negative      Past Medical History:   Diagnosis Date     Aortic valve stenosis 11/2/2015     Cardiac LV ejection fraction 30-35% 11/2/2015     Chest pain, unspecified chest pain type 11/2/2015     Hiatal hernia 11/2/2015     Hyperlipidemia LDL goal <100 11/2/2015     Personal history of tobacco use, presenting hazards to health 11/2/2015     Type 2 diabetes mellitus without complication (H) 11/2/2015       Past Surgical History:   Procedure Laterality Date     BYPASS GRAFT ARTERY CORONARY N/A 11/7/2015    Procedure: BYPASS GRAFT ARTERY CORONARY;  Surgeon: Sia Caldera MD;  Location: [facility] OR     KNEE SURGERY  2009     REPLACE VALVE AORTIC N/A 11/7/2015    Procedure: REPLACE VALVE AORTIC;  Surgeon: Sia Caldera MD;  Location:  OR       Social History     Socioeconomic History     Marital status: Single     Spouse name: Not on file     Number of children: Not on  file     Years of education: Not on file     Highest education level: Not on file   Occupational History     Not on file   Social Needs     Financial resource strain: Not on file     Food insecurity:     Worry: Not on file     Inability: Not on file     Transportation needs:     Medical: Not on file     Non-medical: Not on file   Tobacco Use     Smoking status: Former Smoker     Types: Pipe     Last attempt to quit: 5/15/2016     Years since quitting: 3.5     Smokeless tobacco: Never Used     Tobacco comment: smokes a pipe in good weather   Substance and Sexual Activity     Alcohol use: Yes     Alcohol/week: 0.0 standard drinks     Comment: rare     Drug use: No     Sexual activity: Not Currently     Partners: Female   Lifestyle     Physical activity:     Days per week: Not on file     Minutes per session: Not on file     Stress: Not on file   Relationships     Social connections:     Talks on phone: Not on file     Gets together: Not on file     Attends Jewish service: Not on file     Active member of club or organization: Not on file     Attends meetings of clubs or organizations: Not on file     Relationship status: Not on file     Intimate partner violence:     Fear of current or ex partner: Not on file     Emotionally abused: Not on file     Physically abused: Not on file     Forced sexual activity: Not on file   Other Topics Concern     Parent/sibling w/ CABG, MI or angioplasty before 65F 55M? Not Asked   Social History Narrative     Not on file       Current Outpatient Medications   Medication Sig Dispense Refill     cephALEXin (KEFLEX) 500 MG capsule Take 1 capsule (500 mg) by mouth 2 times daily for 5 days 10 capsule 0     aspirin EC 81 MG EC tablet Take 1 tablet (81 mg) by mouth daily       atorvastatin (LIPITOR) 40 MG tablet Take 1 tablet (40 mg) by mouth daily 90 tablet 3     blood glucose (NO BRAND SPECIFIED) test strip Use to test blood sugars 3 times daily or as directed 100 strip 11     carvedilol  "(COREG) 3.125 MG tablet TAKE 1 TABLET (3.125 MG) BY MOUTH 2 TIMES DAILY (WITH MEALS) 60 tablet 5     insulin pen needle (B-D U/F) 31G X 8 MM USE THREE PEN NEEDLES DAILY OR AS DIRECTED.  Appointment needed for additional refills. 100 each 0     insulin syringe-needle U-100 (BD INSULIN SYRINGE ULTRAFINE) 31G X 5/16\" 0.5 ML miscellaneous Use one syringe 3 daily or as directed. 100 each prn     isosorbide mononitrate (IMDUR) 30 MG 24 hr tablet TAKE 1 TABLET BY MOUTH EVERY DAY 90 tablet 2     LEVEMIR FLEXTOUCH 100 UNIT/ML pen INJECT 60 UNITS UNDER THE SKIN EVERY DAY 60 mL 3     lisinopril (PRINIVIL/ZESTRIL) 5 MG tablet Take 1 tablet (5 mg) by mouth daily 90 tablet 3     NOVOLOG FLEXPEN 100 UNIT/ML soln INJECT 10 - 12 UNITS UNDER THE SKIN TWICE A DAY 15 mL 1     ONETOUCH ULTRA test strip USE TO TEST BLOOD SUGAR 3 TIMES A  each 0     senna-docusate (DOK PLUS) 8.6-50 MG tablet TAKE 1-2 TABLETS BY MOUTH 2 TIMES DAILY 100 tablet 3       Medications and history reviewed    Physical exam:  Vitals: /60   Temp 97.9  F (36.6  C) (Temporal)   Ht 1.727 m (5' 8\")   Wt 103.4 kg (228 lb)   BMI 34.67 kg/m     BMI= Body mass index is 34.67 kg/m .    Constitutional: Healthy, alert, non-distressed   Head: Normo-cephalic, atraumatic, no lesions, masses or tenderness   Cardiovascular: RRR, no new murmurs, +S1, +S2 heart sounds, no clicks, rubs or gallops   Respiratory: CTAB, no rales, rhonchi or wheezing, equal chest rise, good respiratory effort   Gastrointestinal: Soft, non-tender, non distended, no rebound rigidity or guarding, no masses or hernias palpated   : Deferred  Musculoskeletal: Moves all extremities, normal  strength, no deformities noted   Skin: back with mattress sutures intact, no purulence or fluctuance. Old blood draining from incision, small amount. Some induration on the edges as well as mild cellulitis   Psychiatric: Mentation appears normal, affect appropriate   Hematologic/Lymphatic/Immunologic: " Normal cervical and supraclavicular lymph nodes   Patient able to get up on table without difficulty.      Assessment:     ICD-10-CM    1. Cellulitis of other specified site L03.818 cephALEXin (KEFLEX) 500 MG capsule     Plan: He has mild cellulitis with induration so for skin prophylaxis/treatment will give short course of keflex. Told him to change dressing daily or prn saturation of blood or other drainage. Otherwise, he is schedule to return in 10 days for suture removal.    Clarence Hutchison, DO    Again, thank you for allowing me to participate in the care of your patient.        Sincerely,        Clarence Hutchison, DO

## 2019-12-05 ENCOUNTER — OFFICE VISIT (OUTPATIENT)
Dept: SURGERY | Facility: OTHER | Age: 69
End: 2019-12-05
Payer: MEDICARE

## 2019-12-05 VITALS
BODY MASS INDEX: 34.56 KG/M2 | SYSTOLIC BLOOD PRESSURE: 120 MMHG | DIASTOLIC BLOOD PRESSURE: 60 MMHG | HEIGHT: 68 IN | TEMPERATURE: 98.6 F | WEIGHT: 228 LBS

## 2019-12-05 DIAGNOSIS — Z98.890 HISTORY OF EPIDERMAL INCLUSION CYST EXCISION: Primary | ICD-10-CM

## 2019-12-05 DIAGNOSIS — Z87.2 HISTORY OF EPIDERMAL INCLUSION CYST EXCISION: Primary | ICD-10-CM

## 2019-12-05 PROCEDURE — 99024 POSTOP FOLLOW-UP VISIT: CPT | Performed by: SURGERY

## 2019-12-05 ASSESSMENT — MIFFLIN-ST. JEOR: SCORE: 1763.7

## 2019-12-05 NOTE — LETTER
12/5/2019         RE: Reinaldo Barrios  [address]  [facility] [address]        Dear Colleague,    Thank you for referring your patient, Reinaldo Barrios, to the [facility]. Please see a copy of my visit note below.    General Surgery Follow Up    Pt returns for follow up visit s/p epidermal cyst excision    HPI:  Reinaldo has been doing well. Small amount of bloody drainage still very slowly oozing. The incision is almost all the way healed but a small punctate opening at the center. No signs of infection.      Past Medical History:   Diagnosis Date     Aortic valve stenosis 11/2/2015     Cardiac LV ejection fraction 30-35% 11/2/2015     Chest pain, unspecified chest pain type 11/2/2015     Hiatal hernia 11/2/2015     Hyperlipidemia LDL goal <100 11/2/2015     Personal history of tobacco use, presenting hazards to health 11/2/2015     Type 2 diabetes mellitus without complication (H) 11/2/2015       Past Surgical History:   Procedure Laterality Date     BYPASS GRAFT ARTERY CORONARY N/A 11/7/2015    Procedure: BYPASS GRAFT ARTERY CORONARY;  Surgeon: Sia Caldera MD;  Location: [facility] OR     KNEE SURGERY  2009     REPLACE VALVE AORTIC N/A 11/7/2015    Procedure: REPLACE VALVE AORTIC;  Surgeon: Sia Caldera MD;  Location:  OR       Social History     Socioeconomic History     Marital status: Single     Spouse name: Not on file     Number of children: Not on file     Years of education: Not on file     Highest education level: Not on file   Occupational History     Not on file   Social Needs     Financial resource strain: Not on file     Food insecurity:     Worry: Not on file     Inability: Not on file     Transportation needs:     Medical: Not on file     Non-medical: Not on file   Tobacco Use     Smoking status: Former Smoker     Types: Pipe     Last attempt to quit: 5/15/2016     Years since quitting: 3.5     Smokeless tobacco: Never Used     Tobacco comment: smokes a pipe in good  "weather   Substance and Sexual Activity     Alcohol use: Yes     Alcohol/week: 0.0 standard drinks     Comment: rare     Drug use: No     Sexual activity: Not Currently     Partners: Female   Lifestyle     Physical activity:     Days per week: Not on file     Minutes per session: Not on file     Stress: Not on file   Relationships     Social connections:     Talks on phone: Not on file     Gets together: Not on file     Attends Muslim service: Not on file     Active member of club or organization: Not on file     Attends meetings of clubs or organizations: Not on file     Relationship status: Not on file     Intimate partner violence:     Fear of current or ex partner: Not on file     Emotionally abused: Not on file     Physically abused: Not on file     Forced sexual activity: Not on file   Other Topics Concern     Parent/sibling w/ CABG, MI or angioplasty before 65F 55M? Not Asked   Social History Narrative     Not on file       Current Outpatient Medications   Medication Sig Dispense Refill     aspirin EC 81 MG EC tablet Take 1 tablet (81 mg) by mouth daily       atorvastatin (LIPITOR) 40 MG tablet Take 1 tablet (40 mg) by mouth daily 90 tablet 3     blood glucose (NO BRAND SPECIFIED) test strip Use to test blood sugars 3 times daily or as directed 100 strip 11     carvedilol (COREG) 3.125 MG tablet TAKE 1 TABLET (3.125 MG) BY MOUTH 2 TIMES DAILY (WITH MEALS) 60 tablet 5     insulin pen needle (B-D U/F) 31G X 8 MM USE THREE PEN NEEDLES DAILY OR AS DIRECTED.  Appointment needed for additional refills. 100 each 0     insulin syringe-needle U-100 (BD INSULIN SYRINGE ULTRAFINE) 31G X 5/16\" 0.5 ML miscellaneous Use one syringe 3 daily or as directed. 100 each prn     isosorbide mononitrate (IMDUR) 30 MG 24 hr tablet TAKE 1 TABLET BY MOUTH EVERY DAY 90 tablet 2     LEVEMIR FLEXTOUCH 100 UNIT/ML pen INJECT 60 UNITS UNDER THE SKIN EVERY DAY 60 mL 3     lisinopril (PRINIVIL/ZESTRIL) 5 MG tablet Take 1 tablet (5 mg) by " "mouth daily 90 tablet 3     NOVOLOG FLEXPEN 100 UNIT/ML soln INJECT 10 - 12 UNITS UNDER THE SKIN TWICE A DAY 15 mL 1     ONETOUCH ULTRA test strip USE TO TEST BLOOD SUGAR 3 TIMES A  each 0     senna-docusate (DOK PLUS) 8.6-50 MG tablet TAKE 1-2 TABLETS BY MOUTH 2 TIMES DAILY 100 tablet 3       Medications and history reviewed    Physical exam:  Vitals: /60   Temp 98.6  F (37  C) (Temporal)   Ht 1.727 m (5' 8\")   Wt 103.4 kg (228 lb)   BMI 34.67 kg/m     BMI= Body mass index is 34.67 kg/m .    HEART: RRR, no new murmurs  LUNGS: CTAB, equal chest rise, good effort  ABD: soft, non tender, non distended  INCISIONS: small punctate opening in the center. Sutures intact, no erythema, fluctuance or induration.  EXT: SANCHEZ, no deformities    PATHOLOGY:  none    Assessment:     ICD-10-CM    1. History of epidermal inclusion cyst excision Z98.890     Z87.2      Plan: Sutures removed, Steris applied. He may return prn.    Clarence Hutchison, DO      Again, thank you for allowing me to participate in the care of your patient.        Sincerely,        Clarence Hutchison, DO    "

## 2019-12-05 NOTE — PROGRESS NOTES
General Surgery Follow Up    Pt returns for follow up visit s/p epidermal cyst excision    HPI:  Reinaldo has been doing well. Small amount of bloody drainage still very slowly oozing. The incision is almost all the way healed but a small punctate opening at the center. No signs of infection.      Past Medical History:   Diagnosis Date     Aortic valve stenosis 11/2/2015     Cardiac LV ejection fraction 30-35% 11/2/2015     Chest pain, unspecified chest pain type 11/2/2015     Hiatal hernia 11/2/2015     Hyperlipidemia LDL goal <100 11/2/2015     Personal history of tobacco use, presenting hazards to health 11/2/2015     Type 2 diabetes mellitus without complication (H) 11/2/2015       Past Surgical History:   Procedure Laterality Date     BYPASS GRAFT ARTERY CORONARY N/A 11/7/2015    Procedure: BYPASS GRAFT ARTERY CORONARY;  Surgeon: Sia Caldera MD;  Location: [facility] OR     KNEE SURGERY  2009     REPLACE VALVE AORTIC N/A 11/7/2015    Procedure: REPLACE VALVE AORTIC;  Surgeon: Sia Caldera MD;  Location:  OR       Social History     Socioeconomic History     Marital status: Single     Spouse name: Not on file     Number of children: Not on file     Years of education: Not on file     Highest education level: Not on file   Occupational History     Not on file   Social Needs     Financial resource strain: Not on file     Food insecurity:     Worry: Not on file     Inability: Not on file     Transportation needs:     Medical: Not on file     Non-medical: Not on file   Tobacco Use     Smoking status: Former Smoker     Types: Pipe     Last attempt to quit: 5/15/2016     Years since quitting: 3.5     Smokeless tobacco: Never Used     Tobacco comment: smokes a pipe in good weather   Substance and Sexual Activity     Alcohol use: Yes     Alcohol/week: 0.0 standard drinks     Comment: rare     Drug use: No     Sexual activity: Not Currently     Partners: Female   Lifestyle     Physical activity:     Days per week:  "Not on file     Minutes per session: Not on file     Stress: Not on file   Relationships     Social connections:     Talks on phone: Not on file     Gets together: Not on file     Attends Jehovah's witness service: Not on file     Active member of club or organization: Not on file     Attends meetings of clubs or organizations: Not on file     Relationship status: Not on file     Intimate partner violence:     Fear of current or ex partner: Not on file     Emotionally abused: Not on file     Physically abused: Not on file     Forced sexual activity: Not on file   Other Topics Concern     Parent/sibling w/ CABG, MI or angioplasty before 65F 55M? Not Asked   Social History Narrative     Not on file       Current Outpatient Medications   Medication Sig Dispense Refill     aspirin EC 81 MG EC tablet Take 1 tablet (81 mg) by mouth daily       atorvastatin (LIPITOR) 40 MG tablet Take 1 tablet (40 mg) by mouth daily 90 tablet 3     blood glucose (NO BRAND SPECIFIED) test strip Use to test blood sugars 3 times daily or as directed 100 strip 11     carvedilol (COREG) 3.125 MG tablet TAKE 1 TABLET (3.125 MG) BY MOUTH 2 TIMES DAILY (WITH MEALS) 60 tablet 5     insulin pen needle (B-D U/F) 31G X 8 MM USE THREE PEN NEEDLES DAILY OR AS DIRECTED.  Appointment needed for additional refills. 100 each 0     insulin syringe-needle U-100 (BD INSULIN SYRINGE ULTRAFINE) 31G X 5/16\" 0.5 ML miscellaneous Use one syringe 3 daily or as directed. 100 each prn     isosorbide mononitrate (IMDUR) 30 MG 24 hr tablet TAKE 1 TABLET BY MOUTH EVERY DAY 90 tablet 2     LEVEMIR FLEXTOUCH 100 UNIT/ML pen INJECT 60 UNITS UNDER THE SKIN EVERY DAY 60 mL 3     lisinopril (PRINIVIL/ZESTRIL) 5 MG tablet Take 1 tablet (5 mg) by mouth daily 90 tablet 3     NOVOLOG FLEXPEN 100 UNIT/ML soln INJECT 10 - 12 UNITS UNDER THE SKIN TWICE A DAY 15 mL 1     ONETOUCH ULTRA test strip USE TO TEST BLOOD SUGAR 3 TIMES A  each 0     senna-docusate (DOK PLUS) 8.6-50 MG tablet " "TAKE 1-2 TABLETS BY MOUTH 2 TIMES DAILY 100 tablet 3       Medications and history reviewed    Physical exam:  Vitals: /60   Temp 98.6  F (37  C) (Temporal)   Ht 1.727 m (5' 8\")   Wt 103.4 kg (228 lb)   BMI 34.67 kg/m    BMI= Body mass index is 34.67 kg/m .    HEART: RRR, no new murmurs  LUNGS: CTAB, equal chest rise, good effort  ABD: soft, non tender, non distended  INCISIONS: small punctate opening in the center. Sutures intact, no erythema, fluctuance or induration.  EXT: SANCHEZ, no deformities    PATHOLOGY:  none    Assessment:     ICD-10-CM    1. History of epidermal inclusion cyst excision Z98.890     Z87.2      Plan: Sutures removed, Steris applied. He may return prn.    Clarence Hutchison, DO    "

## 2019-12-16 ENCOUNTER — OFFICE VISIT (OUTPATIENT)
Dept: FAMILY MEDICINE | Facility: OTHER | Age: 69
End: 2019-12-16
Payer: MEDICARE

## 2019-12-16 VITALS
TEMPERATURE: 98.7 F | HEIGHT: 68 IN | SYSTOLIC BLOOD PRESSURE: 110 MMHG | DIASTOLIC BLOOD PRESSURE: 62 MMHG | BODY MASS INDEX: 33.95 KG/M2 | HEART RATE: 72 BPM | RESPIRATION RATE: 18 BRPM | WEIGHT: 224 LBS | OXYGEN SATURATION: 97 %

## 2019-12-16 DIAGNOSIS — Z95.1 S/P CABG (CORONARY ARTERY BYPASS GRAFT): ICD-10-CM

## 2019-12-16 DIAGNOSIS — Z95.2 HEART VALVE REPLACED: ICD-10-CM

## 2019-12-16 DIAGNOSIS — Z79.4 TYPE 2 DIABETES MELLITUS WITH HYPERGLYCEMIA, WITH LONG-TERM CURRENT USE OF INSULIN (H): ICD-10-CM

## 2019-12-16 DIAGNOSIS — R94.30 CARDIAC LV EJECTION FRACTION 30-35%: ICD-10-CM

## 2019-12-16 DIAGNOSIS — E11.65 TYPE 2 DIABETES MELLITUS WITH HYPERGLYCEMIA, WITH LONG-TERM CURRENT USE OF INSULIN (H): ICD-10-CM

## 2019-12-16 DIAGNOSIS — I10 ESSENTIAL HYPERTENSION WITH GOAL BLOOD PRESSURE LESS THAN 140/90: Primary | ICD-10-CM

## 2019-12-16 PROCEDURE — 99214 OFFICE O/P EST MOD 30 MIN: CPT | Performed by: INTERNAL MEDICINE

## 2019-12-16 ASSESSMENT — MIFFLIN-ST. JEOR: SCORE: 1745.56

## 2019-12-16 ASSESSMENT — PAIN SCALES - GENERAL: PAINLEVEL: MODERATE PAIN (4)

## 2019-12-16 NOTE — PROGRESS NOTES
Subjective     Reinaldo Barrios is a 69 year old male who presents to clinic today for the following health issues:    HPI   Chief Complaint   Patient presents with     Follow Up     diabetes, heart and back                      Chief Complaint         The patient is a pleasant and well-known 69-year-old gentleman who presents today for follow-up of his diabetes.  He is currently on Levemir with twice daily NovoLog.  He states he has had no significant hyper or hypoglycemic episodes.  He continues to take the aspirin, statin, ACE inhibitor without difficulty.  He recently had a large sebaceous cyst removed from his back and this is healing well.  There is no discharge or drainage present.  He does have a history of coronary artery disease and denies any recent chest pain.  He has not required any nitroglycerin but does take the Imdur regularly.                       PAST, FAMILY,SOCIAL HISTORY:     Medical  History:   has a past medical history of Aortic valve stenosis (11/2/2015), Cardiac LV ejection fraction 30-35% (11/2/2015), Chest pain, unspecified chest pain type (11/2/2015), Hiatal hernia (11/2/2015), Hyperlipidemia LDL goal <100 (11/2/2015), Personal history of tobacco use, presenting hazards to health (11/2/2015), and Type 2 diabetes mellitus without complication (H) (11/2/2015).     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     Social History:   reports that he quit smoking about 3 years ago. His smoking use included pipe. He has never used smokeless tobacco. He reports current alcohol use. He reports that he does not use drugs.     Family History:  family history is not on file.            MEDICATIONS  Current Outpatient Medications   Medication Sig Dispense Refill     aspirin EC 81 MG EC tablet Take 1 tablet (81 mg) by mouth daily       atorvastatin (LIPITOR) 40 MG  "tablet Take 1 tablet (40 mg) by mouth daily 90 tablet 3     blood glucose (NO BRAND SPECIFIED) test strip Use to test blood sugars 3 times daily or as directed 100 strip 11     carvedilol (COREG) 3.125 MG tablet TAKE 1 TABLET (3.125 MG) BY MOUTH 2 TIMES DAILY (WITH MEALS) 60 tablet 5     insulin pen needle (B-D U/F) 31G X 8 MM USE THREE PEN NEEDLES DAILY OR AS DIRECTED.  Appointment needed for additional refills. 100 each 0     insulin syringe-needle U-100 (BD INSULIN SYRINGE ULTRAFINE) 31G X 5/16\" 0.5 ML miscellaneous Use one syringe 3 daily or as directed. 100 each prn     isosorbide mononitrate (IMDUR) 30 MG 24 hr tablet TAKE 1 TABLET BY MOUTH EVERY DAY 90 tablet 2     LEVEMIR FLEXTOUCH 100 UNIT/ML pen INJECT 60 UNITS UNDER THE SKIN EVERY DAY 60 mL 3     lisinopril (PRINIVIL/ZESTRIL) 5 MG tablet Take 1 tablet (5 mg) by mouth daily 90 tablet 3     NOVOLOG FLEXPEN 100 UNIT/ML soln INJECT 10 - 12 UNITS UNDER THE SKIN TWICE A DAY 15 mL 1     ONETOUCH ULTRA test strip USE TO TEST BLOOD SUGAR 3 TIMES A  each 0     senna-docusate (DOK PLUS) 8.6-50 MG tablet TAKE 1-2 TABLETS BY MOUTH 2 TIMES DAILY 100 tablet 3         --------------------------------------------------------------------------------------------------------------------                              Review of Systems       LUNGS: Pt denies: cough, excess sputum, hemoptysis, or shortness of breath.   HEART: Pt denies: chest pain, arrhythmia, syncope, tachy or bradyarrhythmia.   GI: Pt denies: nausea, vomiting, diarrhea, constipation, melena, or hematochezia.   NEURO: Pt denies: seizures, strokes, diplopia, weakness, paraesthesias, or paralysis.   SKIN: Pt denies: itching, rashes, discoloration, or specific lesions of concern. Denies recent hair loss.   PSYCH: The patient denies significant depression, anxiety, mood imbalance. Specifically denies any suicidal ideation.                                     Examination    /62 (BP Location: Left arm, " "Patient Position: Sitting, Cuff Size: Adult Large)   Pulse 72   Temp 98.7  F (37.1  C) (Temporal)   Resp 18   Ht 1.727 m (5' 8\")   Wt 101.6 kg (224 lb)   SpO2 97%   BMI 34.06 kg/m       Constitutional: The patient appears to be in no acute distress. The patient appears to be adequately hydrated. No acute respiratory or hemodynamic distress is noted at this time.   LUNGS: clear bilaterally, airflow is brisk, no intercostal retraction or stridor is noted. No coughing is noted during visit.   HEART:  regular without rubs, clicks, gallops, or murmurs. PMI is nondisplaced. Upstrokes are brisk. S1,S2 are heard.  The porcine valve sounds crisp and clear.   GI: Abdomen is soft, without rebound, guarding or tenderness. Bowel sounds are appropriate. No renal bruits are heard.   NEURO: Pt is alert and appropriate. No neurologic lateralization is noted. Cranial nerves 2-12 are intact. Peripheral sensory and motor function are grossly normal.    SKIN:  warm and dry. No erythema, or rashes are noted. No specific lesions of concern are noted.    PSYCH: The patient appears grossly appropriate. Maintains good eye contact, does not have any jittery or atypical motion. Displays appropriate affect.                                           Decision Making  1. Type 2 diabetes mellitus with hyperglycemia, with long-term current use of insulin (H)  Patient will set up back examinations, continue insulin at current dosages.  Continue aspirin, ACE inhibitor, statin.    2. Essential hypertension with goal blood pressure less than 140/90  Controlled.  Continue current medication    3. Heart valve replaced [Z95.2]  Recommend echocardiogram next year.    4. S/P CABG (coronary artery bypass graft)  Doing well, continue current medications.    5. Cardiac LV ejection fraction 30-35%  No signs of uncontrolled congestive heart failure at this time.                           FOLLOW UP   I have asked the patient to make an appointment for " followup with me in 4 months        I have carefully explained the diagnosis and treatment options to the patient.  The patient has displayed an understanding of the above, and all subsequent questions were answered.      DO RADHA Horn    Portions of this note were produced using Trustribe  Although every attempt at real-time proof reading has been made, occasional grammar/syntax errors may have been missed.

## 2020-02-06 ENCOUNTER — TELEPHONE (OUTPATIENT)
Dept: FAMILY MEDICINE | Facility: OTHER | Age: 70
End: 2020-02-06

## 2020-02-06 NOTE — LETTER
[facility]  [address] [facility] [address]  [phone]        Reinaldo Barrios  [address]   [facility] [address]      February 6, 2020      Dear Reinaldo,    I care about your health and have reviewed your health plan, including your medical conditions, medication list, and lab results and am making recommendations based on this review, to better manage your health.    You are in particular need of attention regarding:  -Annual Diabetic Eye Exam    I am recommending that you:  - Schedule your annual diabetic eye exam with eye care provider of your choice. Please have these results faxed to us at [phone]    If you've had the preventative screening completed at another facility or feel you're not due for this screening, please call our clinic at the number listed above or send us a My Chart message so we can update our records. We would like to thank you in advance for taking the time to take care of your health.  If you have any questions, please don t hesitate to contact our clinic.    Sincerely,       Your [facility] Team

## 2020-02-06 NOTE — TELEPHONE ENCOUNTER
Panel Management Review      Health Maintenance Due   Topic Date Due     ZOSTER IMMUNIZATION (1 of 2) 10/21/1998     PNEUMOCOCCAL IMMUNIZATION 65+ LOW/MEDIUM RISK (2 of 2 - PPSV23) 10/13/2017     EYE EXAM  08/03/2019     PHQ-2  01/01/2020     Summary:    Patient is due/failing the following:   Diabetic eye exam    Action needed:   Needs to schedule annual diabetic eye exam with eye care provider of choice and have results faxed to clinic.    Type of outreach:    Sent letter.    Questions for provider review:    None                                                                                                                                    Jeanna Swanson MA     2/6/2020       Chart routed to none .

## 2020-03-13 DIAGNOSIS — E11.9 TYPE 2 DIABETES MELLITUS WITHOUT COMPLICATION, WITH LONG-TERM CURRENT USE OF INSULIN (H): ICD-10-CM

## 2020-03-13 DIAGNOSIS — Z79.4 TYPE 2 DIABETES MELLITUS WITHOUT COMPLICATION, WITH LONG-TERM CURRENT USE OF INSULIN (H): ICD-10-CM

## 2020-03-16 RX ORDER — INSULIN ASPART 100 [IU]/ML
INJECTION, SOLUTION INTRAVENOUS; SUBCUTANEOUS
Qty: 15 ML | Refills: 0 | Status: SHIPPED | OUTPATIENT
Start: 2020-03-16 | End: 2020-06-05

## 2020-03-16 NOTE — TELEPHONE ENCOUNTER
"Requested Prescriptions   Pending Prescriptions Disp Refills     NOVOLOG FLEXPEN 100 UNIT/ML soln [Pharmacy Med Name: insulin aspart (NOVOLOG FLEXPEN) 100 UNIT/ML injection] 15 mL 1     Sig: INJECT 10 - 12 UNITS UNDER THE SKIN TWICE A DAY   Last Written Prescription Date:  10/25/2019  Last Fill Quantity: 15ml,  # refills: 1   Last office visit: 9/13/2018 with prescribing provider:  12/16/2019   Future Office Visit:   Next 5 appointments (look out 90 days)    Apr 16, 2020  7:40 AM CDT  Office Visit with Mark Blanchard DO  [facility] ([facility] [address] [facility] [id number] [phone]             Short Acting Insulin Protocol Failed - 3/13/2020 12:06 PM        Failed - HgbA1C in past 3 or 6 months     If HgbA1C is 8 or greater, it needs to be on file within the past 3 months.  If less than 8, must be on file within the past 6 months.     Recent Labs   Lab Test 10/15/19  0806   A1C 8.6*             Passed - Blood pressure less than 140/90 in past 6 months     BP Readings from Last 3 Encounters:   12/16/19 110/62   12/05/19 120/60   11/25/19 104/60                 Passed - LDL on file in past 12 months     Recent Labs   Lab Test 10/15/19  0806   LDL 55             Passed - Microalbumin on file in past 12 months     Recent Labs   Lab Test 04/10/19  0825   MICROL 9   UMALCR 5.82             Passed - Serum creatinine on file in past 12 months     Recent Labs   Lab Test 10/15/19  0806   CR 1.02       Ok to refill medication if creatinine is low          Passed - Medication is active on med list        Passed - Patient is age 18 or older        Passed - Recent (6 mo) or future (30 days) visit within the authorizing provider's specialty     Patient had office visit in the last 6 months or has a visit in the next 30 days with authorizing provider or within the authorizing provider's specialty.  See \"Patient Info\" tab in inbasket, or \"Choose Columns\" in Meds & Orders section " of the refill encounter.

## 2020-03-16 NOTE — TELEPHONE ENCOUNTER
NOVOLOG FLEXPEN 100 UNIT/ML soln [Pharmacy Med Name: insulin aspart (NOVOLOG FLEXPEN) 100 UNIT/ML injection] 15 mL 1    Sig: INJECT 10 - 12 UNITS UNDER THE SKIN TWICE A DAY   Medication is being filled for 1 time refill only due to:  4/16/2020 office visit for further refills     Martha Dooley RN

## 2020-03-17 DIAGNOSIS — E78.5 HYPERLIPIDEMIA LDL GOAL <100: ICD-10-CM

## 2020-03-17 DIAGNOSIS — R07.2 PRECORDIAL PAIN: ICD-10-CM

## 2020-03-18 RX ORDER — ATORVASTATIN CALCIUM 40 MG/1
40 TABLET, FILM COATED ORAL DAILY
Qty: 90 TABLET | Refills: 3 | Status: SHIPPED | OUTPATIENT
Start: 2020-03-18 | End: 2021-04-08

## 2020-03-18 RX ORDER — ISOSORBIDE MONONITRATE 30 MG/1
TABLET, EXTENDED RELEASE ORAL
Qty: 90 TABLET | Refills: 2 | Status: SHIPPED | OUTPATIENT
Start: 2020-03-18 | End: 2021-01-07

## 2020-04-16 ENCOUNTER — VIRTUAL VISIT (OUTPATIENT)
Dept: FAMILY MEDICINE | Facility: CLINIC | Age: 70
End: 2020-04-16
Payer: MEDICARE

## 2020-04-16 DIAGNOSIS — Z79.4 TYPE 2 DIABETES MELLITUS WITH HYPERGLYCEMIA, WITH LONG-TERM CURRENT USE OF INSULIN (H): ICD-10-CM

## 2020-04-16 DIAGNOSIS — E66.01 MORBID OBESITY (H): ICD-10-CM

## 2020-04-16 DIAGNOSIS — I48.4 ATYPICAL ATRIAL FLUTTER (H): ICD-10-CM

## 2020-04-16 DIAGNOSIS — I24.9 ACS (ACUTE CORONARY SYNDROME) (H): ICD-10-CM

## 2020-04-16 DIAGNOSIS — E11.65 TYPE 2 DIABETES MELLITUS WITH HYPERGLYCEMIA, WITH LONG-TERM CURRENT USE OF INSULIN (H): ICD-10-CM

## 2020-04-16 DIAGNOSIS — R94.30 CARDIAC LV EJECTION FRACTION 30-35%: ICD-10-CM

## 2020-04-16 DIAGNOSIS — Z95.1 S/P CABG (CORONARY ARTERY BYPASS GRAFT): Primary | ICD-10-CM

## 2020-04-16 DIAGNOSIS — I50.82 BIVENTRICULAR CONGESTIVE HEART FAILURE (H): ICD-10-CM

## 2020-04-16 DIAGNOSIS — Z95.2 S/P AORTIC VALVE REPLACEMENT: ICD-10-CM

## 2020-04-16 PROCEDURE — 99443 ZZC PHYSICIAN TELEPHONE EVALUATION 21-30 MIN: CPT | Performed by: INTERNAL MEDICINE

## 2020-04-16 RX ORDER — PEN NEEDLE, DIABETIC 31 GX5/16"
NEEDLE, DISPOSABLE MISCELLANEOUS
Qty: 100 EACH | Refills: 0 | Status: SHIPPED | OUTPATIENT
Start: 2020-04-16 | End: 2021-05-25

## 2020-04-16 NOTE — PROGRESS NOTES
"Reinaldo Barrios is a 69 year old male who is being evaluated via a billable telephone visit.      The patient has been notified of following:     \"This telephone visit will be conducted via a call between you and your physician/provider. We have found that certain health care needs can be provided without the need for a physical exam.  This service lets us provide the care you need with a short phone conversation.  If a prescription is necessary we can send it directly to your pharmacy.  If lab work is needed we can place an order for that and you can then stop by our lab to have the test done at a later time.    Telephone visits are billed at different rates depending on your insurance coverage. During this emergency period, for some insurers they may be billed the same as an in-person visit.  Please reach out to your insurance provider with any questions.    If during the course of the call the physician/provider feels a telephone visit is not appropriate, you will not be charged for this service.\"    Patient has given verbal consent for Telephone visit?  Yes    How would you like to obtain your AVS? email   Subjective     Reinaldo Barrios is a 69 year old male who presents to clinic today for the following health issues:    New Patient/Transfer of Care  Diabetes Follow-up    How often are you checking your blood sugar? Four or more times daily  Blood sugar testing frequency justification:  Uncontrolled diabetes  What time of day are you checking your blood sugars (select all that apply)?  Before meals  Have you had any blood sugars above 200?  Yes   Have you had any blood sugars below 70?  Yes     What symptoms do you notice when your blood sugar is low?  Shaky, Dizzy and Weak    What concerns do you have today about your diabetes? None and Low blood sugar     Do you have any of these symptoms? (Select all that apply)  No numbness or tingling in feet.  No redness, sores or blisters on feet.  No " complaints of excessive thirst.  No reports of blurry vision.  No significant changes to weight.    Have you had a diabetic eye exam in the last 12 months?           Hyperlipidemia Follow-Up      Are you regularly taking any medication or supplement to lower your cholesterol?   Yes- .    Are you having muscle aches or other side effects that you think could be caused by your cholesterol lowering medication?  No    Hypertension Follow-up      Do you check your blood pressure regularly outside of the clinic? Yes     Are you following a low salt diet? Yes    Are your blood pressures ever more than 140 on the top number (systolic) OR more   than 90 on the bottom number (diastolic), for example 140/90? No    BP Readings from Last 2 Encounters:   12/16/19 110/62   12/05/19 120/60     Hemoglobin A1C (%)   Date Value   10/15/2019 8.6 (H)   08/12/2019 8.3 (H)     LDL Cholesterol Calculated (mg/dL)   Date Value   10/15/2019 55   09/13/2018 63         How many servings of fruits and vegetables do you eat daily?  0-1    On average, how many sweetened beverages do you drink each day (Examples: soda, juice, sweet tea, etc.  Do NOT count diet or artificially sweetened beverages)?   0    How many days per week do you exercise enough to make your heart beat faster? 3 or less    How many minutes a day do you exercise enough to make your heart beat faster? 9 or less    How many days per week do you miss taking your medication? 0         -------------------------------------    Patient Active Problem List   Diagnosis     Aortic valve stenosis     Personal history of tobacco use, presenting hazards to health     Hyperlipidemia LDL goal <100     Hiatal hernia     Chest pain, unspecified chest pain type     Cardiac LV ejection fraction 30-35%     NSTEMI (non-ST elevated myocardial infarction) (H)     ACS (acute coronary syndrome) (H)     CHF (congestive heart failure) (H)     Aortic stenosis     S/P CABG (coronary artery bypass graft)      "Advanced directives, counseling/discussion     Long term current use of anticoagulant therapy     Heart valve replaced [Z95.2]     S/P aortic valve replacement     Atypical atrial flutter (H)     Essential hypertension with goal blood pressure less than 140/90     Non morbid obesity due to excess calories     Type 2 diabetes mellitus with hyperglycemia, with long-term current use of insulin (H)     Obesity (BMI 35.0-39.9) with comorbidity (H)     Past Surgical History:   Procedure Laterality Date     BYPASS GRAFT ARTERY CORONARY N/A 11/7/2015    Procedure: BYPASS GRAFT ARTERY CORONARY;  Surgeon: Sia Caldera MD;  Location:  OR     KNEE SURGERY  2009     REPLACE VALVE AORTIC N/A 11/7/2015    Procedure: REPLACE VALVE AORTIC;  Surgeon: Sia Caldera MD;  Location:  OR       Social History     Tobacco Use     Smoking status: Former Smoker     Types: Pipe     Last attempt to quit: 5/15/2016     Years since quitting: 3.9     Smokeless tobacco: Never Used     Tobacco comment: smokes a pipe in good weather   Substance Use Topics     Alcohol use: Yes     Alcohol/week: 0.0 standard drinks     Comment: rare     No family history on file.      Current Outpatient Medications   Medication Sig Dispense Refill     aspirin EC 81 MG EC tablet Take 1 tablet (81 mg) by mouth daily       atorvastatin (LIPITOR) 40 MG tablet TAKE 1 TABLET (40 MG) BY MOUTH DAILY 90 tablet 3     blood glucose (NO BRAND SPECIFIED) test strip Use to test blood sugars 3 times daily or as directed 100 strip 11     carvedilol (COREG) 3.125 MG tablet TAKE 1 TABLET (3.125 MG) BY MOUTH 2 TIMES DAILY (WITH MEALS) 60 tablet 5     insulin pen needle (B-D U/F) 31G X 8 MM USE THREE PEN NEEDLES DAILY OR AS DIRECTED.  Appointment needed for additional refills. 100 each 0     insulin syringe-needle U-100 (BD INSULIN SYRINGE ULTRAFINE) 31G X 5/16\" 0.5 ML miscellaneous Use one syringe 3 daily or as directed. 100 each prn     isosorbide mononitrate (IMDUR) 30 " "MG 24 hr tablet TAKE 1 TABLET BY MOUTH EVERY DAY 90 tablet 2     LEVEMIR FLEXTOUCH 100 UNIT/ML pen INJECT 60 UNITS UNDER THE SKIN EVERY DAY 60 mL 3     lisinopril (PRINIVIL/ZESTRIL) 5 MG tablet Take 1 tablet (5 mg) by mouth daily 90 tablet 3     NOVOLOG FLEXPEN 100 UNIT/ML soln INJECT 10 - 12 UNITS UNDER THE SKIN TWICE A DAY 15 mL 0     ONETOUCH ULTRA test strip USE TO TEST BLOOD SUGAR 3 TIMES A  each 0     senna-docusate (DOK PLUS) 8.6-50 MG tablet TAKE 1-2 TABLETS BY MOUTH 2 TIMES DAILY 100 tablet 3     Allergies   Allergen Reactions     Sulfa Drugs      Optison Unknown     Pt states he \"felt like crap later\" after he was given Optison in 2016.     Recent Labs   Lab Test 10/15/19  0806 08/12/19  0806 04/10/19  0815 09/13/18  0844  12/09/17  1509  06/15/17  0852   A1C 8.6* 8.3* 8.2* 7.5*   < >  --    < > 7.7*   LDL 55  --   --  63  --   --   --  64   HDL 53  --   --  47  --   --   --  55   TRIG 106  --   --  140  --   --   --  182*   ALT 26  --   --  31  --  26  --   --    CR 1.02 1.06 1.01 0.99   < > 1.00   < >  --    GFRESTIMATED 74 70 75 75   < > 74   < >  --    GFRESTBLACK 85 82 87 >90   < > >90   < >  --    POTASSIUM 4.4 4.1 4.3 3.9   < > 3.8   < >  --    TSH 3.71  --  2.96  --   --   --   --   --     < > = values in this interval not displayed.        Reviewed and updated as needed this visit by Provider         Review of Systems   ROS COMP: CONSTITUTIONAL: NEGATIVE for fever, chills, change in weight  INTEGUMENTARY/SKIN: NEGATIVE for worrisome rashes, moles or lesions  EYES: NEGATIVE for vision changes or irritation  ENT/MOUTH: NEGATIVE for ear, mouth and throat problems.Occasional vertigo    RESP: NEGATIVE for significant cough or SOB  CV: NEGATIVE for palpitations or peripheral edema. Rare chest pain. Resolves with rest.  GI: NEGATIVE for nausea, abdominal pain, heartburn, or change in bowel habits  : NEGATIVE for frequency, dysuria, or hematuria  MUSCULOSKELETAL: NEGATIVE for significant " arthralgias or myalgia  NEURO: NEGATIVE for weakness, dizziness or paresthesias  ENDOCRINE: NEGATIVE for temperature intolerance, skin/hair changes  HEME: NEGATIVE for bleeding problems  PSYCHIATRIC: NEGATIVE for changes in mood or affect       Objective   Reported vitals:  There were no vitals taken for this visit.   healthy, alert and no distress  PSYCH: Alert and oriented times 3; coherent speech, normal   rate and volume, able to articulate logical thoughts, able   to abstract reason, no tangential thoughts, no hallucinations   or delusions  His affect is normal  RESP: No cough, no audible wheezing, able to talk in full sentences  Remainder of exam unable to be completed due to telephone visits    Diagnostic Test Results:  Labs reviewed in Epic        Assessment/Plan:  1. Type 2 diabetes mellitus with hyperglycemia, with long-term current use of insulin (H)    - insulin pen needle (B-D U/F) 31G X 8 MM miscellaneous; USE THREE PEN NEEDLES DAILY OR AS DIRECTED.  Appointment needed for additional refills.  Dispense: 100 each; Refill: 0    2. Atypical atrial flutter (H)  Currently in sinus rhythm.  Currently not requiring anticoagulant therapy.    3. Morbid obesity (H)  Recommend continued caloric restriction    4. ACS (acute coronary syndrome) (H)  Stable at this time.  Continue medications.    5. S/P CABG (coronary artery bypass graft)  As above    6. S/P aortic valve replacement  Bioprosthetic valve.    7. Cardiac LV ejection fraction 30-35%  We will check echocardiogram following epidemic    8. Biventricular congestive heart failure (H)  Stable at this time without signs of edema or dyspnea                               FOLLOW UP   I have asked the patient to make an appointment for followup with me in July            I have carefully explained the diagnosis and treatment options to the patient.  The patient has displayed an understanding of the above, and all subsequent questions were answered.      Mark  DO RADHA Blanchard    Portions of this note were produced using [employer]  Although every attempt at real-time proof reading has been made, occasional grammar/syntax errors may have been missed.      Phone call duration: 24 minutes

## 2020-05-05 DIAGNOSIS — K59.04 CHRONIC IDIOPATHIC CONSTIPATION: ICD-10-CM

## 2020-05-06 RX ORDER — AMOXICILLIN 250 MG
CAPSULE ORAL
Qty: 100 TABLET | Refills: 3 | Status: SHIPPED | OUTPATIENT
Start: 2020-05-06 | End: 2020-12-02

## 2020-05-06 NOTE — TELEPHONE ENCOUNTER
Prescription approved per [facility] Refill Protocol.    TERI HairstonN, RN  [facility]

## 2020-06-04 DIAGNOSIS — E11.9 TYPE 2 DIABETES MELLITUS WITHOUT COMPLICATION, WITH LONG-TERM CURRENT USE OF INSULIN (H): ICD-10-CM

## 2020-06-04 DIAGNOSIS — Z79.4 TYPE 2 DIABETES MELLITUS WITHOUT COMPLICATION, WITH LONG-TERM CURRENT USE OF INSULIN (H): ICD-10-CM

## 2020-06-04 DIAGNOSIS — I50.42 SYSTOLIC AND DIASTOLIC CHF, CHRONIC (H): ICD-10-CM

## 2020-06-05 RX ORDER — INSULIN ASPART 100 [IU]/ML
INJECTION, SOLUTION INTRAVENOUS; SUBCUTANEOUS
Qty: 15 ML | Refills: 0 | Status: SHIPPED | OUTPATIENT
Start: 2020-06-05 | End: 2020-08-13

## 2020-06-05 RX ORDER — LISINOPRIL 5 MG/1
5 TABLET ORAL DAILY
Qty: 90 TABLET | Refills: 0 | Status: SHIPPED | OUTPATIENT
Start: 2020-06-05 | End: 2020-09-28

## 2020-06-05 NOTE — TELEPHONE ENCOUNTER
Routing refill request to provider for review/approval because:  Labs not current:  A1C    TERI HairstonN, RN  [facility]

## 2020-06-18 DIAGNOSIS — Z79.4 TYPE 2 DIABETES MELLITUS WITH HYPERGLYCEMIA, WITH LONG-TERM CURRENT USE OF INSULIN (H): ICD-10-CM

## 2020-06-18 DIAGNOSIS — E11.65 TYPE 2 DIABETES MELLITUS WITH HYPERGLYCEMIA, WITH LONG-TERM CURRENT USE OF INSULIN (H): ICD-10-CM

## 2020-06-18 RX ORDER — BLOOD SUGAR DIAGNOSTIC
STRIP MISCELLANEOUS
Qty: 100 EACH | Refills: 11 | Status: SHIPPED | OUTPATIENT
Start: 2020-06-18 | End: 2023-04-13

## 2020-06-30 DIAGNOSIS — I10 ESSENTIAL HYPERTENSION WITH GOAL BLOOD PRESSURE LESS THAN 140/90: ICD-10-CM

## 2020-06-30 RX ORDER — CARVEDILOL 3.12 MG/1
TABLET ORAL
Qty: 60 TABLET | Refills: 0 | Status: SHIPPED | OUTPATIENT
Start: 2020-06-30 | End: 2020-09-10

## 2020-06-30 NOTE — TELEPHONE ENCOUNTER
"Routing refill request to provider for review/approval because:  A break in medication  T'd up 1 month for provider review.    Requested Prescriptions   Pending Prescriptions Disp Refills     carvedilol (COREG) 3.125 MG tablet [Pharmacy Med Name: CARVEDILOL 3.125MG TABLET] 60 tablet 5     Sig: TAKE 1 TABLET (3.125 MG) BY MOUTH 2 TIMES DAILY (WITH MEALS)   Last Written Prescription Date:  4/10/2019  Last Fill Quantity: 60,  # refills: 5   Last office visit: 4/16/2020 with prescribing provider:     Future Office Visit:        Beta-Blockers Protocol Passed - 6/30/2020 12:20 PM        Passed - Blood pressure under 140/90 in past 12 months     BP Readings from Last 3 Encounters:   12/16/19 110/62   12/05/19 120/60   11/25/19 104/60           Passed - Patient is age 6 or older        Passed - Recent (12 mo) or future (30 days) visit within the authorizing provider's specialty     Patient has had an office visit with the authorizing provider or a provider within the authorizing providers department within the previous 12 mos or has a future within next 30 days. See \"Patient Info\" tab in inbasket, or \"Choose Columns\" in Meds & Orders section of the refill encounter.            Passed - Medication is active on med list         Martha Dooley RN      "

## 2020-08-13 DIAGNOSIS — E11.9 TYPE 2 DIABETES MELLITUS WITHOUT COMPLICATION, WITH LONG-TERM CURRENT USE OF INSULIN (H): ICD-10-CM

## 2020-08-13 DIAGNOSIS — Z79.4 TYPE 2 DIABETES MELLITUS WITHOUT COMPLICATION, WITH LONG-TERM CURRENT USE OF INSULIN (H): ICD-10-CM

## 2020-08-13 RX ORDER — INSULIN ASPART 100 [IU]/ML
INJECTION, SOLUTION INTRAVENOUS; SUBCUTANEOUS
Qty: 15 ML | Refills: 0 | Status: SHIPPED | OUTPATIENT
Start: 2020-08-13 | End: 2020-09-25

## 2020-08-13 NOTE — TELEPHONE ENCOUNTER
Routing refill request to provider for review/approval because:  Labs not current:  A1C    TERI HairstonN, RN  [facility]

## 2020-09-08 DIAGNOSIS — I10 ESSENTIAL HYPERTENSION WITH GOAL BLOOD PRESSURE LESS THAN 140/90: ICD-10-CM

## 2020-09-10 RX ORDER — CARVEDILOL 3.12 MG/1
TABLET ORAL
Qty: 180 TABLET | Refills: 1 | Status: SHIPPED | OUTPATIENT
Start: 2020-09-10 | End: 2021-09-12

## 2020-09-10 NOTE — TELEPHONE ENCOUNTER
Prescription approved per [facility] Refill Protocol.    TERI HairstonN, RN  [facility]

## 2020-09-25 DIAGNOSIS — Z79.4 TYPE 2 DIABETES MELLITUS WITHOUT COMPLICATION, WITH LONG-TERM CURRENT USE OF INSULIN (H): ICD-10-CM

## 2020-09-25 DIAGNOSIS — E11.9 TYPE 2 DIABETES MELLITUS WITHOUT COMPLICATION, WITH LONG-TERM CURRENT USE OF INSULIN (H): ICD-10-CM

## 2020-09-25 DIAGNOSIS — Z79.4 TYPE 2 DIABETES MELLITUS WITH HYPERGLYCEMIA, WITH LONG-TERM CURRENT USE OF INSULIN (H): ICD-10-CM

## 2020-09-25 DIAGNOSIS — E11.65 TYPE 2 DIABETES MELLITUS WITH HYPERGLYCEMIA, WITH LONG-TERM CURRENT USE OF INSULIN (H): ICD-10-CM

## 2020-09-25 RX ORDER — INSULIN ASPART 100 [IU]/ML
INJECTION, SOLUTION INTRAVENOUS; SUBCUTANEOUS
Qty: 15 ML | Refills: 0 | Status: SHIPPED | OUTPATIENT
Start: 2020-09-25 | End: 2020-12-02

## 2020-09-25 RX ORDER — INSULIN DETEMIR 100 [IU]/ML
INJECTION, SOLUTION SUBCUTANEOUS
Qty: 60 ML | Refills: 0 | Status: SHIPPED | OUTPATIENT
Start: 2020-09-25 | End: 2020-12-02

## 2020-09-25 NOTE — TELEPHONE ENCOUNTER
Routing refill request to provider for review/approval because:  Labs not current:  A1C    TERI HairstonN, RN  [facility]

## 2020-09-26 DIAGNOSIS — I50.42 SYSTOLIC AND DIASTOLIC CHF, CHRONIC (H): ICD-10-CM

## 2020-09-28 RX ORDER — LISINOPRIL 5 MG/1
5 TABLET ORAL DAILY
Qty: 90 TABLET | Refills: 0 | Status: SHIPPED | OUTPATIENT
Start: 2020-09-28 | End: 2021-01-07

## 2020-09-28 NOTE — TELEPHONE ENCOUNTER
"  Requested Prescriptions   Pending Prescriptions Disp Refills     lisinopril (ZESTRIL) 5 MG tablet [Pharmacy Med Name: LISINOPRIL 5MG TABLET] 90 tablet 0     Sig: TAKE 1 TABLET (5 MG) BY MOUTH DAILY   Last office visit with PCP 4/16/2020    ACE Inhibitors (Including Combos) Protocol Passed - 9/26/2020 11:30 AM        Passed - Blood pressure under 140/90 in past 12 months     BP Readings from Last 3 Encounters:   12/16/19 110/62   12/05/19 120/60   11/25/19 104/60           Passed - Recent (12 mo) or future (30 days) visit within the authorizing provider's specialty     Patient has had an office visit with the authorizing provider or a provider within the authorizing providers department within the previous 12 mos or has a future within next 30 days. See \"Patient Info\" tab in inbasket, or \"Choose Columns\" in Meds & Orders section of the refill encounter.            Passed - Medication is active on med list        Passed - Patient is age 18 or older        Passed - Normal serum creatinine on file in past 12 months     Recent Labs   Lab Test 10/15/19  0806   CR 1.02       Ok to refill medication if creatinine is low          Passed - Normal serum potassium on file in past 12 months     Recent Labs   Lab Test 10/15/19  0806   POTASSIUM 4.4              Prescription approved per [facility] Refill Protocol.  Martha Dooley RN      "

## 2020-12-01 DIAGNOSIS — K59.04 CHRONIC IDIOPATHIC CONSTIPATION: ICD-10-CM

## 2020-12-01 DIAGNOSIS — Z79.4 TYPE 2 DIABETES MELLITUS WITH HYPERGLYCEMIA, WITH LONG-TERM CURRENT USE OF INSULIN (H): ICD-10-CM

## 2020-12-01 DIAGNOSIS — E11.9 TYPE 2 DIABETES MELLITUS WITHOUT COMPLICATION, WITH LONG-TERM CURRENT USE OF INSULIN (H): ICD-10-CM

## 2020-12-01 DIAGNOSIS — Z79.4 TYPE 2 DIABETES MELLITUS WITHOUT COMPLICATION, WITH LONG-TERM CURRENT USE OF INSULIN (H): ICD-10-CM

## 2020-12-01 DIAGNOSIS — E11.65 TYPE 2 DIABETES MELLITUS WITH HYPERGLYCEMIA, WITH LONG-TERM CURRENT USE OF INSULIN (H): ICD-10-CM

## 2020-12-02 RX ORDER — INSULIN DETEMIR 100 [IU]/ML
INJECTION, SOLUTION SUBCUTANEOUS
Qty: 60 ML | Refills: 0 | Status: SHIPPED | OUTPATIENT
Start: 2020-12-02 | End: 2021-03-01

## 2020-12-02 RX ORDER — INSULIN ASPART 100 [IU]/ML
INJECTION, SOLUTION INTRAVENOUS; SUBCUTANEOUS
Qty: 15 ML | Refills: 0 | Status: SHIPPED | OUTPATIENT
Start: 2020-12-02 | End: 2021-03-01

## 2020-12-02 RX ORDER — DOCUSATE SODIUM 50 MG AND SENNOSIDES 8.6 MG 8.6; 5 MG/1; MG/1
TABLET, FILM COATED ORAL
Qty: 100 TABLET | Refills: 3 | Status: SHIPPED | OUTPATIENT
Start: 2020-12-02 | End: 2021-06-16

## 2020-12-02 NOTE — TELEPHONE ENCOUNTER
NOVOLOG FLEXPEN 100 UNIT/ML soln [Pharmacy Med Name: insulin aspart (NOVOLOG FLEXPEN) 100 UNIT/ML injection] 15 mL 0    Sig: INJECT 10 - 12 UNITS UNDER THE SKIN TWICE A DAY       LEVEMIR FLEXTOUCH 100 UNIT/ML pen [Pharmacy Med Name: insulin detemir (LEVEMIR FLEXTOUCH)100 UNIT/ML injection] 60 mL 0    Sig: INJECT 60 UNITS UNDER THE SKIN EVERY DAY     Routing refill request to provider for review/approval because:  Labs not current:  CR, A1C  Both last taken 10/15/2019      T'd up 1 refill request for provider review.      Martha Dooley RN

## 2020-12-02 NOTE — TELEPHONE ENCOUNTER
"Requested Prescriptions   Pending Prescriptions Disp Refills     LEVEMIR FLEXTOUCH 100 UNIT/ML pen [Pharmacy Med Name: insulin detemir (LEVEMIR FLEXTOUCH)100 UNIT/ML injection] 60 mL 0     Sig: INJECT 60 UNITS UNDER THE SKIN EVERY DAY   Last Written Prescription Date:  9/25/2020  Last Fill Quantity: 60ml,  # refills: 0   Last office visit: 4/6/2020   Future Office Visit:        Long Acting Insulin Protocol Failed - 12/1/2020  9:28 AM        Failed - Serum creatinine on file in past 12 months     Recent Labs   Lab Test 10/15/19  0806   CR 1.02     Ok to refill medication if creatinine is low          Failed - HgbA1C in past 3 or 6 months     If HgbA1C is 8 or greater, it needs to be on file within the past 3 months.  If less than 8, must be on file within the past 6 months.     Recent Labs   Lab Test 10/15/19  0806   A1C 8.6*           Failed - Recent (6 mo) or future (30 days) visit within the authorizing provider's specialty     Patient had office visit in the last 6 months or has a visit in the next 30 days with authorizing provider or within the authorizing provider's specialty.  See \"Patient Info\" tab in inbasket, or \"Choose Columns\" in Meds & Orders section of the refill encounter.            Passed - Medication is active on med list        Passed - Patient is age 18 or older           NOVOLOG FLEXPEN 100 UNIT/ML soln [Pharmacy Med Name: insulin aspart (NOVOLOG FLEXPEN) 100 UNIT/ML injection] 15 mL 0     Sig: INJECT 10 - 12 UNITS UNDER THE SKIN TWICE A DAY   9/25/2020 for 15ml      Short Acting Insulin Protocol Failed - 12/1/2020  9:28 AM        Failed - Serum creatinine on file in past 12 months     Recent Labs   Lab Test 10/15/19  0806   CR 1.02     Ok to refill medication if creatinine is low          Failed - HgbA1C in past 3 or 6 months     If HgbA1C is 8 or greater, it needs to be on file within the past 3 months.  If less than 8, must be on file within the past 6 months.     Recent Labs   Lab Test " "10/15/19  0806   A1C 8.6*           Failed - Recent (6 mo) or future (30 days) visit within the authorizing provider's specialty     Patient had office visit in the last 6 months or has a visit in the next 30 days with authorizing provider or within the authorizing provider's specialty.  See \"Patient Info\" tab in inbasket, or \"Choose Columns\" in Meds & Orders section of the refill encounter.            Passed - Medication is active on med list        Passed - Patient is age 18 or older         Routing refill requests to provider for review/approval  Martha Dooley RN        "

## 2020-12-02 NOTE — TELEPHONE ENCOUNTER
Prescription approved per [facility] Refill Protocol.    TERI HairstonN, RN  [facility]

## 2021-01-06 DIAGNOSIS — R07.2 PRECORDIAL PAIN: ICD-10-CM

## 2021-01-06 DIAGNOSIS — I50.42 SYSTOLIC AND DIASTOLIC CHF, CHRONIC (H): ICD-10-CM

## 2021-01-07 RX ORDER — LISINOPRIL 5 MG/1
5 TABLET ORAL DAILY
Qty: 90 TABLET | Refills: 0 | Status: SHIPPED | OUTPATIENT
Start: 2021-01-07 | End: 2021-04-08

## 2021-01-07 RX ORDER — ISOSORBIDE MONONITRATE 30 MG/1
TABLET, EXTENDED RELEASE ORAL
Qty: 90 TABLET | Refills: 0 | Status: SHIPPED | OUTPATIENT
Start: 2021-01-07 | End: 2021-04-08

## 2021-01-07 NOTE — TELEPHONE ENCOUNTER
Routing refill request to provider for review/approval because:  Labs not current:  BP, Creatinine, Potassium    NOHELIA Hairston, RN  [facility]

## 2021-02-26 DIAGNOSIS — Z79.4 TYPE 2 DIABETES MELLITUS WITH HYPERGLYCEMIA, WITH LONG-TERM CURRENT USE OF INSULIN (H): ICD-10-CM

## 2021-02-26 DIAGNOSIS — E11.9 TYPE 2 DIABETES MELLITUS WITHOUT COMPLICATION, WITH LONG-TERM CURRENT USE OF INSULIN (H): ICD-10-CM

## 2021-02-26 DIAGNOSIS — E11.65 TYPE 2 DIABETES MELLITUS WITH HYPERGLYCEMIA, WITH LONG-TERM CURRENT USE OF INSULIN (H): ICD-10-CM

## 2021-02-26 DIAGNOSIS — Z79.4 TYPE 2 DIABETES MELLITUS WITHOUT COMPLICATION, WITH LONG-TERM CURRENT USE OF INSULIN (H): ICD-10-CM

## 2021-03-01 RX ORDER — INSULIN ASPART 100 [IU]/ML
INJECTION, SOLUTION INTRAVENOUS; SUBCUTANEOUS
Qty: 15 ML | Refills: 0 | Status: SHIPPED | OUTPATIENT
Start: 2021-03-01 | End: 2021-05-17

## 2021-03-01 RX ORDER — INSULIN DETEMIR 100 [IU]/ML
INJECTION, SOLUTION SUBCUTANEOUS
Qty: 60 ML | Refills: 0 | Status: SHIPPED | OUTPATIENT
Start: 2021-03-01 | End: 2021-05-17

## 2021-03-01 NOTE — TELEPHONE ENCOUNTER
Routing refill request to provider for review/approval because:  Patient needs to be seen because: due for diabetes f/u visit.  Naa given.  Lynn Wade RN

## 2021-04-07 DIAGNOSIS — E78.5 HYPERLIPIDEMIA LDL GOAL <100: ICD-10-CM

## 2021-04-07 DIAGNOSIS — R07.2 PRECORDIAL PAIN: ICD-10-CM

## 2021-04-07 DIAGNOSIS — I50.42 SYSTOLIC AND DIASTOLIC CHF, CHRONIC (H): ICD-10-CM

## 2021-04-08 RX ORDER — LISINOPRIL 5 MG/1
5 TABLET ORAL DAILY
Qty: 90 TABLET | Refills: 0 | Status: SHIPPED | OUTPATIENT
Start: 2021-04-08 | End: 2021-07-02

## 2021-04-08 RX ORDER — ATORVASTATIN CALCIUM 40 MG/1
40 TABLET, FILM COATED ORAL DAILY
Qty: 90 TABLET | Refills: 0 | Status: SHIPPED | OUTPATIENT
Start: 2021-04-08 | End: 2021-07-02

## 2021-04-08 RX ORDER — ISOSORBIDE MONONITRATE 30 MG/1
TABLET, EXTENDED RELEASE ORAL
Qty: 90 TABLET | Refills: 0 | Status: SHIPPED | OUTPATIENT
Start: 2021-04-08 | End: 2021-07-15

## 2021-04-08 NOTE — TELEPHONE ENCOUNTER
Routing refill request to provider for review/approval because:  Labs not current:  BP, Creatinine, Potassium, LDL  Patient needs to be seen because:  Due for follow up     TERI HairstonN, RN  [facility]

## 2021-05-15 DIAGNOSIS — E11.65 TYPE 2 DIABETES MELLITUS WITH HYPERGLYCEMIA, WITH LONG-TERM CURRENT USE OF INSULIN (H): ICD-10-CM

## 2021-05-15 DIAGNOSIS — E78.5 HYPERLIPIDEMIA LDL GOAL <100: ICD-10-CM

## 2021-05-15 DIAGNOSIS — Z79.4 TYPE 2 DIABETES MELLITUS WITH HYPERGLYCEMIA, WITH LONG-TERM CURRENT USE OF INSULIN (H): ICD-10-CM

## 2021-05-15 DIAGNOSIS — Z79.4 TYPE 2 DIABETES MELLITUS WITHOUT COMPLICATION, WITH LONG-TERM CURRENT USE OF INSULIN (H): ICD-10-CM

## 2021-05-15 DIAGNOSIS — E11.9 TYPE 2 DIABETES MELLITUS WITHOUT COMPLICATION, WITH LONG-TERM CURRENT USE OF INSULIN (H): ICD-10-CM

## 2021-05-17 RX ORDER — INSULIN ASPART 100 [IU]/ML
INJECTION, SOLUTION INTRAVENOUS; SUBCUTANEOUS
Qty: 6 ML | Refills: 0 | Status: SHIPPED | OUTPATIENT
Start: 2021-05-17 | End: 2021-07-15

## 2021-05-17 RX ORDER — ATORVASTATIN CALCIUM 40 MG/1
40 TABLET, FILM COATED ORAL DAILY
Qty: 90 TABLET | Refills: 0 | OUTPATIENT
Start: 2021-05-17

## 2021-05-17 RX ORDER — INSULIN DETEMIR 100 [IU]/ML
INJECTION, SOLUTION SUBCUTANEOUS
Qty: 30 ML | Refills: 0 | Status: SHIPPED | OUTPATIENT
Start: 2021-05-17 | End: 2021-07-15

## 2021-05-17 NOTE — TELEPHONE ENCOUNTER
I spoke to patient, appointment made for 5/25/21. Patient requested appointment be 40 min long as he wants to address heart and diabetes since he hasn't been seen in over a year. Change appointment if 40 min isn't necessary.    Randi Veronica on 5/17/2021 at 5:20 PM

## 2021-05-17 NOTE — TELEPHONE ENCOUNTER
Prescription (Lipitor) was sent 4/8/2021 for #90 with 0 refills.  Pharmacy notified via E-Prescribe refusal.     Laura Dominguez, TERIN, RN  [facility]

## 2021-05-17 NOTE — TELEPHONE ENCOUNTER
Please call the patient and offer him the opportunity to set up an appointment for evaluation and review his medications.  Has been over a year.  He does have diabetes and is on several medications that require monitoring.    [place]

## 2021-05-17 NOTE — TELEPHONE ENCOUNTER
Routing refill request to provider for review/approval because:  Naa given x1 and patient did not follow up, please advise  Patient needs to be seen because it has been more than 1 year since last office visit.    TERI HairstonN, RN  [facility]

## 2021-05-25 ENCOUNTER — OFFICE VISIT (OUTPATIENT)
Dept: INTERNAL MEDICINE | Facility: CLINIC | Age: 71
End: 2021-05-25
Payer: MEDICARE

## 2021-05-25 VITALS
SYSTOLIC BLOOD PRESSURE: 126 MMHG | BODY MASS INDEX: 32.89 KG/M2 | HEIGHT: 68 IN | TEMPERATURE: 97.3 F | OXYGEN SATURATION: 97 % | WEIGHT: 217 LBS | HEART RATE: 78 BPM | RESPIRATION RATE: 18 BRPM | DIASTOLIC BLOOD PRESSURE: 78 MMHG

## 2021-05-25 DIAGNOSIS — E66.01 MORBID OBESITY (H): ICD-10-CM

## 2021-05-25 DIAGNOSIS — Z79.4 TYPE 2 DIABETES MELLITUS WITH HYPERGLYCEMIA, WITH LONG-TERM CURRENT USE OF INSULIN (H): Primary | ICD-10-CM

## 2021-05-25 DIAGNOSIS — E78.5 HYPERLIPIDEMIA LDL GOAL <100: ICD-10-CM

## 2021-05-25 DIAGNOSIS — I50.82 BIVENTRICULAR CONGESTIVE HEART FAILURE (H): ICD-10-CM

## 2021-05-25 DIAGNOSIS — I21.4 NSTEMI (NON-ST ELEVATED MYOCARDIAL INFARCTION) (H): ICD-10-CM

## 2021-05-25 DIAGNOSIS — E11.65 TYPE 2 DIABETES MELLITUS WITH HYPERGLYCEMIA, WITH LONG-TERM CURRENT USE OF INSULIN (H): Primary | ICD-10-CM

## 2021-05-25 DIAGNOSIS — Z95.2 S/P AORTIC VALVE REPLACEMENT: ICD-10-CM

## 2021-05-25 DIAGNOSIS — R42 VERTIGO: ICD-10-CM

## 2021-05-25 DIAGNOSIS — G44.209 TENSION HEADACHE: ICD-10-CM

## 2021-05-25 DIAGNOSIS — G63 POLYNEUROPATHY ASSOCIATED WITH UNDERLYING DISEASE (H): ICD-10-CM

## 2021-05-25 LAB
ALT SERPL W P-5'-P-CCNC: 23 U/L (ref 0–70)
ANION GAP SERPL CALCULATED.3IONS-SCNC: 6 MMOL/L (ref 3–14)
BUN SERPL-MCNC: 15 MG/DL (ref 7–30)
CALCIUM SERPL-MCNC: 8.4 MG/DL (ref 8.5–10.1)
CHLORIDE SERPL-SCNC: 105 MMOL/L (ref 94–109)
CHOLEST SERPL-MCNC: 132 MG/DL
CO2 SERPL-SCNC: 27 MMOL/L (ref 20–32)
CREAT SERPL-MCNC: 0.98 MG/DL (ref 0.66–1.25)
CREAT UR-MCNC: 96 MG/DL
ERYTHROCYTE [DISTWIDTH] IN BLOOD BY AUTOMATED COUNT: 13 % (ref 10–15)
GFR SERPL CREATININE-BSD FRML MDRD: 77 ML/MIN/{1.73_M2}
GLUCOSE SERPL-MCNC: 257 MG/DL (ref 70–99)
HBA1C MFR BLD: 8.8 % (ref 0–5.6)
HCT VFR BLD AUTO: 37.6 % (ref 40–53)
HDLC SERPL-MCNC: 49 MG/DL
HGB BLD-MCNC: 12.8 G/DL (ref 13.3–17.7)
LDLC SERPL CALC-MCNC: 59 MG/DL
MCH RBC QN AUTO: 30.5 PG (ref 26.5–33)
MCHC RBC AUTO-ENTMCNC: 34 G/DL (ref 31.5–36.5)
MCV RBC AUTO: 90 FL (ref 78–100)
MICROALBUMIN UR-MCNC: 9 MG/L
MICROALBUMIN/CREAT UR: 9.51 MG/G CR (ref 0–17)
NONHDLC SERPL-MCNC: 83 MG/DL
PLATELET # BLD AUTO: 193 10E9/L (ref 150–450)
POTASSIUM SERPL-SCNC: 4 MMOL/L (ref 3.4–5.3)
RBC # BLD AUTO: 4.2 10E12/L (ref 4.4–5.9)
SODIUM SERPL-SCNC: 138 MMOL/L (ref 133–144)
TRIGL SERPL-MCNC: 118 MG/DL
WBC # BLD AUTO: 6 10E9/L (ref 4–11)

## 2021-05-25 PROCEDURE — 80048 BASIC METABOLIC PNL TOTAL CA: CPT | Performed by: INTERNAL MEDICINE

## 2021-05-25 PROCEDURE — 82043 UR ALBUMIN QUANTITATIVE: CPT | Performed by: INTERNAL MEDICINE

## 2021-05-25 PROCEDURE — 80061 LIPID PANEL: CPT | Performed by: INTERNAL MEDICINE

## 2021-05-25 PROCEDURE — 85027 COMPLETE CBC AUTOMATED: CPT | Performed by: INTERNAL MEDICINE

## 2021-05-25 PROCEDURE — 36415 COLL VENOUS BLD VENIPUNCTURE: CPT | Performed by: INTERNAL MEDICINE

## 2021-05-25 PROCEDURE — 83036 HEMOGLOBIN GLYCOSYLATED A1C: CPT | Performed by: INTERNAL MEDICINE

## 2021-05-25 PROCEDURE — 84460 ALANINE AMINO (ALT) (SGPT): CPT | Performed by: INTERNAL MEDICINE

## 2021-05-25 PROCEDURE — 99214 OFFICE O/P EST MOD 30 MIN: CPT | Performed by: INTERNAL MEDICINE

## 2021-05-25 RX ORDER — PEN NEEDLE, DIABETIC 31 GX5/16"
NEEDLE, DISPOSABLE MISCELLANEOUS
Qty: 100 EACH | Refills: 0 | Status: SHIPPED | OUTPATIENT
Start: 2021-05-25 | End: 2022-04-22

## 2021-05-25 ASSESSMENT — MIFFLIN-ST. JEOR: SCORE: 1708.81

## 2021-05-25 ASSESSMENT — PAIN SCALES - GENERAL: PAINLEVEL: NO PAIN (0)

## 2021-05-25 NOTE — LETTER
June 7, 2021      Reinaldo Barrios  [address]   [facility] [address]        Dear ChrissieSophie,    We are writing to inform you of your test results.    Microalbumin is normal.   Cholesterol is well controlled with an LDL of 59.   The chemistry panel shows an elevated blood sugar of 257 with normal kidney function.   Liver tests are normal.   Hemoglobin A1c is up slightly at 8.8.  This would suggest an adequate blood sugar control.   The blood cell count shows persistent anemia which is not changed in the last year.  Would recommend screening colonoscopy.     You will be contacted with any outstanding results as they are available.   Feel free to contact me via the office or My Chart if you have any questions regarding the above.     Resulted Orders   Albumin Random Urine Quantitative with Creat Ratio   Result Value Ref Range    Creatinine Urine 96 mg/dL    Albumin Urine mg/L 9 mg/L    Albumin Urine mg/g Cr 9.51 0 - 17 mg/g Cr   HEMOGLOBIN A1C   Result Value Ref Range    Hemoglobin A1C 8.8 (H) 0 - 5.6 %      Comment:      Normal <5.7% Prediabetes 5.7-6.4%  Diabetes 6.5% or higher - adopted from ADA   consensus guidelines.     BASIC METABOLIC PANEL   Result Value Ref Range    Sodium 138 133 - 144 mmol/L    Potassium 4.0 3.4 - 5.3 mmol/L    Chloride 105 94 - 109 mmol/L    Carbon Dioxide 27 20 - 32 mmol/L    Anion Gap 6 3 - 14 mmol/L    Glucose 257 (H) 70 - 99 mg/dL    Urea Nitrogen 15 7 - 30 mg/dL    Creatinine 0.98 0.66 - 1.25 mg/dL    GFR Estimate 77 >60 mL/min/[1.73_m2]      Comment:      Non  GFR Calc  Starting 12/18/2018, serum creatinine based estimated GFR (eGFR) will be   calculated using the Chronic Kidney Disease Epidemiology Collaboration   (CKD-EPI) equation.      GFR Estimate If Black 89 >60 mL/min/[1.73_m2]      Comment:       GFR Calc  Starting 12/18/2018, serum creatinine based estimated GFR (eGFR) will be   calculated using the Chronic Kidney Disease Epidemiology  Collaboration   (CKD-EPI) equation.      Calcium 8.4 (L) 8.5 - 10.1 mg/dL   ALT   Result Value Ref Range    ALT 23 0 - 70 U/L   Lipid panel reflex to direct LDL Non-fasting   Result Value Ref Range    Cholesterol 132 <200 mg/dL    Triglycerides 118 <150 mg/dL    HDL Cholesterol 49 >39 mg/dL    LDL Cholesterol Calculated 59 <100 mg/dL      Comment:      Desirable:       <100 mg/dl    Non HDL Cholesterol 83 <130 mg/dL   CBC with Platelets     Result Value Ref Range    WBC 6.0 4.0 - 11.0 10e9/L    RBC Count 4.20 (L) 4.4 - 5.9 10e12/L    Hemoglobin 12.8 (L) 13.3 - 17.7 g/dL    Hematocrit 37.6 (L) 40.0 - 53.0 %    MCV 90 78 - 100 fl    MCH 30.5 26.5 - 33.0 pg    MCHC 34.0 31.5 - 36.5 g/dL    RDW 13.0 10.0 - 15.0 %    Platelet Count 193 150 - 450 10e9/L       If you have any questions or concerns, please call the clinic at the number listed above.       Sincerely,    Mark Blanchard DO

## 2021-05-25 NOTE — LETTER
June 7, 2021      Reinaldo Barrios  PO   [facility] [address]        Dear ChrissieSophie,    We are writing to inform you of your test results.    Echocardiogram confirms decreased heart function with an ejection fraction of less than 40%.  (A normal ejection fraction is generally 60 to 65%)     Bioprosthetic aortic valve is functioning well.       You will be contacted with any outstanding results as they are available.   Feel free to contact me via the office or My Chart if you have any questions regarding the above.     Resulted Orders   Albumin Random Urine Quantitative with Creat Ratio   Result Value Ref Range    Creatinine Urine 96 mg/dL    Albumin Urine mg/L 9 mg/L    Albumin Urine mg/g Cr 9.51 0 - 17 mg/g Cr   HEMOGLOBIN A1C   Result Value Ref Range    Hemoglobin A1C 8.8 (H) 0 - 5.6 %      Comment:      Normal <5.7% Prediabetes 5.7-6.4%  Diabetes 6.5% or higher - adopted from ADA   consensus guidelines.     BASIC METABOLIC PANEL   Result Value Ref Range    Sodium 138 133 - 144 mmol/L    Potassium 4.0 3.4 - 5.3 mmol/L    Chloride 105 94 - 109 mmol/L    Carbon Dioxide 27 20 - 32 mmol/L    Anion Gap 6 3 - 14 mmol/L    Glucose 257 (H) 70 - 99 mg/dL    Urea Nitrogen 15 7 - 30 mg/dL    Creatinine 0.98 0.66 - 1.25 mg/dL    GFR Estimate 77 >60 mL/min/[1.73_m2]      Comment:      Non  GFR Calc  Starting 12/18/2018, serum creatinine based estimated GFR (eGFR) will be   calculated using the Chronic Kidney Disease Epidemiology Collaboration   (CKD-EPI) equation.      GFR Estimate If Black 89 >60 mL/min/[1.73_m2]      Comment:       GFR Calc  Starting 12/18/2018, serum creatinine based estimated GFR (eGFR) will be   calculated using the Chronic Kidney Disease Epidemiology Collaboration   (CKD-EPI) equation.      Calcium 8.4 (L) 8.5 - 10.1 mg/dL   ALT   Result Value Ref Range    ALT 23 0 - 70 U/L   Lipid panel reflex to direct LDL Non-fasting   Result Value Ref Range    Cholesterol  132 <200 mg/dL    Triglycerides 118 <150 mg/dL    HDL Cholesterol 49 >39 mg/dL    LDL Cholesterol Calculated 59 <100 mg/dL      Comment:      Desirable:       <100 mg/dl    Non HDL Cholesterol 83 <130 mg/dL   CBC with Platelets     Result Value Ref Range    WBC 6.0 4.0 - 11.0 10e9/L    RBC Count 4.20 (L) 4.4 - 5.9 10e12/L    Hemoglobin 12.8 (L) 13.3 - 17.7 g/dL    Hematocrit 37.6 (L) 40.0 - 53.0 %    MCV 90 78 - 100 fl    MCH 30.5 26.5 - 33.0 pg    MCHC 34.0 31.5 - 36.5 g/dL    RDW 13.0 10.0 - 15.0 %    Platelet Count 193 150 - 450 10e9/L   Echocardiogram Complete    Narrative    [id number]  [id number]  [id number]  [id number]^MOISE^GRAY^AMINAH     [facility]  Echocardiography Laboratory  [address] [place] [address]     Name: NGA GARCIA  MRN: [id number]  : 1950  Study Date: 2021 10:19 AM  Age: 70 yrs  Gender: Male  Patient [facility]  Reason For Study: Biventricular congestive heart failure (H)  History: NSTEMI, ACS, CHF, HTN, Diabetes, AVR 23mm St Servando Trifecta AV  Ordering Physician: GRAY PHIPPS  Referring Physician: GRAY PHIPPS  Performed By: Myesha Augustine     BSA: 2.1 m2  Height: 68 in  Weight: 215 lb  HR: 64  BP: 130/78 mmHg  ______________________________________________________________________________  Procedure  Complete Echo Adult.  ______________________________________________________________________________  Interpretation Summary     The left ventricle is normal in size.  Left ventricular systolic function is moderately reduced.  The visual ejection fraction is estimated at 35-40%.  There is moderate global hypokinesia of the left ventricle.  There is a bioprosthetic aortic valve (s/p AVR with 32mm St Servando Trifecta  tissue valve).  The gradient is normal for this prosthetic aortic valve.  No aortic regurgitation is present.  Compared to prior study, there is no significant  change.  ______________________________________________________________________________  Left Ventricle  The left ventricle is normal in size. There is normal left ventricular wall  thickness. Grade I or early diastolic dysfunction. Diastolic Doppler findings  (E/E' ratio and/or other parameters) suggest left ventricular filling  pressures are indeterminate. Left ventricular systolic function is moderately  reduced. The visual ejection fraction is estimated at 35-40%. There is  moderate global hypokinesia of the left ventricle.     Right Ventricle  The right ventricle is normal in structure, function and size.     Atria  The left atrium is mildly dilated. Right atrial size is normal. There is no  atrial shunt seen.     Mitral Valve  The mitral valve is normal in structure and function. There is trace mitral  regurgitation.     Tricuspid Valve  The tricuspid valve is normal in structure and function. There is trace  tricuspid regurgitation. Right ventricular systolic pressure could not be  approximated due to inadequate tricuspid regurgitation. IVC diameter <2.1 cm  collapsing >50% with sniff suggests a normal RA pressure of 3 mmHg.     Aortic Valve  No aortic regurgitation is present. There is a bioprosthetic aortic valve. The  gradient is normal for this prosthetic aortic valve. s/p AVR with 32mm St Servando  Trifecta tissue valve.     Pulmonic Valve  The pulmonic valve is not well seen, but is grossly normal. There is trace  pulmonic valvular regurgitation.     Vessels  Normal size aorta. The inferior vena cava was normal in size with preserved  respiratory variability.     Pericardium  There is no pericardial effusion.     Rhythm  Sinus rhythm was noted.  ______________________________________________________________________________  MMode/2D Measurements & Calculations  IVSd: 1.3 cm     LVIDd: 4.8 cm  LVIDs: 3.8 cm  LVPWd: 1.2 cm  FS: 20.8 %  LV mass(C)d: 232.2 grams  LV mass(C)dI: 110.2 grams/m2  Ao root diam: 3.2  cm  LA dimension: 4.1 cm  asc Aorta Diam: 3.5 cm  LA/Ao: 1.3  LVOT diam: 2.2 cm  LVOT area: 3.8 cm2  LA Volume (BP): 83.7 ml  LA Volume Index (BP): 39.7 ml/m2  RWT: 0.50     Doppler Measurements & Calculations  MV E max danny: 109.0 cm/sec  MV A max danny: 121.0 cm/sec  MV E/A: 0.90  MV dec slope: 364.0 cm/sec2  MV dec time: 0.30 sec  Ao V2 max: 179.0 cm/sec  Ao max P.0 mmHg  Ao V2 mean: 134.0 cm/sec  Ao mean P.0 mmHg  Ao V2 VTI: 44.1 cm  TALIB(I,D): 2.0 cm2  TALIB(V,D): 2.2 cm2  LV V1 max P.2 mmHg  LV V1 max: 102.0 cm/sec  LV V1 VTI: 23.6 cm  SV(LVOT): 89.7 ml  SI(LVOT): 42.6 ml/m2  AV Danny Ratio (DI): 0.57  TALIB Index (cm2/m2): 0.97  Lateral E/e': 9.4     ______________________________________________________________________________  Report approved by: Killian Bueno 2021 01:52 PM             If you have any questions or concerns, please call the clinic at the number listed above.       Sincerely,    Mark Blanchard DO

## 2021-05-25 NOTE — PROGRESS NOTES
Anali Najera is a 70 year old who presents for the following health issues     HPI     Diabetes Follow-up    How often are you checking your blood sugar? Four or more times daily  Blood sugar testing frequency justification:  Uncontrolled diabetes  What time of day are you checking your blood sugars (select all that apply)?  Before and after meals  Have you had any blood sugars above 200?  Yes   Have you had any blood sugars below 70?  No    What symptoms do you notice when your blood sugar is low?  None    What concerns do you have today about your diabetes? Other: feet     Do you have any of these symptoms? (Select all that apply)  Numbness in feet and Burning in feet    Have you had a diabetic eye exam in the last 12 months? No        BP Readings from Last 2 Encounters:   05/25/21 126/78   12/16/19 110/62     Hemoglobin A1C (%)   Date Value   10/15/2019 8.6 (H)   08/12/2019 8.3 (H)     LDL Cholesterol Calculated (mg/dL)   Date Value   10/15/2019 55   09/13/2018 63        EMR reviewed including:             Complaint, History of Chief Complaint, Corresponding Review of Systems, and Complaint Specific Physical Examination.    #1   Follow-up on type 2 diabetes.  Currently taking aspirin, statin, ACE inhibitor.  Using background insulin and coverage twice daily.  Denies symptoms of hyper or hypoglycemia.  Checks blood sugar at home and values are generally under 170.  Lately have been somewhat higher.  Most recent glycosylated hemoglobin was 8.3.  Patient not terribly compliant with diet.       Exam:   LUNGS: clear bilaterally, airflow is brisk, no intercostal retraction or stridor is noted. No coughing is noted during visit.   HEART:  regular without rubs, clicks, gallops, or murmurs. PMI is nondisplaced. Upstrokes are brisk. S1,S2 are heard.   NEURO: Pt is alert and appropriate. No neurologic lateralization is noted. Cranial nerves 2-12 are intact. Peripheral sensory function is decreased in the feet  "bilaterally.      #2   History of coronary artery disease.  Status post non-ST myocardial infarction  No recent chest pain.  Continues Coreg, aspirin and statin.  Also on Imdur.  Has occasional lightheadedness when he stands up promptly.   Lasts only seconds.  Does not appear to be entirely vertiginous.  Suspect lightheadedness       Exam:   ENT: Pharynx is non-erythemous, minimal PND, no significant nasal obstruction, TM's not red or retracted, hearing intact bilaterally. No carotid bruits are heard. No JVD seen. Thyroid is not nodular or enlarged.        #3   Occasional headache associated with lightheadedness and questionable \"vertigo\".  Headache resolved after few minutes.  Suspect secondary to orthostatic hypotension.        Vital Signs:   /78 (BP Location: Right arm, Patient Position: Sitting, Cuff Size: Adult Regular)   Pulse 78   Temp 97.3  F (36.3  C) (Temporal)   Resp 18   Ht 1.727 m (5' 8\")   Wt 98.4 kg (217 lb)   SpO2 97%   BMI 32.99 kg/m               Decision Making    Problem and Complexity     1. Type 2 diabetes mellitus with hyperglycemia, with long-term current use of insulin (H)  Continue current diabetic management.    - insulin pen needle (B-D U/F) 31G X 8 MM miscellaneous; USE THREE PEN NEEDLES DAILY OR AS DIRECTED.  Dispense: 100 each; Refill: 0    2. Polyneuropathy associated with underlying disease (H)  Check blood count.  Continue diabetic control.  If worsens, initiate gabapentin  - CBC with Platelets      3. Hyperlipidemia LDL goal <100  Recommend continue statin and adjust accordingly  - Lipid panel reflex to direct LDL Non-fasting    4. NSTEMI (non-ST elevated myocardial infarction) (H)  Medications as described    5. Biventricular congestive heart failure (H)  Check echocardiogram for ejection fraction and valve function  - Echocardiogram Complete    6. S/P aortic valve replacement  As above.  Bioprosthetic valve in place    7. Vertigo  Suspect lightheadedness.  No vertigo " on exam at this time    8. Tension headache  Suspect orthostatic hypotension recommend weight loss or caloric restriction    9. Obesity (BMI 35.0-39.9) with comorbidity (H)  Recommend weight loss responsible caloric restriction and exercise          ------------------------------------------------------------------------------------------------------------------------------  Data    4=1/3 5=2/3    1  >3  Specialty (external) notes reviewed:   None  Individual tests ordered (by provider) reviewed:   None  Individual tests ordered (by provider):   Multiple  Independent Historians Interview:   None  2  Review of outside (other providers) Tests:   None  3   Verbal Discussion with Specialists:    None    ----------------------------------------------------------------------------------------------------------------------------------  Risk   Prescription drug management:   Yes                                High risk for toxicity:   Decision regarding surgery:    None   Social determinants of health:   No   Decision regarding hospitalization:     None   Decision to withhold therapy:   None                                   FOLLOW UP   I have asked the patient to make an appointment for followup with me in 2 weeks            I have carefully explained the diagnosis and treatment options to the patient.  The patient has displayed an understanding of the above, and all subsequent questions were answered.      DO RADHA Horn    Portions of this note were produced using [employer]  Although every attempt at real-time proof reading has been made, occasional grammar/syntax errors may have been missed.

## 2021-06-01 ENCOUNTER — HOSPITAL ENCOUNTER (OUTPATIENT)
Dept: CARDIOLOGY | Facility: CLINIC | Age: 71
Discharge: HOME | End: 2021-06-01
Attending: INTERNAL MEDICINE | Admitting: INTERNAL MEDICINE
Payer: MEDICARE

## 2021-06-01 PROCEDURE — 93306 TTE W/DOPPLER COMPLETE: CPT

## 2021-06-01 PROCEDURE — 93306 TTE W/DOPPLER COMPLETE: CPT | Mod: 26 | Performed by: INTERNAL MEDICINE

## 2021-06-04 ENCOUNTER — IMMUNIZATION (OUTPATIENT)
Dept: FAMILY MEDICINE | Facility: CLINIC | Age: 71
End: 2021-06-04
Payer: MEDICARE

## 2021-06-04 DIAGNOSIS — Z79.4 TYPE 2 DIABETES MELLITUS WITH HYPERGLYCEMIA, WITH LONG-TERM CURRENT USE OF INSULIN (H): ICD-10-CM

## 2021-06-04 DIAGNOSIS — E11.65 TYPE 2 DIABETES MELLITUS WITH HYPERGLYCEMIA, WITH LONG-TERM CURRENT USE OF INSULIN (H): ICD-10-CM

## 2021-06-04 PROCEDURE — 91301 PR COVID VAC MODERNA 100 MCG/0.5 ML IM: CPT

## 2021-06-04 PROCEDURE — 0011A PR COVID VAC MODERNA 100 MCG/0.5 ML IM: CPT

## 2021-06-04 NOTE — TELEPHONE ENCOUNTER
Pending Prescriptions:                       Disp   Refills    blood glucose (ONETOUCH ULTRA) test strip  100 st*1        Sig: USE TO TEST BLOOD SUGAR 3 TIMES A DAY    Routing refill request to provider for review/approval because:  A break in medication  Last filled 2019

## 2021-06-16 DIAGNOSIS — K59.04 CHRONIC IDIOPATHIC CONSTIPATION: ICD-10-CM

## 2021-06-16 RX ORDER — AMOXICILLIN 250 MG
CAPSULE ORAL
Qty: 100 TABLET | Refills: 3 | Status: SHIPPED | OUTPATIENT
Start: 2021-06-16 | End: 2022-01-06

## 2021-06-16 NOTE — TELEPHONE ENCOUNTER
Prescription approved per [facility] Refill Protocol.    Adrianna Conley RN

## 2021-07-02 ENCOUNTER — IMMUNIZATION (OUTPATIENT)
Dept: FAMILY MEDICINE | Facility: CLINIC | Age: 71
End: 2021-07-02
Attending: FAMILY MEDICINE
Payer: MEDICARE

## 2021-07-02 DIAGNOSIS — I50.42 SYSTOLIC AND DIASTOLIC CHF, CHRONIC (H): ICD-10-CM

## 2021-07-02 DIAGNOSIS — E78.5 HYPERLIPIDEMIA LDL GOAL <100: ICD-10-CM

## 2021-07-02 PROCEDURE — 91301 PR COVID VAC MODERNA 100 MCG/0.5 ML IM: CPT

## 2021-07-02 PROCEDURE — 0012A PR COVID VAC MODERNA 100 MCG/0.5 ML IM: CPT

## 2021-07-02 RX ORDER — LISINOPRIL 5 MG/1
5 TABLET ORAL DAILY
Qty: 90 TABLET | Refills: 1 | Status: SHIPPED | OUTPATIENT
Start: 2021-07-02 | End: 2022-01-06

## 2021-07-02 RX ORDER — ATORVASTATIN CALCIUM 40 MG/1
40 TABLET, FILM COATED ORAL DAILY
Qty: 90 TABLET | Refills: 1 | Status: SHIPPED | OUTPATIENT
Start: 2021-07-02 | End: 2022-01-06

## 2021-07-02 NOTE — TELEPHONE ENCOUNTER
Prescription approved per [facility] Refill Protocol.    Geneva Goodman RN on 7/2/2021 at 2:44 PM

## 2021-07-15 DIAGNOSIS — E11.9 TYPE 2 DIABETES MELLITUS WITHOUT COMPLICATION, WITH LONG-TERM CURRENT USE OF INSULIN (H): ICD-10-CM

## 2021-07-15 DIAGNOSIS — Z79.4 TYPE 2 DIABETES MELLITUS WITHOUT COMPLICATION, WITH LONG-TERM CURRENT USE OF INSULIN (H): ICD-10-CM

## 2021-07-15 DIAGNOSIS — Z79.4 TYPE 2 DIABETES MELLITUS WITH HYPERGLYCEMIA, WITH LONG-TERM CURRENT USE OF INSULIN (H): ICD-10-CM

## 2021-07-15 DIAGNOSIS — E11.65 TYPE 2 DIABETES MELLITUS WITH HYPERGLYCEMIA, WITH LONG-TERM CURRENT USE OF INSULIN (H): ICD-10-CM

## 2021-07-15 DIAGNOSIS — R07.2 PRECORDIAL PAIN: ICD-10-CM

## 2021-07-15 NOTE — LETTER
[address] [address]  Phone: [phone]        July 19, 2021      Reinaldo Barrios                                                                                                                                   [facility] [address]            Dear Chrissie Barrios,    We are concerned about your health care.  We recently provided you with a medication refill.  Many medications require routine follow-up with your Doctor.      At this time we ask that: You schedule a routine office visit with your physician to follow your medications. Please call the clinic at [phone] option 1 to schedule.     Your prescription: Has been refilled for 1 time so you may have time for the above noted follow-up.      Thank you,      Mark Blanchard DO  Care Team

## 2021-07-16 RX ORDER — ISOSORBIDE MONONITRATE 30 MG/1
30 TABLET, EXTENDED RELEASE ORAL DAILY
Qty: 90 TABLET | Refills: 0 | Status: SHIPPED | OUTPATIENT
Start: 2021-07-16 | End: 2021-09-13

## 2021-07-16 RX ORDER — INSULIN ASPART 100 [IU]/ML
INJECTION, SOLUTION INTRAVENOUS; SUBCUTANEOUS
Qty: 6 ML | Refills: 0 | Status: SHIPPED | OUTPATIENT
Start: 2021-07-16 | End: 2021-08-04

## 2021-07-16 NOTE — TELEPHONE ENCOUNTER
Called and LM for patient to call back. Please relay note below and help schedule appointment needed.   Myesha Edwards MA

## 2021-07-16 NOTE — TELEPHONE ENCOUNTER
Pending Prescriptions:                       Disp   Refills    insulin detemir (LEVEMIR FLEXTOUCH) 100 U*30 mL  0            Sig: INJECT 60 UNITS UNDER THE SKIN EVERY DAY    isosorbide mononitrate (IMDUR) 30 MG 24 h*90 tab*0            Sig: Take 1 tablet (30 mg) by mouth daily    insulin aspart (NOVOLOG FLEXPEN) 100 UNIT*6 mL   0            Sig: INJECT 10 - 12 UNITS UNDER THE SKIN TWICE A DAY    Medication is being filled for 1 time kim refill only due to:  Patient is due for medication follow up    Please call and help schedule.  Thank you!    Geneva Goodman RN on 7/16/2021 at 9:28 AM

## 2021-08-04 ENCOUNTER — OFFICE VISIT (OUTPATIENT)
Dept: INTERNAL MEDICINE | Facility: CLINIC | Age: 71
End: 2021-08-04
Payer: MEDICARE

## 2021-08-04 VITALS
DIASTOLIC BLOOD PRESSURE: 82 MMHG | SYSTOLIC BLOOD PRESSURE: 114 MMHG | TEMPERATURE: 96.7 F | RESPIRATION RATE: 20 BRPM | OXYGEN SATURATION: 99 % | HEART RATE: 76 BPM | WEIGHT: 217 LBS | BODY MASS INDEX: 32.99 KG/M2

## 2021-08-04 DIAGNOSIS — Z79.01 LONG TERM CURRENT USE OF ANTICOAGULANT THERAPY: ICD-10-CM

## 2021-08-04 DIAGNOSIS — Z79.4 TYPE 2 DIABETES MELLITUS WITH HYPERGLYCEMIA, WITH LONG-TERM CURRENT USE OF INSULIN (H): ICD-10-CM

## 2021-08-04 DIAGNOSIS — D50.9 IRON DEFICIENCY ANEMIA, UNSPECIFIED IRON DEFICIENCY ANEMIA TYPE: ICD-10-CM

## 2021-08-04 DIAGNOSIS — R94.30 CARDIAC LV EJECTION FRACTION 30-35%: ICD-10-CM

## 2021-08-04 DIAGNOSIS — E11.65 TYPE 2 DIABETES MELLITUS WITH HYPERGLYCEMIA, WITH LONG-TERM CURRENT USE OF INSULIN (H): ICD-10-CM

## 2021-08-04 DIAGNOSIS — I50.82 BIVENTRICULAR CONGESTIVE HEART FAILURE (H): ICD-10-CM

## 2021-08-04 DIAGNOSIS — E66.01 MORBID OBESITY (H): ICD-10-CM

## 2021-08-04 DIAGNOSIS — Z12.11 SCREEN FOR COLON CANCER: ICD-10-CM

## 2021-08-04 DIAGNOSIS — E78.5 HYPERLIPIDEMIA LDL GOAL <100: ICD-10-CM

## 2021-08-04 DIAGNOSIS — Z95.2 S/P AORTIC VALVE REPLACEMENT: Primary | ICD-10-CM

## 2021-08-04 DIAGNOSIS — I10 ESSENTIAL HYPERTENSION WITH GOAL BLOOD PRESSURE LESS THAN 140/90: ICD-10-CM

## 2021-08-04 LAB
BASOPHILS # BLD AUTO: 0.1 10E3/UL (ref 0–0.2)
BASOPHILS NFR BLD AUTO: 1 %
EOSINOPHIL # BLD AUTO: 0.3 10E3/UL (ref 0–0.7)
EOSINOPHIL NFR BLD AUTO: 4 %
ERYTHROCYTE [DISTWIDTH] IN BLOOD BY AUTOMATED COUNT: 13.3 % (ref 10–15)
FERRITIN SERPL-MCNC: 151 NG/ML (ref 26–388)
HCT VFR BLD AUTO: 39.2 % (ref 40–53)
HGB BLD-MCNC: 13.2 G/DL (ref 13.3–17.7)
IMM GRANULOCYTES # BLD: 0 10E3/UL
IMM GRANULOCYTES NFR BLD: 0 %
IRON SATN MFR SERPL: 26 % (ref 15–46)
IRON SERPL-MCNC: 78 UG/DL (ref 35–180)
LYMPHOCYTES # BLD AUTO: 1.2 10E3/UL (ref 0.8–5.3)
LYMPHOCYTES NFR BLD AUTO: 15 %
MCH RBC QN AUTO: 30.5 PG (ref 26.5–33)
MCHC RBC AUTO-ENTMCNC: 33.7 G/DL (ref 31.5–36.5)
MCV RBC AUTO: 91 FL (ref 78–100)
MONOCYTES # BLD AUTO: 0.5 10E3/UL (ref 0–1.3)
MONOCYTES NFR BLD AUTO: 7 %
NEUTROPHILS # BLD AUTO: 6.2 10E3/UL (ref 1.6–8.3)
NEUTROPHILS NFR BLD AUTO: 73 %
NRBC # BLD AUTO: 0 10E3/UL
NRBC BLD AUTO-RTO: 0 /100
PLATELET # BLD AUTO: 213 10E3/UL (ref 150–450)
RBC # BLD AUTO: 4.33 10E6/UL (ref 4.4–5.9)
RETICS # AUTO: 0.06 10E6/UL (ref 0.03–0.1)
RETICS/RBC NFR AUTO: 1.5 % (ref 0.5–2)
TIBC SERPL-MCNC: 297 UG/DL (ref 240–430)
WBC # BLD AUTO: 8.3 10E3/UL (ref 4–11)

## 2021-08-04 PROCEDURE — 83550 IRON BINDING TEST: CPT | Performed by: INTERNAL MEDICINE

## 2021-08-04 PROCEDURE — 82728 ASSAY OF FERRITIN: CPT | Performed by: INTERNAL MEDICINE

## 2021-08-04 PROCEDURE — 36415 COLL VENOUS BLD VENIPUNCTURE: CPT | Performed by: INTERNAL MEDICINE

## 2021-08-04 PROCEDURE — 99207 PR FOOT EXAM NO CHARGE: CPT | Performed by: INTERNAL MEDICINE

## 2021-08-04 PROCEDURE — 85045 AUTOMATED RETICULOCYTE COUNT: CPT | Performed by: INTERNAL MEDICINE

## 2021-08-04 PROCEDURE — 99214 OFFICE O/P EST MOD 30 MIN: CPT | Performed by: INTERNAL MEDICINE

## 2021-08-04 PROCEDURE — 85025 COMPLETE CBC W/AUTO DIFF WBC: CPT | Performed by: INTERNAL MEDICINE

## 2021-08-04 PROCEDURE — 82607 VITAMIN B-12: CPT | Performed by: INTERNAL MEDICINE

## 2021-08-04 RX ORDER — INSULIN ASPART 100 [IU]/ML
INJECTION, SOLUTION INTRAVENOUS; SUBCUTANEOUS
Qty: 15 ML | Refills: 1 | Status: SHIPPED | OUTPATIENT
Start: 2021-08-04 | End: 2022-02-15

## 2021-08-04 ASSESSMENT — PAIN SCALES - GENERAL: PAINLEVEL: NO PAIN (0)

## 2021-08-04 NOTE — LETTER
August 5, 2021      Reinaldo Barrios  [address]   [facility] [address]        Dear ,    We are writing to inform you of your test results.    The vitamin B12 level is normal.   The ferritin and iron tests are normal.   The reticulocyte count is appropriate.   The anemia has improved and the hemoglobin is now 13.2.   Would recommend repeat blood count in 1 month.     Colonoscopy and screening for colorectal cancer is always recommended.     You will be contacted with any outstanding results as they are available.     Feel free to contact me via the office or My Chart if you have any questions regarding the above.       Resulted Orders   Vitamin B12   Result Value Ref Range    Vitamin B12 295 193 - 986 pg/mL   Iron and iron binding capacity   Result Value Ref Range    Iron 78 35 - 180 ug/dL    Iron Binding Capacity 297 240 - 430 ug/dL    Iron Sat Index 26 15 - 46 %   Ferritin   Result Value Ref Range    Ferritin 151 26 - 388 ng/mL   Reticulocyte count   Result Value Ref Range    % Reticulocyte 1.5 0.5 - 2.0 %    Absolute Reticulocyte 0.063 0.025 - 0.095 10e6/uL   CBC with platelets and differential   Result Value Ref Range    WBC Count 8.3 4.0 - 11.0 10e3/uL    RBC Count 4.33 (L) 4.40 - 5.90 10e6/uL    Hemoglobin 13.2 (L) 13.3 - 17.7 g/dL    Hematocrit 39.2 (L) 40.0 - 53.0 %    MCV 91 78 - 100 fL    MCH 30.5 26.5 - 33.0 pg    MCHC 33.7 31.5 - 36.5 g/dL    RDW 13.3 10.0 - 15.0 %    Platelet Count 213 150 - 450 10e3/uL    % Neutrophils 73 %    % Lymphocytes 15 %    % Monocytes 7 %    % Eosinophils 4 %    % Basophils 1 %    % Immature Granulocytes 0 %    NRBCs per 100 WBC 0 <1 /100    Absolute Neutrophils 6.2 1.6 - 8.3 10e3/uL    Absolute Lymphocytes 1.2 0.8 - 5.3 10e3/uL    Absolute Monocytes 0.5 0.0 - 1.3 10e3/uL    Absolute Eosinophils 0.3 0.0 - 0.7 10e3/uL    Absolute Basophils 0.1 0.0 - 0.2 10e3/uL    Absolute Immature Granulocytes 0.0 <=0.0 10e3/uL    Absolute NRBCs 0.0 10e3/uL       If you have any  questions or concerns, please call the clinic at the number listed above.       Sincerely,     Mark Blanchard, DO

## 2021-08-04 NOTE — LETTER
August 30, 2021      Reinaldo Barrios  [address]   [facility] [address]        Dear ChrissieSophie,    We are writing to inform you of your test results.      There is no blood in the stool sample as tested.     You will be contacted with any outstanding results as they are available.   Feel free to contact me via the office or My Chart if you have any questions regarding the above.    Mark Blanchard DO     Resulted Orders   Vitamin B12   Result Value Ref Range    Vitamin B12 295 193 - 986 pg/mL   Iron and iron binding capacity   Result Value Ref Range    Iron 78 35 - 180 ug/dL    Iron Binding Capacity 297 240 - 430 ug/dL    Iron Sat Index 26 15 - 46 %   Ferritin   Result Value Ref Range    Ferritin 151 26 - 388 ng/mL   Reticulocyte count   Result Value Ref Range    % Reticulocyte 1.5 0.5 - 2.0 %    Absolute Reticulocyte 0.063 0.025 - 0.095 10e6/uL   Fecal colorectal cancer screen (FIT)   Result Value Ref Range    Occult Blood Screen FIT Negative Negative   CBC with platelets and differential   Result Value Ref Range    WBC Count 8.3 4.0 - 11.0 10e3/uL    RBC Count 4.33 (L) 4.40 - 5.90 10e6/uL    Hemoglobin 13.2 (L) 13.3 - 17.7 g/dL    Hematocrit 39.2 (L) 40.0 - 53.0 %    MCV 91 78 - 100 fL    MCH 30.5 26.5 - 33.0 pg    MCHC 33.7 31.5 - 36.5 g/dL    RDW 13.3 10.0 - 15.0 %    Platelet Count 213 150 - 450 10e3/uL    % Neutrophils 73 %    % Lymphocytes 15 %    % Monocytes 7 %    % Eosinophils 4 %    % Basophils 1 %    % Immature Granulocytes 0 %    NRBCs per 100 WBC 0 <1 /100    Absolute Neutrophils 6.2 1.6 - 8.3 10e3/uL    Absolute Lymphocytes 1.2 0.8 - 5.3 10e3/uL    Absolute Monocytes 0.5 0.0 - 1.3 10e3/uL    Absolute Eosinophils 0.3 0.0 - 0.7 10e3/uL    Absolute Basophils 0.1 0.0 - 0.2 10e3/uL    Absolute Immature Granulocytes 0.0 <=0.0 10e3/uL    Absolute NRBCs 0.0 10e3/uL       If you have any questions or concerns, please call the clinic at the number listed above.

## 2021-08-04 NOTE — PROGRESS NOTES
Anali Najera is a 70 year old who presents for the following health issues     HPI     Chief Complaint   Patient presents with     Results     discuss lab results       Arm Pain     right arm pain post  covid shot for 1 month      Fatigue        EMR reviewed including:             Complaint, History of Chief Complaint, Corresponding Review of Systems, and Complaint Specific Physical Examination.    #1   Right arm sore following Covid vaccination.  Has been mildly tender for the last month.  No decrease in range of motion or strength.  No evidence of bruising.  No paresthesias or anesthesias noted.  Discomfort does not keep him awake, he is able to find a tender area like walking on his arm and looking for it.      #2   Follow-up on type 2 diabetes  Blood sugars at home have been generally somewhat elevated over 150.  A1c in May was 8.8.  Taking background insulin with preprandial NovoLog.  Appropriately on statin, aspirin, ACE inhibitor.       Exam:   NEURO: Pt is alert and appropriate. No neurologic lateralization is noted. Cranial nerves 2-12 are intact. Peripheral sensory and motor function are grossly normal.    SKIN:  warm and dry. No erythema, or rashes are noted. No specific lesions of concern are noted.    LUNGS: clear bilaterally, airflow is brisk, no intercostal retraction or stridor is noted. No coughing is noted during visit.   HEART:  regular without rubs, gallops, or murmurs. PMI is nondisplaced. Upstrokes are brisk. S1,S2 are heard.   Feet: no evidence of skin breakdown or ulceration is noted.  Sensation is decreased to monofilament and vibration.  Pulses are strong, capillary refill is brisk.      Vital Signs:   /82 (BP Location: Right arm, Patient Position: Sitting, Cuff Size: Adult Regular)   Pulse 76   Temp (!) 96.7  F (35.9  C) (Temporal)   Resp 20   Wt 98.4 kg (217 lb)   SpO2 99%   BMI 32.99 kg/m               Decision Making    Problem and Complexity     1. Type 2  "diabetes mellitus with hyperglycemia, with long-term current use of insulin (H)  Recommend increasing the Levemir to 65 units.  Continue the preprandial coverage  - insulin aspart (NOVOLOG FLEXPEN) 100 UNIT/ML pen; INJECT 10 - 12 UNITS UNDER THE SKIN TWICE A DAY  Dispense: 15 mL; Refill: 1  - insulin detemir (LEVEMIR FLEXTOUCH) 100 UNIT/ML pen; INJECT 65 UNITS UNDER THE SKIN EVERY DAY  Dispense: 45 mL; Refill: 1    2. Iron deficiency anemia, unspecified iron deficiency anemia type  Rule out anemia as cause for intermittent fatigue.    Patient refuses colonoscopy.  \"Never had whenever will\"  - Vitamin B12; Future  - Iron and iron binding capacity; Future  - Ferritin; Future  - Reticulocyte count; Future  - CBC with platelets and differential; Future  - Vitamin B12  - Iron and iron binding capacity  - Ferritin  - Reticulocyte count  - CBC with platelets and differential    3. Essential hypertension with goal blood pressure less than 140/90  Continue current medication.  Blood pressure well controlled    4. S/P aortic valve replacement  Bioprosthetic valve in place.    5. Long term current use of anticoagulant therapy  Continue anticoagulation for decreased ejection fraction    6. Obesity (BMI 35.0-39.9) with comorbidity (H)  Recommend weight loss through responsible caloric restriction diabetic management    7. Biventricular congestive heart failure (H)  Currently stable on medications including Coreg, ACE inhibitor,    8. Cardiac LV ejection fraction 30-35%  As above    9. Hyperlipidemia LDL goal <100  Continue statin therapy    10. Screen for colon cancer  Screening performed  - Fecal colorectal cancer screen (FIT); Future  - Fecal colorectal cancer screen (FIT)          ------------------------------------------------------------------------------------------------------------------------------  Data    4=1/3 5=2/3    1  >3  Specialty (external) notes reviewed:   Previous echocardiogram and cardiovascular notes " noted  Individual tests ordered (by provider) reviewed:   Previous lab work from May reviewed with patient  Individual tests ordered (by provider):   Yes  Independent Historians Interview:   No  2  Review of outside (other providers) Tests:   None  3   Verbal Discussion with Specialists:    None    ----------------------------------------------------------------------------------------------------------------------------------  Risk   Prescription drug management:   Yes                                High risk for toxicity:   Decision regarding surgery:    None   Social determinants of health:   No   Decision regarding hospitalization:     None   Decision to withhold therapy:   None  Foot exam                             FOLLOW UP   I have asked the patient to make an appointment for followup with me 2 weeks            I have carefully explained the diagnosis and treatment options to the patient.  The patient has displayed an understanding of the above, and all subsequent questions were answered.      DO RADHA Horn    Portions of this note were produced using [employer]  Although every attempt at real-time proof reading has been made, occasional grammar/syntax errors may have been missed.

## 2021-08-05 LAB — VIT B12 SERPL-MCNC: 295 PG/ML (ref 193–986)

## 2021-08-06 PROCEDURE — 82274 ASSAY TEST FOR BLOOD FECAL: CPT | Performed by: INTERNAL MEDICINE

## 2021-08-08 LAB — HEMOCCULT STL QL IA: NEGATIVE

## 2021-08-24 DIAGNOSIS — Z79.4 TYPE 2 DIABETES MELLITUS WITH HYPERGLYCEMIA, WITH LONG-TERM CURRENT USE OF INSULIN (H): ICD-10-CM

## 2021-08-24 DIAGNOSIS — E11.65 TYPE 2 DIABETES MELLITUS WITH HYPERGLYCEMIA, WITH LONG-TERM CURRENT USE OF INSULIN (H): ICD-10-CM

## 2021-08-25 RX ORDER — BLOOD SUGAR DIAGNOSTIC
STRIP MISCELLANEOUS
Qty: 100 STRIP | Refills: 1 | Status: SHIPPED | OUTPATIENT
Start: 2021-08-25 | End: 2023-04-13

## 2021-08-25 NOTE — TELEPHONE ENCOUNTER
Prescription approved per [facility] Refill Protocol.  Suma Gonsales RN

## 2021-09-12 DIAGNOSIS — I10 ESSENTIAL HYPERTENSION WITH GOAL BLOOD PRESSURE LESS THAN 140/90: ICD-10-CM

## 2021-09-13 RX ORDER — CARVEDILOL 3.12 MG/1
TABLET ORAL
Qty: 180 TABLET | Refills: 1 | Status: SHIPPED | OUTPATIENT
Start: 2021-09-13 | End: 2022-09-06

## 2021-09-13 NOTE — TELEPHONE ENCOUNTER
"Requested Prescriptions   Pending Prescriptions Disp Refills    carvedilol (COREG) 3.125 MG tablet 180 tablet 1        Beta-Blockers Protocol Passed - 9/12/2021  9:25 AM        Passed - Blood pressure under 140/90 in past 12 months       BP Readings from Last 3 Encounters:   08/04/21 114/82   05/25/21 126/78   12/16/19 110/62                 Passed - Patient is age 6 or older        Passed - Recent (12 mo) or future (30 days) visit within the authorizing provider's specialty     Patient has had an office visit with the authorizing provider or a provider within the authorizing providers department within the previous 12 mos or has a future within next 30 days. See \"Patient Info\" tab in inbasket, or \"Choose Columns\" in Meds & Orders section of the refill encounter.              Passed - Medication is active on med list            Routing refill request to provider for review/approval because:  A break in medication      Angela Ness RN  [facility]                 "

## 2021-09-16 ENCOUNTER — OFFICE VISIT (OUTPATIENT)
Dept: INTERNAL MEDICINE | Facility: CLINIC | Age: 71
End: 2021-09-16
Payer: MEDICARE

## 2021-09-16 VITALS
WEIGHT: 218.3 LBS | RESPIRATION RATE: 18 BRPM | BODY MASS INDEX: 33.19 KG/M2 | TEMPERATURE: 98.9 F | HEART RATE: 86 BPM | OXYGEN SATURATION: 100 % | SYSTOLIC BLOOD PRESSURE: 140 MMHG | DIASTOLIC BLOOD PRESSURE: 62 MMHG

## 2021-09-16 DIAGNOSIS — E11.65 TYPE 2 DIABETES MELLITUS WITH HYPERGLYCEMIA, WITH LONG-TERM CURRENT USE OF INSULIN (H): Primary | ICD-10-CM

## 2021-09-16 DIAGNOSIS — I10 ESSENTIAL HYPERTENSION WITH GOAL BLOOD PRESSURE LESS THAN 140/90: ICD-10-CM

## 2021-09-16 DIAGNOSIS — Z95.2 HEART VALVE REPLACED: ICD-10-CM

## 2021-09-16 DIAGNOSIS — Z79.4 TYPE 2 DIABETES MELLITUS WITH HYPERGLYCEMIA, WITH LONG-TERM CURRENT USE OF INSULIN (H): Primary | ICD-10-CM

## 2021-09-16 DIAGNOSIS — H90.3 BILATERAL SENSORINEURAL HEARING LOSS: ICD-10-CM

## 2021-09-16 DIAGNOSIS — Z95.1 S/P CABG (CORONARY ARTERY BYPASS GRAFT): ICD-10-CM

## 2021-09-16 LAB
ANION GAP SERPL CALCULATED.3IONS-SCNC: 5 MMOL/L (ref 3–14)
BUN SERPL-MCNC: 22 MG/DL (ref 7–30)
CALCIUM SERPL-MCNC: 8.8 MG/DL (ref 8.5–10.1)
CHLORIDE BLD-SCNC: 106 MMOL/L (ref 94–109)
CO2 SERPL-SCNC: 28 MMOL/L (ref 20–32)
CREAT SERPL-MCNC: 1.57 MG/DL (ref 0.66–1.25)
GFR SERPL CREATININE-BSD FRML MDRD: 43 ML/MIN/1.73M2
GLUCOSE BLD-MCNC: 234 MG/DL (ref 70–99)
HBA1C MFR BLD: 8.9 % (ref 0–5.6)
POTASSIUM BLD-SCNC: 3.9 MMOL/L (ref 3.4–5.3)
SODIUM SERPL-SCNC: 139 MMOL/L (ref 133–144)

## 2021-09-16 PROCEDURE — 83036 HEMOGLOBIN GLYCOSYLATED A1C: CPT | Performed by: INTERNAL MEDICINE

## 2021-09-16 PROCEDURE — 93000 ELECTROCARDIOGRAM COMPLETE: CPT | Performed by: INTERNAL MEDICINE

## 2021-09-16 PROCEDURE — 99214 OFFICE O/P EST MOD 30 MIN: CPT | Performed by: INTERNAL MEDICINE

## 2021-09-16 PROCEDURE — 36415 COLL VENOUS BLD VENIPUNCTURE: CPT | Performed by: INTERNAL MEDICINE

## 2021-09-16 PROCEDURE — 80048 BASIC METABOLIC PNL TOTAL CA: CPT | Performed by: INTERNAL MEDICINE

## 2021-09-16 ASSESSMENT — PAIN SCALES - GENERAL: PAINLEVEL: NO PAIN (0)

## 2021-09-16 NOTE — LETTER
September 21, 2021      Reinaldo Barrios  [address]   [facility] [id number]        Dear ChrissieSophie,    We are writing to inform you of your test results.    Hemoglobin A1c is essentially unchanged at 8.9.  This would suggest some room for improvement diabetic management.   Chemistry panel does demonstrate a blood sugar of 234.  Kidney function is slightly decreased.   Please set up an appointment at a time of your convenience to discuss improved diabetic management, medications and techniques.     You will be contacted with any outstanding results as they are available.   Feel free to contact me via the office or My Chart if you have any questions regarding the above.     Mark Blanchard DO     Resulted Orders   Hemoglobin A1c   Result Value Ref Range    Hemoglobin A1C 8.9 (H) 0.0 - 5.6 %      Comment:      Normal <5.7%   Prediabetes 5.7-6.4%    Diabetes 6.5% or higher     Note: Adopted from ADA consensus guidelines.    Narrative    Results confirmed by repeat test.   Basic metabolic panel  (Ca, Cl, CO2, Creat, Gluc, K, Na, BUN)   Result Value Ref Range    Sodium 139 133 - 144 mmol/L    Potassium 3.9 3.4 - 5.3 mmol/L    Chloride 106 94 - 109 mmol/L    Carbon Dioxide (CO2) 28 20 - 32 mmol/L    Anion Gap 5 3 - 14 mmol/L    Urea Nitrogen 22 7 - 30 mg/dL    Creatinine 1.57 (H) 0.66 - 1.25 mg/dL    Calcium 8.8 8.5 - 10.1 mg/dL    Glucose 234 (H) 70 - 99 mg/dL    GFR Estimate 43 (L) >60 mL/min/1.73m2      Comment:      As of July 11, 2021, eGFR is calculated by the CKD-EPI creatinine equation, without race adjustment. eGFR can be influenced by muscle mass, exercise, and diet. The reported eGFR is an estimation only and is only applicable if the renal function is stable.       If you have any questions or concerns, please call the clinic at the number listed above.

## 2021-09-16 NOTE — PROGRESS NOTES
Anali Najera is a 70 year old who presents for the following health issues     HPI     Chest Pain  Onset/Duration: Started week and a half ago  Description:   Location: left side  Character: sharp  Radiation: No  Duration: Unsure        EMR reviewed including:             Complaint, History of Chief Complaint, Corresponding Review of Systems, and Complaint Specific Physical Examination.    #1   Chest pain  Point tenderness over anterior fourth and fifth costochondral junction.  Reproducible with palpation or deep respiration.  Not associated with nausea, diuresis, or radiation.  Has been shooting large bore handgun.   MS: Minimal crepitance is noted in the extremities. No deformity is present. Muscle strength is appropriate and equal bilaterally. No acute joint erythema or swelling is present.      #2   Type 2 diabetes  Reports blood sugars fairly controlled at this rather vague about values and blood sugar numbers.  Denies polyuria or polydipsia.  Last hemoglobin A1c was 8.8.       Exam:   NEURO: Pt is alert and appropriate. No neurologic lateralization is noted. Cranial nerves 2-12 are intact. Peripheral sensory and motor function are grossly normal.    LUNGS: clear bilaterally, airflow is brisk, no intercostal retraction or stridor is noted. No coughing is noted during visit.      #3   Acute hearing loss.    Occurred shortly after discharging 4 inch .357 magnum several rounds.  Did not have hearing protection at the time.       Exam:   ENT: Pharynx is non-erythemous, minimal PND, no significant nasal obstruction, TM's not red or retracted, hearing markedly decreased bilaterally. No carotid bruits are heard. No JVD seen. Thyroid is not nodular or enlarged.        Vital Signs:   BP (!) 140/62 (BP Location: Right arm, Patient Position: Sitting, Cuff Size: Adult Large)   Pulse 86   Temp 98.9  F (37.2  C) (Temporal)   Resp 18   Wt 99 kg (218 lb 4.8 oz)   SpO2 100%   BMI 33.19 kg/m                Decision Making    Problem and Complexity     1. Type 2 diabetes mellitus with hyperglycemia, with long-term current use of insulin (H)  Check A1c and renal function.  Adjust insulin and medications accordingly  - Hemoglobin A1c; Future  - Basic metabolic panel  (Ca, Cl, CO2, Creat, Gluc, K, Na, BUN); Future  - Hemoglobin A1c  - Basic metabolic panel  (Ca, Cl, CO2, Creat, Gluc, K, Na, BUN)    2. Essential hypertension with goal blood pressure less than 140/90  Currently mildly elevated.  Home values generally better.  We will get EKG in face of chest discomfort.   EKG demonstrates no evidence of ischemia  - EKG 12-lead complete w/read - Clinics    3. Heart valve replaced [Z95.2]  Bioprosthetic aortic valve.  No problems with embolic phenomenon.    4. S/P CABG (coronary artery bypass graft)  Stable.  No cardiac type symptoms    5. Bilateral sensorineural hearing loss  We will set up with audiology.  Recommend:   Air rifle   Proper hearing protection.    - Adult Audiology Referral; Future          ------------------------------------------------------------------------------------------------------------------------------  Data    4=1/3 5=2/3    1  >3  Specialty (external) notes reviewed:   None  Individual tests ordered (by provider) reviewed:   None  Individual tests ordered (by provider):   Multiple  Independent Historians Interview:   None  2  Review of outside (other providers) Tests:   None  3   Verbal Discussion with Specialists:    None    ----------------------------------------------------------------------------------------------------------------------------------  Risk   Prescription drug management:   Yes                                High risk for toxicity:   Decision regarding surgery:    None   Social determinants of health:   No   Decision regarding hospitalization:     None   Decision to withhold therapy:   None                                   FOLLOW UP   I have asked the patient to  make an appointment for followup with me in 1 month            I have carefully explained the diagnosis and treatment options to the patient.  The patient has displayed an understanding of the above, and all subsequent questions were answered.      DO RADHA Horn    Portions of this note were produced using [employer]  Although every attempt at real-time proof reading has been made, occasional grammar/syntax errors may have been missed.

## 2021-09-28 ENCOUNTER — OFFICE VISIT (OUTPATIENT)
Dept: INTERNAL MEDICINE | Facility: CLINIC | Age: 71
End: 2021-09-28
Payer: MEDICARE

## 2021-09-28 VITALS
WEIGHT: 222 LBS | DIASTOLIC BLOOD PRESSURE: 80 MMHG | HEART RATE: 74 BPM | RESPIRATION RATE: 18 BRPM | SYSTOLIC BLOOD PRESSURE: 128 MMHG | TEMPERATURE: 97.9 F | BODY MASS INDEX: 33.75 KG/M2 | OXYGEN SATURATION: 96 %

## 2021-09-28 DIAGNOSIS — Z00.00 MEDICARE ANNUAL WELLNESS VISIT, SUBSEQUENT: ICD-10-CM

## 2021-09-28 DIAGNOSIS — I10 ESSENTIAL HYPERTENSION WITH GOAL BLOOD PRESSURE LESS THAN 140/90: ICD-10-CM

## 2021-09-28 DIAGNOSIS — Z95.1 S/P CABG (CORONARY ARTERY BYPASS GRAFT): ICD-10-CM

## 2021-09-28 DIAGNOSIS — H90.3 BILATERAL SENSORINEURAL HEARING LOSS: ICD-10-CM

## 2021-09-28 DIAGNOSIS — D50.9 IRON DEFICIENCY ANEMIA, UNSPECIFIED IRON DEFICIENCY ANEMIA TYPE: Primary | ICD-10-CM

## 2021-09-28 DIAGNOSIS — Z79.4 TYPE 2 DIABETES MELLITUS WITH HYPERGLYCEMIA, WITH LONG-TERM CURRENT USE OF INSULIN (H): ICD-10-CM

## 2021-09-28 DIAGNOSIS — E11.65 TYPE 2 DIABETES MELLITUS WITH HYPERGLYCEMIA, WITH LONG-TERM CURRENT USE OF INSULIN (H): ICD-10-CM

## 2021-09-28 DIAGNOSIS — Z95.2 S/P AORTIC VALVE REPLACEMENT: ICD-10-CM

## 2021-09-28 LAB
BASOPHILS # BLD AUTO: 0.1 10E3/UL (ref 0–0.2)
BASOPHILS NFR BLD AUTO: 1 %
EOSINOPHIL # BLD AUTO: 0.3 10E3/UL (ref 0–0.7)
EOSINOPHIL NFR BLD AUTO: 3 %
ERYTHROCYTE [DISTWIDTH] IN BLOOD BY AUTOMATED COUNT: 13.1 % (ref 10–15)
HCT VFR BLD AUTO: 38.3 % (ref 40–53)
HGB BLD-MCNC: 12.8 G/DL (ref 13.3–17.7)
IMM GRANULOCYTES # BLD: 0 10E3/UL
IMM GRANULOCYTES NFR BLD: 0 %
LYMPHOCYTES # BLD AUTO: 1.5 10E3/UL (ref 0.8–5.3)
LYMPHOCYTES NFR BLD AUTO: 17 %
MCH RBC QN AUTO: 30.3 PG (ref 26.5–33)
MCHC RBC AUTO-ENTMCNC: 33.4 G/DL (ref 31.5–36.5)
MCV RBC AUTO: 91 FL (ref 78–100)
MONOCYTES # BLD AUTO: 0.6 10E3/UL (ref 0–1.3)
MONOCYTES NFR BLD AUTO: 7 %
NEUTROPHILS # BLD AUTO: 6.1 10E3/UL (ref 1.6–8.3)
NEUTROPHILS NFR BLD AUTO: 72 %
NRBC # BLD AUTO: 0 10E3/UL
NRBC BLD AUTO-RTO: 0 /100
PLATELET # BLD AUTO: 220 10E3/UL (ref 150–450)
RBC # BLD AUTO: 4.22 10E6/UL (ref 4.4–5.9)
WBC # BLD AUTO: 8.5 10E3/UL (ref 4–11)

## 2021-09-28 PROCEDURE — 90662 IIV NO PRSV INCREASED AG IM: CPT | Performed by: INTERNAL MEDICINE

## 2021-09-28 PROCEDURE — G0439 PPPS, SUBSEQ VISIT: HCPCS | Performed by: INTERNAL MEDICINE

## 2021-09-28 PROCEDURE — 36415 COLL VENOUS BLD VENIPUNCTURE: CPT | Performed by: INTERNAL MEDICINE

## 2021-09-28 PROCEDURE — 85025 COMPLETE CBC W/AUTO DIFF WBC: CPT | Performed by: INTERNAL MEDICINE

## 2021-09-28 PROCEDURE — G0008 ADMIN INFLUENZA VIRUS VAC: HCPCS | Performed by: INTERNAL MEDICINE

## 2021-09-28 PROCEDURE — 99213 OFFICE O/P EST LOW 20 MIN: CPT | Mod: 25 | Performed by: INTERNAL MEDICINE

## 2021-09-28 ASSESSMENT — PAIN SCALES - GENERAL: PAINLEVEL: NO PAIN (0)

## 2021-09-28 NOTE — LETTER
October 12, 2021      Re[address]do Barrios  [address]   [facility] [address]        Dear ChrissieSophie,    We are writing to inform you of your test results.    Blood cell count shows mild anemia with a hemoglobin 12.8.  This is unchanged over the last several years.     You will be contacted with any outstanding results as they are available.   Feel free to contact me via the office or My Chart if you have any questions regarding the above.     Mark Blanchard DO    Resulted Orders   CBC with platelets and differential   Result Value Ref Range    WBC Count 8.5 4.0 - 11.0 10e3/uL    RBC Count 4.22 (L) 4.40 - 5.90 10e6/uL    Hemoglobin 12.8 (L) 13.3 - 17.7 g/dL    Hematocrit 38.3 (L) 40.0 - 53.0 %    MCV 91 78 - 100 fL    MCH 30.3 26.5 - 33.0 pg    MCHC 33.4 31.5 - 36.5 g/dL    RDW 13.1 10.0 - 15.0 %    Platelet Count 220 150 - 450 10e3/uL    % Neutrophils 72 %    % Lymphocytes 17 %    % Monocytes 7 %    % Eosinophils 3 %    % Basophils 1 %    % Immature Granulocytes 0 %    NRBCs per 100 WBC 0 <1 /100    Absolute Neutrophils 6.1 1.6 - 8.3 10e3/uL    Absolute Lymphocytes 1.5 0.8 - 5.3 10e3/uL    Absolute Monocytes 0.6 0.0 - 1.3 10e3/uL    Absolute Eosinophils 0.3 0.0 - 0.7 10e3/uL    Absolute Basophils 0.1 0.0 - 0.2 10e3/uL    Absolute Immature Granulocytes 0.0 <=0.0 10e3/uL    Absolute NRBCs 0.0 10e3/uL       If you have any questions or concerns, please call the clinic at the number listed above.

## 2021-09-28 NOTE — PROGRESS NOTES
"        Anali Najera is a 70 year old who presents for the following health issues     HPI   Hearing loss. Acute on chronic.  Has not yet seen audiology.  Shot .44 Mag without \"Ears on\"    2 cylinders (12 rounds)  Feels pressure in ears.  No fever,drainage, vertigo   Exam:   TM intact, not red,not bulging.    Chief Complaint   Patient presents with     Follow Up     follow up lab results, needs chest xray and ears    Annual Wellness Visit    Patient has been advised of split billing requirements and indicates understanding: Yes     Are you in the first 12 months of your Medicare Part B coverage?  No    Physical Health:    In general, how would you rate your overall physical health? good    Outside of work, how many days during the week do you exercise?none    Outside of work, approximately how many minutes a day do you exercise?not applicable    If you drink alcohol do you typically have >3 drinks per day or >7 drinks per week? No    Do you usually eat at least 4 servings of fruit and vegetables a day, include whole grains & fiber and avoid regularly eating high fat or \"junk\" foods? Yes    Do you have any problems taking medications regularly? No    Do you have any side effects from medications? none    Needs assistance for the following daily activities: no assistance needed    Which of the following safety concerns are present in your home?  none identified     Hearing impairment: Yes, can't hear very well. Even yelling    In the past 6 months, have you been bothered by leaking of urine? no    Mental Health:    In general, how would you rate your overall mental or emotional health? good  PHQ-2 Score:      Do you feel safe in your environment? Yes    Have you ever done Advance Care Planning? (For example, a Health Directive, POLST, or a discussion with a medical provider or your loved ones about your wishes)? No, advance care planning information given to patient to review.  Patient plans to discuss their " wishes with loved ones or provider.      Fall risk: Modest       Cognitive Screenin) Repeat 3 items (Leader, Season, Table)    2) Clock draw: NORMAL  3) 3 item recall: Recalls 3 objects  Results: NORMAL clock, 1-2 items recalled: COGNITIVE IMPAIRMENT LESS LIKELY    Mini-CogTM Copyright DANAE Berg. Licensed by the author for use in [facility]; reprinted with permission ([email or web address]). All rights reserved.      Do you have sleep apnea, excessive snoring or daytime drowsiness?: no    Current providers sharing in care for this patient include:   Patient Care Team:  Mark Blanchard DO as PCP - General (Internal Medicine)  Mark Blanchard DO as Assigned PCP    Patient has been advised of split billing requirements and indicates understanding: Yes                                Review of Systems     Constitutional: The patient denies significant weight gain, weight loss, or cold intolerance, night sweats, fatigue, lethargy, listlessness.  Patient notes blood sugars have been fairly well controlled.  Takes his medications compliantly.  Denies polyuria or polydipsia.     LUNGS: Pt denies: cough, excess sputum, hemoptysis, or shortness of breath.   HEART: Pt denies: chest pain, arrhythmia, syncope, tachy or bradyarrhythmia.   GI: Pt denies: nausea, vomiting, diarrhea, constipation, melena, or hematochezia.   NEURO: Pt denies: seizures, strokes, diplopia, weakness, paraesthesias, or paralysis.   SKIN: Pt denies: itching, rashes, discoloration, or specific lesions of concern. Denies recent hair loss.   PSYCH: The patient denies significant depression, anxiety, mood imbalance. Specifically denies any suicidal ideation.   ENT: Pt denies: sore throat, significant nasal congestion, epistaxis, difficulty swallowing.  Significant decrease in bilateral baseline hearing is noted..        Examination    /80 (BP Location: Right arm, Patient Position: Sitting, Cuff Size: Adult Large)   Pulse  74   Temp 97.9  F (36.6  C) (Temporal)   Resp 18   Wt 100.7 kg (222 lb)   SpO2 96%   BMI 33.75 kg/m      Constitutional: The patient appears to be in no acute distress. The patient appears to be adequately hydrated. No acute respiratory or hemodynamic distress is noted at this time.   LUNGS: clear bilaterally, airflow is brisk, no intercostal retraction or stridor is noted. No coughing is noted during visit.   HEART:  regular without rubs, clicks, gallops, or murmurs. PMI is nondisplaced. Upstrokes are brisk. S1,S2 are heard.   GI: Abdomen is soft, without rebound, guarding or tenderness. Bowel sounds are appropriate. No renal bruits are heard.   NEURO: Pt is alert and appropriate. No neurologic lateralization is noted. Cranial nerves 2-12 are intact. Peripheral sensory and motor function are grossly normal.    SKIN:  warm and dry. No erythema, or rashes are noted. No specific lesions of concern are noted.    PSYCH: The patient appears grossly appropriate. Maintains good eye contact, does not have any jittery or atypical motion. Displays appropriate affect.   ENT: Pharynx is non-erythemous, minimal PND, no significant nasal obstruction, TM's not red or retracted, hearing markedly reduced bilaterally. No carotid bruits are heard. No JVD seen. Thyroid is not nodular or enlarged.            Vital Signs:   /80 (BP Location: Right arm, Patient Position: Sitting, Cuff Size: Adult Large)   Pulse 74   Temp 97.9  F (36.6  C) (Temporal)   Resp 18   Wt 100.7 kg (222 lb)   SpO2 96%   BMI 33.75 kg/m               Decision Making    Problem and Complexity     1. Medicare annual wellness visit, subsequent    2. Bilateral sensorineural hearing loss  Set up with audiology again    3. Iron deficiency anemia, unspecified iron deficiency anemia type  Check CBC.  Anemia improving  - CBC with platelets and differential; Future    4. S/P aortic valve replacement  Stable.  Bioprosthetic valve in place    5. S/P CABG  (coronary artery bypass graft)  No chest pain.  Continues current medication    6. Essential hypertension with goal blood pressure less than 140/90  Blood pressure controlled on current medication    7. Type 2 diabetes mellitus with hyperglycemia, with long-term current use of insulin (H)  We will recheck A1c in 4 months.  Showing slow improvement.  Discussed dietary restriction.                                 FOLLOW UP   I have asked the patient to make an appointment for followup with me in 4 months or as needed          I have carefully explained the diagnosis and treatment options to the patient.  The patient has displayed an understanding of the above, and all subsequent questions were answered.      DO RADHA Horn    Portions of this note were produced using [employer]  Although every attempt at real-time proof reading has been made, occasional grammar/syntax errors may have been missed.

## 2021-11-12 DIAGNOSIS — Z79.4 TYPE 2 DIABETES MELLITUS WITH HYPERGLYCEMIA, WITH LONG-TERM CURRENT USE OF INSULIN (H): Primary | ICD-10-CM

## 2021-11-12 DIAGNOSIS — E11.65 TYPE 2 DIABETES MELLITUS WITH HYPERGLYCEMIA, WITH LONG-TERM CURRENT USE OF INSULIN (H): Primary | ICD-10-CM

## 2021-11-12 RX ORDER — BLOOD SUGAR DIAGNOSTIC
STRIP MISCELLANEOUS
Qty: 100 STRIP | Refills: 8 | Status: SHIPPED | OUTPATIENT
Start: 2021-11-12 | End: 2022-09-07

## 2021-11-12 NOTE — TELEPHONE ENCOUNTER
Test strips  Prescription approved per [facility] Refill Protocol.    Martha Dooley RN

## 2021-12-04 ENCOUNTER — HOSPITAL ENCOUNTER (EMERGENCY)
Facility: CLINIC | Age: 71
Discharge: HOME | End: 2021-12-04
Attending: PHYSICIAN ASSISTANT | Admitting: PHYSICIAN ASSISTANT
Payer: MEDICARE

## 2021-12-04 VITALS
SYSTOLIC BLOOD PRESSURE: 155 MMHG | HEART RATE: 67 BPM | RESPIRATION RATE: 19 BRPM | DIASTOLIC BLOOD PRESSURE: 89 MMHG | OXYGEN SATURATION: 98 % | TEMPERATURE: 98.5 F

## 2021-12-04 DIAGNOSIS — M62.830 LUMBAR PARASPINAL MUSCLE SPASM: ICD-10-CM

## 2021-12-04 PROCEDURE — 99284 EMERGENCY DEPT VISIT MOD MDM: CPT | Performed by: PHYSICIAN ASSISTANT

## 2021-12-04 RX ORDER — NAPROXEN 500 MG/1
500 TABLET ORAL 2 TIMES DAILY WITH MEALS
Qty: 16 TABLET | Refills: 0 | Status: SHIPPED | OUTPATIENT
Start: 2021-12-04 | End: 2021-12-12

## 2021-12-04 RX ORDER — METHOCARBAMOL 500 MG/1
500 TABLET, FILM COATED ORAL 4 TIMES DAILY PRN
Qty: 30 TABLET | Refills: 0 | Status: SHIPPED | OUTPATIENT
Start: 2021-12-04 | End: 2022-07-10

## 2021-12-04 NOTE — ED TRIAGE NOTES
Complains of left sided lower back.  Is diabetic and heart problems.  States pain has been having discomfort that flared up 2-3 weeks ago and moved wrong this morning. Denies numbness/tinlging to lower ectremities

## 2021-12-04 NOTE — DISCHARGE INSTRUCTIONS
Please use the muscle relaxers as needed to help with the back pain.  Take naproxen twice daily to help reduce inflammation and help pain as well.  Apply IcyHot or lidocaine patches to the low back for additional relief.  Apply heat and practice gentle stretching once an hour to prevent further spasming.  Follow-up with your clinic provider in a week if things are improving.  Return to the emergency department for any worsening concerns.    Thank you for choosing [facility]. It was a pleasure taking care of you today. If you have any questions, please call [phone].    Archana Luz PA-C

## 2021-12-04 NOTE — ED PROVIDER NOTES
"  History     Chief Complaint   Patient presents with     Back Pain     HPI  Reinaldo Barrios is a 71 year old male who resents to the emergency department complaining of low back pain.  The patient reports he has had some low back discomfort for the last couple weeks but when he woke up this morning it was much worse.  He has pain across the low back with any movement.  Describes as sharp and tight feeling.  He has no numbness or tingling or weakness in his legs.  No numbness in the groin.  No reported loss of bowel or bladder control.  No fevers reported.  He denies any injury recently.  He tried an old oxycodone at home from 4 years ago but that did not help.        Allergies:  Allergies   Allergen Reactions     Sulfa Drugs      Optison Unknown     Pt states he \"felt like crap later\" after he was given Optison in 2016.       Problem List:    Patient Active Problem List    Diagnosis Date Noted     Obesity (BMI 35.0-39.9) with comorbidity (H) 04/10/2019     Priority: Medium     Type 2 diabetes mellitus with hyperglycemia, with long-term current use of insulin (H) 05/18/2018     Priority: Medium     Non morbid obesity due to excess calories 07/03/2017     Priority: Medium     Essential hypertension with goal blood pressure less than 140/90 05/22/2016     Priority: Medium     S/P aortic valve replacement 04/27/2016     Priority: Medium     Long term current use of anticoagulant therapy 03/16/2016     Priority: Medium     Heart valve replaced [Z95.2] 03/16/2016     Priority: Medium     Advanced directives, counseling/discussion 01/12/2016     Priority: Medium     declined       S/P CABG (coronary artery bypass graft) 12/04/2015     Priority: Medium     Aortic stenosis 11/07/2015     Priority: Medium     CHF (congestive heart failure) (H) 11/05/2015     Priority: Medium     NSTEMI (non-ST elevated myocardial infarction) (H) 11/03/2015     Priority: Medium     ACS (acute coronary syndrome) (H) 11/03/2015     " Priority: Medium     Aortic valve stenosis 2015     Priority: Medium     Personal history of tobacco use, presenting hazards to health 2015     Priority: Medium     Hyperlipidemia LDL goal <100 2015     Priority: Medium     Hiatal hernia 2015     Priority: Medium     Chest pain, unspecified chest pain type 2015     Priority: Medium     Cardiac LV ejection fraction 30-35% 2015     Priority: Medium        Past Medical History:    Past Medical History:   Diagnosis Date     Aortic valve stenosis 2015     Cardiac LV ejection fraction 30-35% 2015     Chest pain, unspecified chest pain type 2015     Hiatal hernia 2015     Hyperlipidemia LDL goal <100 2015     Personal history of tobacco use, presenting hazards to health 2015     Type 2 diabetes mellitus without complication (H) 2015       Past Surgical History:    Past Surgical History:   Procedure Laterality Date     BYPASS GRAFT ARTERY CORONARY N/A 2015    Procedure: BYPASS GRAFT ARTERY CORONARY;  Surgeon: Sia Caldera MD;  Location: [facility]     KNEE SURGERY       REPLACE VALVE AORTIC N/A 2015    Procedure: REPLACE VALVE AORTIC;  Surgeon: Sia Caldera MD;  Location:  OR       Family History:    No family history on file.    Social History:  Marital Status:  Single [1]  Social History     Tobacco Use     Smoking status: Former Smoker     Types: Pipe     Quit date: 5/15/2016     Years since quittin.5     Smokeless tobacco: Never Used     Tobacco comment: smokes a pipe in good weather   Vaping Use     Vaping Use: Never used   Substance Use Topics     Alcohol use: Yes     Alcohol/week: 0.0 standard drinks     Comment: rare     Drug use: No        Medications:    methocarbamol (ROBAXIN) 500 MG tablet  naproxen (NAPROSYN) 500 MG tablet  aspirin EC 81 MG EC tablet  atorvastatin (LIPITOR) 40 MG tablet  carvedilol (COREG) 3.125 MG tablet  insulin aspart (NOVOLOG FLEXPEN) 100  "UNIT/ML pen  insulin detemir (LEVEMIR FLEXTOUCH) 100 UNIT/ML pen  insulin pen needle (B-D U/F) 31G X 8 MM miscellaneous  insulin syringe-needle U-100 (BD INSULIN SYRINGE ULTRAFINE) 31G X 5/16\" 0.5 ML miscellaneous  isosorbide mononitrate (IMDUR) 30 MG 24 hr tablet  lisinopril (ZESTRIL) 5 MG tablet  ONETOUCH ULTRA test strip  ONETOUCH ULTRA test strip  ONETOUCH ULTRA test strip  senna-docusate (STIMULANT LAXATIVE) 8.6-50 MG tablet          Review of Systems   All other systems reviewed and are negative.      Physical Exam   BP: (!) 155/89  Pulse: 67  Temp: 98.5  F (36.9  C)  Resp: 19  SpO2: 98 %      Physical Exam  Vitals and nursing note reviewed.   Constitutional:       General: He is not in acute distress.     Appearance: He is well-developed. He is not ill-appearing, toxic-appearing or diaphoretic.   HENT:      Head: Normocephalic and atraumatic.   Eyes:      Extraocular Movements: Extraocular movements intact.      Conjunctiva/sclera: Conjunctivae normal.      Pupils: Pupils are equal, round, and reactive to light.   Pulmonary:      Effort: Pulmonary effort is normal. No respiratory distress.   Abdominal:      Tenderness: There is no right CVA tenderness or left CVA tenderness.   Musculoskeletal:         General: No deformity.      Cervical back: Neck supple. No tenderness.      Thoracic back: Normal.      Lumbar back: Spasms and tenderness present. No bony tenderness.        Back:       Comments: Tenderness and spasming to area noted.  No weakness exhibited in the lower extremities.   Skin:     General: Skin is warm and dry.   Neurological:      Mental Status: He is alert and oriented to person, place, and time. Mental status is at baseline.      Motor: No weakness.      Coordination: Coordination normal.   Psychiatric:         Mood and Affect: Mood normal.         Behavior: Behavior normal.         ED Course          Procedures      No results found for this or any previous visit (from the past 24 " hour(s)).    Medications - No data to display       Assessments & Plan (with Medical Decision Making)  Reinaldo Barrios is a 71 year old L who presented to the ED with low back pain.  He has had some discomfort the last couple weeks but it has gotten worse in the last 24 hours.  He has no history of falls or warning neurological signs or symptoms.  On exam today he did have tenderness across the low back soft tissue and musculature with some spasming consistent with a lumbar spasm.  He had no midline bony tenderness and no weakness or numbness in the legs to suggest need for advanced imaging.  I explained to the patient that he has some muscular spasming causing his pain.  He is agreeable to treating this today with muscle relaxers.  Robaxin was prescribed, side effects discussed.  I also suggested naproxen twice daily to help with inflammation and pain as well and he was agreeable to this.  Encouraged heat to the back, gentle stretching and moving to prevent further spasming.  He can follow-up with his clinic provider in a week if no improvement.  Return precautions were provided.     I have reviewed the nursing notes.    I have reviewed the findings, diagnosis, plan and need for follow up with the patient.    New Prescriptions    METHOCARBAMOL (ROBAXIN) 500 MG TABLET    Take 1 tablet (500 mg) by mouth 4 times daily as needed for muscle spasms    NAPROXEN (NAPROSYN) 500 MG TABLET    Take 1 tablet (500 mg) by mouth 2 times daily (with meals) for 8 days       Final diagnoses:   Lumbar paraspinal muscle spasm     Note: Chart documentation done in part with Dragon Voice Recognition software. Although reviewed after completion, some word and grammatical errors may remain.     12/4/2021   [facility] EMERGENCY DEPT     Archana Luz PA-C  12/04/21 [id number]

## 2022-01-04 DIAGNOSIS — I50.42 SYSTOLIC AND DIASTOLIC CHF, CHRONIC (H): ICD-10-CM

## 2022-01-04 DIAGNOSIS — K59.04 CHRONIC IDIOPATHIC CONSTIPATION: ICD-10-CM

## 2022-01-04 DIAGNOSIS — E78.5 HYPERLIPIDEMIA LDL GOAL <100: ICD-10-CM

## 2022-01-04 DIAGNOSIS — R07.2 PRECORDIAL PAIN: ICD-10-CM

## 2022-01-06 RX ORDER — AMOXICILLIN 250 MG
CAPSULE ORAL
Qty: 100 TABLET | Refills: 3 | Status: SHIPPED | OUTPATIENT
Start: 2022-01-06 | End: 2022-07-28

## 2022-01-06 RX ORDER — ISOSORBIDE MONONITRATE 30 MG/1
30 TABLET, EXTENDED RELEASE ORAL DAILY
Qty: 90 TABLET | Refills: 0 | Status: SHIPPED | OUTPATIENT
Start: 2022-01-06 | End: 2022-04-13

## 2022-01-06 RX ORDER — ATORVASTATIN CALCIUM 40 MG/1
40 TABLET, FILM COATED ORAL DAILY
Qty: 90 TABLET | Refills: 1 | Status: SHIPPED | OUTPATIENT
Start: 2022-01-06 | End: 2022-07-11

## 2022-01-06 RX ORDER — LISINOPRIL 5 MG/1
5 TABLET ORAL DAILY
Qty: 90 TABLET | Refills: 1 | Status: SHIPPED | OUTPATIENT
Start: 2022-01-06 | End: 2022-06-23

## 2022-01-06 NOTE — TELEPHONE ENCOUNTER
Prescription approved per [facility] Refill Protocol.    Robbie Hernandez RN

## 2022-01-06 NOTE — TELEPHONE ENCOUNTER
Isosorbide  Routing refill request to provider for review/approval because:  BP elevated    Lisinopril  Routing refill request to provider for review/approval because:  Labs not current:  CRE  BP elevated      Robbie Hernandez RN

## 2022-02-11 DIAGNOSIS — Z79.4 TYPE 2 DIABETES MELLITUS WITH HYPERGLYCEMIA, WITH LONG-TERM CURRENT USE OF INSULIN (H): ICD-10-CM

## 2022-02-11 DIAGNOSIS — E11.65 TYPE 2 DIABETES MELLITUS WITH HYPERGLYCEMIA, WITH LONG-TERM CURRENT USE OF INSULIN (H): ICD-10-CM

## 2022-02-14 NOTE — TELEPHONE ENCOUNTER
Routing refill request to provider for review/approval because:  Labs out of range:    Lab Results   Component Value Date    A1C 8.9 09/16/2021    A1C 8.8 05/25/2021    A1C 8.6 10/15/2019    A1C 8.3 08/12/2019    A1C 8.2 04/10/2019    A1C 7.5 09/13/2018

## 2022-02-15 RX ORDER — INSULIN DETEMIR 100 [IU]/ML
INJECTION, SOLUTION SUBCUTANEOUS
Qty: 45 ML | Refills: 1 | Status: SHIPPED | OUTPATIENT
Start: 2022-02-15 | End: 2022-06-23

## 2022-02-15 RX ORDER — INSULIN ASPART 100 [IU]/ML
INJECTION, SOLUTION INTRAVENOUS; SUBCUTANEOUS
Qty: 15 ML | Refills: 1 | Status: SHIPPED | OUTPATIENT
Start: 2022-02-15 | End: 2022-06-23

## 2022-04-12 DIAGNOSIS — R07.2 PRECORDIAL PAIN: ICD-10-CM

## 2022-04-13 RX ORDER — ISOSORBIDE MONONITRATE 30 MG/1
TABLET, EXTENDED RELEASE ORAL
Qty: 90 TABLET | Refills: 0 | Status: SHIPPED | OUTPATIENT
Start: 2022-04-13 | End: 2022-07-11

## 2022-04-13 NOTE — TELEPHONE ENCOUNTER
Routing refill request to provider for review/approval because:  Elevated BP on file.     Adrianna Conley Rn

## 2022-04-21 DIAGNOSIS — Z79.4 TYPE 2 DIABETES MELLITUS WITH HYPERGLYCEMIA, WITH LONG-TERM CURRENT USE OF INSULIN (H): ICD-10-CM

## 2022-04-21 DIAGNOSIS — E11.65 TYPE 2 DIABETES MELLITUS WITH HYPERGLYCEMIA, WITH LONG-TERM CURRENT USE OF INSULIN (H): ICD-10-CM

## 2022-04-22 RX ORDER — PEN NEEDLE, DIABETIC 31 GX5/16"
NEEDLE, DISPOSABLE MISCELLANEOUS
Qty: 100 EACH | Refills: 3 | Status: SHIPPED | OUTPATIENT
Start: 2022-04-22 | End: 2023-06-14

## 2022-04-22 NOTE — TELEPHONE ENCOUNTER
BD needles  Prescription approved per [facility] Refill Protocol.    Martha Dooley RN

## 2022-05-05 ENCOUNTER — OFFICE VISIT (OUTPATIENT)
Dept: INTERNAL MEDICINE | Facility: CLINIC | Age: 72
End: 2022-05-05
Payer: MEDICARE

## 2022-05-05 VITALS
RESPIRATION RATE: 20 BRPM | TEMPERATURE: 99 F | SYSTOLIC BLOOD PRESSURE: 138 MMHG | WEIGHT: 216.4 LBS | BODY MASS INDEX: 34.78 KG/M2 | HEIGHT: 66 IN | DIASTOLIC BLOOD PRESSURE: 82 MMHG | OXYGEN SATURATION: 96 % | HEART RATE: 72 BPM

## 2022-05-05 DIAGNOSIS — E11.65 TYPE 2 DIABETES MELLITUS WITH HYPERGLYCEMIA, WITH LONG-TERM CURRENT USE OF INSULIN (H): Primary | ICD-10-CM

## 2022-05-05 DIAGNOSIS — Z79.4 TYPE 2 DIABETES MELLITUS WITH HYPERGLYCEMIA, WITH LONG-TERM CURRENT USE OF INSULIN (H): Primary | ICD-10-CM

## 2022-05-05 DIAGNOSIS — E66.01 MORBID OBESITY (H): ICD-10-CM

## 2022-05-05 DIAGNOSIS — I50.42 SYSTOLIC AND DIASTOLIC CHF, CHRONIC (H): ICD-10-CM

## 2022-05-05 DIAGNOSIS — R07.2 PRECORDIAL PAIN: ICD-10-CM

## 2022-05-05 DIAGNOSIS — G63 POLYNEUROPATHY ASSOCIATED WITH UNDERLYING DISEASE (H): ICD-10-CM

## 2022-05-05 DIAGNOSIS — E78.5 HYPERLIPIDEMIA LDL GOAL <100: ICD-10-CM

## 2022-05-05 DIAGNOSIS — Z95.1 S/P CABG (CORONARY ARTERY BYPASS GRAFT): ICD-10-CM

## 2022-05-05 DIAGNOSIS — H90.3 BILATERAL SENSORINEURAL HEARING LOSS: ICD-10-CM

## 2022-05-05 DIAGNOSIS — Z95.2 S/P AORTIC VALVE REPLACEMENT: ICD-10-CM

## 2022-05-05 LAB
ALT SERPL W P-5'-P-CCNC: 28 U/L (ref 0–70)
ANION GAP SERPL CALCULATED.3IONS-SCNC: 4 MMOL/L (ref 3–14)
BUN SERPL-MCNC: 12 MG/DL (ref 7–30)
CALCIUM SERPL-MCNC: 8.7 MG/DL (ref 8.5–10.1)
CHLORIDE BLD-SCNC: 105 MMOL/L (ref 94–109)
CHOLEST SERPL-MCNC: 124 MG/DL
CO2 SERPL-SCNC: 29 MMOL/L (ref 20–32)
CREAT SERPL-MCNC: 1.01 MG/DL (ref 0.66–1.25)
CREAT UR-MCNC: 90 MG/DL
FASTING STATUS PATIENT QL REPORTED: NO
GFR SERPL CREATININE-BSD FRML MDRD: 79 ML/MIN/1.73M2
GLUCOSE BLD-MCNC: 197 MG/DL (ref 70–99)
HBA1C MFR BLD: 7.6 % (ref 0–5.6)
HDLC SERPL-MCNC: 50 MG/DL
LDLC SERPL CALC-MCNC: 51 MG/DL
MICROALBUMIN UR-MCNC: 13 MG/L
MICROALBUMIN/CREAT UR: 14.44 MG/G CR (ref 0–17)
NONHDLC SERPL-MCNC: 74 MG/DL
POTASSIUM BLD-SCNC: 4.1 MMOL/L (ref 3.4–5.3)
SODIUM SERPL-SCNC: 138 MMOL/L (ref 133–144)
TRIGL SERPL-MCNC: 113 MG/DL

## 2022-05-05 PROCEDURE — 36415 COLL VENOUS BLD VENIPUNCTURE: CPT | Performed by: INTERNAL MEDICINE

## 2022-05-05 PROCEDURE — 80048 BASIC METABOLIC PNL TOTAL CA: CPT | Performed by: INTERNAL MEDICINE

## 2022-05-05 PROCEDURE — 84460 ALANINE AMINO (ALT) (SGPT): CPT | Performed by: INTERNAL MEDICINE

## 2022-05-05 PROCEDURE — 83036 HEMOGLOBIN GLYCOSYLATED A1C: CPT | Performed by: INTERNAL MEDICINE

## 2022-05-05 PROCEDURE — 80061 LIPID PANEL: CPT | Performed by: INTERNAL MEDICINE

## 2022-05-05 PROCEDURE — 82043 UR ALBUMIN QUANTITATIVE: CPT | Performed by: INTERNAL MEDICINE

## 2022-05-05 PROCEDURE — 99214 OFFICE O/P EST MOD 30 MIN: CPT | Performed by: INTERNAL MEDICINE

## 2022-05-05 ASSESSMENT — PAIN SCALES - GENERAL: PAINLEVEL: NO PAIN (0)

## 2022-05-05 NOTE — PROGRESS NOTES
Please help the patient set up a cardiac echo.  This should be performed sometime after the middle of June.    Thank you.    [place]

## 2022-05-05 NOTE — PROGRESS NOTES
Anali Najera is a 71 year old who presents for the following health issues     HPI     Diabetes Follow-up    How often are you checking your blood sugar? Three times daily  Blood sugar testing frequency justification:  When he feels blood sugar is high  What time of day are you checking your blood sugars (select all that apply)?  Before meals  Have you had any blood sugars above 200?  No  Have you had any blood sugars below 70?  No    What symptoms do you notice when your blood sugar is low?  None    What concerns do you have today about your diabetes? None     Do you have any of these symptoms? (Select all that apply)  Numbness in feet and Burning in feet    Have you had a diabetic eye exam in the last 12 months? No        BP Readings from Last 2 Encounters:   05/05/22 138/82   12/04/21 (!) 155/89     Hemoglobin A1C POCT (%)   Date Value   05/25/2021 8.8 (H)   10/15/2019 8.6 (H)     Hemoglobin A1C (%)   Date Value   09/16/2021 8.9 (H)     LDL Cholesterol Calculated (mg/dL)   Date Value   05/25/2021 59   10/15/2019 55                 How many servings of fruits and vegetables do you eat daily?  2-3    On average, how many sweetened beverages do you drink each day (Examples: soda, juice, sweet tea, etc.  Do NOT count diet or artificially sweetened beverages)?   1    How many days per week do you exercise enough to make your heart beat faster? 3 or less    How many minutes a day do you exercise enough to make your heart beat faster? 10 - 19    How many days per week do you miss taking your medication? 0         EMR reviewed including:             Complaint, History of Chief Complaint, Corresponding Review of Systems, and Complaint Specific Physical Examination.    #1   Follow-up on type 2 diabetes  Blood sugars have been improving with most values in the upper 100 and lower 200s at most.  Patient has had a few hypoglycemic episodes and has had to back his insulin down from time to time.  Denies polyuria  "or polydipsia.  Watches his diet closely and eats foods properly.        Exam:   NEURO: Pt is alert and appropriate. No neurologic lateralization is noted. Cranial nerves 2-12 are intact. Peripheral sensory function is decreased in the forefeet bilaterally    SKIN:  warm and dry. No erythema, or rashes are noted. No specific lesions of concern are noted.       #2   Follow-up on congestive heart failure  Does have mild shortness of breath with exertion.  Rare chest pressure which only lasts a few moments and is not always associated with exertion.  Denies diaphoresis, nausea or other ischemic type symptoms.  Denies significant weight change or edema.        Exam:   LUNGS: clear bilaterally, airflow is brisk, no intercostal retraction or stridor is noted. No coughing is noted during visit.   HEART:  regular without rubs, clicks, gallops. PMI is nondisplaced. Upstrokes are brisk. S1,S2 are heard.  No significant murmur noted.  Patient has bioprosthetic valve.            Patient has been interviewed, applicable history and applied review of systems have been performed.    Vital Signs:   /82 (BP Location: Right arm, Patient Position: Chair, Cuff Size: Adult Large)   Pulse 72   Temp 99  F (37.2  C) (Temporal)   Resp 20   Ht 1.664 m (5' 5.5\")   Wt 98.2 kg (216 lb 6.4 oz)   SpO2 96%   BMI 35.46 kg/m        Decision Making    Problem and Complexity     1. Type 2 diabetes mellitus with hyperglycemia, with long-term current use of insulin (H)  Patient scheduling ophthalmology appointment with [place].  Check lab occluding A1c, renal and hepatic function  - OPTOMETRY REFERRAL; Future  - HEMOGLOBIN A1C; Future  - BASIC METABOLIC PANEL; Future  - ALT; Future  - Albumin Random Urine Quantitative with Creat Ratio; Future  - HEMOGLOBIN A1C  - BASIC METABOLIC PANEL  - ALT  - Albumin Random Urine Quantitative with Creat Ratio    2. Polyneuropathy associated with underlying disease (H)  Neuropathy associated " diabetes.  Does not keep him awake.  Does not require therapy at this time.    3. Precordial pain  Does not appear to be cardiac.  Is brief, pointed, and not associated with exertion.    4. S/P aortic valve replacement  We will set up echocardiogram for evaluation of his valve and cardiac output  - Echocardiogram Complete; Future    5. S/P CABG (coronary artery bypass graft)  If chest pain changes character, cardiology consultation will be requested.  Additionally, stress testing via Lexiscan nuclear imaging will be performed.    6. Systolic and diastolic CHF, chronic (H)  Currently stable.  Continue medication    7. Hyperlipidemia LDL goal <100  Continue statin therapy, check lipid levels    8. Bilateral sensorineural hearing loss  Recommend continued hearing aid usage    9. Morbid obesity (H)  Recommend continue weight loss responsible caloric restriction and exercise                                FOLLOW UP   I have asked the patient to make an appointment for followup with me in 6 months or as needed.  This will be predicated upon his lab results.        I have carefully explained the diagnosis and treatment options to the patient.  The patient has displayed an understanding of the above, and all subsequent questions were answered.      DO RADHA Horn    Portions of this note were produced using [employer]  Although every attempt at real-time proof reading has been made, occasional grammar/syntax errors may have been missed.

## 2022-05-05 NOTE — LETTER
May 16, 2022      Reinaldo Barrios  [address]   [facility] [address]        Dear ,    We are writing to inform you of your test results.    Microalbumin is normal.   Hemoglobin A1c is improved and is now 7.6 down from the previous 8.9 suggesting very good blood sugar control.   Cholesterol is well controlled with an LDL of 51.   Liver test is normal.   Chemistry panel shows an elevated blood sugar 197 and normal kidney function.       Resulted Orders   HEMOGLOBIN A1C   Result Value Ref Range    Hemoglobin A1C 7.6 (H) 0.0 - 5.6 %      Comment:      Normal <5.7%   Prediabetes 5.7-6.4%    Diabetes 6.5% or higher     Note: Adopted from ADA consensus guidelines.   BASIC METABOLIC PANEL   Result Value Ref Range    Sodium 138 133 - 144 mmol/L    Potassium 4.1 3.4 - 5.3 mmol/L    Chloride 105 94 - 109 mmol/L    Carbon Dioxide (CO2) 29 20 - 32 mmol/L    Anion Gap 4 3 - 14 mmol/L    Urea Nitrogen 12 7 - 30 mg/dL    Creatinine 1.01 0.66 - 1.25 mg/dL    Calcium 8.7 8.5 - 10.1 mg/dL    Glucose 197 (H) 70 - 99 mg/dL    GFR Estimate 79 >60 mL/min/1.73m2      Comment:      Effective December 21, 2021 eGFRcr in adults is calculated using the 2021 CKD-EPI creatinine equation which includes age and gender (Amanda et al., NEJM, DOI: 10.1056/HTIIle5180467)   ALT   Result Value Ref Range    ALT 28 0 - 70 U/L   Lipid panel reflex to direct LDL Fasting   Result Value Ref Range    Cholesterol 124 <200 mg/dL    Triglycerides 113 <150 mg/dL    Direct Measure HDL 50 >=40 mg/dL    LDL Cholesterol Calculated 51 <=100 mg/dL    Non HDL Cholesterol 74 <130 mg/dL    Patient Fasting > 8hrs? No     Narrative    Cholesterol  Desirable:  <200 mg/dL    Triglycerides  Normal:  Less than 150 mg/dL  Borderline High:  150-199 mg/dL  High:  200-499 mg/dL  Very High:  Greater than or equal to 500 mg/dL    Direct Measure HDL  Female:  Greater than or equal to 50 mg/dL   Male:  Greater than or equal to 40 mg/dL    LDL Cholesterol  Desirable:   <100mg/dL  Above Desirable:  100-129 mg/dL   Borderline High:  130-159 mg/dL   High:  160-189 mg/dL   Very High:  >= 190 mg/dL    Non HDL Cholesterol  Desirable:  130 mg/dL  Above Desirable:  130-159 mg/dL  Borderline High:  160-189 mg/dL  High:  190-219 mg/dL  Very High:  Greater than or equal to 220 mg/dL   Albumin Random Urine Quantitative with Creat Ratio   Result Value Ref Range    Creatinine Urine mg/dL 90 mg/dL    Albumin Urine mg/L 13 mg/L    Albumin Urine mg/g Cr 14.44 0.00 - 17.00 mg/g Cr       If you have any questions or concerns, please call the clinic at the number listed above.       Sincerely,      Mark Blanchard, DO

## 2022-06-06 ENCOUNTER — HOSPITAL ENCOUNTER (OUTPATIENT)
Dept: CARDIOLOGY | Facility: CLINIC | Age: 72
Discharge: HOME | End: 2022-06-06
Attending: INTERNAL MEDICINE | Admitting: INTERNAL MEDICINE
Payer: MEDICARE

## 2022-06-06 DIAGNOSIS — Z95.2 S/P AORTIC VALVE REPLACEMENT: ICD-10-CM

## 2022-06-06 LAB — LVEF ECHO: NORMAL

## 2022-06-06 PROCEDURE — 93306 TTE W/DOPPLER COMPLETE: CPT

## 2022-06-06 PROCEDURE — 93306 TTE W/DOPPLER COMPLETE: CPT | Mod: 26 | Performed by: INTERNAL MEDICINE

## 2022-06-21 DIAGNOSIS — E11.65 TYPE 2 DIABETES MELLITUS WITH HYPERGLYCEMIA, WITH LONG-TERM CURRENT USE OF INSULIN (H): ICD-10-CM

## 2022-06-21 DIAGNOSIS — I50.42 SYSTOLIC AND DIASTOLIC CHF, CHRONIC (H): ICD-10-CM

## 2022-06-21 DIAGNOSIS — Z79.4 TYPE 2 DIABETES MELLITUS WITH HYPERGLYCEMIA, WITH LONG-TERM CURRENT USE OF INSULIN (H): ICD-10-CM

## 2022-06-23 RX ORDER — LISINOPRIL 5 MG/1
TABLET ORAL
Qty: 90 TABLET | Refills: 1 | Status: SHIPPED | OUTPATIENT
Start: 2022-06-23 | End: 2022-12-20

## 2022-06-23 RX ORDER — INSULIN ASPART 100 [IU]/ML
INJECTION, SOLUTION INTRAVENOUS; SUBCUTANEOUS
Qty: 15 ML | Refills: 1 | Status: SHIPPED | OUTPATIENT
Start: 2022-06-23 | End: 2022-12-20

## 2022-06-23 RX ORDER — INSULIN DETEMIR 100 [IU]/ML
INJECTION, SOLUTION SUBCUTANEOUS
Qty: 45 ML | Refills: 1 | Status: SHIPPED | OUTPATIENT
Start: 2022-06-23 | End: 2022-07-07

## 2022-07-07 ENCOUNTER — OFFICE VISIT (OUTPATIENT)
Dept: INTERNAL MEDICINE | Facility: CLINIC | Age: 72
End: 2022-07-07
Payer: MEDICARE

## 2022-07-07 VITALS
SYSTOLIC BLOOD PRESSURE: 130 MMHG | BODY MASS INDEX: 35.47 KG/M2 | OXYGEN SATURATION: 97 % | DIASTOLIC BLOOD PRESSURE: 72 MMHG | HEART RATE: 70 BPM | TEMPERATURE: 97 F | WEIGHT: 216.44 LBS

## 2022-07-07 DIAGNOSIS — Z12.11 SCREEN FOR COLON CANCER: Primary | ICD-10-CM

## 2022-07-07 DIAGNOSIS — G45.9 TIA (TRANSIENT ISCHEMIC ATTACK): ICD-10-CM

## 2022-07-07 DIAGNOSIS — Z79.4 TYPE 2 DIABETES MELLITUS WITH HYPERGLYCEMIA, WITH LONG-TERM CURRENT USE OF INSULIN (H): ICD-10-CM

## 2022-07-07 DIAGNOSIS — Z95.2 S/P AORTIC VALVE REPLACEMENT: ICD-10-CM

## 2022-07-07 DIAGNOSIS — E11.65 TYPE 2 DIABETES MELLITUS WITH HYPERGLYCEMIA, WITH LONG-TERM CURRENT USE OF INSULIN (H): ICD-10-CM

## 2022-07-07 DIAGNOSIS — I10 ESSENTIAL HYPERTENSION WITH GOAL BLOOD PRESSURE LESS THAN 140/90: ICD-10-CM

## 2022-07-07 PROCEDURE — 99214 OFFICE O/P EST MOD 30 MIN: CPT | Performed by: INTERNAL MEDICINE

## 2022-07-07 RX ORDER — INSULIN DETEMIR 100 [IU]/ML
50 INJECTION, SOLUTION SUBCUTANEOUS EVERY MORNING
Qty: 45 ML | Refills: 1 | COMMUNITY
Start: 2022-07-07 | End: 2022-09-06

## 2022-07-07 NOTE — PROGRESS NOTES
"      Anali Najera is a 71 year old, presenting for the following health issues:  RECHECK      History of Present Illness       Diabetes:   He presents for follow up of diabetes.  He is checking home blood glucose three times daily. He checks blood glucose before meals.  Blood glucose is sometimes over 200 and sometimes under 70. He is aware of hypoglycemia symptoms including shakiness and dizziness. He has no concerns regarding his diabetes at this time.  He is having numbness in feet. The patient has not had a diabetic eye exam in the last 12 months.         Vascular Disease:  He presents for follow up of vascular disease.  He never takes nitroglycerin. He takes daily aspirin.    Reason for visit:  Follow up    He eats 0-1 servings of fruits and vegetables daily.He consumes 1 sweetened beverage(s) daily.He exercises with enough effort to increase his heart rate 10 to 19 minutes per day.  He exercises with enough effort to increase his heart rate 7 days per week.   He is taking medications regularly.        EMR reviewed including:             Complaint, History of Chief Complaint, Corresponding Review of Systems, and Complaint Specific Physical Examination.    #1   Labile blood sugars  Frequent episodes of hypoglycemia  Patient is \"auto adjusting\" his insulin as he needs it.  Frequently has to have a rescue meal.  Since his hypoglycemia without difficulty.  Highest blood sugars are generally in the mid 200 range.  Recent A1c has come down slightly.        Exam:   Constitutional: The patient appears to be in no acute distress. The patient appears to be adequately hydrated. No acute respiratory or hemodynamic distress is noted at this time.    #2   recent TIA symptoms.  Had a brief episode of dysphasia and paresthesias in the hands.  Slightly worse on the left.  Chose not to go to the hospital.  Symptoms resolved after an hour or so.  Has a prior history of TIA with uneventful work-up in 2019.  Carotids less " than 50% at that time.  CT unremarkable.   Exam:   NEURO: Pt is alert and appropriate. No neurologic lateralization is noted. Cranial nerves 2-12 are intact. Peripheral sensory and motor function are grossly normal.       #3   Chest pain  Longstanding history of ischemic heart disease with intermittent congestive heart failure  Denies orthopnea or dyspnea.  Has reviewed his echo and is concerned regarding the mention of a lead placement.  Discussed the concept of echocardiography and shadowing artifact.  Patient is status post aortic valvuloplasty.  Valve functioning well.        Exam:   HEART:  regular without rubs, clicks, gallops, or murmurs. PMI is nondisplaced. Upstrokes are brisk. S1,S2 are heard.   LUNGS: clear bilaterally, airflow is brisk, no intercostal retraction or stridor is noted. No coughing is noted during visit.        Patient has been interviewed, applicable history and applied review of systems have been performed.    Vital Signs:   /72   Pulse 70   Temp 97  F (36.1  C) (Temporal)   Wt 98.2 kg (216 lb 7 oz)   SpO2 97%   BMI 35.47 kg/m        Decision Making    Problem and Complexity     1. Type 2 diabetes mellitus with hyperglycemia, with long-term current use of insulin (H)  Given the labile nature of the patient's blood sugar, I recommend that he continues the Levemir at 50 units daily and discontinue his NovoLog.  This will limit the variables in attempting to try to stabilize his blood sugars.  - insulin detemir (LEVEMIR FLEXTOUCH) 100 UNIT/ML pen; Inject 50 Units Subcutaneous At Bedtime  Dispense: 45 mL; Refill: 1    2. S/P aortic valve replacement  Doing well.    3. Essential hypertension with goal blood pressure less than 140/90  Currently controlled on medication.  No changes recommended    4. TIA (transient ischemic attack)  No work-up needed at this time.  Patient has had previous work-up unremarkable and is on appropriate therapy.  Instructed to go to the hospital or have  "someone take him to the hospital if there is a recurrence.  \"A TIA can become the stroke\"    5. Screen for colon cancer  Screening  - Fecal colorectal cancer screen FIT - Future (S+30); Future                                FOLLOW UP   I have asked the patient to make an appointment for followup with me in in 2 weeks with blood sugar log in hand.  Have also recommended a blood pressure log.        I have carefully explained the diagnosis and treatment options to the patient.  The patient has displayed an understanding of the above, and all subsequent questions were answered.      DO RADHA Horn    Portions of this note were produced using [email or web address]  Although every attempt at real-time proof reading has been made, occasional grammar/syntax errors may have been missed.    "

## 2022-07-10 ENCOUNTER — HOSPITAL ENCOUNTER (EMERGENCY)
Facility: CLINIC | Age: 72
Discharge: ANOTHER HOSPITAL | End: 2022-07-11
Attending: EMERGENCY MEDICINE | Admitting: EMERGENCY MEDICINE
Payer: MEDICARE

## 2022-07-10 ENCOUNTER — APPOINTMENT (OUTPATIENT)
Dept: CT IMAGING | Facility: CLINIC | Age: 72
End: 2022-07-10
Attending: EMERGENCY MEDICINE
Payer: MEDICARE

## 2022-07-10 DIAGNOSIS — R20.0 NUMBNESS ON RIGHT SIDE: ICD-10-CM

## 2022-07-10 DIAGNOSIS — I63.9 ACUTE CVA (CEREBROVASCULAR ACCIDENT) (H): Primary | ICD-10-CM

## 2022-07-10 DIAGNOSIS — E78.5 HYPERLIPIDEMIA LDL GOAL <100: ICD-10-CM

## 2022-07-10 LAB
ALBUMIN SERPL-MCNC: 3.5 G/DL (ref 3.4–5)
ALP SERPL-CCNC: 102 U/L (ref 40–150)
ALT SERPL W P-5'-P-CCNC: 31 U/L (ref 0–70)
ANION GAP SERPL CALCULATED.3IONS-SCNC: 6 MMOL/L (ref 3–14)
AST SERPL W P-5'-P-CCNC: 34 U/L (ref 0–45)
BASOPHILS # BLD AUTO: 0.1 10E3/UL (ref 0–0.2)
BASOPHILS NFR BLD AUTO: 1 %
BILIRUB SERPL-MCNC: 0.7 MG/DL (ref 0.2–1.3)
BUN SERPL-MCNC: 13 MG/DL (ref 7–30)
CALCIUM SERPL-MCNC: 8.4 MG/DL (ref 8.5–10.1)
CHLORIDE BLD-SCNC: 107 MMOL/L (ref 94–109)
CO2 SERPL-SCNC: 28 MMOL/L (ref 20–32)
CREAT SERPL-MCNC: 1.12 MG/DL (ref 0.66–1.25)
EOSINOPHIL # BLD AUTO: 0.3 10E3/UL (ref 0–0.7)
EOSINOPHIL NFR BLD AUTO: 4 %
ERYTHROCYTE [DISTWIDTH] IN BLOOD BY AUTOMATED COUNT: 13.4 % (ref 10–15)
GFR SERPL CREATININE-BSD FRML MDRD: 69 ML/MIN/1.73M2
GLUCOSE BLD-MCNC: 141 MG/DL (ref 70–99)
GLUCOSE BLDC GLUCOMTR-MCNC: 146 MG/DL (ref 70–99)
GLUCOSE BLDC GLUCOMTR-MCNC: 161 MG/DL (ref 70–99)
HCT VFR BLD AUTO: 40.7 % (ref 40–53)
HGB BLD-MCNC: 13.9 G/DL (ref 13.3–17.7)
HOLD SPECIMEN: NORMAL
IMM GRANULOCYTES # BLD: 0 10E3/UL
IMM GRANULOCYTES NFR BLD: 0 %
LYMPHOCYTES # BLD AUTO: 1.9 10E3/UL (ref 0.8–5.3)
LYMPHOCYTES NFR BLD AUTO: 24 %
MCH RBC QN AUTO: 30.5 PG (ref 26.5–33)
MCHC RBC AUTO-ENTMCNC: 34.2 G/DL (ref 31.5–36.5)
MCV RBC AUTO: 90 FL (ref 78–100)
MONOCYTES # BLD AUTO: 0.5 10E3/UL (ref 0–1.3)
MONOCYTES NFR BLD AUTO: 7 %
NEUTROPHILS # BLD AUTO: 4.9 10E3/UL (ref 1.6–8.3)
NEUTROPHILS NFR BLD AUTO: 64 %
NRBC # BLD AUTO: 0 10E3/UL
NRBC BLD AUTO-RTO: 0 /100
PLATELET # BLD AUTO: 204 10E3/UL (ref 150–450)
POTASSIUM BLD-SCNC: 4.6 MMOL/L (ref 3.4–5.3)
PROT SERPL-MCNC: 7.5 G/DL (ref 6.8–8.8)
RBC # BLD AUTO: 4.55 10E6/UL (ref 4.4–5.9)
SARS-COV-2 RNA RESP QL NAA+PROBE: NEGATIVE
SODIUM SERPL-SCNC: 141 MMOL/L (ref 133–144)
TROPONIN I SERPL HS-MCNC: 13 NG/L
WBC # BLD AUTO: 7.6 10E3/UL (ref 4–11)

## 2022-07-10 PROCEDURE — 83036 HEMOGLOBIN GLYCOSYLATED A1C: CPT | Performed by: NURSE PRACTITIONER

## 2022-07-10 PROCEDURE — 36415 COLL VENOUS BLD VENIPUNCTURE: CPT | Performed by: EMERGENCY MEDICINE

## 2022-07-10 PROCEDURE — 250N000009 HC RX 250: Performed by: EMERGENCY MEDICINE

## 2022-07-10 PROCEDURE — 99285 EMERGENCY DEPT VISIT HI MDM: CPT | Mod: 25 | Performed by: EMERGENCY MEDICINE

## 2022-07-10 PROCEDURE — G1010 CDSM STANSON: HCPCS

## 2022-07-10 PROCEDURE — 87635 SARS-COV-2 COVID-19 AMP PRB: CPT | Performed by: EMERGENCY MEDICINE

## 2022-07-10 PROCEDURE — C9803 HOPD COVID-19 SPEC COLLECT: HCPCS | Performed by: EMERGENCY MEDICINE

## 2022-07-10 PROCEDURE — 93010 ELECTROCARDIOGRAM REPORT: CPT | Performed by: EMERGENCY MEDICINE

## 2022-07-10 PROCEDURE — 250N000011 HC RX IP 250 OP 636: Performed by: EMERGENCY MEDICINE

## 2022-07-10 PROCEDURE — 84484 ASSAY OF TROPONIN QUANT: CPT | Performed by: EMERGENCY MEDICINE

## 2022-07-10 PROCEDURE — 250N000013 HC RX MED GY IP 250 OP 250 PS 637: Performed by: EMERGENCY MEDICINE

## 2022-07-10 PROCEDURE — 80053 COMPREHEN METABOLIC PANEL: CPT | Performed by: EMERGENCY MEDICINE

## 2022-07-10 PROCEDURE — 93005 ELECTROCARDIOGRAM TRACING: CPT | Performed by: EMERGENCY MEDICINE

## 2022-07-10 PROCEDURE — 85025 COMPLETE CBC W/AUTO DIFF WBC: CPT | Performed by: EMERGENCY MEDICINE

## 2022-07-10 RX ORDER — CLOPIDOGREL BISULFATE 75 MG/1
300 TABLET ORAL ONCE
Status: COMPLETED | OUTPATIENT
Start: 2022-07-10 | End: 2022-07-10

## 2022-07-10 RX ORDER — ATORVASTATIN CALCIUM 40 MG/1
40 TABLET, FILM COATED ORAL DAILY
Status: DISCONTINUED | OUTPATIENT
Start: 2022-07-11 | End: 2022-07-11 | Stop reason: HOSPADM

## 2022-07-10 RX ORDER — ISOSORBIDE MONONITRATE 30 MG/1
30 TABLET, EXTENDED RELEASE ORAL DAILY
Status: DISCONTINUED | OUTPATIENT
Start: 2022-07-11 | End: 2022-07-11 | Stop reason: HOSPADM

## 2022-07-10 RX ORDER — LORAZEPAM 1 MG/1
1 TABLET ORAL ONCE
Status: DISCONTINUED | OUTPATIENT
Start: 2022-07-11 | End: 2022-07-11 | Stop reason: HOSPADM

## 2022-07-10 RX ORDER — IOPAMIDOL 755 MG/ML
500 INJECTION, SOLUTION INTRAVASCULAR ONCE
Status: COMPLETED | OUTPATIENT
Start: 2022-07-10 | End: 2022-07-10

## 2022-07-10 RX ORDER — CLOPIDOGREL BISULFATE 75 MG/1
75 TABLET ORAL DAILY
Status: DISCONTINUED | OUTPATIENT
Start: 2022-07-11 | End: 2022-07-11 | Stop reason: HOSPADM

## 2022-07-10 RX ORDER — ASPIRIN 81 MG/1
81 TABLET ORAL DAILY
Status: DISCONTINUED | OUTPATIENT
Start: 2022-07-11 | End: 2022-07-11 | Stop reason: HOSPADM

## 2022-07-10 RX ORDER — LISINOPRIL 2.5 MG/1
5 TABLET ORAL DAILY
Status: DISCONTINUED | OUTPATIENT
Start: 2022-07-11 | End: 2022-07-11 | Stop reason: HOSPADM

## 2022-07-10 RX ORDER — NICOTINE POLACRILEX 4 MG
15-30 LOZENGE BUCCAL
Status: DISCONTINUED | OUTPATIENT
Start: 2022-07-10 | End: 2022-07-11 | Stop reason: HOSPADM

## 2022-07-10 RX ORDER — DEXTROSE MONOHYDRATE 25 G/50ML
25-50 INJECTION, SOLUTION INTRAVENOUS
Status: DISCONTINUED | OUTPATIENT
Start: 2022-07-10 | End: 2022-07-11 | Stop reason: HOSPADM

## 2022-07-10 RX ORDER — CARVEDILOL 3.12 MG/1
3.12 TABLET ORAL 2 TIMES DAILY WITH MEALS
Status: DISCONTINUED | OUTPATIENT
Start: 2022-07-10 | End: 2022-07-11 | Stop reason: HOSPADM

## 2022-07-10 RX ADMIN — ASPIRIN 325 MG: 325 TABLET ORAL at 21:02

## 2022-07-10 RX ADMIN — IOPAMIDOL 75 ML: 755 INJECTION, SOLUTION INTRAVENOUS at 17:50

## 2022-07-10 RX ADMIN — CLOPIDOGREL BISULFATE 300 MG: 75 TABLET ORAL at 21:02

## 2022-07-10 RX ADMIN — SODIUM CHLORIDE 100 ML: 9 INJECTION, SOLUTION INTRAVENOUS at 17:50

## 2022-07-10 ASSESSMENT — ENCOUNTER SYMPTOMS
WEAKNESS: 0
APPETITE CHANGE: 0
DIARRHEA: 0
ABDOMINAL PAIN: 0
NAUSEA: 0
MUSCULOSKELETAL NEGATIVE: 1
DIFFICULTY URINATING: 0
HEADACHES: 0
LIGHT-HEADEDNESS: 0
CONFUSION: 0
DIAPHORESIS: 0
ARTHRALGIAS: 0
PSYCHIATRIC NEGATIVE: 1
RESPIRATORY NEGATIVE: 1
MYALGIAS: 0
SPEECH DIFFICULTY: 0
CHILLS: 0
SHORTNESS OF BREATH: 0
ACTIVITY CHANGE: 0
NUMBNESS: 1
VOMITING: 0
COUGH: 0
DIZZINESS: 0
CARDIOVASCULAR NEGATIVE: 1
DYSURIA: 0
FEVER: 0
SORE THROAT: 0

## 2022-07-10 NOTE — ED PROVIDER NOTES
"  History     Chief Complaint   Patient presents with     Numbness     HPI  Reinaldo Barrios is a 71 year old male who who has a history significant for type 2 diabetes, obesity, long-term use of anticoagulant therapy.  He arrives today to the emergency department for evaluation of transient right-sided numbness.  The patient was in his normal state of health until approximately 3 hours prior to arrival.  He reports that he developed right hand numbness that moved proximally up the right arm.  He did report then he developed right upper and lower facial numbness.  He states he then took a nap as typically this resolves symptoms.  He awoke he noted some symptoms persisted prompting EMS call.  In route symptoms have resolved completely.  He received no intervention.  Patient reports no weakness.  He reports no visual disturbance such as double vision, blurred vision or loss.  He reports no difficulty speaking or facial weakness.  He reports no abnormal coordination.  No recent headache, fever, chills, neck stiffness.  He reports typical symptoms are in the left however today this was on the right.  He does report similar symptoms on Memorial Day of this past year.  The patient has been compliant with his medications as prescribed per patient.    Allergies:  Allergies   Allergen Reactions     Sulfa Drugs      Optison Unknown     Pt states he \"felt like crap later\" after he was given Optison in 2016.       Problem List:    Patient Active Problem List    Diagnosis Date Noted     Polyneuropathy associated with underlying disease (H) 05/05/2022     Priority: Medium     Obesity (BMI 35.0-39.9) with comorbidity (H) 04/10/2019     Priority: Medium     Type 2 diabetes mellitus with hyperglycemia, with long-term current use of insulin (H) 05/18/2018     Priority: Medium     Non morbid obesity due to excess calories 07/03/2017     Priority: Medium     Essential hypertension with goal blood pressure less than 140/90 " 05/22/2016     Priority: Medium     S/P aortic valve replacement 04/27/2016     Priority: Medium     Long term current use of anticoagulant therapy 03/16/2016     Priority: Medium     Heart valve replaced [Z95.2] 03/16/2016     Priority: Medium     Advanced directives, counseling/discussion 01/12/2016     Priority: Medium     declined       S/P CABG (coronary artery bypass graft) 12/04/2015     Priority: Medium     Aortic stenosis 11/07/2015     Priority: Medium     CHF (congestive heart failure) (H) 11/05/2015     Priority: Medium     NSTEMI (non-ST elevated myocardial infarction) (H) 11/03/2015     Priority: Medium     ACS (acute coronary syndrome) (H) 11/03/2015     Priority: Medium     Aortic valve stenosis 11/02/2015     Priority: Medium     Personal history of tobacco use, presenting hazards to health 11/02/2015     Priority: Medium     Hyperlipidemia LDL goal <100 11/02/2015     Priority: Medium     Hiatal hernia 11/02/2015     Priority: Medium     Chest pain, unspecified chest pain type 11/02/2015     Priority: Medium     Cardiac LV ejection fraction 30-35% 11/02/2015     Priority: Medium        Past Medical History:    Past Medical History:   Diagnosis Date     Aortic valve stenosis 11/2/2015     Cardiac LV ejection fraction 30-35% 11/2/2015     Chest pain, unspecified chest pain type 11/2/2015     Hiatal hernia 11/2/2015     Hyperlipidemia LDL goal <100 11/2/2015     Personal history of tobacco use, presenting hazards to health 11/2/2015     Type 2 diabetes mellitus without complication (H) 11/2/2015       Past Surgical History:    Past Surgical History:   Procedure Laterality Date     BYPASS GRAFT ARTERY CORONARY N/A 11/7/2015    Procedure: BYPASS GRAFT ARTERY CORONARY;  Surgeon: Sia Caldera MD;  Location: [facility] OR     KNEE SURGERY  2009     REPLACE VALVE AORTIC N/A 11/7/2015    Procedure: REPLACE VALVE AORTIC;  Surgeon: Sia Caldera MD;  Location:  OR       Family History:    History  "reviewed. No pertinent family history.    Social History:  Marital Status:  Single [1]  Social History     Tobacco Use     Smoking status: Current Some Day Smoker     Types: Pipe     Last attempt to quit: 5/15/2016     Years since quittin.1     Smokeless tobacco: Never Used     Tobacco comment: smokes a pipe in good weather   Vaping Use     Vaping Use: Never used   Substance Use Topics     Alcohol use: Yes     Alcohol/week: 0.0 standard drinks     Comment: rare     Drug use: No        Medications:    aspirin EC 81 MG EC tablet  atorvastatin (LIPITOR) 40 MG tablet  B-D U/F 31G X 8 MM insulin pen needle  carvedilol (COREG) 3.125 MG tablet  insulin detemir (LEVEMIR FLEXTOUCH) 100 UNIT/ML pen  insulin syringe-needle U-100 (BD INSULIN SYRINGE ULTRAFINE) 31G X \" 0.5 ML miscellaneous  isosorbide mononitrate (IMDUR) 30 MG 24 hr tablet  lisinopril (ZESTRIL) 5 MG tablet  ONETOUCH ULTRA test strip  ONETOUCH ULTRA test strip  senna-docusate (SENEXON-S) 8.6-50 MG tablet  NOVOLOG FLEXPEN 100 UNIT/ML soln  ONETOUCH ULTRA test strip          Review of Systems   Constitutional: Negative for activity change, appetite change, chills, diaphoresis and fever.   HENT: Negative.  Negative for congestion, ear pain and sore throat.    Respiratory: Negative.  Negative for cough and shortness of breath.    Cardiovascular: Negative.  Negative for chest pain and leg swelling.   Gastrointestinal: Negative for abdominal pain, diarrhea, nausea and vomiting.   Genitourinary: Negative.  Negative for difficulty urinating and dysuria.   Musculoskeletal: Negative.  Negative for arthralgias and myalgias.   Skin: Negative.  Negative for rash.   Neurological: Positive for numbness. Negative for dizziness, speech difficulty, weakness, light-headedness and headaches.   Psychiatric/Behavioral: Negative.  Negative for confusion.   All other systems reviewed and are negative.      Physical Exam   BP: (!) 173/102  Pulse: 81  Temp: 98  F (36.7  C)  Resp: " 18  Weight: 98 kg (216 lb)  SpO2: 99 %      Physical Exam  Vitals and nursing note reviewed.   Constitutional:       General: He is not in acute distress.     Appearance: Normal appearance. He is not ill-appearing, toxic-appearing or diaphoretic.   HENT:      Head: Normocephalic and atraumatic.      Right Ear: External ear normal.      Left Ear: External ear normal.      Nose: Nose normal. No congestion.      Mouth/Throat:      Mouth: Mucous membranes are moist.      Pharynx: Oropharynx is clear. No oropharyngeal exudate.   Eyes:      General: No visual field deficit.     Extraocular Movements: Extraocular movements intact.      Conjunctiva/sclera: Conjunctivae normal.      Pupils: Pupils are equal, round, and reactive to light.   Cardiovascular:      Rate and Rhythm: Normal rate.      Pulses: Normal pulses.      Heart sounds: Normal heart sounds. No murmur heard.    No friction rub.   Pulmonary:      Effort: Pulmonary effort is normal. No respiratory distress.      Breath sounds: No stridor. No wheezing, rhonchi or rales.   Abdominal:      General: Abdomen is flat. There is no distension.      Tenderness: There is no abdominal tenderness. There is no guarding or rebound.   Musculoskeletal:         General: No deformity or signs of injury. Normal range of motion.      Cervical back: Normal range of motion.      Right lower leg: No edema.   Skin:     General: Skin is warm.      Capillary Refill: Capillary refill takes less than 2 seconds.      Coloration: Skin is not pale.      Findings: No bruising or erythema.   Neurological:      General: No focal deficit present.      Mental Status: He is alert and oriented to person, place, and time. Mental status is at baseline.      GCS: GCS eye subscore is 4. GCS verbal subscore is 5. GCS motor subscore is 6.      Cranial Nerves: No cranial nerve deficit or dysarthria.      Sensory: Sensation is intact. No sensory deficit.      Motor: No weakness.      Coordination:  Coordination is intact. Coordination normal.   Psychiatric:         Mood and Affect: Mood normal.         Behavior: Behavior normal.         ED Course                 Procedures              EKG Interpretation:      Interpreted by Ehsan Arrington MD    Symptoms at time of EKG: none  Rhythm: normal sinus   Rate: normal  Axis: normal  Ectopy: none  Conduction: non specific QRS widening; AV block  ST Segments/ T Waves: No ST-T wave changes  Q Waves: old infarct  Comparison to prior: No old EKG available    Clinical Impression: abnormal EKG                 Results for orders placed or performed during the hospital encounter of 07/10/22 (from the past 24 hour(s))   Glucose by meter   Result Value Ref Range    GLUCOSE BY METER POCT 146 (H) 70 - 99 mg/dL   [place] [facility]    Narrative    The following orders were created for panel order [place] [facility].  Procedure                               Abnormality         Status                     ---------                               -----------         ------                     Extra Blue Top Tube[[id number]]                              Final result               Extra Green Top (Lithium...[[id number]]                      Final result               Extra Purple Top Tube[[id number]]                            Final result                 Please view results for these tests on the individual orders.   Extra Blue Top Tube   Result Value Ref Range    Hold Specimen JIC    Extra Green Top (Lithium Heparin) Tube   Result Value Ref Range    Hold Specimen JIC    Extra Purple Top Tube   Result Value Ref Range    Hold Specimen JIC    CBC with platelets differential    Narrative    The following orders were created for panel order CBC with platelets differential.  Procedure                               Abnormality         Status                     ---------                               -----------         ------                     CBC with platelets and d...[[id number]]                       Final result                 Please view results for these tests on the individual orders.   Comprehensive metabolic panel   Result Value Ref Range    Sodium 141 133 - 144 mmol/L    Potassium 4.6 3.4 - 5.3 mmol/L    Chloride 107 94 - 109 mmol/L    Carbon Dioxide (CO2) 28 20 - 32 mmol/L    Anion Gap 6 3 - 14 mmol/L    Urea Nitrogen 13 7 - 30 mg/dL    Creatinine 1.12 0.66 - 1.25 mg/dL    Calcium 8.4 (L) 8.5 - 10.1 mg/dL    Glucose 141 (H) 70 - 99 mg/dL    Alkaline Phosphatase 102 40 - 150 U/L    AST 34 0 - 45 U/L    ALT 31 0 - 70 U/L    Protein Total 7.5 6.8 - 8.8 g/dL    Albumin 3.5 3.4 - 5.0 g/dL    Bilirubin Total 0.7 0.2 - 1.3 mg/dL    GFR Estimate 69 >60 mL/min/1.73m2   Troponin I   Result Value Ref Range    Troponin I High Sensitivity 13 <79 ng/L   CBC with platelets and differential   Result Value Ref Range    WBC Count 7.6 4.0 - 11.0 10e3/uL    RBC Count 4.55 4.40 - 5.90 10e6/uL    Hemoglobin 13.9 13.3 - 17.7 g/dL    Hematocrit 40.7 40.0 - 53.0 %    MCV 90 78 - 100 fL    MCH 30.5 26.5 - 33.0 pg    MCHC 34.2 31.5 - 36.5 g/dL    RDW 13.4 10.0 - 15.0 %    Platelet Count 204 150 - 450 10e3/uL    % Neutrophils 64 %    % Lymphocytes 24 %    % Monocytes 7 %    % Eosinophils 4 %    % Basophils 1 %    % Immature Granulocytes 0 %    NRBCs per 100 WBC 0 <1 /100    Absolute Neutrophils 4.9 1.6 - 8.3 10e3/uL    Absolute Lymphocytes 1.9 0.8 - 5.3 10e3/uL    Absolute Monocytes 0.5 0.0 - 1.3 10e3/uL    Absolute Eosinophils 0.3 0.0 - 0.7 10e3/uL    Absolute Basophils 0.1 0.0 - 0.2 10e3/uL    Absolute Immature Granulocytes 0.0 <=0.4 10e3/uL    Absolute NRBCs 0.0 10e3/uL   CT Head w/o Contrast    Narrative    EXAM: CT HEAD W/O CONTRAST, CTA HEAD NECK W CONTRAST  LOCATION: [facility]  DATE/TIME: 7/10/2022 5:47 PM    INDICATION: R sided sensory change  COMPARISON: 12/09/2017.  CONTRAST: mL Isovue 370  TECHNIQUE: Head and neck CT angiogram with IV contrast. Noncontrast head CT followed by axial  helical CT images of the head and neck vessels obtained during the arterial phase of intravenous contrast administration. Axial 2D reconstructed images and   multiplanar 3D MIP reconstructed images of the head and neck vessels were performed by the technologist. Dose reduction techniques were used. All stenosis measurements made according to NASCET criteria unless otherwise specified.    FINDINGS:   NONCONTRAST HEAD CT:   INTRACRANIAL CONTENTS: No intracranial hemorrhage, extraaxial collection, or mass effect.  No CT evidence of acute infarct. There is scattered diffuse low attenuation within the periventricular and subcortical white matter consistent with diffuse small   vessel ischemic disease. The ventricular system, basal cisterns and the cortical sulci are consistent with diffuse volume loss.     VISUALIZED ORBITS/SINUSES/MASTOIDS: No intraorbital abnormality. No paranasal sinus mucosal disease. No middle ear or mastoid effusion.    BONES/SOFT TISSUES: No acute abnormality.    HEAD CTA:  ANTERIOR CIRCULATION: No stenosis/occlusion, aneurysm, or high flow vascular malformation. There is a patent anterior communicating artery along with the fetal origin to the right posterior cerebral artery.    POSTERIOR CIRCULATION: No stenosis/occlusion, aneurysm, or high flow vascular malformation. Balanced vertebral arteries supply a normal basilar artery.     DURAL VENOUS SINUSES: Expected enhancement of the major dural venous sinuses.    NECK CTA:  RIGHT CAROTID: No measurable stenosis or dissection.    LEFT CAROTID: No measurable stenosis or dissection.    VERTEBRAL ARTERIES: No focal stenosis or dissection. Balanced vertebral arteries.    AORTIC ARCH: There is a bovine arch configuration of the great vessels off the aortic arch with no significant stenosis of their origins.    NONVASCULAR STRUCTURES: The cervical spine with minimal degenerative changes. The lungs visualized on this study are clear.      Impression     IMPRESSION:   HEAD CT:  1.  No discrete mass lesion, hemorrhage or focal area suggestive of acute infarct.    2.  Stable diffuse age related changes.    HEAD CTA:   1.  No discrete vessel occlusion, significant stenosis, aneurysm or high flow vascular malformation involving arteries of the [place] of [place].    NECK CTA:  1.  Bovine arch configuration of great vessels off the aortic arch with no significant stenosis of their origins.  2.  No significant stenosis or irregularity involving the arteries of the neck by NASCET criteria.  3.  No radiographic evidence of dissection.   CTA Head Neck with Contrast    Narrative    EXAM: CT HEAD W/O CONTRAST, CTA HEAD NECK W CONTRAST  LOCATION: [facility]  DATE/TIME: 7/10/2022 5:47 PM    INDICATION: R sided sensory change  COMPARISON: 12/09/2017.  CONTRAST: mL Isovue 370  TECHNIQUE: Head and neck CT angiogram with IV contrast. Noncontrast head CT followed by axial helical CT images of the head and neck vessels obtained during the arterial phase of intravenous contrast administration. Axial 2D reconstructed images and   multiplanar 3D MIP reconstructed images of the head and neck vessels were performed by the technologist. Dose reduction techniques were used. All stenosis measurements made according to NASCET criteria unless otherwise specified.    FINDINGS:   NONCONTRAST HEAD CT:   INTRACRANIAL CONTENTS: No intracranial hemorrhage, extraaxial collection, or mass effect.  No CT evidence of acute infarct. There is scattered diffuse low attenuation within the periventricular and subcortical white matter consistent with diffuse small   vessel ischemic disease. The ventricular system, basal cisterns and the cortical sulci are consistent with diffuse volume loss.     VISUALIZED ORBITS/SINUSES/MASTOIDS: No intraorbital abnormality. No paranasal sinus mucosal disease. No middle ear or mastoid effusion.    BONES/SOFT TISSUES: No acute abnormality.    HEAD  CTA:  ANTERIOR CIRCULATION: No stenosis/occlusion, aneurysm, or high flow vascular malformation. There is a patent anterior communicating artery along with the fetal origin to the right posterior cerebral artery.    POSTERIOR CIRCULATION: No stenosis/occlusion, aneurysm, or high flow vascular malformation. Balanced vertebral arteries supply a normal basilar artery.     DURAL VENOUS SINUSES: Expected enhancement of the major dural venous sinuses.    NECK CTA:  RIGHT CAROTID: No measurable stenosis or dissection.    LEFT CAROTID: No measurable stenosis or dissection.    VERTEBRAL ARTERIES: No focal stenosis or dissection. Balanced vertebral arteries.    AORTIC ARCH: There is a bovine arch configuration of the great vessels off the aortic arch with no significant stenosis of their origins.    NONVASCULAR STRUCTURES: The cervical spine with minimal degenerative changes. The lungs visualized on this study are clear.      Impression    IMPRESSION:   HEAD CT:  1.  No discrete mass lesion, hemorrhage or focal area suggestive of acute infarct.    2.  Stable diffuse age related changes.    HEAD CTA:   1.  No discrete vessel occlusion, significant stenosis, aneurysm or high flow vascular malformation involving arteries of the [place] of [place].    NECK CTA:  1.  Bovine arch configuration of great vessels off the aortic arch with no significant stenosis of their origins.  2.  No significant stenosis or irregularity involving the arteries of the neck by NASCET criteria.  3.  No radiographic evidence of dissection.       Medications   clopidogrel (PLAVIX) tablet 300 mg (has no administration in time range)   aspirin (ASA) tablet 325 mg (has no administration in time range)   iopamidol (ISOVUE-370) solution 500 mL (75 mLs Intravenous Given 7/10/22 1750)   sodium chloride  100ml (100 mLs As instructed Given 7/10/22 1750)       Assessments & Plan (with Medical Decision Making)     I have reviewed the nursing notes.    I have reviewed  the findings, diagnosis, plan and need for follow up with the patient.    Reinaldo Barrios is a 71 year old male who who has a history significant for type 2 diabetes, obesity, long-term use of anticoagulant therapy.  He arrives today to the emergency department for evaluation of transient right-sided numbness.  On arrival to the emergency department this patient is noted be alert.  Is presently afebrile.  He is hemodynamically stable with modest hypertension with question of possible TIA I would allow for permissive hypertension at this time.  GCS at this time 15 he has no signs of external trauma to the head or neck.  Presently I do not find any neurovascular deficit.  No gaze preference or deviation.  Tongue protrudes midline.  No facial asymmetry.  No dysmetria with finger-to-nose.  Strength and sensation are full and without limitation.  Etiology unclear certainly TIA with multiple cardiovascular risk factors is possible.  Very low suspicion for large vessel occlusion.  NIH stroke scale 0 at this time.  I do not believe he is a candidate for tPA.  I would plan for CTA albeit low suspicion for large clot.    CT imaging demonstrated no sign of acute intravascular clots.  On reevaluation the patient remains asymptomatic.  Case discussed with on-call stroke neurologist.  Based on review of patient's records and risk factors recommendation provided was for overnight observation for completion of immediate work-up to include echo and MRI.  Permissive hypertension allowed.  Patient did receive Plavix at 300 mg and aspirin 325 mg with recommendation to initiate at 75 mg and 81 mg respectively tomorrow.    This patient was signed over to my colleague at 2100 pending placement and continued monitoring.    New Prescriptions    No medications on file       Final diagnoses:   Numbness on right side       7/10/2022   [facility] EMERGENCY DEPT     Ehsan Arrington MD  07/11/22 [id number]

## 2022-07-10 NOTE — CONSULTS
"    [facility]    Stroke Telephone Note    I was called by Ehsan Arrington on 07/10/22 regarding patient Reinaldo Barrios. The patient is a 71 year old male with hx of DM presents with transient episode of R sided numbness lasting for 2 hours. Pt has had similar event in the past as well    Imaging Findings   Ct head neg for acute abnormalities  CTA neg for LVO , stenosis or other vascular abnormalities;      Impression  Transient ischemic attack      Recommendations   - Neurochecks and Vital Signs every 4 hours   -Load aspirin 325 mg once  - Plavix (clopidogrel) 300 mg PO loading dose x 1  - Plavix (clopidogrel) 75 mg PO Daily and aspirin 81 mg daily x 21 days  - Statin: Lipitor 40 mg. Titrated based on LDL with target LDL <70 or resume PTA statin  - MRI Brain with and without contrast  - 24-hour Telemetry  - TTE without bubble  - Bedside Glucose Monitoring  - A1c, Lipid Panel  - PT/OT/SLP  - Stroke Education  - Euthermia, Euglycemia  - Please reach out to stroke team in the morning once above work up is completed for need of additional work up ( example 30 day heart monitor etc)    My recommendations are based on the information provided over the phone by Reinaldo Barrios's in-person providers. They are not intended to replace the clinical judgment of his in-person providers. I was not requested to personally see or examine the patient at this time.    Verenice Calero MD  Vascular Neurology  To page me or covering stroke neurology team member, click here: [facility]   Choose \"On Call\" tab at top, then search dropdown box for \"Neurology Adult\", select location, press Enter, then look for stroke/neuro ICU/telestroke.           "

## 2022-07-10 NOTE — ED TRIAGE NOTES
Stroke Ananth called; pt reports at 2:30pm he had numbness down right arm and leg. Pt states he took a nap and woke up and still had some tingling which ended on his drive here. Pt has hx of TIA/strokes. Glucose 146     Triage Assessment     Row Name 07/10/22 [id number]       Triage Assessment (Adult)    Airway WDL WDL       Cardiac WDL    Cardiac WDL WDL

## 2022-07-11 ENCOUNTER — APPOINTMENT (OUTPATIENT)
Dept: MRI IMAGING | Facility: CLINIC | Age: 72
End: 2022-07-11
Attending: FAMILY MEDICINE
Payer: MEDICARE

## 2022-07-11 ENCOUNTER — APPOINTMENT (OUTPATIENT)
Dept: CARDIOLOGY | Facility: CLINIC | Age: 72
End: 2022-07-11
Attending: FAMILY MEDICINE
Payer: MEDICARE

## 2022-07-11 VITALS
WEIGHT: 216 LBS | OXYGEN SATURATION: 98 % | RESPIRATION RATE: 20 BRPM | SYSTOLIC BLOOD PRESSURE: 119 MMHG | HEART RATE: 73 BPM | TEMPERATURE: 97.4 F | DIASTOLIC BLOOD PRESSURE: 75 MMHG | BODY MASS INDEX: 35.4 KG/M2

## 2022-07-11 LAB
CHOLEST SERPL-MCNC: 142 MG/DL
HBA1C MFR BLD: 7.7 % (ref 0–5.6)
HDLC SERPL-MCNC: 49 MG/DL
LDLC SERPL CALC-MCNC: 66 MG/DL
LVEF ECHO: NORMAL
NONHDLC SERPL-MCNC: 93 MG/DL
TRIGL SERPL-MCNC: 137 MG/DL

## 2022-07-11 PROCEDURE — A9585 GADOBUTROL INJECTION: HCPCS | Performed by: FAMILY MEDICINE

## 2022-07-11 PROCEDURE — 93308 TTE F-UP OR LMTD: CPT | Mod: 26 | Performed by: INTERNAL MEDICINE

## 2022-07-11 PROCEDURE — 93325 DOPPLER ECHO COLOR FLOW MAPG: CPT | Mod: 26 | Performed by: INTERNAL MEDICINE

## 2022-07-11 PROCEDURE — 36415 COLL VENOUS BLD VENIPUNCTURE: CPT | Performed by: FAMILY MEDICINE

## 2022-07-11 PROCEDURE — 255N000002 HC RX 255 OP 636: Performed by: FAMILY MEDICINE

## 2022-07-11 PROCEDURE — 93321 DOPPLER ECHO F-UP/LMTD STD: CPT

## 2022-07-11 PROCEDURE — 93325 DOPPLER ECHO COLOR FLOW MAPG: CPT

## 2022-07-11 PROCEDURE — 93321 DOPPLER ECHO F-UP/LMTD STD: CPT | Mod: 26 | Performed by: INTERNAL MEDICINE

## 2022-07-11 PROCEDURE — G1010 CDSM STANSON: HCPCS

## 2022-07-11 PROCEDURE — 250N000013 HC RX MED GY IP 250 OP 250 PS 637: Performed by: FAMILY MEDICINE

## 2022-07-11 PROCEDURE — 80061 LIPID PANEL: CPT | Performed by: FAMILY MEDICINE

## 2022-07-11 RX ORDER — CLOPIDOGREL BISULFATE 75 MG/1
75 TABLET ORAL DAILY
Qty: 21 TABLET | Refills: 0 | Status: SHIPPED | OUTPATIENT
Start: 2022-07-11 | End: 2023-09-18

## 2022-07-11 RX ORDER — GADOBUTROL 604.72 MG/ML
10 INJECTION INTRAVENOUS ONCE
Status: COMPLETED | OUTPATIENT
Start: 2022-07-11 | End: 2022-07-11

## 2022-07-11 RX ADMIN — GADOBUTROL 10 ML: 604.72 INJECTION INTRAVENOUS at 08:56

## 2022-07-11 RX ADMIN — CARVEDILOL 3.12 MG: 3.12 TABLET, FILM COATED ORAL at 09:20

## 2022-07-11 RX ADMIN — ISOSORBIDE MONONITRATE 30 MG: 30 TABLET, EXTENDED RELEASE ORAL at 08:23

## 2022-07-11 NOTE — ED PROVIDER NOTES
Transfer of Care Note  This patient was signed out to me and I assumed care from Dr. Kowalski at change of shift.  Reinaldo Barrios is a 71 year old male who presented to the emergency department with a chief complaint of Numbness  .  Please see the original providers history and physical for complete details.    The following issues were signed out to me to follow up on:  MRI and disposition      Pertant Lab/Imaging Findings During this Visit was     Results for orders placed or performed during the hospital encounter of 07/10/22 (from the past 24 hour(s))   Glucose by meter   Result Value Ref Range    GLUCOSE BY METER POCT 146 (H) 70 - 99 mg/dL   [place] Draw    Narrative    The following orders were created for panel order [place] Draw.  Procedure                               Abnormality         Status                     ---------                               -----------         ------                     Extra Blue Top Tube[[id number]]                              Final result               Extra Green Top (Lithium...[[id number]]                      Final result               Extra Purple Top Tube[[id number]]                            Final result                 Please view results for these tests on the individual orders.   Extra Blue Top Tube   Result Value Ref Range    Hold Specimen JIC    Extra Green Top (Lithium Heparin) Tube   Result Value Ref Range    Hold Specimen JIC    Extra Purple Top Tube   Result Value Ref Range    Hold Specimen JIC    CBC with platelets differential    Narrative    The following orders were created for panel order CBC with platelets differential.  Procedure                               Abnormality         Status                     ---------                               -----------         ------                     CBC with platelets and d...[[id number]]                      Final result                 Please view results for these tests on the individual orders.    Comprehensive metabolic panel   Result Value Ref Range    Sodium 141 133 - 144 mmol/L    Potassium 4.6 3.4 - 5.3 mmol/L    Chloride 107 94 - 109 mmol/L    Carbon Dioxide (CO2) 28 20 - 32 mmol/L    Anion Gap 6 3 - 14 mmol/L    Urea Nitrogen 13 7 - 30 mg/dL    Creatinine 1.12 0.66 - 1.25 mg/dL    Calcium 8.4 (L) 8.5 - 10.1 mg/dL    Glucose 141 (H) 70 - 99 mg/dL    Alkaline Phosphatase 102 40 - 150 U/L    AST 34 0 - 45 U/L    ALT 31 0 - 70 U/L    Protein Total 7.5 6.8 - 8.8 g/dL    Albumin 3.5 3.4 - 5.0 g/dL    Bilirubin Total 0.7 0.2 - 1.3 mg/dL    GFR Estimate 69 >60 mL/min/1.73m2   Troponin I   Result Value Ref Range    Troponin I High Sensitivity 13 <79 ng/L   CBC with platelets and differential   Result Value Ref Range    WBC Count 7.6 4.0 - 11.0 10e3/uL    RBC Count 4.55 4.40 - 5.90 10e6/uL    Hemoglobin 13.9 13.3 - 17.7 g/dL    Hematocrit 40.7 40.0 - 53.0 %    MCV 90 78 - 100 fL    MCH 30.5 26.5 - 33.0 pg    MCHC 34.2 31.5 - 36.5 g/dL    RDW 13.4 10.0 - 15.0 %    Platelet Count 204 150 - 450 10e3/uL    % Neutrophils 64 %    % Lymphocytes 24 %    % Monocytes 7 %    % Eosinophils 4 %    % Basophils 1 %    % Immature Granulocytes 0 %    NRBCs per 100 WBC 0 <1 /100    Absolute Neutrophils 4.9 1.6 - 8.3 10e3/uL    Absolute Lymphocytes 1.9 0.8 - 5.3 10e3/uL    Absolute Monocytes 0.5 0.0 - 1.3 10e3/uL    Absolute Eosinophils 0.3 0.0 - 0.7 10e3/uL    Absolute Basophils 0.1 0.0 - 0.2 10e3/uL    Absolute Immature Granulocytes 0.0 <=0.4 10e3/uL    Absolute NRBCs 0.0 10e3/uL   Hemoglobin A1c   Result Value Ref Range    Hemoglobin A1C 7.7 (H) 0.0 - 5.6 %   CT Head w/o Contrast    Narrative    EXAM: CT HEAD W/O CONTRAST, CTA HEAD NECK W CONTRAST  LOCATION: [facility]  DATE/TIME: 7/10/2022 5:47 PM    INDICATION: R sided sensory change  COMPARISON: 12/09/2017.  CONTRAST: mL Isovue 370  TECHNIQUE: Head and neck CT angiogram with IV contrast. Noncontrast head CT followed by axial helical CT  images of the head and neck vessels obtained during the arterial phase of intravenous contrast administration. Axial 2D reconstructed images and   multiplanar 3D MIP reconstructed images of the head and neck vessels were performed by the technologist. Dose reduction techniques were used. All stenosis measurements made according to NASCET criteria unless otherwise specified.    FINDINGS:   NONCONTRAST HEAD CT:   INTRACRANIAL CONTENTS: No intracranial hemorrhage, extraaxial collection, or mass effect.  No CT evidence of acute infarct. There is scattered diffuse low attenuation within the periventricular and subcortical white matter consistent with diffuse small   vessel ischemic disease. The ventricular system, basal cisterns and the cortical sulci are consistent with diffuse volume loss.     VISUALIZED ORBITS/SINUSES/MASTOIDS: No intraorbital abnormality. No paranasal sinus mucosal disease. No middle ear or mastoid effusion.    BONES/SOFT TISSUES: No acute abnormality.    HEAD CTA:  ANTERIOR CIRCULATION: No stenosis/occlusion, aneurysm, or high flow vascular malformation. There is a patent anterior communicating artery along with the fetal origin to the right posterior cerebral artery.    POSTERIOR CIRCULATION: No stenosis/occlusion, aneurysm, or high flow vascular malformation. Balanced vertebral arteries supply a normal basilar artery.     DURAL VENOUS SINUSES: Expected enhancement of the major dural venous sinuses.    NECK CTA:  RIGHT CAROTID: No measurable stenosis or dissection.    LEFT CAROTID: No measurable stenosis or dissection.    VERTEBRAL ARTERIES: No focal stenosis or dissection. Balanced vertebral arteries.    AORTIC ARCH: There is a bovine arch configuration of the great vessels off the aortic arch with no significant stenosis of their origins.    NONVASCULAR STRUCTURES: The cervical spine with minimal degenerative changes. The lungs visualized on this study are clear.      Impression    IMPRESSION:    HEAD CT:  1.  No discrete mass lesion, hemorrhage or focal area suggestive of acute infarct.    2.  Stable diffuse age related changes.    HEAD CTA:   1.  No discrete vessel occlusion, significant stenosis, aneurysm or high flow vascular malformation involving arteries of the [place] of [place].    NECK CTA:  1.  Bovine arch configuration of great vessels off the aortic arch with no significant stenosis of their origins.  2.  No significant stenosis or irregularity involving the arteries of the neck by NASCET criteria.  3.  No radiographic evidence of dissection.   CTA Head Neck with Contrast    Narrative    EXAM: CT HEAD W/O CONTRAST, CTA HEAD NECK W CONTRAST  LOCATION: [facility]  DATE/TIME: 7/10/2022 5:47 PM    INDICATION: R sided sensory change  COMPARISON: 12/09/2017.  CONTRAST: mL Isovue 370  TECHNIQUE: Head and neck CT angiogram with IV contrast. Noncontrast head CT followed by axial helical CT images of the head and neck vessels obtained during the arterial phase of intravenous contrast administration. Axial 2D reconstructed images and   multiplanar 3D MIP reconstructed images of the head and neck vessels were performed by the technologist. Dose reduction techniques were used. All stenosis measurements made according to NASCET criteria unless otherwise specified.    FINDINGS:   NONCONTRAST HEAD CT:   INTRACRANIAL CONTENTS: No intracranial hemorrhage, extraaxial collection, or mass effect.  No CT evidence of acute infarct. There is scattered diffuse low attenuation within the periventricular and subcortical white matter consistent with diffuse small   vessel ischemic disease. The ventricular system, basal cisterns and the cortical sulci are consistent with diffuse volume loss.     VISUALIZED ORBITS/SINUSES/MASTOIDS: No intraorbital abnormality. No paranasal sinus mucosal disease. No middle ear or mastoid effusion.    BONES/SOFT TISSUES: No acute abnormality.    HEAD  CTA:  ANTERIOR CIRCULATION: No stenosis/occlusion, aneurysm, or high flow vascular malformation. There is a patent anterior communicating artery along with the fetal origin to the right posterior cerebral artery.    POSTERIOR CIRCULATION: No stenosis/occlusion, aneurysm, or high flow vascular malformation. Balanced vertebral arteries supply a normal basilar artery.     DURAL VENOUS SINUSES: Expected enhancement of the major dural venous sinuses.    NECK CTA:  RIGHT CAROTID: No measurable stenosis or dissection.    LEFT CAROTID: No measurable stenosis or dissection.    VERTEBRAL ARTERIES: No focal stenosis or dissection. Balanced vertebral arteries.    AORTIC ARCH: There is a bovine arch configuration of the great vessels off the aortic arch with no significant stenosis of their origins.    NONVASCULAR STRUCTURES: The cervical spine with minimal degenerative changes. The lungs visualized on this study are clear.      Impression    IMPRESSION:   HEAD CT:  1.  No discrete mass lesion, hemorrhage or focal area suggestive of acute infarct.    2.  Stable diffuse age related changes.    HEAD CTA:   1.  No discrete vessel occlusion, significant stenosis, aneurysm or high flow vascular malformation involving arteries of the [place] of [place].    NECK CTA:  1.  Bovine arch configuration of great vessels off the aortic arch with no significant stenosis of their origins.  2.  No significant stenosis or irregularity involving the arteries of the neck by NASCET criteria.  3.  No radiographic evidence of dissection.   Asymptomatic COVID-19 Virus (Coronavirus) by PCR Nose    Specimen: Nose; Swab   Result Value Ref Range    SARS CoV2 PCR Negative Negative    Narrative    Testing was performed using the scott  SARS-CoV-2 & Influenza A/B Assay on the scott  Melv[employer]  System.  This test should be ordered for the detection of SARS-COV-2 in individuals who meet SARS-CoV-2 clinical and/or epidemiological criteria. Test performance is unknown  in asymptomatic patients.  This test is for in vitro diagnostic use under the FDA EUA for laboratories certified under CLIA to perform moderate and/or high complexity testing. This test has not been FDA cleared or approved.  A negative test does not rule out the presence of PCR inhibitors in the specimen or target RNA in concentration below the limit of detection for the assay. The possibility of a false negative should be considered if the patient's recent exposure or clinical presentation suggests COVID-19.  [facility] are certified under the Clinical Laboratory Improvement Amendments of 1988 (CLIA-88) as qualified to perform moderate and/or high complexity laboratory testing.   Glucose by meter   Result Value Ref Range    GLUCOSE BY METER POCT 161 (H) 70 - 99 mg/dL   Lipid panel reflex to direct LDL   Result Value Ref Range    Cholesterol 142 <200 mg/dL    Triglycerides 137 <150 mg/dL    Direct Measure HDL 49 >=40 mg/dL    LDL Cholesterol Calculated 66 <=100 mg/dL    Non HDL Cholesterol 93 <130 mg/dL    Narrative    Cholesterol  Desirable:  <200 mg/dL    Triglycerides  Normal:  Less than 150 mg/dL  Borderline High:  150-199 mg/dL  High:  200-499 mg/dL  Very High:  Greater than or equal to 500 mg/dL    Direct Measure HDL  Female:  Greater than or equal to 50 mg/dL   Male:  Greater than or equal to 40 mg/dL    LDL Cholesterol  Desirable:  <100mg/dL  Above Desirable:  100-129 mg/dL   Borderline High:  130-159 mg/dL   High:  160-189 mg/dL   Very High:  >= 190 mg/dL    Non HDL Cholesterol  Desirable:  130 mg/dL  Above Desirable:  130-159 mg/dL  Borderline High:  160-189 mg/dL  High:  190-219 mg/dL  Very High:  Greater than or equal to 220 mg/dL   Echocardiogram Limited   Result Value Ref Range    LVEF  40-45%     Narrative    [id number]  [id number]  [id number]  [id number]^NELIA^MURRAY^[facility]  Echocardiography Laboratory  [address] [place] [place] [address]      Name: NGA GARCIA  MRN: [id number]  : 1950  Study Date: 2022 08:01 AM  Age: 71 yrs  Gender: Male  Patient Location: [facility]  Reason For Study: TIA  History: htn, cabg, hyperlipidemia, chf, dm  Ordering Physician: MURRAY PICKENS  Referring Physician: Mark Blanchard  Performed By: Andreea Buchanan     BSA: 2.0 m2  Height: 65 in  Weight: 216 lb  HR: 74  BP: 166/94 mmHg  ______________________________________________________________________________  Procedure  Limited Portable Echo Adult. Complete Echo Adult. Poor acoustic windows.  Allergy to contrast.  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  The gradient is normal for this prosthetic aortic valve.  This degree of valvular regurgitation is within normal limits.  The visual ejection fraction is 40-45%.  There is moderate to severe inferior wall hypokinesis.  No cardiac source of emboli noted.  ______________________________________________________________________________  Left Ventricle  The left ventricle is normal in size. There is normal left ventricular wall  thickness. The visual ejection fraction is 40-45%. Septal motion is consistent  with post-operative state. There is moderate to severe inferior wall  hypokinesis.     Right Ventricle  The right ventricle is normal in structure, function and size.     Atria  The left atrium is borderline dilated. Right atrial size is normal. There is  no color Doppler evidence of an atrial shunt.     Mitral Valve  The mitral valve leaflets are mildly thickened. There is trace mitral  regurgitation.     Tricuspid Valve  The tricuspid valve is normal in structure and function. Right ventricle  systolic pressure estimate normal. There is trace tricuspid regurgitation.     Aortic Valve  The gradient is normal for this prosthetic aortic valve. This degree of  valvular  regurgitation is within normal limits.     Vessels  Normal size aorta. The inferior vena cava is normal.     Pericardium  The pericardium appears normal.     Rhythm  Sinus rhythm was noted.     ______________________________________________________________________________  MMode/2D Measurements & Calculations  IVSd: 1.3 cm  LVIDd: 4.2 cm  LVIDs: 3.3 cm  LVPWd: 1.2 cm  FS: 22.0 %  LV mass(C)d: 184.8 grams  LV mass(C)dI: 90.5 grams/m2     Ao root diam: 3.2 cm  LA dimension: 4.0 cm  LA/Ao: 1.3  LVOT diam: 2.2 cm  LVOT area: 3.7 cm2  RWT: 0.56     Doppler Measurements & Calculations  Ao V2 max: 195.0 cm/sec  Ao max PG: 15.3 mmHg  Ao V2 mean: 119.6 cm/sec  Ao mean P.1 mmHg  Ao V2 VTI: 44.7 cm  TALIB(I,D): 1.6 cm2  TALIB(V,D): 1.5 cm2  LV V1 max P.5 mmHg  LV V1 max: 79.0 cm/sec  LV V1 VTI: 19.7 cm  SV(LVOT): 73.4 ml  SI(LVOT): 35.9 ml/m2  PA V2 max: 93.4 cm/sec  PA max PG: 3.5 mmHg  TR max danny: 197.1 cm/sec  TR max PG: 15.5 mmHg  AV Danny Ratio (DI): 0.41  TALIB Index (cm2/m2): 0.80     ______________________________________________________________________________  Report approved by: Killian Albert 2022 09:35 AM         MR Brain w/o & w Contrast    Narrative    MRI BRAIN WITHOUT AND WITH CONTRAST  2022 9:21 AM    HISTORY:  Transient ischemic attack, right-sided numbness x 2 hours.     TECHNIQUE:  Multiplanar, multisequence MRI of the brain without and  with 10 mL Gadavist    COMPARISON: CT/CTA head and neck 7/10/2022    FINDINGS:  Questionable tiny infarct involving the left cerebellar hemisphere.  Mild to moderate parenchymal volume loss is present. Scattered  frontoparietal and camron predominate white matter T2 hyperintensities  likely represent chronic small vessel ischemic change. Probable old  left frontal infarct. Probable old basal ganglia and camron lacunar  infarcts. Small old bilateral cerebellar infarcts.    Marrow signal is within normal limits. The visualized paranasal  sinuses, tympanic cavities,  and mastoid cavities are unremarkable.      Impression    IMPRESSION:    1. Questionable tiny acute infarct involving the left cerebellar  hemisphere.  2. Chronic changes as detailed.    EFFIE THAYER MD         SYSTEM ID:  [id number]         My focused follow up physical exam shows:   Vitals:  B/P: 120/78, T: 98, P: 72, R: 13  Gen:  Pt appears stable and no apparent distress      ED Course & Medical Decision Making:  MRI did come back and did show a new acute stroke on the left cerebellar hemisphere which is consistent with the patient's symptoms.  I consulted stroke neurology again, since the patient has been here for 18+ hours, we have pretty much completed the work-up here, echo came back and was unremarkable also.  They are recommending to continue the patient on Plavix for the next 21 days along with 81 mg of aspirin.  They are recommending to continue on the statin that the patient is on.  Patient will follow up with his doctor as an outpatient.  They are also recommending a 30-day event monitor.         Impression and Disposition:  Acute CVA           This note was completed in part using Dragon voice recognition, and may contain word and grammatical errors.        Kody Mendieta MD  07/11/22 [id number]

## 2022-07-11 NOTE — CONSULTS
[facility]    Stroke Telephone Note    I was called by Dr. Kody Mendieta on 07/11/22 regarding patient Reinaldo Barrios. The patient is a 71 year old male with PMHx of DM presented to [facility] on 7/10/22 with transient episode of R sided numbness lasting for 2 hours. Pt has had similar event in the past as well. ED /102    Imaging Findings   HEAD CT:  1.  No discrete mass lesion, hemorrhage or focal area suggestive of acute infarct.   2.  Stable diffuse age related changes.     HEAD CTA:   1.  No discrete vessel occlusion, significant stenosis, aneurysm or high flow vascular malformation involving arteries of the [place] of [place].     NECK CTA:  1.  Bovine arch configuration of great vessels off the aortic arch with no significant stenosis of their origins.  2.  No significant stenosis or irregularity involving the arteries of the neck by NASCET criteria.  3.  No radiographic evidence of dissection.    MRI brain:   Questionable tiny infarct involving the left cerebellar hemisphere.    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 due to small vessel disease     Recommendations   - Neurochecks and Vital Signs every 4 hours   - Permissive HTN; goal SBP < 220 mmHg        -Long term BP goal normotension <130/80 or lower for overall cardiovascular health  - Start DAPT: Daily aspirin 81 mg + Plavix 75 mg daily for 21 days, then continue wth aspirin 325 mg daily monotherapy for secondary stroke prevention  - Statin: Continue PTA Lipitor 40 mg daily, goal LDL 40-70, <40 increases risk of ICH, titrate outpatient with PCP  - Continue inpatient telemetry, discharge with 30 day cardiac event monitor to evaluate for atrial fibrillation   - Bedside Glucose Monitoring, A1c goal should be <7.0%  - PT/OT/SLP  - Stroke Education  - Lifestyle modifications:  "exercise, Mediterranean diet   - Euthermia, Euglycemia    My recommendations are based on the information provided over the phone by Reinaldo Barrios's in-person providers. They are not intended to replace the clinical judgment of his in-person providers. I was not requested to personally see or examine the patient at this time.    MACKENZIE Carl, CNP  Vascular Neurology  To page me or covering stroke neurology team member, click here: AMCOM   Choose \"On Call\" tab at top, then search dropdown box for \"Neurology Adult\", select location, press Enter, then look for stroke/neuro ICU/telestroke.         "

## 2022-07-11 NOTE — ED PROVIDER NOTES
I was asked to take over the care of this patient at shift change by Dr. Arrington.  Dr. Silverio stated the patient drove himself to the ER from his home secondary to concerns of symptoms of 2 hours of right-sided numbness.  His symptoms resolved prior to arrival to the ER and they have not returned.  Stroke code initiated.  NIH score was 0 on arrival.  Patient with a history of diabetes.  CT was negative for any acute abnormalities.  CTA was also negative for LVO, significant stenosis or other vascular abnormalities suggestive of reason for his symptoms.  Dr. Pringle stated that he did consult with stroke neurology at the [facility].  They recommended admission to the hospital with echocardiogram and MRI evaluation of the brain with and without contrast in the morning.  They also recommended a loading dose of Plavix 300 mg as well as 325 mg of aspirin orally.  They also recommended daily dosing of Plavix at 75 mg and aspirin at 81 mg daily starting tomorrow ongoing.    Unfortunately, there is no beds available in the [facility] for this patient.  All hospitals have been contacted and have refused transfer this patient to their facilities.  Our hospital is also not willing to accept the patient due to lack of staffing.  Plan is to border the patient in the emergency room.  Boarding orders placed.  Home medications ordered and glucose monitoring ordered.  MRI and echo orders placed.  Patient is claustrophobic so oral Ativan was ordered to give prior to the MRI scan.    I spoke with the patient to make them aware of the plan of care and he was fine with the plan to monitor in the emergency room through the night with additional tests to be performed tomorrow.    Patient signed out to Dr. Mendieta at shift change.      Julio Cesar Kowalski, DO  07/10/22 2114       Julio Cesar Kowalski, DO  07/10/22 0647

## 2022-07-11 NOTE — ED NOTES
Pt did not want his evening meds, states typically takes insulin in the morning and would like meds in AM. Provider notified, aware. Pt is resting. VS monitoring. Call light in reach.

## 2022-07-11 NOTE — DISCHARGE INSTRUCTIONS
1.  They are recommending that you continue on the Plavix for the next 21 days and continue on a baby aspirin for the next 21 days, after this you will stop the Plavix and we want you to take a full-strength aspirin daily for the rest of your life.

## 2022-07-21 ENCOUNTER — OFFICE VISIT (OUTPATIENT)
Dept: INTERNAL MEDICINE | Facility: CLINIC | Age: 72
End: 2022-07-21
Payer: MEDICARE

## 2022-07-21 VITALS
OXYGEN SATURATION: 97 % | RESPIRATION RATE: 20 BRPM | HEART RATE: 76 BPM | SYSTOLIC BLOOD PRESSURE: 132 MMHG | WEIGHT: 210.1 LBS | BODY MASS INDEX: 34.43 KG/M2 | DIASTOLIC BLOOD PRESSURE: 70 MMHG | TEMPERATURE: 97.8 F

## 2022-07-21 DIAGNOSIS — E11.65 TYPE 2 DIABETES MELLITUS WITH HYPERGLYCEMIA, WITH LONG-TERM CURRENT USE OF INSULIN (H): ICD-10-CM

## 2022-07-21 DIAGNOSIS — I63.81 CEREBROVASCULAR ACCIDENT (CVA) DUE TO OCCLUSION OF SMALL ARTERY (H): Primary | ICD-10-CM

## 2022-07-21 DIAGNOSIS — Z79.4 TYPE 2 DIABETES MELLITUS WITH HYPERGLYCEMIA, WITH LONG-TERM CURRENT USE OF INSULIN (H): ICD-10-CM

## 2022-07-21 DIAGNOSIS — I50.82 BIVENTRICULAR CONGESTIVE HEART FAILURE (H): ICD-10-CM

## 2022-07-21 PROCEDURE — 99214 OFFICE O/P EST MOD 30 MIN: CPT | Performed by: INTERNAL MEDICINE

## 2022-07-21 NOTE — PROGRESS NOTES
Anali Najera is a 71 year old, presenting for the following health issues:  Cerebrovascular Accident (Discuss recent stroke/)      Cerebrovascular Accident         Concern - discuss stroke and blood sugars  Onset:   Description: discuss stroke  Intensity: 0/10  Progression of Symptoms:  intermittent  Accompanying Signs & Symptoms: none  Previous history of similar problem:   Precipitating factors:        Worsened by:   Alleviating factors:        Improved by:   Therapies tried and outcome:  none            EMR reviewed including:             Complaint, History of Chief Complaint, Corresponding Review of Systems, and Complaint Specific Physical Examination.    #1   Follow-up on ER visit  Recently had stroke.  Associated with tingling in his upper and lower right extremities  Symptoms resolved spontaneously  Full work-up performed and appreciated  Patient has many questions regarding his ER visit.        Exam:   LUNGS: clear bilaterally, airflow is brisk, no intercostal retraction or stridor is noted. No coughing is noted during visit.   HEART:  regular without rubs, clicks, gallops, or murmurs. PMI is nondisplaced. Upstrokes are brisk. S1,S2 are heard.   GI: Abdomen is soft, without rebound, guarding or tenderness. Bowel sounds are appropriate. No renal bruits are heard.   NEURO: Pt is alert and appropriate. No neurologic lateralization is noted. Cranial nerves 2-12 are intact. Peripheral sensory and motor function are grossly normal.       #2   Follow-up type 2 diabetes  Was intended to take long-acting insulin only to find baseline.  Patient has rarely required short acting insulin  Is aggressively working on diet.  Denies any hyper or hypoglycemic episodes or symptoms.      #3   Biventricular heart failure  Currently stable.  Had some edema in the emergency department but was diuresed.  Denies current shortness of breath or orthopnea.  History of prior aortic valvuloplasty.  Recent echo demonstrates  ejection fraction of 40 to 45%.  Inferior wall hypokinesis is unchanged.        Patient has been interviewed, applicable history and applied review of systems have been performed.    Vital Signs:   /70 (BP Location: Right arm, Patient Position: Chair, Cuff Size: Adult Regular)   Pulse 76   Temp 97.8  F (36.6  C) (Temporal)   Resp 20   Wt 95.3 kg (210 lb 1.6 oz)   SpO2 97%   BMI 34.43 kg/m        Decision Making    1. Cerebrovascular accident (CVA) due to occlusion of small artery (H)  Recommend continuing Plavix and conversion to aspirin.  Patient has concerns regarding a proper dose of aspirin, recommended 81 mg daily.    2. Biventricular congestive heart failure (H)  Stable on current medication.  Continue    3. Type 2 diabetes mellitus with hyperglycemia, with long-term current use of insulin (H)  Continue background insulin and serial checks throughout the day for evaluation of patterning.                               FOLLOW UP   I have asked the patient to make an appointment for followup with me in 3 to 4 weeks with blood sugar log        I have carefully explained the diagnosis and treatment options to the patient.  The patient has displayed an understanding of the above, and all subsequent questions were answered.      DO RADHA Horn    Portions of this note were produced using [employer]  Although every attempt at real-time proof reading has been made, occasional grammar/syntax errors may have been missed.

## 2022-07-28 DIAGNOSIS — K59.04 CHRONIC IDIOPATHIC CONSTIPATION: ICD-10-CM

## 2022-07-28 RX ORDER — AMOXICILLIN 250 MG
CAPSULE ORAL
Qty: 100 TABLET | Refills: 3 | Status: SHIPPED | OUTPATIENT
Start: 2022-07-28 | End: 2023-02-14

## 2022-08-11 ENCOUNTER — HOSPITAL ENCOUNTER (EMERGENCY)
Facility: CLINIC | Age: 72
Discharge: HOME | End: 2022-08-11
Attending: EMERGENCY MEDICINE | Admitting: EMERGENCY MEDICINE
Payer: MEDICARE

## 2022-08-11 ENCOUNTER — APPOINTMENT (OUTPATIENT)
Dept: GENERAL RADIOLOGY | Facility: CLINIC | Age: 72
End: 2022-08-11
Attending: EMERGENCY MEDICINE
Payer: MEDICARE

## 2022-08-11 VITALS
DIASTOLIC BLOOD PRESSURE: 86 MMHG | TEMPERATURE: 99.4 F | WEIGHT: 211 LBS | SYSTOLIC BLOOD PRESSURE: 133 MMHG | HEART RATE: 82 BPM | BODY MASS INDEX: 34.58 KG/M2 | OXYGEN SATURATION: 97 % | RESPIRATION RATE: 18 BRPM

## 2022-08-11 DIAGNOSIS — G44.209 TENSION HEADACHE: ICD-10-CM

## 2022-08-11 LAB
ANION GAP SERPL CALCULATED.3IONS-SCNC: 6 MMOL/L (ref 3–14)
BASOPHILS # BLD AUTO: 0 10E3/UL (ref 0–0.2)
BASOPHILS NFR BLD AUTO: 0 %
BUN SERPL-MCNC: 14 MG/DL (ref 7–30)
CALCIUM SERPL-MCNC: 8.4 MG/DL (ref 8.5–10.1)
CHLORIDE BLD-SCNC: 105 MMOL/L (ref 94–109)
CO2 SERPL-SCNC: 24 MMOL/L (ref 20–32)
CREAT SERPL-MCNC: 0.77 MG/DL (ref 0.66–1.25)
CRP SERPL-MCNC: 12.2 MG/L (ref 0–8)
EOSINOPHIL # BLD AUTO: 0 10E3/UL (ref 0–0.7)
EOSINOPHIL NFR BLD AUTO: 1 %
ERYTHROCYTE [DISTWIDTH] IN BLOOD BY AUTOMATED COUNT: 13.4 % (ref 10–15)
ERYTHROCYTE [SEDIMENTATION RATE] IN BLOOD BY WESTERGREN METHOD: 19 MM/HR (ref 0–20)
FLUAV RNA SPEC QL NAA+PROBE: NEGATIVE
FLUBV RNA RESP QL NAA+PROBE: NEGATIVE
GFR SERPL CREATININE-BSD FRML MDRD: >90 ML/MIN/1.73M2
GLUCOSE BLD-MCNC: 214 MG/DL (ref 70–99)
HCT VFR BLD AUTO: 37.7 % (ref 40–53)
HGB BLD-MCNC: 12.8 G/DL (ref 13.3–17.7)
IMM GRANULOCYTES # BLD: 0 10E3/UL
IMM GRANULOCYTES NFR BLD: 0 %
LYMPHOCYTES # BLD AUTO: 0.6 10E3/UL (ref 0.8–5.3)
LYMPHOCYTES NFR BLD AUTO: 10 %
MCH RBC QN AUTO: 30.3 PG (ref 26.5–33)
MCHC RBC AUTO-ENTMCNC: 34 G/DL (ref 31.5–36.5)
MCV RBC AUTO: 89 FL (ref 78–100)
MONOCYTES # BLD AUTO: 0.5 10E3/UL (ref 0–1.3)
MONOCYTES NFR BLD AUTO: 8 %
NEUTROPHILS # BLD AUTO: 5 10E3/UL (ref 1.6–8.3)
NEUTROPHILS NFR BLD AUTO: 81 %
NRBC # BLD AUTO: 0 10E3/UL
NRBC BLD AUTO-RTO: 0 /100
PLATELET # BLD AUTO: 172 10E3/UL (ref 150–450)
POTASSIUM BLD-SCNC: 3.8 MMOL/L (ref 3.4–5.3)
RBC # BLD AUTO: 4.23 10E6/UL (ref 4.4–5.9)
SARS-COV-2 RNA RESP QL NAA+PROBE: NEGATIVE
SODIUM SERPL-SCNC: 135 MMOL/L (ref 133–144)
WBC # BLD AUTO: 6.2 10E3/UL (ref 4–11)

## 2022-08-11 PROCEDURE — 85652 RBC SED RATE AUTOMATED: CPT | Performed by: EMERGENCY MEDICINE

## 2022-08-11 PROCEDURE — 87636 SARSCOV2 & INF A&B AMP PRB: CPT | Performed by: EMERGENCY MEDICINE

## 2022-08-11 PROCEDURE — 96374 THER/PROPH/DIAG INJ IV PUSH: CPT | Performed by: EMERGENCY MEDICINE

## 2022-08-11 PROCEDURE — 258N000003 HC RX IP 258 OP 636: Performed by: EMERGENCY MEDICINE

## 2022-08-11 PROCEDURE — 80048 BASIC METABOLIC PNL TOTAL CA: CPT | Performed by: EMERGENCY MEDICINE

## 2022-08-11 PROCEDURE — 99284 EMERGENCY DEPT VISIT MOD MDM: CPT | Mod: 25 | Performed by: EMERGENCY MEDICINE

## 2022-08-11 PROCEDURE — 250N000011 HC RX IP 250 OP 636: Performed by: EMERGENCY MEDICINE

## 2022-08-11 PROCEDURE — 71045 X-RAY EXAM CHEST 1 VIEW: CPT

## 2022-08-11 PROCEDURE — 96361 HYDRATE IV INFUSION ADD-ON: CPT | Performed by: EMERGENCY MEDICINE

## 2022-08-11 PROCEDURE — 99284 EMERGENCY DEPT VISIT MOD MDM: CPT | Mod: CS | Performed by: EMERGENCY MEDICINE

## 2022-08-11 PROCEDURE — 36415 COLL VENOUS BLD VENIPUNCTURE: CPT | Performed by: EMERGENCY MEDICINE

## 2022-08-11 PROCEDURE — 86140 C-REACTIVE PROTEIN: CPT | Performed by: EMERGENCY MEDICINE

## 2022-08-11 PROCEDURE — 85025 COMPLETE CBC W/AUTO DIFF WBC: CPT | Performed by: EMERGENCY MEDICINE

## 2022-08-11 RX ORDER — SODIUM CHLORIDE 9 MG/ML
INJECTION, SOLUTION INTRAVENOUS CONTINUOUS
Status: DISCONTINUED | OUTPATIENT
Start: 2022-08-11 | End: 2022-08-11 | Stop reason: HOSPADM

## 2022-08-11 RX ORDER — KETOROLAC TROMETHAMINE 15 MG/ML
15 INJECTION, SOLUTION INTRAMUSCULAR; INTRAVENOUS ONCE
Status: COMPLETED | OUTPATIENT
Start: 2022-08-11 | End: 2022-08-11

## 2022-08-11 RX ADMIN — SODIUM CHLORIDE 1000 ML: 9 INJECTION, SOLUTION INTRAVENOUS at 15:55

## 2022-08-11 RX ADMIN — KETOROLAC TROMETHAMINE 15 MG: 15 INJECTION, SOLUTION INTRAMUSCULAR; INTRAVENOUS at 15:55

## 2022-08-11 ASSESSMENT — ACTIVITIES OF DAILY LIVING (ADL)
ADLS_ACUITY_SCORE: 37
ADLS_ACUITY_SCORE: 37

## 2022-08-11 NOTE — DISCHARGE INSTRUCTIONS
Your COVID test was negative.  Your blood work did not show any worrisome cause for your headache    You may take Tylenol, 1000 mg by mouth every 8 hours as needed.  Stay well-hydrated.  Continue the rest of your regular medications as prescribed    If you continue to have symptoms, or have any new symptoms concerning for COVID-19, you may wish to retest in a few days.    If you have any worsening headache, vision changes, slurred speech or weakness, or any new concerns, do not hesitate to return to the emergency room for evaluation

## 2022-08-11 NOTE — ED NOTES
Patient presents with COVID concern after daughter testing positive and he woke up with a headache this morning. Patient denies having a headache or pain upon assessment.

## 2022-08-11 NOTE — ED PROVIDER NOTES
"  History     Chief Complaint   Patient presents with     Covid Concern     HPI  Reinaldo Barrios is a 71 year old male who presents the emergency room for concern of COVID infection.  He says he woke up today and had a headache.  Describes it as bitemporal, feels like a pressure.  No vision changes.  He said that he tried sleeping but every time he woke up from nap and today he still had headache.  No slurred speech or weakness.  He came in to be evaluated as he is concerned it might be related to COVID-19.  He was around his daughter about 4 5 days ago, and she has tested positive for COVID.  He denies any shortness of breath, cough, or chest pain.  No lower extremity swelling.  He has a history of TIAs and said that he thought he should come get checked out.    Allergies:  Allergies   Allergen Reactions     Sulfa Drugs      Optison Unknown     Pt states he \"felt like crap later\" after he was given Optison in 2016.       Problem List:    Patient Active Problem List    Diagnosis Date Noted     Polyneuropathy associated with underlying disease (H) 05/05/2022     Priority: Medium     Obesity (BMI 35.0-39.9) with comorbidity (H) 04/10/2019     Priority: Medium     Type 2 diabetes mellitus with hyperglycemia, with long-term current use of insulin (H) 05/18/2018     Priority: Medium     Non morbid obesity due to excess calories 07/03/2017     Priority: Medium     Essential hypertension with goal blood pressure less than 140/90 05/22/2016     Priority: Medium     S/P aortic valve replacement 04/27/2016     Priority: Medium     Long term current use of anticoagulant therapy 03/16/2016     Priority: Medium     Heart valve replaced [Z95.2] 03/16/2016     Priority: Medium     Advanced directives, counseling/discussion 01/12/2016     Priority: Medium     declined       S/P CABG (coronary artery bypass graft) 12/04/2015     Priority: Medium     Aortic stenosis 11/07/2015     Priority: Medium     CHF (congestive heart " failure) (H) 2015     Priority: Medium     NSTEMI (non-ST elevated myocardial infarction) (H) 2015     Priority: Medium     ACS (acute coronary syndrome) (H) 2015     Priority: Medium     Aortic valve stenosis 2015     Priority: Medium     Personal history of tobacco use, presenting hazards to health 2015     Priority: Medium     Hyperlipidemia LDL goal <100 2015     Priority: Medium     Hiatal hernia 2015     Priority: Medium     Chest pain, unspecified chest pain type 2015     Priority: Medium     Cardiac LV ejection fraction 30-35% 2015     Priority: Medium        Past Medical History:    Past Medical History:   Diagnosis Date     Aortic valve stenosis 2015     Cardiac LV ejection fraction 30-35% 2015     Chest pain, unspecified chest pain type 2015     Hiatal hernia 2015     Hyperlipidemia LDL goal <100 2015     Personal history of tobacco use, presenting hazards to health 2015     Type 2 diabetes mellitus without complication (H) 2015       Past Surgical History:    Past Surgical History:   Procedure Laterality Date     BYPASS GRAFT ARTERY CORONARY N/A 2015    Procedure: BYPASS GRAFT ARTERY CORONARY;  Surgeon: Sia Caldera MD;  Location: [facility] OR     KNEE SURGERY  2009     REPLACE VALVE AORTIC N/A 2015    Procedure: REPLACE VALVE AORTIC;  Surgeon: Sia Caldera MD;  Location:  OR       Family History:    History reviewed. No pertinent family history.    Social History:  Marital Status:  Single [1]  Social History     Tobacco Use     Smoking status: Current Some Day Smoker     Types: Pipe     Last attempt to quit: 5/15/2016     Years since quittin.2     Smokeless tobacco: Never Used     Tobacco comment: smokes a pipe a couple times a day   Vaping Use     Vaping Use: Never used   Substance Use Topics     Alcohol use: Yes     Alcohol/week: 0.0 standard drinks     Comment: rare     Drug use: No     "    Medications:    senna-docusate (SENEXON-S) 8.6-50 MG tablet  aspirin EC 81 MG EC tablet  atorvastatin (LIPITOR) 40 MG tablet  B-D U/F 31G X 8 MM insulin pen needle  carvedilol (COREG) 3.125 MG tablet  clopidogrel (PLAVIX) 75 MG tablet  insulin detemir (LEVEMIR FLEXTOUCH) 100 UNIT/ML pen  insulin syringe-needle U-100 (BD INSULIN SYRINGE ULTRAFINE) 31G X 5/16\" 0.5 ML miscellaneous  isosorbide mononitrate (IMDUR) 30 MG 24 hr tablet  lisinopril (ZESTRIL) 5 MG tablet  NOVOLOG FLEXPEN 100 UNIT/ML soln  ONETOUCH ULTRA test strip  ONETOUCH ULTRA test strip  ONETOUCH ULTRA test strip          Review of Systems   All other systems reviewed and are negative.      Physical Exam   BP: 133/86  Pulse: 82  Temp: 99.4  F (37.4  C)  Resp: 18  Weight: 95.7 kg (211 lb)  SpO2: 97 %      Physical Exam  Vitals and nursing note reviewed.   Constitutional:       General: He is not in acute distress.     Appearance: He is not diaphoretic.   HENT:      Head: Atraumatic.      Comments: No temporal tenderness or pulsations  Eyes:      General: No scleral icterus.     Extraocular Movements: Extraocular movements intact.      Conjunctiva/sclera: Conjunctivae normal.      Pupils: Pupils are equal, round, and reactive to light.   Cardiovascular:      Rate and Rhythm: Normal rate.      Heart sounds: Normal heart sounds.   Pulmonary:      Effort: Pulmonary effort is normal. No respiratory distress.      Breath sounds: Normal breath sounds.   Abdominal:      General: Bowel sounds are normal.      Palpations: Abdomen is soft.      Tenderness: There is no abdominal tenderness.   Musculoskeletal:         General: No tenderness.      Cervical back: Normal range of motion and neck supple.      Right lower leg: No edema.      Left lower leg: No edema.   Skin:     General: Skin is warm.      Findings: No rash.   Neurological:      General: No focal deficit present.      Mental Status: He is alert.      Cranial Nerves: No cranial nerve deficit.         ED " Course                 Procedures              Critical Care time:  none               Results for orders placed or performed during the hospital encounter of 08/11/22 (from the past 24 hour(s))   XR Chest Port 1 View    Narrative    XR CHEST PORT 1 VIEW 8/11/2022 3:37 PM    HISTORY: COVID concern    COMPARISON: 12/9/2017    FINDINGS: Normal cardiac silhouette and pulmonary vasculature. No  evidence of consolidation or pleural effusions. Thoracic spine and  right shoulder degenerative changes.    \    ISATU TEIXEIRA MD         SYSTEM ID:  [id number]   Erythrocyte sedimentation rate auto   Result Value Ref Range    Erythrocyte Sedimentation Rate 19 0 - 20 mm/hr   CRP inflammation   Result Value Ref Range    CRP Inflammation 12.2 (H) 0.0 - 8.0 mg/L   Basic metabolic panel   Result Value Ref Range    Sodium 135 133 - 144 mmol/L    Potassium 3.8 3.4 - 5.3 mmol/L    Chloride 105 94 - 109 mmol/L    Carbon Dioxide (CO2) 24 20 - 32 mmol/L    Anion Gap 6 3 - 14 mmol/L    Urea Nitrogen 14 7 - 30 mg/dL    Creatinine 0.77 0.66 - 1.25 mg/dL    Calcium 8.4 (L) 8.5 - 10.1 mg/dL    Glucose 214 (H) 70 - 99 mg/dL    GFR Estimate >90 >60 mL/min/1.73m2   CBC with platelets differential    Narrative    The following orders were created for panel order CBC with platelets differential.  Procedure                               Abnormality         Status                     ---------                               -----------         ------                     CBC with platelets and d...[[id number]]  Abnormal            Final result                 Please view results for these tests on the individual orders.   CBC with platelets and differential   Result Value Ref Range    WBC Count 6.2 4.0 - 11.0 10e3/uL    RBC Count 4.23 (L) 4.40 - 5.90 10e6/uL    Hemoglobin 12.8 (L) 13.3 - 17.7 g/dL    Hematocrit 37.7 (L) 40.0 - 53.0 %    MCV 89 78 - 100 fL    MCH 30.3 26.5 - 33.0 pg    MCHC 34.0 31.5 - 36.5 g/dL    RDW 13.4 10.0 - 15.0 %    Platelet  Count 172 150 - 450 10e3/uL    % Neutrophils 81 %    % Lymphocytes 10 %    % Monocytes 8 %    % Eosinophils 1 %    % Basophils 0 %    % Immature Granulocytes 0 %    NRBCs per 100 WBC 0 <1 /100    Absolute Neutrophils 5.0 1.6 - 8.3 10e3/uL    Absolute Lymphocytes 0.6 (L) 0.8 - 5.3 10e3/uL    Absolute Monocytes 0.5 0.0 - 1.3 10e3/uL    Absolute Eosinophils 0.0 0.0 - 0.7 10e3/uL    Absolute Basophils 0.0 0.0 - 0.2 10e3/uL    Absolute Immature Granulocytes 0.0 <=0.4 10e3/uL    Absolute NRBCs 0.0 10e3/uL   Symptomatic; Yes; 8/11/2022 Influenza A/B & SARS-CoV2 (COVID-19) Virus PCR Multiplex Nose    Specimen: Nose; Swab   Result Value Ref Range    Influenza A PCR Negative Negative    Influenza B PCR Negative Negative    SARS CoV2 PCR Negative Negative    Narrative    Testing was performed using the scott SARS-CoV-2 & Influenza A/B Assay on the scott [employer] System. This test should be ordered for the detection of SARS-CoV-2 and influenza viruses in individuals who meet clinical and/or epidemiological criteria. Test performance is unknown in asymptomatic patients. This test is for in vitro diagnostic use under the FDA EUA for laboratories certified under CLIA to perform moderate and/or high complexity testing. This test has not been FDA cleared or approved. A negative result does not rule out the presence of PCR inhibitors in the specimen or target RNA in concentration below the limit of detection for the assay. If only one viral target is positive but coinfection with multiple targets is suspected, the sample should be re-tested with another FDA cleared, approved or authorized test, if coinfection would change clinical management. [facility] are certified under the Clinical Laboratory Improvement Amendments of 1988 (CLIA-88) as  qualified to perform moderate and/or high complexity laboratory testing.       Medications   0.9% sodium chloride BOLUS (0 mLs Intravenous Stopped 8/11/22 7227)   ketorolac  (TORADOL) injection 15 mg (15 mg Intravenous Given 8/11/22 1505)       Assessments & Plan (with Medical Decision Making)  Reinaldo is a 71-year-old male presenting to the emergency room after COVID exposure with headache and concern for possible COVID-19.  See history and focused physical exam as above  Is an 71-year-old male in no acute distress, vital signs are stable and he is afebrile.  No focal neurologic deficit noted, no acute concerning findings on exam.  We will check blood work, and due to the patient's temporal headache will check sed rate and CRP.  We will also swab for COVID and influenza.  Labs and imaging as above.  COVID swab was negative, other findings unremarkable.  Patient has not had any change in GCS or signs of TIA or CVA.  Discussed with him that it may be early in course of COVID-19 infection, or he may have other viral infection causing his symptoms.  He may take Tylenol at home and continue to monitor.  If he has any worsening symptoms or any new concerns, return promptly to the ER for evaluation.  He also may want to retest for COVID-19 and another 2 days.  He is agreeable to this plan, discharged in no distress     I have reviewed the nursing notes.    I have reviewed the findings, diagnosis, plan and need for follow up with the patient.       Discharge Medication List as of 8/11/2022  4:54 PM          Final diagnoses:   Tension headache       8/11/2022   [facility] EMERGENCY DEPT     Amie Andrade DO  08/11/22 2019

## 2022-08-11 NOTE — ED TRIAGE NOTES
Pt awoke with a headache and has had it all day, his daughter tested positive for covid and she told him he should come in and get checked

## 2022-08-12 ENCOUNTER — PATIENT OUTREACH (OUTPATIENT)
Dept: CARE COORDINATION | Facility: CLINIC | Age: 72
End: 2022-08-12

## 2022-08-12 DIAGNOSIS — Z71.89 OTHER SPECIFIED COUNSELING: ICD-10-CM

## 2022-08-12 NOTE — PROGRESS NOTES
Clinic Care Coordination Contact  [facility]/Voicemail       Clinical Data: Care Coordinator Outreach  Outreach attempted x 1.  Left message on patient's voicemail with call back information and requested return call.  Plan: Care Coordinator will try to reach patient again in 1-2 business days.    SITA Fairbanks  [phone]  [facility]

## 2022-08-13 NOTE — PROGRESS NOTES
Clinic Care Coordination Contact  [facility]/Voicemail       Clinical Data: Care Coordinator Outreach  Outreach attempted x 2.  Left message on patient's voicemail with call back information and requested return call.  Plan:Care Coordinator will do no further outreaches at this time.        SITA Fairbanks  [phone]  [facility]

## 2022-08-24 ENCOUNTER — OFFICE VISIT (OUTPATIENT)
Dept: INTERNAL MEDICINE | Facility: CLINIC | Age: 72
End: 2022-08-24
Payer: MEDICARE

## 2022-08-24 VITALS
DIASTOLIC BLOOD PRESSURE: 68 MMHG | HEART RATE: 72 BPM | OXYGEN SATURATION: 98 % | BODY MASS INDEX: 34.41 KG/M2 | TEMPERATURE: 97 F | SYSTOLIC BLOOD PRESSURE: 122 MMHG | RESPIRATION RATE: 18 BRPM | WEIGHT: 210 LBS

## 2022-08-24 DIAGNOSIS — I10 ESSENTIAL HYPERTENSION WITH GOAL BLOOD PRESSURE LESS THAN 140/90: Primary | ICD-10-CM

## 2022-08-24 DIAGNOSIS — Z95.2 S/P AORTIC VALVE REPLACEMENT: ICD-10-CM

## 2022-08-24 DIAGNOSIS — Z79.4 TYPE 2 DIABETES MELLITUS WITH HYPERGLYCEMIA, WITH LONG-TERM CURRENT USE OF INSULIN (H): ICD-10-CM

## 2022-08-24 DIAGNOSIS — G63 POLYNEUROPATHY ASSOCIATED WITH UNDERLYING DISEASE (H): ICD-10-CM

## 2022-08-24 DIAGNOSIS — E11.65 TYPE 2 DIABETES MELLITUS WITH HYPERGLYCEMIA, WITH LONG-TERM CURRENT USE OF INSULIN (H): ICD-10-CM

## 2022-08-24 DIAGNOSIS — I50.82 BIVENTRICULAR CONGESTIVE HEART FAILURE (H): ICD-10-CM

## 2022-08-24 DIAGNOSIS — Z95.1 S/P CABG (CORONARY ARTERY BYPASS GRAFT): ICD-10-CM

## 2022-08-24 PROCEDURE — 99214 OFFICE O/P EST MOD 30 MIN: CPT | Performed by: INTERNAL MEDICINE

## 2022-08-24 ASSESSMENT — PAIN SCALES - GENERAL: PAINLEVEL: NO PAIN (0)

## 2022-08-24 NOTE — PROGRESS NOTES
Anali Najera is a 71 year old, presenting for the following health issues:  Diabetes      HPI     Chief Complaint   Patient presents with     Diabetes        EMR reviewed including:             Complaint, History of Chief Complaint, Corresponding Review of Systems, and Complaint Specific Physical Examination.    #1   Follow-up on type 2 diabetes  Slowly adjusting short acting insulin upward preprandially  Continues background insulin unchanged dosage/50 units daily  Denies hypoglycemia.  Most values under 200.  Continues to have polyneuropathy.  Able to sleep without difficulty.  Denies hyper or hypoglycemic symptoms.        Exam:   NEURO: Pt is alert and appropriate. No neurologic lateralization is noted. Cranial nerves 2-12 are intact. Peripheral sensory function is decreased in the feet   SKIN:  warm and dry. No erythema, or rashes are noted. No specific lesions of concern are noted.       #2   Follow-up on congestive heart failure  History of ischemic heart disease.  Post CABG.  History of aortic stenosis now post valvuloplasty.  Tissue valve.  Not on anticoagulation.        Exam:   LUNGS: clear bilaterally, airflow is brisk, no intercostal retraction or stridor is noted. No coughing is noted during visit.   HEART:  regular without rubs, clicks, gallops, or murmurs. PMI is nondisplaced. Upstrokes are brisk. S1,S2 are heard.   GI: Abdomen is soft, without rebound, guarding or tenderness. Bowel sounds are appropriate. No renal bruits are heard.  Abdomen is obese.        Patient has been interviewed, applicable history and applied review of systems have been performed.    Vital Signs:   /68 (BP Location: Right arm, Patient Position: Sitting, Cuff Size: Adult Large)   Pulse 72   Temp 97  F (36.1  C) (Temporal)   Resp 18   Wt 95.3 kg (210 lb)   SpO2 98%   BMI 34.41 kg/m        Decision Making    Problem and Complexity     1. Type 2 diabetes mellitus with hyperglycemia, with long-term current  use of insulin (H)  Continue current insulin therapy.  Continue aspirin, statin,  Not on ACE or ARB due to tolerance issues    2. Essential hypertension with goal blood pressure less than 140/90  Controlled.  Continue current medication    3. Polyneuropathy associated with underlying disease (H)  Stable.  Not requiring specific therapy    4. Biventricular congestive heart failure (H)  Chronic and stable.  Follow-up in cardiology and scheduled    5. S/P CABG (coronary artery bypass graft)  As above    6. S/P aortic valve replacement  As above                                FOLLOW UP   I have asked the patient to make an appointment for followup with me in 4 months          I have reviewed the patient's vaccination schedule and discussed the benefits of prophylactic vaccination in detail.  I recommend the patient contact their pharmacist (not the pharmacy within the clinic) for vaccinations.  Discussed that most insurance companies now favor reimbursement to the pharmacies and it will financially behoove the patient to have vaccinations performed at their pharmacy.          I have carefully explained the diagnosis and treatment options to the patient.  The patient has displayed an understanding of the above, and all subsequent questions were answered.      DO RADHA Horn    Portions of this note were produced using [employer]  Although every attempt at real-time proof reading has been made, occasional grammar/syntax errors may have been missed.

## 2022-08-31 ENCOUNTER — TELEPHONE (OUTPATIENT)
Dept: INTERNAL MEDICINE | Facility: CLINIC | Age: 72
End: 2022-08-31

## 2022-08-31 DIAGNOSIS — I10 ESSENTIAL HYPERTENSION WITH GOAL BLOOD PRESSURE LESS THAN 140/90: ICD-10-CM

## 2022-09-06 DIAGNOSIS — Z79.4 TYPE 2 DIABETES MELLITUS WITH HYPERGLYCEMIA, WITH LONG-TERM CURRENT USE OF INSULIN (H): ICD-10-CM

## 2022-09-06 DIAGNOSIS — E11.65 TYPE 2 DIABETES MELLITUS WITH HYPERGLYCEMIA, WITH LONG-TERM CURRENT USE OF INSULIN (H): ICD-10-CM

## 2022-09-06 RX ORDER — INSULIN DETEMIR 100 [IU]/ML
50 INJECTION, SOLUTION SUBCUTANEOUS EVERY MORNING
Qty: 45 ML | Refills: 1 | Status: SHIPPED | OUTPATIENT
Start: 2022-09-06 | End: 2023-02-14

## 2022-09-06 RX ORDER — CARVEDILOL 3.12 MG/1
3.12 TABLET ORAL DAILY
Qty: 180 TABLET | Refills: 1 | Status: SHIPPED | OUTPATIENT
Start: 2022-09-06 | End: 2023-08-23

## 2022-09-06 NOTE — TELEPHONE ENCOUNTER
Pending Prescriptions:                       Disp   Refills    carvedilol (COREG) 3.125 MG tablet [Pharma*180 ta*1        Sig: TAKE 1 TABLET (3.125 MG) BY MOUTH 2 TIMES DAILY WITH           MEALS    Routing refill request to provider for review/approval because:  A break in medication:  Last refilled 9/13/2021 for 180 tablets and 1 refill

## 2022-09-06 NOTE — TELEPHONE ENCOUNTER
Prescription approved per [facility] Refill Protocol.    Kristine Hansen, BSN, RN

## 2022-09-07 DIAGNOSIS — E11.65 TYPE 2 DIABETES MELLITUS WITH HYPERGLYCEMIA, WITH LONG-TERM CURRENT USE OF INSULIN (H): ICD-10-CM

## 2022-09-07 DIAGNOSIS — Z79.4 TYPE 2 DIABETES MELLITUS WITH HYPERGLYCEMIA, WITH LONG-TERM CURRENT USE OF INSULIN (H): ICD-10-CM

## 2022-09-07 RX ORDER — BLOOD SUGAR DIAGNOSTIC
STRIP MISCELLANEOUS
Qty: 270 STRIP | Refills: 3 | Status: SHIPPED | OUTPATIENT
Start: 2022-09-07 | End: 2023-06-13

## 2022-09-09 RX ORDER — BLOOD SUGAR DIAGNOSTIC
STRIP MISCELLANEOUS
Qty: 100 STRIP | Refills: 8 | OUTPATIENT
Start: 2022-09-09

## 2022-09-09 NOTE — TELEPHONE ENCOUNTER
One touch ultra test strips denied  Duplicate, sent 9/7/2022 for 270 strips and 3 refills to this pharmacy.

## 2022-12-17 DIAGNOSIS — E11.65 TYPE 2 DIABETES MELLITUS WITH HYPERGLYCEMIA, WITH LONG-TERM CURRENT USE OF INSULIN (H): ICD-10-CM

## 2022-12-17 DIAGNOSIS — I50.42 SYSTOLIC AND DIASTOLIC CHF, CHRONIC (H): ICD-10-CM

## 2022-12-17 DIAGNOSIS — R07.2 PRECORDIAL PAIN: ICD-10-CM

## 2022-12-17 DIAGNOSIS — Z79.4 TYPE 2 DIABETES MELLITUS WITH HYPERGLYCEMIA, WITH LONG-TERM CURRENT USE OF INSULIN (H): ICD-10-CM

## 2022-12-17 DIAGNOSIS — E78.5 HYPERLIPIDEMIA LDL GOAL <100: ICD-10-CM

## 2022-12-20 RX ORDER — INSULIN ASPART 100 [IU]/ML
INJECTION, SOLUTION INTRAVENOUS; SUBCUTANEOUS
Qty: 15 ML | Refills: 1 | Status: SHIPPED | OUTPATIENT
Start: 2022-12-20 | End: 2023-05-23

## 2022-12-20 RX ORDER — ATORVASTATIN CALCIUM 40 MG/1
40 TABLET, FILM COATED ORAL DAILY
Qty: 90 TABLET | Refills: 1 | Status: SHIPPED | OUTPATIENT
Start: 2022-12-20 | End: 2023-06-28

## 2022-12-20 RX ORDER — LISINOPRIL 5 MG/1
TABLET ORAL
Qty: 90 TABLET | Refills: 1 | Status: SHIPPED | OUTPATIENT
Start: 2022-12-20 | End: 2023-06-28

## 2022-12-20 RX ORDER — ISOSORBIDE MONONITRATE 30 MG/1
TABLET, EXTENDED RELEASE ORAL
Qty: 90 TABLET | Refills: 1 | Status: SHIPPED | OUTPATIENT
Start: 2022-12-20 | End: 2023-06-28

## 2022-12-20 NOTE — TELEPHONE ENCOUNTER
"Novolog flexpen  lipitor  imdur  zestril  Prescription approved per [facility] Refill Protocol.    Requested Prescriptions   Pending Prescriptions Disp Refills     lisinopril (ZESTRIL) 5 MG tablet [Pharmacy Med Name: LISINOPRIL 5MG TABS] 90 tablet 1     Sig: TAKE ONE TABLET BY MOUTH ONCE DAILY       ACE Inhibitors (Including Combos) Protocol Passed - 12/17/2022 11:09 AM        Passed - Blood pressure under 140/90 in past 12 months     BP Readings from Last 3 Encounters:   08/24/22 122/68   08/11/22 133/86   07/21/22 132/70                 Passed - Recent (12 mo) or future (30 days) visit within the authorizing provider's specialty     Patient has had an office visit with the authorizing provider or a provider within the authorizing providers department within the previous 12 mos or has a future within next 30 days. See \"Patient Info\" tab in inbasket, or \"Choose Columns\" in Meds & Orders section of the refill encounter.              Passed - Medication is active on med list        Passed - Patient is age 18 or older        Passed - Normal serum creatinine on file in past 12 months     Recent Labs   Lab Test 08/11/22  1552   CR 0.77       Ok to refill medication if creatinine is low          Passed - Normal serum potassium on file in past 12 months     Recent Labs   Lab Test 08/11/22  1552   POTASSIUM 3.8                isosorbide mononitrate (IMDUR) 30 MG 24 hr tablet [Pharmacy Med Name: ISOSORBIDE MONONITRATE ER 30 TB24] 90 tablet 0     Sig: TAKE ONE TABLET (30 MG) BY MOUTH ONCE DAILY       Nitrates Passed - 12/17/2022 11:09 AM        Passed - Blood pressure under 140/90 in past 12 months     BP Readings from Last 3 Encounters:   08/24/22 122/68   08/11/22 133/86   07/21/22 132/70                 Passed - Pt is not on erectile dysfunction medications        Passed - Recent (12 mo) or future (30 days) visit within the authorizing provider's specialty     Patient has had an office visit with the authorizing provider or a " "provider within the authorizing providers department within the previous 12 mos or has a future within next 30 days. See \"Patient Info\" tab in inbasket, or \"Choose Columns\" in Meds & Orders section of the refill encounter.              Passed - Medication is active on med list        Passed - Patient is age 18 or older           atorvastatin (LIPITOR) 40 MG tablet [Pharmacy Med Name: ATORVASTATIN CALCIUM 40MG TABS] 90 tablet 1     Sig: TAKE 1 TABLET (40 MG) BY MOUTH DAILY       Statins Protocol Passed - 12/17/2022 11:09 AM        Passed - LDL on file in past 12 months     Recent Labs   Lab Test 07/11/22  0731   LDL 66             Passed - No abnormal creatine kinase in past 12 months     Recent Labs   Lab Test 11/05/15  1750   *                Passed - Recent (12 mo) or future (30 days) visit within the authorizing provider's specialty     Patient has had an office visit with the authorizing provider or a provider within the authorizing providers department within the previous 12 mos or has a future within next 30 days. See \"Patient Info\" tab in inCrystal ISet, or \"Choose Columns\" in Meds & Orders section of the refill encounter.              Passed - Medication is active on med list        Passed - Patient is age 18 or older           NOVOLOG FLEXPEN 100 UNIT/ML soln [Pharmacy Med Name: NOVOLOG FLEXPEN 100UNIT/ML SOPN] 15 mL 1     Sig: INJECT 10 - 12 UNITS UNDER THE SKIN TWICE A DAY       Short Acting Insulin Protocol Passed - 12/17/2022 11:09 AM        Passed - Serum creatinine on file in past 12 months     Recent Labs   Lab Test 08/11/22  1552   CR 0.77       Ok to refill medication if creatinine is low          Passed - HgbA1C in past 3 or 6 months     If HgbA1C is 8 or greater, it needs to be on file within the past 3 months.  If less than 8, must be on file within the past 6 months.     Recent Labs   Lab Test 07/10/22  1739   A1C 7.7*             Passed - Medication is active on med list        Passed - " "Patient is age 18 or older        Passed - Recent (6 mo) or future (30 days) visit within the authorizing provider's specialty     Patient had office visit in the last 6 months or has a visit in the next 30 days with authorizing provider or within the authorizing provider's specialty.  See \"Patient Info\" tab in inbasket, or \"Choose Columns\" in Meds & Orders section of the refill encounter.                 "

## 2023-02-13 DIAGNOSIS — K59.04 CHRONIC IDIOPATHIC CONSTIPATION: ICD-10-CM

## 2023-02-13 DIAGNOSIS — E11.65 TYPE 2 DIABETES MELLITUS WITH HYPERGLYCEMIA, WITH LONG-TERM CURRENT USE OF INSULIN (H): ICD-10-CM

## 2023-02-13 DIAGNOSIS — Z79.4 TYPE 2 DIABETES MELLITUS WITH HYPERGLYCEMIA, WITH LONG-TERM CURRENT USE OF INSULIN (H): ICD-10-CM

## 2023-02-14 RX ORDER — AMOXICILLIN 250 MG
CAPSULE ORAL
Qty: 100 TABLET | Refills: 2 | Status: SHIPPED | OUTPATIENT
Start: 2023-02-14 | End: 2023-06-28

## 2023-02-14 RX ORDER — INSULIN DETEMIR 100 [IU]/ML
50 INJECTION, SOLUTION SUBCUTANEOUS EVERY MORNING
Qty: 45 ML | Refills: 1 | Status: SHIPPED | OUTPATIENT
Start: 2023-02-14 | End: 2023-02-15

## 2023-02-14 NOTE — TELEPHONE ENCOUNTER
Levemir flextouch  Routing refill request to provider for review/approval because:   Failed - HgbA1C in past 3 or 6 months    If HgbA1C is 8 or greater, it needs to be on file within the past 3 months.  If less than 8, must be on file within the past 6 months.     Recent Labs   Lab Test 07/10/22  1739   A1C 7.7*            Senna docusate  Prescription approved per [facility] Refill Protocol.    Martha Dooley RN

## 2023-02-15 DIAGNOSIS — E11.65 TYPE 2 DIABETES MELLITUS WITH HYPERGLYCEMIA, WITH LONG-TERM CURRENT USE OF INSULIN (H): ICD-10-CM

## 2023-02-15 DIAGNOSIS — Z79.4 TYPE 2 DIABETES MELLITUS WITH HYPERGLYCEMIA, WITH LONG-TERM CURRENT USE OF INSULIN (H): ICD-10-CM

## 2023-02-15 RX ORDER — INSULIN DETEMIR 100 [IU]/ML
65 INJECTION, SOLUTION SUBCUTANEOUS EVERY MORNING
Qty: 45 ML | Refills: 1 | Status: SHIPPED | OUTPATIENT
Start: 2023-02-15 | End: 2023-07-28

## 2023-03-15 ENCOUNTER — OFFICE VISIT (OUTPATIENT)
Dept: INTERNAL MEDICINE | Facility: CLINIC | Age: 73
End: 2023-03-15
Payer: MEDICARE

## 2023-03-15 VITALS
HEIGHT: 66 IN | TEMPERATURE: 98 F | WEIGHT: 224.1 LBS | BODY MASS INDEX: 36.02 KG/M2 | RESPIRATION RATE: 18 BRPM | HEART RATE: 76 BPM | SYSTOLIC BLOOD PRESSURE: 136 MMHG | OXYGEN SATURATION: 98 % | DIASTOLIC BLOOD PRESSURE: 80 MMHG

## 2023-03-15 DIAGNOSIS — Z00.00 MEDICARE ANNUAL WELLNESS VISIT, SUBSEQUENT: ICD-10-CM

## 2023-03-15 DIAGNOSIS — Z95.1 S/P CABG (CORONARY ARTERY BYPASS GRAFT): ICD-10-CM

## 2023-03-15 DIAGNOSIS — I50.42 SYSTOLIC AND DIASTOLIC CHF, CHRONIC (H): ICD-10-CM

## 2023-03-15 DIAGNOSIS — Z12.11 SCREEN FOR COLON CANCER: ICD-10-CM

## 2023-03-15 DIAGNOSIS — H90.3 SENSORINEURAL HEARING LOSS (SNHL) OF BOTH EARS: ICD-10-CM

## 2023-03-15 DIAGNOSIS — I10 ESSENTIAL HYPERTENSION WITH GOAL BLOOD PRESSURE LESS THAN 140/90: ICD-10-CM

## 2023-03-15 DIAGNOSIS — E11.65 TYPE 2 DIABETES MELLITUS WITH HYPERGLYCEMIA, WITH LONG-TERM CURRENT USE OF INSULIN (H): ICD-10-CM

## 2023-03-15 DIAGNOSIS — G63 POLYNEUROPATHY ASSOCIATED WITH UNDERLYING DISEASE (H): ICD-10-CM

## 2023-03-15 DIAGNOSIS — R07.2 PRECORDIAL PAIN: Primary | ICD-10-CM

## 2023-03-15 DIAGNOSIS — Z79.4 TYPE 2 DIABETES MELLITUS WITH HYPERGLYCEMIA, WITH LONG-TERM CURRENT USE OF INSULIN (H): ICD-10-CM

## 2023-03-15 DIAGNOSIS — E66.01 MORBID OBESITY (H): ICD-10-CM

## 2023-03-15 DIAGNOSIS — Z79.01 LONG TERM CURRENT USE OF ANTICOAGULANT THERAPY: ICD-10-CM

## 2023-03-15 LAB
ALBUMIN SERPL BCG-MCNC: 3.6 G/DL (ref 3.5–5.2)
ALP SERPL-CCNC: 105 U/L (ref 40–129)
ALT SERPL W P-5'-P-CCNC: 17 U/L (ref 10–50)
ANION GAP SERPL CALCULATED.3IONS-SCNC: 7 MMOL/L (ref 7–15)
AST SERPL W P-5'-P-CCNC: 20 U/L (ref 10–50)
BILIRUB DIRECT SERPL-MCNC: <0.2 MG/DL (ref 0–0.3)
BILIRUB SERPL-MCNC: 0.4 MG/DL
BUN SERPL-MCNC: 17.2 MG/DL (ref 8–23)
CALCIUM SERPL-MCNC: 9 MG/DL (ref 8.8–10.2)
CHLORIDE SERPL-SCNC: 98 MMOL/L (ref 98–107)
CREAT SERPL-MCNC: 0.9 MG/DL (ref 0.67–1.17)
DEPRECATED HCO3 PLAS-SCNC: 28 MMOL/L (ref 22–29)
GFR SERPL CREATININE-BSD FRML MDRD: 90 ML/MIN/1.73M2
GLUCOSE SERPL-MCNC: 265 MG/DL (ref 70–99)
HBA1C MFR BLD: 9.1 %
POTASSIUM SERPL-SCNC: 3.9 MMOL/L (ref 3.4–5.3)
PROT SERPL-MCNC: 6.8 G/DL (ref 6.4–8.3)
SODIUM SERPL-SCNC: 133 MMOL/L (ref 136–145)
TSH SERPL DL<=0.005 MIU/L-ACNC: 3.61 UIU/ML (ref 0.3–4.2)

## 2023-03-15 PROCEDURE — 83036 HEMOGLOBIN GLYCOSYLATED A1C: CPT | Performed by: INTERNAL MEDICINE

## 2023-03-15 PROCEDURE — 36415 COLL VENOUS BLD VENIPUNCTURE: CPT | Performed by: INTERNAL MEDICINE

## 2023-03-15 PROCEDURE — 80053 COMPREHEN METABOLIC PANEL: CPT | Performed by: INTERNAL MEDICINE

## 2023-03-15 PROCEDURE — 99214 OFFICE O/P EST MOD 30 MIN: CPT | Performed by: INTERNAL MEDICINE

## 2023-03-15 PROCEDURE — 82248 BILIRUBIN DIRECT: CPT | Performed by: INTERNAL MEDICINE

## 2023-03-15 PROCEDURE — 99207 PR FOOT EXAM NO CHARGE: CPT | Performed by: INTERNAL MEDICINE

## 2023-03-15 PROCEDURE — 84443 ASSAY THYROID STIM HORMONE: CPT | Performed by: INTERNAL MEDICINE

## 2023-03-15 ASSESSMENT — PAIN SCALES - GENERAL: PAINLEVEL: NO PAIN (0)

## 2023-03-15 NOTE — PROGRESS NOTES
Anali Najera is a 72 year old, presenting for the following health issues:  Diabetes         EMR reviewed including:             Complaint, History of Chief Complaint, Corresponding Review of Systems, and Complaint Specific Physical Examination.    #1   Chest pain  Dull/retrosternal  With exertion.  Sometimes at rest  Resolves with rest  Nonpositional  History of coronary artery disease with previous bypass  Has not seen Cardiology for several years.  Does not watch diet        Exam:   LUNGS: clear bilaterally, airflow is brisk, no intercostal retraction or stridor is noted. No coughing is noted during visit.   HEART:  regular without rubs, clicks, gallops, or murmurs. PMI is nondisplaced. Upstrokes are brisk. S1,S2 are heard.   GI: Abdomen is soft, without rebound, guarding or tenderness. Bowel sounds are appropriate. No renal bruits are heard.      #2   Follow-up on type 2 diabetes continues background insulin.  With coverage  Continues aspirin, statin, ACE inhibitor.  Last A1c 7.7 in July 2022  Denies symptoms of polyuria or polydipsia  Has had no hypoglycemia  Does note some decrease sensation in the feet.        Exam:   NEURO: Pt is alert and appropriate. No neurologic lateralization is noted. Cranial nerves 2-12 are intact. Peripheral sensory function is decreased in the feet to the ankle.   Feet: no evidence of skin breakdown or ulceration is noted.  Sensation is markedly decreased to monofilament and vibration.  Pulses are strong, capillary refill is brisk.  .       #3   History of aortic valvuloplasty  St. Servando's tissue valve in place.  Not on anticoagulant therapy  No recent history of embolic phenomenon.  Prior history of TIA.  Continues Plavix and aspirin.        Exam:  As above    Problem and Complexity     1. Medicare annual wellness visit, subsequent  Done    2. Precordial pain  Must assume cardiac given patient's history and symptoms.  We will set up nuclear stress test  - NM MPI  Treadmill; Future    3. Type 2 diabetes mellitus with hyperglycemia, with long-term current use of insulin (H)  Check A1c.  Adjust medication accordingly  - HEMOGLOBIN A1C; Future  - FOOT EXAM    4. S/P CABG (coronary artery bypass graft)  Continue current medications including nitro, low-dose beta-blocker, antiplatelet therapy.    5. Essential hypertension with goal blood pressure less than 140/90  Controlled with current blood pressure of 136/80.  - TSH WITH FREE T4 REFLEX; Future  - BASIC METABOLIC PANEL; Future    6. Sensorineural hearing loss (SNHL) of both ears  Continue over-the-counter hearing aids.  Recommend audiology consultation.  Patient refuses    7. Systolic and diastolic CHF, chronic (H)  Currently stable.    8. Morbid obesity (H)  Recommend weight loss with responsible caloric restriction    9. Long term current use of anticoagulant therapy  No longer anticoagulated.  Patient is on antiplatelet therapy.    10. Polyneuropathy associated with underlying disease (H)  Stable.  Maintain adequate blood sugar control    11. Screen for colon cancer  Patient adamantly refusing colonoscopy.  - Fecal colorectal cancer screen FIT - Future (S+30); Future  - Hepatic panel (Albumin, ALT, AST, Bili, Alk Phos, TP); Future                                FOLLOW UP   I have asked the patient to make an appointment for followup with either:  1.  Me,  2.  The patient's preferred provider,  3.  Any available provider  In 4 months     patient refusing all and any form of vaccination.      I have carefully explained the diagnosis and treatment options to the patient.  The patient has displayed an understanding of the above, and all subsequent questions were answered.      DO RADHA Horn    Portions of this note were produced using [employer]  Although every attempt at real-time proof reading has been made, occasional grammar/syntax errors may have been missed.

## 2023-03-20 PROCEDURE — 82274 ASSAY TEST FOR BLOOD FECAL: CPT | Performed by: INTERNAL MEDICINE

## 2023-03-21 ENCOUNTER — HOSPITAL ENCOUNTER (OUTPATIENT)
Dept: NUCLEAR MEDICINE | Facility: CLINIC | Age: 73
Setting detail: NUCLEAR MEDICINE
Discharge: HOME | End: 2023-03-21
Attending: INTERNAL MEDICINE
Payer: MEDICARE

## 2023-03-21 ENCOUNTER — HOSPITAL ENCOUNTER (OUTPATIENT)
Dept: CARDIOLOGY | Facility: CLINIC | Age: 73
Setting detail: NUCLEAR MEDICINE
Discharge: HOME | End: 2023-03-21
Attending: INTERNAL MEDICINE
Payer: MEDICARE

## 2023-03-21 DIAGNOSIS — R07.2 PRECORDIAL PAIN: ICD-10-CM

## 2023-03-21 LAB
CV STRESS MAX HR HE: 86
HEMOCCULT STL QL IA: POSITIVE
NUC STRESS EJECTION FRACTION: 37 %
RATE PRESSURE PRODUCT: NORMAL
STRESS ECHO BASELINE DIASTOLIC HE: 102
STRESS ECHO BASELINE HR: 74 BPM
STRESS ECHO BASELINE SYSTOLIC BP: 188
STRESS ECHO CALCULATED PERCENT HR: 59 %
STRESS ECHO LAST STRESS DIASTOLIC BP: 94
STRESS ECHO LAST STRESS SYSTOLIC BP: 180
STRESS ECHO TARGET HR: 146

## 2023-03-21 PROCEDURE — 93018 CV STRESS TEST I&R ONLY: CPT | Performed by: INTERNAL MEDICINE

## 2023-03-21 PROCEDURE — 93016 CV STRESS TEST SUPVJ ONLY: CPT | Performed by: INTERNAL MEDICINE

## 2023-03-21 PROCEDURE — G1010 CDSM STANSON: HCPCS

## 2023-03-21 PROCEDURE — 250N000011 HC RX IP 250 OP 636: Performed by: INTERNAL MEDICINE

## 2023-03-21 PROCEDURE — 93017 CV STRESS TEST TRACING ONLY: CPT

## 2023-03-21 PROCEDURE — G1010 CDSM STANSON: HCPCS | Performed by: INTERNAL MEDICINE

## 2023-03-21 PROCEDURE — 78452 HT MUSCLE IMAGE SPECT MULT: CPT | Mod: 26 | Performed by: INTERNAL MEDICINE

## 2023-03-21 PROCEDURE — 93017 CV STRESS TEST TRACING ONLY: CPT | Performed by: REHABILITATION PRACTITIONER

## 2023-03-21 PROCEDURE — 343N000001 HC RX 343: Performed by: INTERNAL MEDICINE

## 2023-03-21 PROCEDURE — A9502 TC99M TETROFOSMIN: HCPCS | Performed by: INTERNAL MEDICINE

## 2023-03-21 RX ORDER — REGADENOSON 0.08 MG/ML
0.4 INJECTION, SOLUTION INTRAVENOUS ONCE
Status: COMPLETED | OUTPATIENT
Start: 2023-03-21 | End: 2023-03-21

## 2023-03-21 RX ADMIN — TETROFOSMIN 10.8 MILLICURIE: 1.38 INJECTION, POWDER, LYOPHILIZED, FOR SOLUTION INTRAVENOUS at 07:50

## 2023-03-21 RX ADMIN — REGADENOSON 0.4 MG: 0.08 INJECTION, SOLUTION INTRAVENOUS at 09:07

## 2023-03-21 RX ADMIN — TETROFOSMIN 33.3 MILLICURIE: 1.38 INJECTION, POWDER, LYOPHILIZED, FOR SOLUTION INTRAVENOUS at 09:20

## 2023-03-26 ENCOUNTER — HEALTH MAINTENANCE LETTER (OUTPATIENT)
Age: 73
End: 2023-03-26

## 2023-03-27 ENCOUNTER — TELEPHONE (OUTPATIENT)
Dept: INTERNAL MEDICINE | Facility: CLINIC | Age: 73
End: 2023-03-27
Payer: MEDICARE

## 2023-03-27 NOTE — TELEPHONE ENCOUNTER
Patient hung up because I could get him in with in the next 2 days. Right then. I did explain would send a message to his team to see when they could work him him. He said that wasn't good enough and hung up.

## 2023-04-10 ENCOUNTER — TELEPHONE (OUTPATIENT)
Dept: INTERNAL MEDICINE | Facility: CLINIC | Age: 73
End: 2023-04-10
Payer: MEDICARE

## 2023-04-10 ENCOUNTER — TELEPHONE (OUTPATIENT)
Dept: GASTROENTEROLOGY | Facility: CLINIC | Age: 73
End: 2023-04-10
Payer: MEDICARE

## 2023-04-10 DIAGNOSIS — Z79.4 TYPE 2 DIABETES MELLITUS WITH HYPERGLYCEMIA, WITH LONG-TERM CURRENT USE OF INSULIN (H): Primary | ICD-10-CM

## 2023-04-10 DIAGNOSIS — E11.65 TYPE 2 DIABETES MELLITUS WITH HYPERGLYCEMIA, WITH LONG-TERM CURRENT USE OF INSULIN (H): Primary | ICD-10-CM

## 2023-04-10 DIAGNOSIS — G63 POLYNEUROPATHY ASSOCIATED WITH UNDERLYING DISEASE (H): ICD-10-CM

## 2023-04-10 NOTE — TELEPHONE ENCOUNTER
Screening Questions  BLUE  KIND OF PREP RED  LOCATION [review exclusion criteria] GREEN  SEDATION TYPE    Y - USPS LETTER   Are you active on mychart?   Mark Blanchard, DO   Ordering/Referring Provider?    MEDICARE  What type of coverage do you have?  Y -  03/27/2023  Have you had a positive covid test in the last 14 days?    33.4  1. BMI  [BMI 40+ - review exclusion criteria - MAC + [facility]]            *NEED PAC APPT AT [facility] + [facility] (ONLY)*     SELF   2. Are you able to give consent for your medical care? [IF NO,RN REVIEW]          N  3. Are you taking any prescription pain medications on a routine schedule   (ex narcotics: tramadol, oxycodone, roxicodone, oxycontin,  and percocet)?    [RN Review]             N  3a. EXTENDED PREP What kind of prescription?     N 4. Do you have any chemical dependencies such as alcohol, street drugs, or methadone?    **If yes 3- 5 , please schedule with MAC sedation.**          IF YES TO ANY 6 - 10 - HOSPITAL SETTING ONLY.     N 6.   Do you need assistance transferring?     N 7.   Have you had a heart or lung transplant?    N 8.   Are you currently on dialysis?   N 9.   Do you use daily home oxygen?   N 10. Do you take nitroglycerin?   10a. N If yes, how often?     11. [FEMALES]   Are you currently pregnant?     11a.  If yes, how many weeks? [ Greater than 12 weeks, OR NEEDED]    N 12. [review exclusion criteria]  Do you have any implantable devices in your body (pacemaker, defib, LVAD)?            *NEED PAC APPT AT [facility]*     N 13. Do you have Pulmonary Hypertension?             *NEED PAC APPT AT [facility]*     N 14. In the past 6 months, have you had any heart related issues including cardiomyopathy or heart attack?     N 14a. If yes, did it require cardiac stenting if so when?     N 15. Have you had a stroke or Transient ischemic attack (TIA - aka  mini stroke ) within 6 months?      N 16. Do you have mod to severe Obstructive Sleep Apnea?  [Hospital only]    N 17. Do you have  "SEVERE AND UNCONTROLLED asthma?              *NEED PAC APPT AT [facility]*     N - UNSURE 18. Are you currently taking any blood thinners?     18a. No. Continue to 19.   18b. Yes/no Blood Thinner: No [CONTINUE TO #19]    N  19. Do you take the medication Phentermine?    19a. If yes, \"Hold for 7 days before procedure.  Please consult your prescribing provider if you have questions about holding this medication.\"     N  20. Do you have chronic kidney disease?      Y - DIABETIC   21. Do you have a diagnosis of diabetes?     N  22. On a regular basis do you go 3-5 days between bowel movements?     23. Preferred LOCAL Pharmacy for Pre Prescription    [ LIST ONLY ONE PHARMACY]        [address]        - BLAINE REMINDERS -    Informed patient they will need an adult          Cannot take any type of public or medical transportation alone    Conscious Sedation- Needs  for 6 hours after the procedure        MAC/General-Needs  for 24 hours after procedure    Pre-Procedure Covid test to be completed         [[place] PCR/HOME Testing Required] + ADULT to ACCOMPANY     Confirmed Nurse will call to complete assessment       - SCHEDULING DETAILS -      Hospital Setting Required? If yes, what is the exclusion?:  N      Additional comments:  PT DOES NOT HAVE A  AND WILL CONTACT HIS PCP FOR AN (IN-PATIENT) SERVICE.     NH               "

## 2023-04-10 NOTE — TELEPHONE ENCOUNTER
Reason for Call:  Appointment Request    Patient requesting this type of appt:  Office visit to discuss results and get podiatry appt scheduled/referral placed    Requested provider: Mark Blanchard    Reason patient unable to be scheduled: Not within requested timeframe    When does patient want to be seen/preferred time: 3-7 days, early mornings work best    Comments: wants to discuss positive cancer results. He doesn't have access to a computer so cannot see them via [employer].     Could we send this information to you in [employer] or would you prefer to receive a phone call?:   Patient would prefer a phone call   Okay to leave a detailed message?: Yes at Cell number on file:    Telephone Information:   Mobile [phone]       Call taken on 4/10/2023 at 10:21 AM by Tita Moscoso

## 2023-04-10 NOTE — TELEPHONE ENCOUNTER
"Please place the patient on my schedule in any available 20 minute time slot that is NOT listed as:   \"U N A V A I L A B L E\".    If a timely appointment is not available, please refer the patient to one of the many excellent [place] providers who might have an opening.    Thank you.    Santo"

## 2023-04-10 NOTE — TELEPHONE ENCOUNTER
Patient spoke with you about his foot and had a foot exam at his last visit.  He would like to schedule with podiatry, can you please complete the pended referral.    Podiatry referral pended, needs completion, Dx and Sig    Lindsay Bryson XRO/

## 2023-04-13 ENCOUNTER — OFFICE VISIT (OUTPATIENT)
Dept: PODIATRY | Facility: CLINIC | Age: 73
End: 2023-04-13
Payer: MEDICARE

## 2023-04-13 VITALS
HEIGHT: 66 IN | SYSTOLIC BLOOD PRESSURE: 146 MMHG | DIASTOLIC BLOOD PRESSURE: 68 MMHG | WEIGHT: 224.1 LBS | BODY MASS INDEX: 36.02 KG/M2

## 2023-04-13 DIAGNOSIS — L85.9 HYPERKERATOSIS: ICD-10-CM

## 2023-04-13 DIAGNOSIS — I73.9 PERIPHERAL VASCULAR DISEASE (H): ICD-10-CM

## 2023-04-13 DIAGNOSIS — E11.65 TYPE 2 DIABETES MELLITUS WITH HYPERGLYCEMIA, WITH LONG-TERM CURRENT USE OF INSULIN (H): Primary | ICD-10-CM

## 2023-04-13 DIAGNOSIS — Z79.4 TYPE 2 DIABETES MELLITUS WITH HYPERGLYCEMIA, WITH LONG-TERM CURRENT USE OF INSULIN (H): Primary | ICD-10-CM

## 2023-04-13 DIAGNOSIS — L85.8 CUTANEOUS HORN: ICD-10-CM

## 2023-04-13 DIAGNOSIS — E11.43 TYPE 2 DIABETES MELLITUS WITH DIABETIC AUTONOMIC NEUROPATHY, WITHOUT LONG-TERM CURRENT USE OF INSULIN (H): ICD-10-CM

## 2023-04-13 PROCEDURE — 11055 PARING/CUTG B9 HYPRKER LES 1: CPT | Performed by: PODIATRIST

## 2023-04-13 ASSESSMENT — PAIN SCALES - GENERAL: PAINLEVEL: SEVERE PAIN (6)

## 2023-04-13 NOTE — PROGRESS NOTES
HPI:  Reinaldo Barrios is a 72 year old male who is seen in consultation at the request of Mark Blanchard DO.    Pt presents for eval of:   (Onset, Location, L/R, Character, Treatments, Injury if yes)    DM type 2    HX, Polio as a child, Stroke, MI     1. Ingrown lateral Right 3rd toenail, ongoing since 2021. Toenail is thick and curving.  Pain is noted on the right lateral third toenail that is exquisite guarding causing limping.  It is reducing activities.    2. Intermittent plantar ball of left and right foot numbness.    Retired.        Review of Systems:  Patient denies fever, chills, rash, wound, stiffness, limping, numbness, weakness, heart burn, blood in stool, chest pain with activity, calf pain when walking, shortness of breath with activity, chronic cough, easy bleeding/bruising, swelling of ankles, excessive thirst, fatigue, depression, anxiety.  Patient admits only to symptoms noted in history.     Patient Active Problem List   Diagnosis     Aortic valve stenosis     Personal history of tobacco use, presenting hazards to health     Hyperlipidemia LDL goal <100     Hiatal hernia     Chest pain, unspecified chest pain type     Cardiac LV ejection fraction 30-35%     NSTEMI (non-ST elevated myocardial infarction) (H)     ACS (acute coronary syndrome) (H)     CHF (congestive heart failure) (H)     Aortic stenosis     S/P CABG (coronary artery bypass graft)     Advanced directives, counseling/discussion     Long term current use of anticoagulant therapy     Heart valve replaced [Z95.2]     S/P aortic valve replacement     Essential hypertension with goal blood pressure less than 140/90     Non morbid obesity due to excess calories     Type 2 diabetes mellitus with hyperglycemia, with long-term current use of insulin (H)     Obesity (BMI 35.0-39.9) with comorbidity (H)     Polyneuropathy associated with underlying disease (H)     PAST MEDICAL HISTORY:   Past Medical History:   Diagnosis Date  "    Aortic valve stenosis 11/2/2015     Cardiac LV ejection fraction 30-35% 11/2/2015     Chest pain, unspecified chest pain type 11/2/2015     Hiatal hernia 11/2/2015     Hyperlipidemia LDL goal <100 11/2/2015     Personal history of tobacco use, presenting hazards to health 11/2/2015     Type 2 diabetes mellitus without complication (H) 11/2/2015     PAST SURGICAL HISTORY:   Past Surgical History:   Procedure Laterality Date     BYPASS GRAFT ARTERY CORONARY N/A 11/7/2015    Procedure: BYPASS GRAFT ARTERY CORONARY;  Surgeon: Sia Caldera MD;  Location:  OR     KNEE SURGERY  2009     REPLACE VALVE AORTIC N/A 11/7/2015    Procedure: REPLACE VALVE AORTIC;  Surgeon: Sia Caldera MD;  Location:  OR     MEDICATIONS:   Current Outpatient Medications:      aspirin EC 81 MG EC tablet, Take 1 tablet (81 mg) by mouth daily (Patient taking differently: Take 81 mg by mouth daily), Disp: , Rfl:      atorvastatin (LIPITOR) 40 MG tablet, TAKE 1 TABLET (40 MG) BY MOUTH DAILY, Disp: 90 tablet, Rfl: 1     B-D U/F 31G X 8 MM insulin pen needle, USE THREE PEN NEEDLES DAILY OR AS DIRECTED., Disp: 100 each, Rfl: 3     carvedilol (COREG) 3.125 MG tablet, Take 1 tablet (3.125 mg) by mouth daily TAKE 1 TABLET (3.125 MG) BY MOUTH 2 TIMES DAILY (WITH MEALS) (Patient taking differently: Take 3.125 mg by mouth daily TAKE 1 TABLET (3.125 MG) BY MOUTH 2 TIMES DAILY (WITH MEALS)), Disp: 180 tablet, Rfl: 1     insulin detemir (LEVEMIR FLEXTOUCH) 100 UNIT/ML pen, Inject 65 Units Subcutaneous every morning, Disp: 45 mL, Rfl: 1     insulin syringe-needle U-100 (BD INSULIN SYRINGE ULTRAFINE) 31G X 5/16\" 0.5 ML miscellaneous, Use one syringe 3 daily or as directed., Disp: 100 each, Rfl: prn     isosorbide mononitrate (IMDUR) 30 MG 24 hr tablet, TAKE ONE TABLET (30 MG) BY MOUTH ONCE DAILY, Disp: 90 tablet, Rfl: 1     lisinopril (ZESTRIL) 5 MG tablet, TAKE ONE TABLET BY MOUTH ONCE DAILY, Disp: 90 tablet, Rfl: 1     NOVOLOG FLEXPEN 100 " "UNIT/ML soln, INJECT 10 - 12 UNITS UNDER THE SKIN TWICE A DAY, Disp: 15 mL, Rfl: 1     ONETOUCH ULTRA test strip, USE TO TEST BLOOD SUGAR 3 TIMES A DAY, Disp: 270 strip, Rfl: 3     senna-docusate (SENEXON-S) 8.6-50 MG tablet, TAKE 1 TO 2 TABLETS BY MOUTH TWICE A DAY, Disp: 100 tablet, Rfl: 2     clopidogrel (PLAVIX) 75 MG tablet, Take 1 tablet (75 mg) by mouth daily for 21 days, Disp: 21 tablet, Rfl: 0  ALLERGIES:    Allergies   Allergen Reactions     Sulfa Drugs      Optison Unknown     Pt states he \"felt like crap later\" after he was given Optison in 2016.     SOCIAL HISTORY:   Social History     Socioeconomic History     Marital status: Single     Spouse name: Not on file     Number of children: Not on file     Years of education: Not on file     Highest education level: Not on file   Occupational History     Not on file   Tobacco Use     Smoking status: Some Days     Types: Pipe     Last attempt to quit: 5/15/2016     Years since quittin.9     Smokeless tobacco: Never     Tobacco comments:     smokes a pipe a couple times a day   Vaping Use     Vaping status: Never Used   Substance and Sexual Activity     Alcohol use: Yes     Alcohol/week: 0.0 standard drinks of alcohol     Comment: rare     Drug use: No     Sexual activity: Not Currently     Partners: Female   Other Topics Concern     Parent/sibling w/ CABG, MI or angioplasty before 65F 55M? Not Asked   Social History Narrative     Not on file     Social Determinants of Health     Financial Resource Strain: Not on file   Food Insecurity: Not on file   Transportation Needs: Not on file   Physical Activity: Not on file   Stress: Not on file   Social Connections: Not on file   Intimate Partner Violence: Not on file   Housing Stability: Not on file     FAMILY HISTORY: History reviewed. No pertinent family history.  EXAM:Vitals: BP (!) 146/68 (BP Location: Left arm, Patient Position: Sitting, Cuff Size: Adult Large)   Ht 1.676 m (5' 6\")   Wt 101.7 kg (224 lb " 1.6 oz)   BMI 36.17 kg/m    BMI= Body mass index is 36.17 kg/m .    General appearance: Patient is alert and fully cooperative with history & exam.  No sign of distress is noted during the visit.     Psychiatric: Affect is pleasant & appropriate.  Patient appears motivated to improve health.     Respiratory: Breathing is regular & unlabored while sitting.     HEENT: Hearing is intact to spoken word.  Speech is clear.  No gross evidence of visual impairment that would impact ambulation.     Vascular: DP 1/4 & PT 1/4 left & right.  CFT delayed with dependent rubor noted about the digits.  Diminished hair growth distal to mid tibia and no hair about the foot and toes.  Temperature changes noted, warm to cool proximal to distal.  Hemosiderin pigmentation noted with multiple varicosities legs and feet bilateral. Generalized edema bilateral legs and feet.  Pt denies claudication history.     Neurologic: Normal plantar response bilateral.  Pt admits burning and paraesthesias about the feet and toes with palpation.     Dermatologic: All 10 nails are dystrophic.  The right lateral third toenail is very dystrophic and upon debridement of the toenail a large hyperkeratosis that is nucleated consistent with a cutaneous horn is noted about the lateral eponychium of the right third toenail.  It is very deep more than 5 mm long after debridement.  It is exquisitely painful.  After obtaining local anesthesia of the entire cutaneous horn appears to be debrided leaving no full-thickness ulcerations or abscess.     Musculoskeletal: Patient is ambulatory without assistive device or brace.  There is semi reducible contracture of the lesser digits.    Hemoglobin A1C (%)   Date Value   03/15/2023 9.1 (H)   07/10/2022 7.7 (H)   05/05/2022 7.6 (H)   09/16/2021 8.9 (H)   05/25/2021 8.8 (H)   10/15/2019 8.6 (H)   08/12/2019 8.3 (H)   04/10/2019 8.2 (H)   09/13/2018 7.5 (H)   05/03/2018 6.8 (H)   11/13/2017 7.3 (H)   06/15/2017 7.7 (H)      Creatinine (mg/dL)   Date Value   03/15/2023 0.90   08/11/2022 0.77   07/10/2022 1.12   05/05/2022 1.01   09/16/2021 1.57 (H)   05/25/2021 0.98   10/15/2019 1.02   08/12/2019 1.06   04/10/2019 1.01   09/13/2018 0.99   05/03/2018 0.95          ASSESSMENT:       ICD-10-CM    1. Type 2 diabetes mellitus with hyperglycemia, with long-term current use of insulin (H)  E11.65 TRIM HYPERKERATOTIC SKIN LESION, ONE    Z79.4       2. Hyperkeratosis  L85.9 TRIM HYPERKERATOTIC SKIN LESION, ONE      3. Cutaneous horn  L85.8 TRIM HYPERKERATOTIC SKIN LESION, ONE      4. Peripheral vascular disease (H)  I73.9       5. Type 2 diabetes mellitus with diabetic autonomic neuropathy, without long-term current use of insulin (H)  E11.43            PLAN:    4/14/2023  Patient is very hard of hearing and wears walker game ears today.    Upon debriding the right third toenail a large cutaneous horn or nucleated hyperkeratosis is noted at the lateral eponychium.  This was exquisitely painful therefore I prepped the area with alcohol after obtaining informed consent to debride the lesion.  After obtaining local anesthesia the nail was debrided aggressively without bleeding.  This cutaneous horn was then debrided with a 15 blade scalpel and a tissue nipper.  It was sharply debrided and noted to be about 5 mm in depth and length projecting into his toe.  Upon removing the entire lesion only a scant amount of bleeding is noted and this is controlled with Lumicain and a sterile dressing that can be removed tomorrow.  He can return to regular bathing regular activities tomorrow.    Follow-up in clinic if this remains then follow-up in clinic.  All questions were answered      Hubert Dong DPM

## 2023-04-13 NOTE — LETTER
4/13/2023         RE: Reinaldo Barrios  [address]  [facility] [address]        Dear Colleague,    Thank you for referring your patient, Reinaldo Barrios, to the [facility]. Please see a copy of my visit note below.    HPI:  Reinaldo Barrios is a 72 year old male who is seen in consultation at the request of Mark Blanchard DO.    Pt presents for eval of:   (Onset, Location, L/R, Character, Treatments, Injury if yes)    DM type 2    HX, Polio as a child, Stroke, MI     1. Ingrown lateral Right 3rd toenail, ongoing since 2021. Toenail is thick and curving.  Pain is noted on the right lateral third toenail that is exquisite guarding causing limping.  It is reducing activities.    2. Intermittent plantar ball of left and right foot numbness.    Retired.        Review of Systems:  Patient denies fever, chills, rash, wound, stiffness, limping, numbness, weakness, heart burn, blood in stool, chest pain with activity, calf pain when walking, shortness of breath with activity, chronic cough, easy bleeding/bruising, swelling of ankles, excessive thirst, fatigue, depression, anxiety.  Patient admits only to symptoms noted in history.     Patient Active Problem List   Diagnosis     Aortic valve stenosis     Personal history of tobacco use, presenting hazards to health     Hyperlipidemia LDL goal <100     Hiatal hernia     Chest pain, unspecified chest pain type     Cardiac LV ejection fraction 30-35%     NSTEMI (non-ST elevated myocardial infarction) (H)     ACS (acute coronary syndrome) (H)     CHF (congestive heart failure) (H)     Aortic stenosis     S/P CABG (coronary artery bypass graft)     Advanced directives, counseling/discussion     Long term current use of anticoagulant therapy     Heart valve replaced [Z95.2]     S/P aortic valve replacement     Essential hypertension with goal blood pressure less than 140/90     Non morbid obesity due to excess calories     Type 2  "diabetes mellitus with hyperglycemia, with long-term current use of insulin (H)     Obesity (BMI 35.0-39.9) with comorbidity (H)     Polyneuropathy associated with underlying disease (H)     PAST MEDICAL HISTORY:   Past Medical History:   Diagnosis Date     Aortic valve stenosis 11/2/2015     Cardiac LV ejection fraction 30-35% 11/2/2015     Chest pain, unspecified chest pain type 11/2/2015     Hiatal hernia 11/2/2015     Hyperlipidemia LDL goal <100 11/2/2015     Personal history of tobacco use, presenting hazards to health 11/2/2015     Type 2 diabetes mellitus without complication (H) 11/2/2015     PAST SURGICAL HISTORY:   Past Surgical History:   Procedure Laterality Date     BYPASS GRAFT ARTERY CORONARY N/A 11/7/2015    Procedure: BYPASS GRAFT ARTERY CORONARY;  Surgeon: Sia Caldera MD;  Location: [facility] OR     KNEE SURGERY  2009     REPLACE VALVE AORTIC N/A 11/7/2015    Procedure: REPLACE VALVE AORTIC;  Surgeon: Sia Caldera MD;  Location: [facility] OR     MEDICATIONS:   Current Outpatient Medications:      aspirin EC 81 MG EC tablet, Take 1 tablet (81 mg) by mouth daily (Patient taking differently: Take 81 mg by mouth daily), Disp: , Rfl:      atorvastatin (LIPITOR) 40 MG tablet, TAKE 1 TABLET (40 MG) BY MOUTH DAILY, Disp: 90 tablet, Rfl: 1     B-D U/F 31G X 8 MM insulin pen needle, USE THREE PEN NEEDLES DAILY OR AS DIRECTED., Disp: 100 each, Rfl: 3     carvedilol (COREG) 3.125 MG tablet, Take 1 tablet (3.125 mg) by mouth daily TAKE 1 TABLET (3.125 MG) BY MOUTH 2 TIMES DAILY (WITH MEALS) (Patient taking differently: Take 3.125 mg by mouth daily TAKE 1 TABLET (3.125 MG) BY MOUTH 2 TIMES DAILY (WITH MEALS)), Disp: 180 tablet, Rfl: 1     insulin detemir (LEVEMIR FLEXTOUCH) 100 UNIT/ML pen, Inject 65 Units Subcutaneous every morning, Disp: 45 mL, Rfl: 1     insulin syringe-needle U-100 (BD INSULIN SYRINGE ULTRAFINE) 31G X 5/16\" 0.5 ML miscellaneous, Use one syringe 3 daily or as directed., Disp: 100 each, Rfl: " "prn     isosorbide mononitrate (IMDUR) 30 MG 24 hr tablet, TAKE ONE TABLET (30 MG) BY MOUTH ONCE DAILY, Disp: 90 tablet, Rfl: 1     lisinopril (ZESTRIL) 5 MG tablet, TAKE ONE TABLET BY MOUTH ONCE DAILY, Disp: 90 tablet, Rfl: 1     NOVOLOG FLEXPEN 100 UNIT/ML soln, INJECT 10 - 12 UNITS UNDER THE SKIN TWICE A DAY, Disp: 15 mL, Rfl: 1     ONETOUCH ULTRA test strip, USE TO TEST BLOOD SUGAR 3 TIMES A DAY, Disp: 270 strip, Rfl: 3     senna-docusate (SENEXON-S) 8.6-50 MG tablet, TAKE 1 TO 2 TABLETS BY MOUTH TWICE A DAY, Disp: 100 tablet, Rfl: 2     clopidogrel (PLAVIX) 75 MG tablet, Take 1 tablet (75 mg) by mouth daily for 21 days, Disp: 21 tablet, Rfl: 0  ALLERGIES:    Allergies   Allergen Reactions     Sulfa Drugs      Optison Unknown     Pt states he \"felt like crap later\" after he was given Optison in 2016.     SOCIAL HISTORY:   Social History     Socioeconomic History     Marital status: Single     Spouse name: Not on file     Number of children: Not on file     Years of education: Not on file     Highest education level: Not on file   Occupational History     Not on file   Tobacco Use     Smoking status: Some Days     Types: Pipe     Last attempt to quit: 5/15/2016     Years since quittin.9     Smokeless tobacco: Never     Tobacco comments:     smokes a pipe a couple times a day   Vaping Use     Vaping status: Never Used   Substance and Sexual Activity     Alcohol use: Yes     Alcohol/week: 0.0 standard drinks of alcohol     Comment: rare     Drug use: No     Sexual activity: Not Currently     Partners: Female   Other Topics Concern     Parent/sibling w/ CABG, MI or angioplasty before 65F 55M? Not Asked   Social History Narrative     Not on file     Social Determinants of Health     Financial Resource Strain: Not on file   Food Insecurity: Not on file   Transportation Needs: Not on file   Physical Activity: Not on file   Stress: Not on file   Social Connections: Not on file   Intimate Partner Violence: Not on " "file   Housing Stability: Not on file     FAMILY HISTORY: History reviewed. No pertinent family history.  EXAM:Vitals: BP (!) 146/68 (BP Location: Left arm, Patient Position: Sitting, Cuff Size: Adult Large)   Ht 1.676 m (5' 6\")   Wt 101.7 kg (224 lb 1.6 oz)   BMI 36.17 kg/m    BMI= Body mass index is 36.17 kg/m .    General appearance: Patient is alert and fully cooperative with history & exam.  No sign of distress is noted during the visit.     Psychiatric: Affect is pleasant & appropriate.  Patient appears motivated to improve health.     Respiratory: Breathing is regular & unlabored while sitting.     HEENT: Hearing is intact to spoken word.  Speech is clear.  No gross evidence of visual impairment that would impact ambulation.     Vascular: DP 1/4 & PT 1/4 left & right.  CFT delayed with dependent rubor noted about the digits.  Diminished hair growth distal to mid tibia and no hair about the foot and toes.  Temperature changes noted, warm to cool proximal to distal.  Hemosiderin pigmentation noted with multiple varicosities legs and feet bilateral. Generalized edema bilateral legs and feet.  Pt denies claudication history.     Neurologic: Normal plantar response bilateral.  Pt admits burning and paraesthesias about the feet and toes with palpation.     Dermatologic: All 10 nails are dystrophic.  The right lateral third toenail is very dystrophic and upon debridement of the toenail a large hyperkeratosis that is nucleated consistent with a cutaneous horn is noted about the lateral eponychium of the right third toenail.  It is very deep more than 5 mm long after debridement.  It is exquisitely painful.  After obtaining local anesthesia of the entire cutaneous horn appears to be debrided leaving no full-thickness ulcerations or abscess.     Musculoskeletal: Patient is ambulatory without assistive device or brace.  There is semi reducible contracture of the lesser digits.    Hemoglobin A1C (%)   Date Value "   03/15/2023 9.1 (H)   07/10/2022 7.7 (H)   05/05/2022 7.6 (H)   09/16/2021 8.9 (H)   05/25/2021 8.8 (H)   10/15/2019 8.6 (H)   08/12/2019 8.3 (H)   04/10/2019 8.2 (H)   09/13/2018 7.5 (H)   05/03/2018 6.8 (H)   11/13/2017 7.3 (H)   06/15/2017 7.7 (H)     Creatinine (mg/dL)   Date Value   03/15/2023 0.90   08/11/2022 0.77   07/10/2022 1.12   05/05/2022 1.01   09/16/2021 1.57 (H)   05/25/2021 0.98   10/15/2019 1.02   08/12/2019 1.06   04/10/2019 1.01   09/13/2018 0.99   05/03/2018 0.95          ASSESSMENT:       ICD-10-CM    1. Type 2 diabetes mellitus with hyperglycemia, with long-term current use of insulin (H)  E11.65 TRIM HYPERKERATOTIC SKIN LESION, ONE    Z79.4       2. Hyperkeratosis  L85.9 TRIM HYPERKERATOTIC SKIN LESION, ONE      3. Cutaneous horn  L85.8 TRIM HYPERKERATOTIC SKIN LESION, ONE      4. Peripheral vascular disease (H)  I73.9       5. Type 2 diabetes mellitus with diabetic autonomic neuropathy, without long-term current use of insulin (H)  E11.43            PLAN:    4/14/2023  Patient is very hard of hearing and wears walker game ears today.    Upon debriding the right third toenail a large cutaneous horn or nucleated hyperkeratosis is noted at the lateral eponychium.  This was exquisitely painful therefore I prepped the area with alcohol after obtaining informed consent to debride the lesion.  After obtaining local anesthesia the nail was debrided aggressively without bleeding.  This cutaneous horn was then debrided with a 15 blade scalpel and a tissue nipper.  It was sharply debrided and noted to be about 5 mm in depth and length projecting into his toe.  Upon removing the entire lesion only a scant amount of bleeding is noted and this is controlled with Lumicain and a sterile dressing that can be removed tomorrow.  He can return to regular bathing regular activities tomorrow.    Follow-up in clinic if this remains then follow-up in clinic.  All questions were answered      Hubert Dong  COLEMAN          Again, thank you for allowing me to participate in the care of your patient.        Sincerely,        Hubert Dong DPM

## 2023-05-02 ENCOUNTER — OFFICE VISIT (OUTPATIENT)
Dept: INTERNAL MEDICINE | Facility: CLINIC | Age: 73
End: 2023-05-02
Payer: MEDICARE

## 2023-05-02 VITALS
OXYGEN SATURATION: 98 % | DIASTOLIC BLOOD PRESSURE: 84 MMHG | WEIGHT: 219 LBS | BODY MASS INDEX: 35.35 KG/M2 | SYSTOLIC BLOOD PRESSURE: 148 MMHG | HEART RATE: 68 BPM | RESPIRATION RATE: 16 BRPM | TEMPERATURE: 98.7 F

## 2023-05-02 DIAGNOSIS — E66.09 NON MORBID OBESITY DUE TO EXCESS CALORIES: ICD-10-CM

## 2023-05-02 DIAGNOSIS — G63 POLYNEUROPATHY ASSOCIATED WITH UNDERLYING DISEASE (H): ICD-10-CM

## 2023-05-02 DIAGNOSIS — E78.5 HYPERLIPIDEMIA LDL GOAL <100: ICD-10-CM

## 2023-05-02 DIAGNOSIS — Z95.1 S/P CABG (CORONARY ARTERY BYPASS GRAFT): ICD-10-CM

## 2023-05-02 DIAGNOSIS — E66.01 MORBID OBESITY (H): ICD-10-CM

## 2023-05-02 DIAGNOSIS — R19.5 FECAL OCCULT BLOOD TEST POSITIVE: ICD-10-CM

## 2023-05-02 DIAGNOSIS — Z95.2 S/P AORTIC VALVE REPLACEMENT: ICD-10-CM

## 2023-05-02 DIAGNOSIS — R94.30 CARDIAC LV EJECTION FRACTION 30-35%: ICD-10-CM

## 2023-05-02 DIAGNOSIS — Z01.818 PREOP GENERAL PHYSICAL EXAM: Primary | ICD-10-CM

## 2023-05-02 DIAGNOSIS — E11.65 TYPE 2 DIABETES MELLITUS WITH HYPERGLYCEMIA, WITH LONG-TERM CURRENT USE OF INSULIN (H): ICD-10-CM

## 2023-05-02 DIAGNOSIS — Z79.4 TYPE 2 DIABETES MELLITUS WITH HYPERGLYCEMIA, WITH LONG-TERM CURRENT USE OF INSULIN (H): ICD-10-CM

## 2023-05-02 PROCEDURE — 99214 OFFICE O/P EST MOD 30 MIN: CPT | Performed by: INTERNAL MEDICINE

## 2023-05-02 NOTE — PROGRESS NOTES
"The patient left without his after visit summary.  The patient notes that he does not use his \"MyChart\" account.  Please contact the patient to review his presurgical instructions/medications to hold.    These can be found in his after visit summary.    Thank you very much for doing this.    Santo"

## 2023-05-02 NOTE — PATIENT INSTRUCTIONS
Patient Instructions:  ++++++++++++++++++++++++++++++++++++++++++++   I have asked the patient to :      Patient is to take 50% of his Levemir (30 units) the night before the procedure.  Patient is to hold the morning dose of NovoLog.  Patient is to hold Plavix 5 days prior to the procedure.                    For informational purposes only. Not to replace the advice of your health care provider. Copyright   ,  [employer]. All rights reserved. Clinically reviewed by Bianca Gutiérrez MD. [employer] [id number] - REV .  Preparing for Your Surgery  Getting started  A nurse will call you to review your health history and instructions. They will give you an arrival time based on your scheduled surgery time. Please be ready to share:  Your doctor's clinic name and phone number  Your medical, surgical, and anesthesia history  A list of allergies and sensitivities  A list of medicines, including herbal treatments and over-the-counter drugs  Whether the patient has a legal guardian (ask how to send us the papers in advance)  Please tell us if you're pregnant--or if there's any chance you might be pregnant. Some surgeries may injure a fetus (unborn baby), so they require a pregnancy test. Surgeries that are safe for a fetus don't always need a test, and you can choose whether to have one.   If you have a child who's having surgery, please ask for a copy of Preparing for Your Child's Surgery.    Preparing for surgery  Within 10 to 30 days of surgery: Have a pre-op exam (sometimes called an H&P, or History and Physical). This can be done at a clinic or pre-operative center.  If you're having a , you may not need this exam. Talk to your care team.  At your pre-op exam, talk to your care team about all medicines you take. If you need to stop any medicines before surgery, ask when to start taking them again.  We do this for your safety. Many medicines can make you bleed too much during surgery. Some  change how well surgery (anesthesia) drugs work.  Call your insurance company to let them know you're having surgery. (If you don't have insurance, call [phone].)  Call your clinic if there's any change in your health. This includes signs of a cold or flu (sore throat, runny nose, cough, rash, fever). It also includes a scrape or scratch near the surgery site.  If you have questions on the day of surgery, call your hospital or surgery center.  Eating and drinking guidelines  For your safety: Unless your surgeon tells you otherwise, follow the guidelines below.  Eat and drink as usual until 8 hours before you arrive for surgery. After that, no food or milk.  Drink clear liquids until 2 hours before you arrive. These are liquids you can see through, like water, Gatorade, and Propel Water. They also include plain black coffee and tea (no cream or milk), candy, and breath mints. You can spit out gum when you arrive.  If you drink alcohol: Stop drinking it the night before surgery.  If your care team tells you to take medicine on the morning of surgery, it's okay to take it with a sip of water.  Preventing infection  Shower or bathe the night before and morning of your surgery. Follow the instructions your clinic gave you. (If no instructions, use regular soap.)  Don't shave or clip hair near your surgery site. We'll remove the hair if needed.  Don't smoke or vape the morning of surgery. You may chew nicotine gum up to 2 hours before surgery. A nicotine patch is okay.  Note: Some surgeries require you to completely quit smoking and nicotine. Check with your surgeon.  Your care team will make every effort to keep you safe from infection. We will:  Clean our hands often with soap and water (or an alcohol-based hand rub).  Clean the skin at your surgery site with a special soap that kills germs.  Give you a special gown to keep you warm. (Cold raises the risk of infection.)  Wear special hair covers, masks, gowns and  gloves during surgery.  Give antibiotic medicine, if prescribed. Not all surgeries need antibiotics.  What to bring on the day of surgery  Photo ID and insurance card  Copy of your health care directive, if you have one  Glasses and hearing aids (bring cases)  You can't wear contacts during surgery  Inhaler and eye drops, if you use them (tell us about these when you arrive)  CPAP machine or breathing device, if you use them  A few personal items, if spending the night  If you have . . .  A pacemaker, ICD (cardiac defibrillator) or other implant: Bring the ID card.  An implanted stimulator: Bring the remote control.  A legal guardian: Bring a copy of the certified (court-stamped) guardianship papers.  Please remove any jewelry, including body piercings. Leave jewelry and other valuables at home.  If you're going home the day of surgery  You must have a responsible adult drive you home. They should stay with you overnight as well.  If you don't have someone to stay with you, and you aren't safe to go home alone, we may keep you overnight. Insurance often won't pay for this.  After surgery  If it's hard to control your pain or you need more pain medicine, please call your surgeon's office.  Questions?   If you have any questions for your care team, list them here: _________________________________________________________________________________________________________________________________________________________________________ ____________________________________ ____________________________________

## 2023-05-02 NOTE — PROGRESS NOTES
[address] [address]  Phone: [phone]  Primary Provider: Mark Blanchard  Pre-op Performing Provider: MARK BLANCHARD      PREOPERATIVE EVALUATION:  Today's date: 5/2/2023    Reinaldo Barrios is a 72 year old male who presents for a preoperative evaluation.       View : No data to display.              Surgical Information:  Surgery/Procedure: Colonoscopy  Surgery Location: [facility]  Surgeon:   Surgery Date: 5/24/23  Time of Surgery: 7:30 am  Where patient plans to recover: At home alone  Fax number for surgical facility: Note does not need to be faxed, will be available electronically in Epic.    Assessment & Plan     The proposed surgical procedure is considered LOW risk.    Preop general physical exam      Fecal occult blood test positive      Type 2 diabetes mellitus with hyperglycemia, with long-term current use of insulin (H)    - Adult Eye  Referral; Future  - Albumin Random Urine Quantitative with Creat Ratio; Future    Polyneuropathy associated with underlying disease (H)      Hyperlipidemia LDL goal <100      Morbid obesity (H)      Cardiac LV ejection fraction 30-35%      S/P CABG (coronary artery bypass graft)  Recent cardiac stress test demonstrated no inducible ischemia    S/P aortic valve replacement  Bioprosthetic valve in the aortic position not requiring anticoagulation    Non morbid obesity due to excess calories             - No identified additional risk factors other than previously addressed    Patient is to take 50% of his Levemir (30 units) the night before the procedure.  Patient is to hold the morning dose of NovoLog.  Patient is to hold Plavix 5 days prior to the procedure.      RECOMMENDATION:  APPROVAL GIVEN to proceed with proposed procedure, without further diagnostic evaluation.            Subjective     HPI related to upcoming procedure: Recent positive fecal occult blood  testing.        5/2/2023    10:13 AM   Preop Questions   1. Have you ever had a heart attack or stroke? YES -    2. Have you ever had surgery on your heart or blood vessels, such as a stent placement, a coronary artery bypass, or surgery on an artery in your head, neck, heart, or legs? YES -    3. Do you have chest pain with activity? YES -    4. Do you have a history of  heart failure? YES -    5. Do you currently have a cold, bronchitis or symptoms of other infection? No   6. Do you have a cough, shortness of breath, or wheezing? YES -    7. Do you or anyone in your family have previous history of blood clots? No   8. Do you or does anyone in your family have a serious bleeding problem such as prolonged bleeding following surgeries or cuts? YES -    9. Have you ever had problems with anemia or been told to take iron pills? No   10. Have you had any abnormal blood loss such as black, tarry or bloody stools? No   11. Have you ever had a blood transfusion? UNKNOWN -    12. Are you willing to have a blood transfusion if it is medically needed before, during, or after your surgery? Yes   13. Have you or any of your relatives ever had problems with anesthesia? No   14. Do you have sleep apnea, excessive snoring or daytime drowsiness? No   15. Do you have any artifical heart valves or other implanted medical devices like a pacemaker, defibrillator, or continuous glucose monitor? YES -    15a. What type of device do you have? heart valve   15b. Name of the clinic that manages your device:   [facility]   16. Do you have artificial joints? YES -    17. Are you allergic to latex? No       Health Care Directive:  Patient does not have a Health Care Directive or Living Will: Discussed advance care planning with patient; however, patient declined at this time.    Preoperative Review of :   reviewed - controlled substances reflected in medication list.      Status of Chronic Conditions:  See problem list for active medical  problems.  Problems all longstanding and stable, except as noted/documented.  See ROS for pertinent symptoms related to these conditions.      Review of Systems  CONSTITUTIONAL: NEGATIVE for fever, chills, change in weight  INTEGUMENTARY/SKIN: NEGATIVE for worrisome rashes, moles or lesions  EYES: NEGATIVE for vision changes or irritation  ENT/MOUTH: NEGATIVE for ear, mouth and throat problems  RESP:Positive for mild dyspnea on exertion.  CV: NEGATIVE for chest pain, palpitations or peripheral edema  GI: NEGATIVE for nausea, abdominal pain, heartburn, or change in bowel habits  : NEGATIVE for frequency, dysuria, or hematuria  MUSCULOSKELETAL: NEGATIVE for significant arthralgias or myalgia  NEURO: NEGATIVE for weakness, dizziness or paresthesias  ENDOCRINE: NEGATIVE for temperature intolerance, skin/hair changes  HEME: NEGATIVE for bleeding problems  PSYCHIATRIC: NEGATIVE for changes in mood or affect    Patient Active Problem List    Diagnosis Date Noted     Polyneuropathy associated with underlying disease (H) 05/05/2022     Priority: Medium     Obesity (BMI 35.0-39.9) with comorbidity (H) 04/10/2019     Priority: Medium     Type 2 diabetes mellitus with hyperglycemia, with long-term current use of insulin (H) 05/18/2018     Priority: Medium     Non morbid obesity due to excess calories 07/03/2017     Priority: Medium     Essential hypertension with goal blood pressure less than 140/90 05/22/2016     Priority: Medium     S/P aortic valve replacement 04/27/2016     Priority: Medium     Long term current use of anticoagulant therapy 03/16/2016     Priority: Medium     Heart valve replaced [Z95.2] 03/16/2016     Priority: Medium     Advanced directives, counseling/discussion 01/12/2016     Priority: Medium     declined       S/P CABG (coronary artery bypass graft) 12/04/2015     Priority: Medium     Aortic stenosis 11/07/2015     Priority: Medium     CHF (congestive heart failure) (H) 11/05/2015     Priority:  Medium     NSTEMI (non-ST elevated myocardial infarction) (H) 11/03/2015     Priority: Medium     ACS (acute coronary syndrome) (H) 11/03/2015     Priority: Medium     Aortic valve stenosis 11/02/2015     Priority: Medium     Personal history of tobacco use, presenting hazards to health 11/02/2015     Priority: Medium     Hyperlipidemia LDL goal <100 11/02/2015     Priority: Medium     Hiatal hernia 11/02/2015     Priority: Medium     Chest pain, unspecified chest pain type 11/02/2015     Priority: Medium     Cardiac LV ejection fraction 30-35% 11/02/2015     Priority: Medium      Past Medical History:   Diagnosis Date     Aortic valve stenosis 11/2/2015     Cardiac LV ejection fraction 30-35% 11/2/2015     Chest pain, unspecified chest pain type 11/2/2015     Hiatal hernia 11/2/2015     Hyperlipidemia LDL goal <100 11/2/2015     Personal history of tobacco use, presenting hazards to health 11/2/2015     Type 2 diabetes mellitus without complication (H) 11/2/2015     Past Surgical History:   Procedure Laterality Date     BYPASS GRAFT ARTERY CORONARY N/A 11/7/2015    Procedure: BYPASS GRAFT ARTERY CORONARY;  Surgeon: Sia Caldera MD;  Location: [facility] OR     KNEE SURGERY  2009     REPLACE VALVE AORTIC N/A 11/7/2015    Procedure: REPLACE VALVE AORTIC;  Surgeon: Sia Caldera MD;  Location: [facility] OR     Current Outpatient Medications   Medication Sig Dispense Refill     aspirin EC 81 MG EC tablet Take 1 tablet (81 mg) by mouth daily (Patient taking differently: Take 81 mg by mouth daily)       atorvastatin (LIPITOR) 40 MG tablet TAKE 1 TABLET (40 MG) BY MOUTH DAILY 90 tablet 1     B-D U/F 31G X 8 MM insulin pen needle USE THREE PEN NEEDLES DAILY OR AS DIRECTED. 100 each 3     carvedilol (COREG) 3.125 MG tablet Take 1 tablet (3.125 mg) by mouth daily TAKE 1 TABLET (3.125 MG) BY MOUTH 2 TIMES DAILY (WITH MEALS) (Patient taking differently: Take 3.125 mg by mouth daily TAKE 1 TABLET (3.125 MG) BY MOUTH 2 TIMES DAILY  "(WITH MEALS)) 180 tablet 1     insulin detemir (LEVEMIR FLEXTOUCH) 100 UNIT/ML pen Inject 65 Units Subcutaneous every morning 45 mL 1     insulin syringe-needle U-100 (BD INSULIN SYRINGE ULTRAFINE) 31G X \" 0.5 ML miscellaneous Use one syringe 3 daily or as directed. 100 each prn     isosorbide mononitrate (IMDUR) 30 MG 24 hr tablet TAKE ONE TABLET (30 MG) BY MOUTH ONCE DAILY 90 tablet 1     lisinopril (ZESTRIL) 5 MG tablet TAKE ONE TABLET BY MOUTH ONCE DAILY 90 tablet 1     NOVOLOG FLEXPEN 100 UNIT/ML soln INJECT 10 - 12 UNITS UNDER THE SKIN TWICE A DAY 15 mL 1     ONETOUCH ULTRA test strip USE TO TEST BLOOD SUGAR 3 TIMES A  strip 3     clopidogrel (PLAVIX) 75 MG tablet Take 1 tablet (75 mg) by mouth daily for 21 days 21 tablet 0     senna-docusate (SENEXON-S) 8.6-50 MG tablet TAKE 1 TO 2 TABLETS BY MOUTH TWICE A  tablet 2       Allergies   Allergen Reactions     Sulfa Antibiotics      Perflutren Protein A Microsph Unknown     Pt states he \"felt like crap later\" after he was given Optison in 2016.        Social History     Tobacco Use     Smoking status: Some Days     Types: Pipe     Last attempt to quit: 5/15/2016     Years since quittin.9     Smokeless tobacco: Never     Tobacco comments:     smokes a pipe a couple times a day   Vaping Use     Vaping status: Never Used   Substance Use Topics     Alcohol use: Yes     Alcohol/week: 0.0 standard drinks of alcohol     Comment: rare     No family history on file.  History   Drug Use No         Objective     BP (!) 148/84   Pulse 68   Temp 98.7  F (37.1  C) (Temporal)   Resp 16   Wt 99.3 kg (219 lb)   SpO2 98%   BMI 35.35 kg/m      Physical Exam    GENERAL APPEARANCE: healthy, alert and no distress     EYES: EOMI,  PERRL     HENT: ear canals and TM's normal and nose and mouth without ulcers or lesions     NECK: no adenopathy, no asymmetry, masses, or scars and thyroid normal to palpation     RESP: lungs clear to auscultation - no rales, " rhonchi or wheezes     CV: regular rates and rhythm, normal S1 S2, no S3 or S4 and no murmur, click or rub     ABDOMEN:  soft, nontender, no HSM or masses and bowel sounds normal     MS: extremities normal- no gross deformities noted, no evidence of inflammation in joints, FROM in all extremities.     SKIN: no suspicious lesions or rashes     NEURO: Normal strength and tone, sensory exam grossly normal, mentation intact and speech normal     PSYCH: mentation appears normal. and affect normal/bright     LYMPHATICS: No cervical adenopathy    Recent Labs   Lab Test 03/15/23  0834 08/11/22  1552 07/10/22  1739   HGB  --  12.8* 13.9   PLT  --  172 204   * 135 141   POTASSIUM 3.9 3.8 4.6   CR 0.90 0.77 1.12   A1C 9.1*  --  7.7*        Diagnostics:  Recent laboratory work is reviewed.  EKG not performed at this time due to recent stress testing.    Revised Cardiac Risk Index (RCRI):  The patient has the following serious cardiovascular risks for perioperative complications:   - Coronary Artery Disease (MI, positive stress test, angina, Qs on EKG) = 1 point     RCRI Interpretation: 1 point: Class II (low risk - 0.9% complication rate)           Signed Electronically by: Mark Blanchard DO  Copy of this evaluation report is provided to requesting physician.

## 2023-05-19 DIAGNOSIS — E11.65 TYPE 2 DIABETES MELLITUS WITH HYPERGLYCEMIA, WITH LONG-TERM CURRENT USE OF INSULIN (H): ICD-10-CM

## 2023-05-19 DIAGNOSIS — Z79.4 TYPE 2 DIABETES MELLITUS WITH HYPERGLYCEMIA, WITH LONG-TERM CURRENT USE OF INSULIN (H): ICD-10-CM

## 2023-05-22 ENCOUNTER — OFFICE VISIT (OUTPATIENT)
Dept: OPHTHALMOLOGY | Facility: CLINIC | Age: 73
End: 2023-05-22
Payer: MEDICARE

## 2023-05-22 DIAGNOSIS — H25.813 COMBINED FORMS OF AGE-RELATED CATARACT OF BOTH EYES: ICD-10-CM

## 2023-05-22 DIAGNOSIS — H52.203 MYOPIA OF BOTH EYES WITH ASTIGMATISM AND PRESBYOPIA: ICD-10-CM

## 2023-05-22 DIAGNOSIS — E11.9 TYPE 2 DIABETES MELLITUS WITHOUT RETINOPATHY (H): Primary | ICD-10-CM

## 2023-05-22 DIAGNOSIS — H52.4 MYOPIA OF BOTH EYES WITH ASTIGMATISM AND PRESBYOPIA: ICD-10-CM

## 2023-05-22 DIAGNOSIS — H40.003 GLAUCOMA SUSPECT OF BOTH EYES: ICD-10-CM

## 2023-05-22 DIAGNOSIS — Z79.4 TYPE 2 DIABETES MELLITUS WITH HYPERGLYCEMIA, WITH LONG-TERM CURRENT USE OF INSULIN (H): ICD-10-CM

## 2023-05-22 DIAGNOSIS — H52.13 MYOPIA OF BOTH EYES WITH ASTIGMATISM AND PRESBYOPIA: ICD-10-CM

## 2023-05-22 DIAGNOSIS — E11.65 TYPE 2 DIABETES MELLITUS WITH HYPERGLYCEMIA, WITH LONG-TERM CURRENT USE OF INSULIN (H): ICD-10-CM

## 2023-05-22 PROCEDURE — 92133 CPTRZD OPH DX IMG PST SGM ON: CPT | Performed by: OPHTHALMOLOGY

## 2023-05-22 PROCEDURE — 92004 COMPRE OPH EXAM NEW PT 1/>: CPT | Performed by: OPHTHALMOLOGY

## 2023-05-22 PROCEDURE — 92015 DETERMINE REFRACTIVE STATE: CPT | Performed by: OPHTHALMOLOGY

## 2023-05-22 ASSESSMENT — REFRACTION_MANIFEST
OD_AXIS: 171
OS_ADD: +2.75
OS_CYLINDER: +1.25
OS_SPHERE: -0.75
OD_SPHERE: -0.50
OD_ADD: +2.75
OS_AXIS: 172
OD_CYLINDER: +1.25

## 2023-05-22 ASSESSMENT — CONF VISUAL FIELD
OD_SUPERIOR_NASAL_RESTRICTION: 0
OD_SUPERIOR_TEMPORAL_RESTRICTION: 0
OS_INFERIOR_TEMPORAL_RESTRICTION: 0
OD_INFERIOR_TEMPORAL_RESTRICTION: 0
OS_INFERIOR_NASAL_RESTRICTION: 0
OS_SUPERIOR_NASAL_RESTRICTION: 0
OD_NORMAL: 1
OS_NORMAL: 1
OS_SUPERIOR_TEMPORAL_RESTRICTION: 0
OD_INFERIOR_NASAL_RESTRICTION: 0

## 2023-05-22 ASSESSMENT — REFRACTION_WEARINGRX
OD_AXIS: 163
SPECS_TYPE: PAL
OS_CYLINDER: +0.75
OS_SPHERE: -0.25
OD_SPHERE: -0.75
OS_SPHERE: -0.50
OS_ADD: +2.50
OS_ADD: +2.50
OD_ADD: +2.50
OD_AXIS: 162
OS_AXIS: 019
OD_CYLINDER: +1.25
OD_SPHERE: -1.00
OS_CYLINDER: +1.00
OD_CYLINDER: +1.25
OS_AXIS: 029
OD_ADD: +2.50

## 2023-05-22 ASSESSMENT — TONOMETRY
OS_IOP_MMHG: 21
OD_IOP_MMHG: 22
OD_IOP_MMHG: 22
IOP_METHOD: ICARE
OS_IOP_MMHG: 22
IOP_METHOD: TONOPEN

## 2023-05-22 ASSESSMENT — SLIT LAMP EXAM - LIDS
COMMENTS: NORMAL
COMMENTS: NORMAL

## 2023-05-22 ASSESSMENT — CUP TO DISC RATIO
OD_RATIO: 0.7
OS_RATIO: 0.7

## 2023-05-22 ASSESSMENT — VISUAL ACUITY
OD_CC: J1+ BE
METHOD: SNELLEN - LINEAR
OS_CC+: -2
CORRECTION_TYPE: GLASSES
OD_CC: 20/20
OD_CC+: -1
OS_CC: 20/20

## 2023-05-22 NOTE — PROGRESS NOTES
HPI     Diabetic Eye Exam    Diabetes characteristics include Type 2 and on insulin.  Duration of 20 years.  Blood sugar level is controlled.           Comments    Patient is here for Diabetic eye exam.  He notes the right eye is more blurry then the left eye.   Both eyes are tearing and itching.   Floaters occasionally. No flashes of lights.    Last eye exam 2- 3years ago  Mom with glaucoma      Lab Results       Component                Value               Date                       A1C                      9.1                 03/15/2023                 A1C                      7.7                 07/10/2022                 A1C                      7.6                 05/05/2022                 A1C                      8.9                 09/16/2021                 A1C                      8.8                 05/25/2021                 A1C                      8.6                 10/15/2019                 A1C                      8.3                 08/12/2019                 A1C                      8.2                 04/10/2019                 A1C                      7.5                 09/13/2018                      Last edited by Jd Tinajero MD on 5/22/2023 10:39 AM.         Review of systems for the eyes was negative other than the pertinent positives/negatives listed in the HPI.      Assessment & Plan    HPI:  Reinaldo Barrios is a 72 year old male with history of T2DM, CAD, HTN, HLD, myopia with astigmatism and presbyopia who presents for diabetic eye exam. He feels that his right eye is more blurry since last exam. Notes occasional floaters      POHx: myopia with astigmatism and presbyopia  PMHx: T2DM, CAD, HTN, HLD  Current Medications: aspirin EC 81 MG EC tablet, Take 1 tablet (81 mg) by mouth daily (Patient taking differently: Take 81 mg by mouth daily)  atorvastatin (LIPITOR) 40 MG tablet, TAKE 1 TABLET (40 MG) BY MOUTH DAILY  B-D U/F 31G X 8 MM insulin pen needle, USE THREE  "PEN NEEDLES DAILY OR AS DIRECTED.  carvedilol (COREG) 3.125 MG tablet, Take 1 tablet (3.125 mg) by mouth daily TAKE 1 TABLET (3.125 MG) BY MOUTH 2 TIMES DAILY (WITH MEALS) (Patient taking differently: Take 3.125 mg by mouth daily TAKE 1 TABLET (3.125 MG) BY MOUTH 2 TIMES DAILY (WITH MEALS))  clopidogrel (PLAVIX) 75 MG tablet, Take 1 tablet (75 mg) by mouth daily for 21 days  insulin detemir (LEVEMIR FLEXTOUCH) 100 UNIT/ML pen, Inject 65 Units Subcutaneous every morning  insulin syringe-needle U-100 (BD INSULIN SYRINGE ULTRAFINE) 31G X 5/16\" 0.5 ML miscellaneous, Use one syringe 3 daily or as directed.  isosorbide mononitrate (IMDUR) 30 MG 24 hr tablet, TAKE ONE TABLET (30 MG) BY MOUTH ONCE DAILY  lisinopril (ZESTRIL) 5 MG tablet, TAKE ONE TABLET BY MOUTH ONCE DAILY  NOVOLOG FLEXPEN 100 UNIT/ML soln, INJECT 10 - 12 UNITS UNDER THE SKIN TWICE A DAY  ONETOUCH ULTRA test strip, USE TO TEST BLOOD SUGAR 3 TIMES A DAY  senna-docusate (SENEXON-S) 8.6-50 MG tablet, TAKE 1 TO 2 TABLETS BY MOUTH TWICE A DAY    No current facility-administered medications on file prior to visit.    FHx: mom-glaucoma  PSHx: denies history of ocular surgeries       Current Eye Medications:      Assessment & Plan:  (E11.9) Type 2 diabetes mellitus without retinopathy (H)  (primary encounter diagnosis)  (E11.65,  Z79.4) Type 2 diabetes mellitus with hyperglycemia, with long-term current use of insulin (H)  Diagnosed ~2000  Most recent HgBA1c 9.1 on 3/15/23  No background diabetic retinopathy or neovascularization noted on today's exam.  Discussed ocular and systemic benefits of blood pressure and blood sugar control.  Return in 1 year for full exam with dilation or sooner if changes to vision.      (H52.13,  H52.203,  H52.4) Myopia of both eyes with astigmatism and presbyopia  Patient has minimal change in myopia but a copy of today's glasses prescription was given.  The patient may wish to update the glasses if the lenses are scratched or the " frames are too small.  Presbyopia is difficulty seeing up close and is treated with bifocals or over the counter reading glasses      (H40.003) Glaucoma suspect of both eyes  Based on IOP and c/d ratio  Maximum intraocular pressure 22/22  Family history: Yes  Trauma history: No  Gonioscopy:   Pachmetry:   Treatment History:     Today's testing:  OCT nerve fiber layer 05/22/23:   Right eye - G(r) 61 NI (g) 70 TI (r) 31 NS (g) 91 TS (y) 94      Left eye - G(r) 67 NI (g) 84 TI (y) 98 NS (g) 79 TS (r) 91    IOP goal: mid teens  Discussed glaucoma diagnosis at length including etiology of disease and treatment options including laser, drops, or surgery  Will have patient return for undilated exam, gonio, pachmetry, wang visual field (HVF) 24-2      (H25.813) Combined forms of age-related cataract of both eyes  Mild cataracts are present and may account for some of the patient's visual complaints. No treatment currently recommended. The patient will monitor for vision changes and contact us with any decrease in vision. Recheck in one year.       Return in about 2 months (around 7/22/2023) for additioanl baseline glaucoma testing, 24-2 VF, gonio, pachymetry.        Jd Tinajero MD     Attending Physician Attestation:  Complete documentation of historical and exam elements from today's encounter can be found in the full encounter summary report (not reduplicated in this progress note).  I personally obtained the chief complaint(s) and history of present illness.  I confirmed and edited as necessary the review of systems, past medical/surgical history, family history, social history, and examination findings as documented by others; and I examined the patient myself.  I personally reviewed the relevant tests, images, and reports as documented above.  I formulated and edited as necessary the assessment and plan and discussed the findings and management plan with the patient and family. - Jd Tinajero MD

## 2023-05-22 NOTE — NURSING NOTE
Chief Complaints and History of Present Illnesses   Patient presents with     Diabetic Eye Exam       Chief Complaint(s) and History of Present Illness(es)     Diabetic Eye Exam            Diabetes Type: Type 2 and on insulin    Duration: 20 years    Blood Sugars: is controlled          Comments    Patient is here for Diabetic eye exam.  He notes the right eye is more blurry then the left eye.   Both eyes are tearing and itching.   Floaters occasionally. No flashes of lights.    Lab Results       Component                Value               Date                       A1C                      9.1                 03/15/2023                 A1C                      7.7                 07/10/2022                 A1C                      7.6                 05/05/2022                 A1C                      8.9                 09/16/2021                 A1C                      8.8                 05/25/2021                 A1C                      8.6                 10/15/2019                 A1C                      8.3                 08/12/2019                 A1C                      8.2                 04/10/2019                 A1C                      7.5                 09/13/2018                             RONALD Lopez  10:03 AM 05/22/2023

## 2023-05-23 RX ORDER — INSULIN ASPART 100 [IU]/ML
INJECTION, SOLUTION INTRAVENOUS; SUBCUTANEOUS
Qty: 15 ML | Refills: 1 | Status: SHIPPED | OUTPATIENT
Start: 2023-05-23 | End: 2023-10-23

## 2023-05-23 NOTE — TELEPHONE ENCOUNTER
Novolog flexpen  Prescription approved per [facility] Refill Protocol.

## 2023-06-01 NOTE — TELEPHONE ENCOUNTER
Addended by: SONIA SANTAMARIA on: 6/1/2023 10:53 AM     Modules accepted: Orders     Patient notified. Geneva PUGH

## 2023-06-12 DIAGNOSIS — Z79.4 TYPE 2 DIABETES MELLITUS WITH HYPERGLYCEMIA, WITH LONG-TERM CURRENT USE OF INSULIN (H): ICD-10-CM

## 2023-06-12 DIAGNOSIS — E11.65 TYPE 2 DIABETES MELLITUS WITH HYPERGLYCEMIA, WITH LONG-TERM CURRENT USE OF INSULIN (H): ICD-10-CM

## 2023-06-13 DIAGNOSIS — E11.65 TYPE 2 DIABETES MELLITUS WITH HYPERGLYCEMIA, WITH LONG-TERM CURRENT USE OF INSULIN (H): ICD-10-CM

## 2023-06-13 DIAGNOSIS — Z79.4 TYPE 2 DIABETES MELLITUS WITH HYPERGLYCEMIA, WITH LONG-TERM CURRENT USE OF INSULIN (H): ICD-10-CM

## 2023-06-13 RX ORDER — BLOOD SUGAR DIAGNOSTIC
STRIP MISCELLANEOUS
Qty: 200 STRIP | Refills: 3 | Status: SHIPPED | OUTPATIENT
Start: 2023-06-13 | End: 2024-02-28

## 2023-06-14 RX ORDER — PEN NEEDLE, DIABETIC 31 GX5/16"
NEEDLE, DISPOSABLE MISCELLANEOUS
Qty: 100 EACH | Refills: 3 | Status: SHIPPED | OUTPATIENT
Start: 2023-06-14

## 2023-06-14 NOTE — TELEPHONE ENCOUNTER
Prescription approved per [facility] Refill Protocol.  Geneva Goodman RN on 6/14/2023 at 10:20 AM    
(2) more than 100 beats/min

## 2023-06-27 DIAGNOSIS — E78.5 HYPERLIPIDEMIA LDL GOAL <100: ICD-10-CM

## 2023-06-27 DIAGNOSIS — I50.42 SYSTOLIC AND DIASTOLIC CHF, CHRONIC (H): ICD-10-CM

## 2023-06-27 DIAGNOSIS — R07.2 PRECORDIAL PAIN: ICD-10-CM

## 2023-06-28 DIAGNOSIS — K59.04 CHRONIC IDIOPATHIC CONSTIPATION: ICD-10-CM

## 2023-06-28 RX ORDER — LISINOPRIL 5 MG/1
TABLET ORAL
Qty: 90 TABLET | Refills: 0 | Status: SHIPPED | OUTPATIENT
Start: 2023-06-28 | End: 2023-09-27

## 2023-06-28 RX ORDER — ISOSORBIDE MONONITRATE 30 MG/1
TABLET, EXTENDED RELEASE ORAL
Qty: 90 TABLET | Refills: 0 | Status: SHIPPED | OUTPATIENT
Start: 2023-06-28 | End: 2023-09-27

## 2023-06-28 RX ORDER — ATORVASTATIN CALCIUM 40 MG/1
40 TABLET, FILM COATED ORAL DAILY
Qty: 90 TABLET | Refills: 0 | Status: SHIPPED | OUTPATIENT
Start: 2023-06-28 | End: 2023-09-27

## 2023-06-28 RX ORDER — DOCUSATE SODIUM AND SENNOSIDES 8.6; 5 MG/1; MG/1
TABLET, FILM COATED ORAL
Qty: 100 TABLET | Refills: 3 | Status: SHIPPED | OUTPATIENT
Start: 2023-06-28 | End: 2024-01-11

## 2023-06-28 NOTE — TELEPHONE ENCOUNTER
Prescription approved per [facility] Refill Protocol.  Geneva Goodman RN on 6/28/2023 at 2:07 PM

## 2023-07-06 ENCOUNTER — HOSPITAL ENCOUNTER (OUTPATIENT)
Dept: GENERAL RADIOLOGY | Facility: CLINIC | Age: 73
Discharge: HOME | End: 2023-07-06
Attending: INTERNAL MEDICINE | Admitting: INTERNAL MEDICINE
Payer: MEDICARE

## 2023-07-06 ENCOUNTER — OFFICE VISIT (OUTPATIENT)
Dept: INTERNAL MEDICINE | Facility: CLINIC | Age: 73
End: 2023-07-06
Payer: MEDICARE

## 2023-07-06 VITALS
RESPIRATION RATE: 18 BRPM | SYSTOLIC BLOOD PRESSURE: 118 MMHG | DIASTOLIC BLOOD PRESSURE: 66 MMHG | WEIGHT: 218.4 LBS | OXYGEN SATURATION: 99 % | TEMPERATURE: 98.8 F | HEIGHT: 66 IN | BODY MASS INDEX: 35.1 KG/M2 | HEART RATE: 66 BPM

## 2023-07-06 DIAGNOSIS — I10 ESSENTIAL HYPERTENSION WITH GOAL BLOOD PRESSURE LESS THAN 140/90: ICD-10-CM

## 2023-07-06 DIAGNOSIS — E11.65 TYPE 2 DIABETES MELLITUS WITH HYPERGLYCEMIA, WITH LONG-TERM CURRENT USE OF INSULIN (H): Primary | ICD-10-CM

## 2023-07-06 DIAGNOSIS — R07.89 CHEST WALL PAIN: ICD-10-CM

## 2023-07-06 DIAGNOSIS — Z95.2 S/P AORTIC VALVE REPLACEMENT: ICD-10-CM

## 2023-07-06 DIAGNOSIS — Z12.11 SCREEN FOR COLON CANCER: ICD-10-CM

## 2023-07-06 DIAGNOSIS — Z79.4 TYPE 2 DIABETES MELLITUS WITH HYPERGLYCEMIA, WITH LONG-TERM CURRENT USE OF INSULIN (H): Primary | ICD-10-CM

## 2023-07-06 LAB
ANION GAP SERPL CALCULATED.3IONS-SCNC: 10 MMOL/L (ref 7–15)
BUN SERPL-MCNC: 13.2 MG/DL (ref 8–23)
CALCIUM SERPL-MCNC: 8.8 MG/DL (ref 8.8–10.2)
CHLORIDE SERPL-SCNC: 101 MMOL/L (ref 98–107)
CHOLEST SERPL-MCNC: 134 MG/DL
CREAT SERPL-MCNC: 0.86 MG/DL (ref 0.67–1.17)
DEPRECATED HCO3 PLAS-SCNC: 27 MMOL/L (ref 22–29)
GFR SERPL CREATININE-BSD FRML MDRD: >90 ML/MIN/1.73M2
GLUCOSE SERPL-MCNC: 175 MG/DL (ref 70–99)
HBA1C MFR BLD: 8.7 %
HDLC SERPL-MCNC: 47 MG/DL
LDLC SERPL CALC-MCNC: 58 MG/DL
NONHDLC SERPL-MCNC: 87 MG/DL
POTASSIUM SERPL-SCNC: 4.3 MMOL/L (ref 3.4–5.3)
SODIUM SERPL-SCNC: 138 MMOL/L (ref 136–145)
TRIGL SERPL-MCNC: 143 MG/DL

## 2023-07-06 PROCEDURE — 80061 LIPID PANEL: CPT | Performed by: INTERNAL MEDICINE

## 2023-07-06 PROCEDURE — 80048 BASIC METABOLIC PNL TOTAL CA: CPT | Performed by: INTERNAL MEDICINE

## 2023-07-06 PROCEDURE — 71101 X-RAY EXAM UNILAT RIBS/CHEST: CPT | Mod: RT

## 2023-07-06 PROCEDURE — 99214 OFFICE O/P EST MOD 30 MIN: CPT | Performed by: INTERNAL MEDICINE

## 2023-07-06 PROCEDURE — 83036 HEMOGLOBIN GLYCOSYLATED A1C: CPT | Performed by: INTERNAL MEDICINE

## 2023-07-06 PROCEDURE — 36415 COLL VENOUS BLD VENIPUNCTURE: CPT | Performed by: INTERNAL MEDICINE

## 2023-07-06 ASSESSMENT — PAIN SCALES - GENERAL: PAINLEVEL: NO PAIN (0)

## 2023-07-06 NOTE — PROGRESS NOTES
Anali Najera is a 72 year old, presenting for the following health issues:  RECHECK (Diabetes)        7/6/2023     8:53 AM   Additional Questions   Roomed by Reba RAND   Accompanied by Self         7/6/2023     8:53 AM   Patient Reported Additional Medications   Patient reports taking the following new medications none      EMR reviewed including:             Complaint, History of Chief Complaint, Corresponding Review of Systems, and Complaint Specific Physical Examination.    #1   Follow-up on type 2 diabetes  Currently on background insulin with twice daily NovoLog.  Is on statin, aspirin, ACE inhibitor.  Notes of blood sugars are generally around 150.  Acknowledges that diet and exercise do seem to control this to a degree.  Believes that his A1c will be better than last time (9.1).  Denies polyuria or polydipsia.  Denies symptoms of hypoglycemia.        Exam:   LUNGS: clear bilaterally, airflow is brisk, no intercostal retraction or stridor is noted. No coughing is noted during visit.   HEART:  regular without rubs, gallops, or murmurs. PMI is nondisplaced. Upstrokes are brisk. S1,S2 are heard.  Bioprosthetic aortic valve is heard.  It is brisk.   GI: Abdomen is soft, without rebound, guarding or tenderness. Bowel sounds are appropriate. No renal bruits are heard.  Abdomen is obese.   NEURO: Pt is alert and appropriate. No neurologic lateralization is noted. Cranial nerves 2-12 are intact. Peripheral sensory and motor function are grossly normal.       #2   Follow-up on hypertension  Blood pressure currently 118/66.  Continues lisinopril without cough or side effects.  Denies lightheadedness or dizziness.  Denies syncope or near syncope.        Exam:  As above      #3   Follow-up on aortic valvular placement.  Is due for echocardiogram.  Denies syncope or any symptomatology.  Denies symptoms of congestive heart failure or fluid overload.  Continues aspirin and Plavix.  Not on anticoagulant.         "Exam:  As above      #4   Need for colonoscopy.  Previous procedure was \"canceled\".  Apparently, patient did not have a  staff was aware of.  Patient refuses to reschedule.  We will set up for testing as a distant second option        #5   Chest wall pain  Lower right lateral chest.  History of previous rib fracture.  No shortness of breath or hemoptysis.  No evidence of crepitance or subcutaneous emphysema noted on exam.        Exam:  As above      Patient has been interviewed, applicable history and applied review of systems have been performed.    Vital Signs:   /66 (BP Location: Right arm, Patient Position: Sitting, Cuff Size: Adult Regular)   Pulse 66   Temp 98.8  F (37.1  C) (Temporal)   Resp 18   Ht 1.676 m (5' 6\")   Wt 99.1 kg (218 lb 6.4 oz)   SpO2 99%   BMI 35.25 kg/m        Decision Making    Problem and Complexity     1. Type 2 diabetes mellitus with hyperglycemia, with long-term current use of insulin (H)  Check A1c and renal function.  Adjust medications accordingly  - Lipid panel reflex to direct LDL Non-fasting; Future  - Hemoglobin A1c; Future  - Basic metabolic panel  (Ca, Cl, CO2, Creat, Gluc, K, Na, BUN); Future    2. Essential hypertension with goal blood pressure less than 140/90  Continue current medications    3. S/P aortic valve replacement  Set up echocardiogram  - Echocardiogram Complete; Future    4. Chest wall pain  Check x-ray to rule out rib fracture or malunion  - XR Ribs & Chest Right G/E 3 Views; Future    5. Screen for colon cancer  Screening attempt #2  - Fecal colorectal cancer screen (FIT); Future                                FOLLOW UP   I have asked the patient to make an appointment for followup with either:  1.  Me,  2.  The patient's preferred provider,  3.  Any available provider  In 4 months or as needed (pending laboratory results)        Regarding routine vaccinations:  I have reviewed the patient's vaccination schedule and discussed the benefits of " prophylactic vaccination in detail.  I recommend the patient contact their pharmacist (not the pharmacy within the clinic) for vaccinations.  Discussed that most insurance companies now favor reimbursement to the pharmacies and it will financially behoove the patient to have vaccinations performed at their pharmacy.        I have carefully explained the diagnosis and treatment options to the patient.  The patient has displayed an understanding of the above, and all subsequent questions were answered.      DO RADHA Horn    Portions of this note were produced using [employer]  Although every attempt at real-time proof reading has been made, occasional grammar/syntax errors may have been missed.

## 2023-07-06 NOTE — NURSING NOTE
Patient refused to answer any questions for today's visit in person and on the tablet.    Reba Dockery CMA

## 2023-07-11 PROCEDURE — 82274 ASSAY TEST FOR BLOOD FECAL: CPT | Performed by: INTERNAL MEDICINE

## 2023-07-12 LAB — HEMOCCULT STL QL IA: POSITIVE

## 2023-07-28 DIAGNOSIS — Z79.4 TYPE 2 DIABETES MELLITUS WITH HYPERGLYCEMIA, WITH LONG-TERM CURRENT USE OF INSULIN (H): ICD-10-CM

## 2023-07-28 DIAGNOSIS — E11.65 TYPE 2 DIABETES MELLITUS WITH HYPERGLYCEMIA, WITH LONG-TERM CURRENT USE OF INSULIN (H): ICD-10-CM

## 2023-07-28 RX ORDER — INSULIN DETEMIR 100 [IU]/ML
INJECTION, SOLUTION SUBCUTANEOUS
Qty: 45 ML | Refills: 1 | Status: SHIPPED | OUTPATIENT
Start: 2023-07-28 | End: 2023-12-18

## 2023-07-31 ENCOUNTER — TRANSFERRED RECORDS (OUTPATIENT)
Dept: OPHTHALMOLOGY | Facility: CLINIC | Age: 73
End: 2023-07-31

## 2023-07-31 ENCOUNTER — OFFICE VISIT (OUTPATIENT)
Dept: OPHTHALMOLOGY | Facility: CLINIC | Age: 73
End: 2023-07-31
Payer: MEDICARE

## 2023-07-31 DIAGNOSIS — H40.003 GLAUCOMA SUSPECT OF BOTH EYES: Primary | ICD-10-CM

## 2023-07-31 PROCEDURE — 99213 OFFICE O/P EST LOW 20 MIN: CPT | Performed by: OPHTHALMOLOGY

## 2023-07-31 PROCEDURE — 92020 GONIOSCOPY: CPT | Mod: 59 | Performed by: OPHTHALMOLOGY

## 2023-07-31 PROCEDURE — 92083 EXTENDED VISUAL FIELD XM: CPT | Performed by: OPHTHALMOLOGY

## 2023-07-31 ASSESSMENT — REFRACTION_WEARINGRX
OS_SPHERE: -0.25
OD_CYLINDER: +1.25
OD_AXIS: 163
OS_CYLINDER: +1.00
OS_AXIS: 029
SPECS_TYPE: PAL
OS_CYLINDER: +0.75
OD_AXIS: 162
OS_ADD: +2.50
OS_AXIS: 019
OD_ADD: +2.50
OD_SPHERE: -0.75
OD_SPHERE: -1.00
OS_SPHERE: -0.50
OS_ADD: +2.50
OD_CYLINDER: +1.25
OD_ADD: +2.50

## 2023-07-31 ASSESSMENT — VISUAL ACUITY
METHOD: SNELLEN - LINEAR
OS_CC+: -2
OD_CC+: -2
OS_CC: 20/20
CORRECTION_TYPE: GLASSES
OD_CC: 20/20

## 2023-07-31 ASSESSMENT — PACHYMETRY
OD_CT(UM): 597
OS_CT(UM): 598

## 2023-07-31 ASSESSMENT — GONIOSCOPY
METHOD: SUSSMAN, FOUR MIRROR
OD_TEMPORAL: E15F 2+PTM
OS_SUPERIOR: E15F 2+PTM
OD_NASAL: E15F 2+PTM
OS_NASAL: E15F 2+PTM
OS_TEMPORAL: E15F 2+PTM
OS_INFERIOR: E15F 2+PTM
OD_SUPERIOR: E15F 2+PTM

## 2023-07-31 ASSESSMENT — TONOMETRY
OD_IOP_MMHG: 18
IOP_METHOD: APPLANATION
OS_IOP_MMHG: 20

## 2023-07-31 ASSESSMENT — CUP TO DISC RATIO
OD_RATIO: 0.7
OS_RATIO: 0.7

## 2023-07-31 ASSESSMENT — SLIT LAMP EXAM - LIDS
COMMENTS: NORMAL
COMMENTS: NORMAL

## 2023-07-31 NOTE — PROGRESS NOTES
HPI       Glaucoma Suspect Evaluation    In both eyes.             Comments    Patient returning for additional glaucoma suspect testing. DM2  Patient has no concerns.   Glasses not updated after last exam yet, due to cost.       DM2  Lab Results       Component                Value               Date                       A1C                      8.7                 07/06/2023                 A1C                      9.1                 03/15/2023                 A1C                      7.7                 07/10/2022                 A1C                      7.6                 05/05/2022                 A1C                      8.9                 09/16/2021                 A1C                      8.8                 05/25/2021                 A1C                      8.6                 10/15/2019                 A1C                      8.3                 08/12/2019                 A1C                      8.2                 04/10/2019                 A1C                      7.5                 09/13/2018                     Last edited by Steven Currie on 7/31/2023 10:08 AM.         Review of systems for the eyes was negative other than the pertinent positives/negatives listed in the HPI.      Assessment & Plan    HPI:  Reinaldo Barrios is a 72 year old male with history of T2DM, CAD, HTN, HLD, myopia with astigmatism and presbyopia who presents for baseline glaucoma testing.       POHx: myopia with astigmatism and presbyopia  PMHx: T2DM, CAD, HTN, HLD  Current Medications: aspirin EC 81 MG EC tablet, Take 1 tablet (81 mg) by mouth daily (Patient taking differently: Take 81 mg by mouth daily)  atorvastatin (LIPITOR) 40 MG tablet, TAKE 1 TABLET (40 MG) BY MOUTH DAILY  B-D U/F 31G X 8 MM insulin pen needle, USE THREE PEN NEEDLES DAILY OR AS DIRECTED.  carvedilol (COREG) 3.125 MG tablet, Take 1 tablet (3.125 mg) by mouth daily TAKE 1 TABLET (3.125 MG) BY MOUTH 2 TIMES DAILY (WITH MEALS) (Patient  "taking differently: Take 3.125 mg by mouth daily TAKE 1 TABLET (3.125 MG) BY MOUTH 2 TIMES DAILY (WITH MEALS))  insulin syringe-needle U-100 (BD INSULIN SYRINGE ULTRAFINE) 31G X 5/16\" 0.5 ML miscellaneous, Use one syringe 3 daily or as directed.  isosorbide mononitrate (IMDUR) 30 MG 24 hr tablet, TAKE ONE TABLET (30 MG) BY MOUTH ONCE DAILY  LEVEMIR FLEXPEN/FLEXTOUCH 100 UNIT/ML soln, INJECT 65 UNITS SUBCUTANEOUS EVERY MORNING  lisinopril (ZESTRIL) 5 MG tablet, TAKE ONE TABLET BY MOUTH ONCE DAILY  NOVOLOG FLEXPEN 100 UNIT/ML soln, INJECT 10 - 12 UNITS UNDER THE SKIN TWICE A DAY  ONETOUCH ULTRA test strip, USE TO TEST BLOOD SUGAR 3 TIMES A DAY  STOOL SOFTENER/LAXATIVE 50-8.6 MG tablet, TAKE 1 TO 2 TABLETS BY MOUTH TWICE A DAY  clopidogrel (PLAVIX) 75 MG tablet, Take 1 tablet (75 mg) by mouth daily for 21 days    No current facility-administered medications on file prior to visit.    FHx: mom-glaucoma  PSHx: denies history of ocular surgeries       Current Eye Medications:      Assessment & Plan:  (H40.003) Glaucoma suspect of both eyes  Based on IOP and c/d ratio  IOP today 18/20  Maximum intraocular pressure 22/22  Family history: Yes  Trauma history: No-hit in the face with baseball right eye 1970  Gonioscopy: extremely deep, ?angle recession  Pachmetry: 597/598  Treatment History:     Today's testing:  Visual field 24-2 07/31/23   Right eye central, superocentral scotoma down to 0 in many spots, VFI 77%, MD -6.34,  PSd 9.41  Left eye normal, reliable, VFI 97%, MD -0.34, PSD 2.03       OCT nerve fiber layer 05/22/23:   Right eye - G(r) 61 NI (g) 70 TI (r) 31 NS (g) 91 TS (y) 94      Left eye - G(r) 67 NI (g) 84 TI (y) 98 NS (g) 79 TS (r) 91    IOP goal: mid teens  Concern for central loss right eye, but questionable reliability  Will have patient return repeat wang visual field (HVF) 24-2 right eye   Per tech not, patient needed constant reminder right eye    (E11.9) Type 2 diabetes mellitus without retinopathy " (H)  (primary encounter diagnosis)  (E11.65,  Z79.4) Type 2 diabetes mellitus with hyperglycemia, with long-term current use of insulin (H)  Diagnosed ~2000  Most recent HgBA1c 9.1 on 3/15/23  No background diabetic retinopathy or neovascularization noted on today's exam.  Discussed ocular and systemic benefits of blood pressure and blood sugar control.  Return in 1 year for full exam with dilation or sooner if changes to vision.      (H52.13,  H52.203,  H52.4) Myopia of both eyes with astigmatism and presbyopia  Patient has minimal change in myopia but a copy of today's glasses prescription was given.  The patient may wish to update the glasses if the lenses are scratched or the frames are too small.  Presbyopia is difficulty seeing up close and is treated with bifocals or over the counter reading glasses           (H25.813) Combined forms of age-related cataract of both eyes  Mild cataracts are present and may account for some of the patient's visual complaints. No treatment currently recommended. The patient will monitor for vision changes and contact us with any decrease in vision. Recheck in one year.       Return in about 1 month (around 8/31/2023) for 24-2 VF od , Follow Up-v/t.        Jd Tinajero MD     Attending Physician Attestation:  Complete documentation of historical and exam elements from today's encounter can be found in the full encounter summary report (not reduplicated in this progress note).  I personally obtained the chief complaint(s) and history of present illness.  I confirmed and edited as necessary the review of systems, past medical/surgical history, family history, social history, and examination findings as documented by others; and I examined the patient myself.  I personally reviewed the relevant tests, images, and reports as documented above.  I formulated and edited as necessary the assessment and plan and discussed the findings and management plan with the patient and  family. - Jd Tinajero MD

## 2023-07-31 NOTE — NURSING NOTE
Chief Complaints and History of Present Illnesses   Patient presents with    Glaucoma Suspect Evaluation       Chief Complaint(s) and History of Present Illness(es)       Glaucoma Suspect Evaluation              Laterality: both eyes              Comments    Patient returning for additional glaucoma suspect testing. DM2  Patient has no concerns.   Glasses not updated after last exam yet, due to cost.       DM2  Lab Results       Component                Value               Date                       A1C                      8.7                 07/06/2023                 A1C                      9.1                 03/15/2023                 A1C                      7.7                 07/10/2022                 A1C                      7.6                 05/05/2022                 A1C                      8.9                 09/16/2021                 A1C                      8.8                 05/25/2021                 A1C                      8.6                 10/15/2019                 A1C                      8.3                 08/12/2019                 A1C                      8.2                 04/10/2019                 A1C                      7.5                 09/13/2018                              Stevne Currie, Ophthalmic Assistant

## 2023-08-04 ENCOUNTER — HOSPITAL ENCOUNTER (OUTPATIENT)
Dept: CARDIOLOGY | Facility: CLINIC | Age: 73
Discharge: HOME | End: 2023-08-04
Attending: INTERNAL MEDICINE | Admitting: INTERNAL MEDICINE
Payer: MEDICARE

## 2023-08-04 DIAGNOSIS — Z95.2 S/P AORTIC VALVE REPLACEMENT: ICD-10-CM

## 2023-08-04 LAB — LVEF ECHO: NORMAL

## 2023-08-04 PROCEDURE — 255N000002 HC RX 255 OP 636: Performed by: INTERNAL MEDICINE

## 2023-08-04 PROCEDURE — 93306 TTE W/DOPPLER COMPLETE: CPT | Mod: 26 | Performed by: INTERNAL MEDICINE

## 2023-08-04 RX ADMIN — PERFLUTREN 2 ML: 6.52 INJECTION, SUSPENSION INTRAVENOUS at 11:30

## 2023-08-23 DIAGNOSIS — I10 ESSENTIAL HYPERTENSION WITH GOAL BLOOD PRESSURE LESS THAN 140/90: ICD-10-CM

## 2023-08-23 RX ORDER — CARVEDILOL 3.12 MG/1
3.12 TABLET ORAL DAILY
Qty: 180 TABLET | Refills: 0 | Status: SHIPPED | OUTPATIENT
Start: 2023-08-23 | End: 2023-12-18

## 2023-09-18 ENCOUNTER — TRANSFERRED RECORDS (OUTPATIENT)
Dept: OPHTHALMOLOGY | Facility: CLINIC | Age: 73
End: 2023-09-18

## 2023-09-18 ENCOUNTER — OFFICE VISIT (OUTPATIENT)
Dept: OPHTHALMOLOGY | Facility: CLINIC | Age: 73
End: 2023-09-18
Payer: MEDICARE

## 2023-09-18 DIAGNOSIS — H40.1212 LOW TENSION GLAUCOMA OF RIGHT EYE, MODERATE STAGE: Primary | ICD-10-CM

## 2023-09-18 PROCEDURE — 99214 OFFICE O/P EST MOD 30 MIN: CPT | Performed by: OPHTHALMOLOGY

## 2023-09-18 PROCEDURE — 92083 EXTENDED VISUAL FIELD XM: CPT | Performed by: OPHTHALMOLOGY

## 2023-09-18 RX ORDER — LORAZEPAM 0.5 MG/1
0.5 TABLET ORAL EVERY 6 HOURS PRN
Qty: 2 TABLET | Refills: 0 | Status: SHIPPED | OUTPATIENT
Start: 2023-09-18

## 2023-09-18 RX ORDER — LATANOPROST 50 UG/ML
1 SOLUTION/ DROPS OPHTHALMIC DAILY
Qty: 2.5 ML | Refills: 11 | Status: SHIPPED | OUTPATIENT
Start: 2023-09-18 | End: 2024-05-13

## 2023-09-18 ASSESSMENT — CONF VISUAL FIELD
OS_NORMAL: 1
OD_SUPERIOR_NASAL_RESTRICTION: 0
OS_SUPERIOR_TEMPORAL_RESTRICTION: 0
OS_INFERIOR_NASAL_RESTRICTION: 0
OS_SUPERIOR_NASAL_RESTRICTION: 0
OD_SUPERIOR_TEMPORAL_RESTRICTION: 0
OD_INFERIOR_TEMPORAL_RESTRICTION: 0
OS_INFERIOR_TEMPORAL_RESTRICTION: 0
OD_INFERIOR_NASAL_RESTRICTION: 0
OD_NORMAL: 1
METHOD: COUNTING FINGERS

## 2023-09-18 ASSESSMENT — REFRACTION_WEARINGRX
OS_CYLINDER: +1.00
OD_AXIS: 162
OD_CYLINDER: +1.25
OD_ADD: +2.50
OS_ADD: +2.50
OS_AXIS: 029
OD_SPHERE: -1.00
OS_SPHERE: -0.50
SPECS_TYPE: PAL

## 2023-09-18 ASSESSMENT — VISUAL ACUITY
OS_CC: 20/25
OD_CC: 20/20
METHOD: SNELLEN - LINEAR
CORRECTION_TYPE: GLASSES

## 2023-09-18 ASSESSMENT — TONOMETRY
IOP_METHOD: TONOPEN
OS_IOP_MMHG: 19
OD_IOP_MMHG: 18

## 2023-09-18 ASSESSMENT — PACHYMETRY
OS_CT(UM): 598
OD_CT(UM): 597

## 2023-09-18 ASSESSMENT — SLIT LAMP EXAM - LIDS
COMMENTS: NORMAL
COMMENTS: NORMAL

## 2023-09-18 ASSESSMENT — CUP TO DISC RATIO
OD_RATIO: 0.7
OS_RATIO: 0.7

## 2023-09-18 NOTE — PROGRESS NOTES
HPI       Follow Up    In both eyes.  Associated symptoms include tearing and floaters.  Negative for eye pain and flashes.  Treatments tried include no treatments.             Comments    Here for follow up. [facility] is about the same since last visit. Stable floaters. Some tearing. No gtts. No pain.    Lab Results       Component                Value               Date                       A1C                      8.7                 07/06/2023                 A1C                      9.1                 03/15/2023                 A1C                      7.7                 07/10/2022                 A1C                      7.6                 05/05/2022                 A1C                      8.9                 09/16/2021                 A1C                      8.8                 05/25/2021                 A1C                      8.6                 10/15/2019                 A1C                      8.3                 08/12/2019                 A1C                      8.2                 04/10/2019                 A1C                      7.5                 09/13/2018             Terry Rod COT 9:54 AM September 18, 2023             Last edited by Terry Rod on 9/18/2023 10:01 AM.         Review of systems for the eyes was negative other than the pertinent positives/negatives listed in the HPI.      Assessment & Plan    HPI:  Reinaldo Barrios is a 72 year old male with history of T2DM, CAD, HTN, HLD, myopia with astigmatism and presbyopia who presents for followup visual field loss of, oct retinal nerve fiber layer thinning both eyes.        POHx: myopia with astigmatism and presbyopia  PMHx: T2DM, CAD, HTN, HLD  Current Medications: aspirin EC 81 MG EC tablet, Take 1 tablet (81 mg) by mouth daily (Patient taking differently: Take 81 mg by mouth daily)  atorvastatin (LIPITOR) 40 MG tablet, TAKE 1 TABLET (40 MG) BY MOUTH DAILY  B-D U/F 31G X 8 MM insulin pen needle, USE THREE PEN NEEDLES DAILY OR AS  "DIRECTED.  carvedilol (COREG) 3.125 MG tablet, Take 1 tablet (3.125 mg) by mouth daily TAKE 1 TABLET (3.125 MG) BY MOUTH 2 TIMES DAILY (WITH MEALS)  clopidogrel (PLAVIX) 75 MG tablet, Take 1 tablet (75 mg) by mouth daily for 21 days  insulin syringe-needle U-100 (BD INSULIN SYRINGE ULTRAFINE) 31G X 5/16\" 0.5 ML miscellaneous, Use one syringe 3 daily or as directed.  isosorbide mononitrate (IMDUR) 30 MG 24 hr tablet, TAKE ONE TABLET (30 MG) BY MOUTH ONCE DAILY  LEVEMIR FLEXPEN/FLEXTOUCH 100 UNIT/ML soln, INJECT 65 UNITS SUBCUTANEOUS EVERY MORNING  lisinopril (ZESTRIL) 5 MG tablet, TAKE ONE TABLET BY MOUTH ONCE DAILY  NOVOLOG FLEXPEN 100 UNIT/ML soln, INJECT 10 - 12 UNITS UNDER THE SKIN TWICE A DAY  ONETOUCH ULTRA test strip, USE TO TEST BLOOD SUGAR 3 TIMES A DAY  STOOL SOFTENER/LAXATIVE 50-8.6 MG tablet, TAKE 1 TO 2 TABLETS BY MOUTH TWICE A DAY    No current facility-administered medications on file prior to visit.    FHx: mom-glaucoma  PSHx: denies history of ocular surgeries       Current Eye Medications:      Assessment & Plan:  (H40.003) Glaucoma suspect of both eyes  (H40.1212) Low tension glaucoma of right eye, moderate stage  (primary encounter diagnosis)  Based on IOP and c/d ratio  IOP today 18/19  Maximum intraocular pressure 22/22  Family history: Yes  Trauma history: No-hit in the face with baseball right eye 1970  Gonioscopy: extremely deep, ?angle recession  Pachmetry: 597/598  Treatment History:     Today's testing:  Visual field 24-2 09/18/23    Right eye central, cecocentral scotoma down to 0 in many spots, VFI 80%, MD -5.19,  PSd 8.50    Visual field 24-2 07/31/23   Right eye central, superocentral scotoma down to 0 in many spots, VFI 77%, MD -6.34,  PSd 9.41  Left eye normal, reliable, VFI 97%, MD -0.34, PSD 2.03       OCT nerve fiber layer 05/22/23:   Right eye - G(r) 61 NI (g) 70 TI (r) 31 NS (g) 91 TS (y) 94      Left eye - G(r) 67 NI (g) 84 TI (y) 98 NS (g) 79 TS (r) 91    IOP goal: mid " teens  Right eye concern for NTG given degree of central loss and oct retinal nerve fiber layer thinning    Start latanoprost at bedtime both eyes, return in 4-6 weeks IOP check  Will also check brain MRI given new visual field loss right eye only. Concern for possible compressive lesion. MRI and CT from 7/2022 without mass. 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  Discussed ocular and systemic benefits of blood pressure and blood sugar control.  Return in 1 year for full exam with dilation or sooner if changes to vision.      (H52.13,  H52.203,  H52.4) Myopia of both eyes with astigmatism and presbyopia  Doing well in current MRx      (H25.813) Combined forms of age-related cataract of both eyes  Mild cataracts are present and may account for some of the patient's visual complaints. No treatment currently recommended. The patient will monitor for vision changes and contact us with any decrease in vision. Recheck in one year.       Return in about 6 weeks (around 10/30/2023) for Follow Up-v/t, IOP check after starting latanoprost.        Jd Tinajero MD     Attending Physician Attestation:  Complete documentation of historical and exam elements from today's encounter can be found in the full encounter summary report (not reduplicated in this progress note).  I personally obtained the chief complaint(s) and history of present illness.  I confirmed and edited as necessary the review of systems, past medical/surgical history, family history, social history, and examination findings as documented by others; and I examined the patient myself.  I personally reviewed the relevant  tests, images, and reports as documented above.  I formulated and edited as necessary the assessment and plan and discussed the findings and management plan with the patient and family. - Jd Tinajero MD

## 2023-09-25 ENCOUNTER — HOSPITAL ENCOUNTER (OUTPATIENT)
Dept: MRI IMAGING | Facility: CLINIC | Age: 73
Discharge: HOME | End: 2023-09-25
Attending: OPHTHALMOLOGY | Admitting: OPHTHALMOLOGY
Payer: MEDICARE

## 2023-09-25 DIAGNOSIS — H40.1212 LOW TENSION GLAUCOMA OF RIGHT EYE, MODERATE STAGE: ICD-10-CM

## 2023-09-25 PROCEDURE — 70553 MRI BRAIN STEM W/O & W/DYE: CPT | Mod: MF

## 2023-09-25 PROCEDURE — A9585 GADOBUTROL INJECTION: HCPCS | Mod: JZ | Performed by: OPHTHALMOLOGY

## 2023-09-25 PROCEDURE — 255N000002 HC RX 255 OP 636: Mod: JZ | Performed by: OPHTHALMOLOGY

## 2023-09-25 PROCEDURE — G1010 CDSM STANSON: HCPCS

## 2023-09-25 RX ORDER — GADOBUTROL 604.72 MG/ML
10 INJECTION INTRAVENOUS ONCE
Status: COMPLETED | OUTPATIENT
Start: 2023-09-25 | End: 2023-09-25

## 2023-09-25 RX ADMIN — GADOBUTROL 10 ML: 604.72 INJECTION INTRAVENOUS at 19:16

## 2023-09-26 DIAGNOSIS — E78.5 HYPERLIPIDEMIA LDL GOAL <100: ICD-10-CM

## 2023-09-26 DIAGNOSIS — I50.42 SYSTOLIC AND DIASTOLIC CHF, CHRONIC (H): ICD-10-CM

## 2023-09-26 DIAGNOSIS — R07.2 PRECORDIAL PAIN: ICD-10-CM

## 2023-09-27 RX ORDER — ATORVASTATIN CALCIUM 40 MG/1
40 TABLET, FILM COATED ORAL DAILY
Qty: 90 TABLET | Refills: 0 | Status: SHIPPED | OUTPATIENT
Start: 2023-09-27 | End: 2023-12-18

## 2023-09-27 RX ORDER — LISINOPRIL 5 MG/1
TABLET ORAL
Qty: 90 TABLET | Refills: 0 | Status: SHIPPED | OUTPATIENT
Start: 2023-09-27 | End: 2023-12-18

## 2023-09-27 RX ORDER — ISOSORBIDE MONONITRATE 30 MG/1
TABLET, EXTENDED RELEASE ORAL
Qty: 90 TABLET | Refills: 0 | Status: SHIPPED | OUTPATIENT
Start: 2023-09-27 | End: 2023-12-18

## 2023-10-23 DIAGNOSIS — Z79.4 TYPE 2 DIABETES MELLITUS WITH HYPERGLYCEMIA, WITH LONG-TERM CURRENT USE OF INSULIN (H): ICD-10-CM

## 2023-10-23 DIAGNOSIS — E11.65 TYPE 2 DIABETES MELLITUS WITH HYPERGLYCEMIA, WITH LONG-TERM CURRENT USE OF INSULIN (H): ICD-10-CM

## 2023-10-23 RX ORDER — INSULIN ASPART 100 [IU]/ML
INJECTION, SOLUTION INTRAVENOUS; SUBCUTANEOUS
Qty: 15 ML | Refills: 1 | Status: SHIPPED | OUTPATIENT
Start: 2023-10-23 | End: 2024-02-28

## 2023-10-30 ENCOUNTER — OFFICE VISIT (OUTPATIENT)
Dept: OPHTHALMOLOGY | Facility: CLINIC | Age: 73
End: 2023-10-30
Payer: MEDICARE

## 2023-10-30 DIAGNOSIS — H40.1212 LOW TENSION GLAUCOMA OF RIGHT EYE, MODERATE STAGE: Primary | ICD-10-CM

## 2023-10-30 DIAGNOSIS — H52.13 MYOPIA OF BOTH EYES WITH ASTIGMATISM AND PRESBYOPIA: ICD-10-CM

## 2023-10-30 DIAGNOSIS — H25.813 COMBINED FORMS OF AGE-RELATED CATARACT OF BOTH EYES: ICD-10-CM

## 2023-10-30 DIAGNOSIS — H52.203 MYOPIA OF BOTH EYES WITH ASTIGMATISM AND PRESBYOPIA: ICD-10-CM

## 2023-10-30 DIAGNOSIS — H52.4 MYOPIA OF BOTH EYES WITH ASTIGMATISM AND PRESBYOPIA: ICD-10-CM

## 2023-10-30 DIAGNOSIS — E11.9 TYPE 2 DIABETES MELLITUS WITHOUT RETINOPATHY (H): ICD-10-CM

## 2023-10-30 PROCEDURE — 99213 OFFICE O/P EST LOW 20 MIN: CPT | Performed by: OPHTHALMOLOGY

## 2023-10-30 ASSESSMENT — CONF VISUAL FIELD
OS_NORMAL: 1
OS_INFERIOR_NASAL_RESTRICTION: 0
OD_SUPERIOR_NASAL_RESTRICTION: 0
OS_SUPERIOR_TEMPORAL_RESTRICTION: 0
OD_INFERIOR_TEMPORAL_RESTRICTION: 0
OS_SUPERIOR_NASAL_RESTRICTION: 0
OD_INFERIOR_NASAL_RESTRICTION: 0
OS_INFERIOR_TEMPORAL_RESTRICTION: 0
OD_NORMAL: 1
METHOD: COUNTING FINGERS
OD_SUPERIOR_TEMPORAL_RESTRICTION: 0

## 2023-10-30 ASSESSMENT — TONOMETRY
IOP_METHOD: APPLANATION
OD_IOP_MMHG: 17
OS_IOP_MMHG: 17

## 2023-10-30 ASSESSMENT — CUP TO DISC RATIO
OS_RATIO: 0.7
OD_RATIO: 0.7

## 2023-10-30 ASSESSMENT — VISUAL ACUITY
OD_CC: 20/25
METHOD: SNELLEN - LINEAR
OS_CC: 20/25
CORRECTION_TYPE: GLASSES

## 2023-10-30 ASSESSMENT — SLIT LAMP EXAM - LIDS
COMMENTS: NORMAL
COMMENTS: NORMAL

## 2023-10-30 NOTE — NURSING NOTE
Chief Complaints and History of Present Illnesses   Patient presents with    Glaucoma Suspect Follow Up     Chief Complaint(s) and History of Present Illness(es)       Glaucoma Suspect Follow Up              Laterality: both eyes              Comments    Pt returns for glaucoma suspect follow-up. He returns specifically for a pressure check after beginning new medication Latanoprost. He reports that he has been compliant with the drops and last used them around 7 or so this morning (he uses in the morning, not at bedtime). He does have some difficulty instilling the drops and it usually takes 2-3 tries to get them in. He does have a little tearing and dryness while on the new medication, but this is tolerable. Vision stable OU. MRI ordered at last visit was completed and results are on file in chart.   Lab Results       Component                Value               Date                       A1C                      8.7                 07/06/2023                 A1C                      9.1                 03/15/2023                 A1C                      7.7                 07/10/2022                 A1C                      7.6                 05/05/2022                 A1C                      8.9                 09/16/2021                 A1C                      8.8                 05/25/2021                 A1C                      8.6                 10/15/2019                 A1C                      8.3                 08/12/2019                 A1C                      8.2                 04/10/2019                 A1C                      7.5                 09/13/2018

## 2023-10-30 NOTE — PROGRESS NOTES
HPI       Glaucoma Suspect Follow Up    In both eyes.             Comments    Pt returns for glaucoma suspect follow-up. He returns specifically for a pressure check after beginning new medication Latanoprost. He reports that he has been compliant with the drops and last used them around 7 or so this morning (he uses in the morning, not at bedtime). He does have some difficulty instilling the drops and it usually takes 2-3 tries to get them in. He does have a little tearing and dryness while on the new medication, but this is tolerable. Vision stable OU. MRI ordered at last visit was completed and results are on file in chart.   Lab Results       Component                Value               Date                       A1C                      8.7                 07/06/2023                 A1C                      9.1                 03/15/2023                 A1C                      7.7                 07/10/2022                 A1C                      7.6                 05/05/2022                 A1C                      8.9                 09/16/2021                 A1C                      8.8                 05/25/2021                 A1C                      8.6                 10/15/2019                 A1C                      8.3                 08/12/2019                 A1C                      8.2                 04/10/2019                 A1C                      7.5                 09/13/2018                     Last edited by Kristine Urrutia on 10/30/2023 12:37 PM.         Review of systems for the eyes was negative other than the pertinent positives/negatives listed in the HPI.      Assessment & Plan    HPI:  Reinaldo Barrios is a 73 year old male with history of T2DM, CAD, HTN, HLD, myopia with astigmatism and presbyopia who presents for followup presumed NTG after starting latanoprost every morning both eyes. Sometimes misses the eye      POHx: myopia with astigmatism and presbyopia  PMHx:  "T2DM, CAD, HTN, HLD  Current Medications: aspirin EC 81 MG EC tablet, Take 1 tablet (81 mg) by mouth daily (Patient taking differently: Take 81 mg by mouth daily)  atorvastatin (LIPITOR) 40 MG tablet, TAKE 1 TABLET (40 MG) BY MOUTH DAILY  B-D U/F 31G X 8 MM insulin pen needle, USE THREE PEN NEEDLES DAILY OR AS DIRECTED.  carvedilol (COREG) 3.125 MG tablet, Take 1 tablet (3.125 mg) by mouth daily TAKE 1 TABLET (3.125 MG) BY MOUTH 2 TIMES DAILY (WITH MEALS)  insulin syringe-needle U-100 (BD INSULIN SYRINGE ULTRAFINE) 31G X 5/16\" 0.5 ML miscellaneous, Use one syringe 3 daily or as directed.  isosorbide mononitrate (IMDUR) 30 MG 24 hr tablet, TAKE ONE TABLET (30 MG) BY MOUTH ONCE DAILY  latanoprost (XALATAN) 0.005 % ophthalmic solution, Place 1 drop into both eyes daily  LEVEMIR FLEXPEN/FLEXTOUCH 100 UNIT/ML soln, INJECT 65 UNITS SUBCUTANEOUS EVERY MORNING  lisinopril (ZESTRIL) 5 MG tablet, TAKE ONE TABLET BY MOUTH ONCE DAILY  LORazepam (ATIVAN) 0.5 MG tablet, Take 1 tablet (0.5 mg) by mouth every 6 hours as needed for anxiety  NOVOLOG FLEXPEN 100 UNIT/ML soln, INJECT 10 - 12 UNITS UNDER THE SKIN TWICE A DAY  ONETOUCH ULTRA test strip, USE TO TEST BLOOD SUGAR 3 TIMES A DAY  STOOL SOFTENER/LAXATIVE 50-8.6 MG tablet, TAKE 1 TO 2 TABLETS BY MOUTH TWICE A DAY  clopidogrel (PLAVIX) 75 MG tablet, Take 1 tablet (75 mg) by mouth daily for 21 days    No current facility-administered medications on file prior to visit.    FHx: mom-glaucoma  PSHx: denies history of ocular surgeries       Current Eye Medications:      Assessment & Plan:  (H40.003) Glaucoma suspect of both eyes  (H40.1212) Low tension glaucoma of right eye, moderate stage  (primary encounter diagnosis)  Based on IOP and c/d ratio  IOP today 18/19  Maximum intraocular pressure 22/22  Family history: Yes  Trauma history: No-hit in the face with baseball right eye 1970  Gonioscopy: extremely deep, ?angle recession  Pachmetry: 597/598  Treatment History:     Today's " testing:  Visual field 24-2 09/18/23    Right eye central, cecocentral scotoma down to 0 in many spots, VFI 80%, MD -5.19,  PSd 8.50    Visual field 24-2 07/31/23   Right eye central, superocentral scotoma down to 0 in many spots, VFI 77%, MD -6.34,  PSd 9.41  Left eye normal, reliable, VFI 97%, MD -0.34, PSD 2.03     OCT nerve fiber layer 05/22/23:   Right eye - G(r) 61 NI (g) 70 TI (r) 31 NS (g) 91 TS (y) 94      Left eye - G(r) 67 NI (g) 84 TI (y) 98 NS (g) 79 TS (r) 91    IOP goal: mid teens  Right eye concern for NTG given degree of central loss and oct retinal nerve fiber layer thinning    Continue latanoprost, change to at bedtime dosing    (E11.9) Type 2 diabetes mellitus without retinopathy (H)  (primary encounter diagnosis)  (E11.65,  Z79.4) Type 2 diabetes mellitus with hyperglycemia, with long-term current use of insulin (H)  Diagnosed ~2000  Most recent HgBA1c 8.7 on 7/6/23  No background diabetic retinopathy or neovascularization noted on today's exam.  Discussed ocular and systemic benefits of blood pressure and blood sugar control.  Return in 1 year for full exam with dilation or sooner if changes to vision.      (H52.13,  H52.203,  H52.4) Myopia of both eyes with astigmatism and presbyopia  Doing well in current MRx    (H25.813) Combined forms of age-related cataract of both eyes  Mild cataracts are present and may account for some of the patient's visual complaints. No treatment currently recommended. The patient will monitor for vision changes and contact us with any decrease in vision. Recheck next visit.       Return in about 4 months (around 2/29/2024) for Follow Up-v/t, OCT RNFL.        Jd Tinajero MD     Attending Physician Attestation:  Complete documentation of historical and exam elements from today's encounter can be found in the full encounter summary report (not reduplicated in this progress note).  I personally obtained the chief complaint(s) and history of present illness.  I  confirmed and edited as necessary the review of systems, past medical/surgical history, family history, social history, and examination findings as documented by others; and I examined the patient myself.  I personally reviewed the relevant tests, images, and reports as documented above.  I formulated and edited as necessary the assessment and plan and discussed the findings and management plan with the patient and family. - Jd Tinajero MD

## 2023-12-16 DIAGNOSIS — Z79.4 TYPE 2 DIABETES MELLITUS WITH HYPERGLYCEMIA, WITH LONG-TERM CURRENT USE OF INSULIN (H): ICD-10-CM

## 2023-12-16 DIAGNOSIS — I10 ESSENTIAL HYPERTENSION WITH GOAL BLOOD PRESSURE LESS THAN 140/90: ICD-10-CM

## 2023-12-16 DIAGNOSIS — I50.42 SYSTOLIC AND DIASTOLIC CHF, CHRONIC (H): ICD-10-CM

## 2023-12-16 DIAGNOSIS — E11.65 TYPE 2 DIABETES MELLITUS WITH HYPERGLYCEMIA, WITH LONG-TERM CURRENT USE OF INSULIN (H): ICD-10-CM

## 2023-12-16 DIAGNOSIS — R07.2 PRECORDIAL PAIN: ICD-10-CM

## 2023-12-16 DIAGNOSIS — E78.5 HYPERLIPIDEMIA LDL GOAL <100: ICD-10-CM

## 2023-12-18 RX ORDER — ATORVASTATIN CALCIUM 40 MG/1
40 TABLET, FILM COATED ORAL DAILY
Qty: 90 TABLET | Refills: 0 | Status: SHIPPED | OUTPATIENT
Start: 2023-12-18 | End: 2024-03-21

## 2023-12-18 RX ORDER — LISINOPRIL 5 MG/1
TABLET ORAL
Qty: 90 TABLET | Refills: 0 | Status: SHIPPED | OUTPATIENT
Start: 2023-12-18 | End: 2024-03-21

## 2023-12-18 RX ORDER — CARVEDILOL 3.12 MG/1
3.12 TABLET ORAL 2 TIMES DAILY WITH MEALS
Qty: 180 TABLET | Refills: 0 | Status: SHIPPED | OUTPATIENT
Start: 2023-12-18 | End: 2024-06-13

## 2023-12-18 RX ORDER — INSULIN DETEMIR 100 [IU]/ML
INJECTION, SOLUTION SUBCUTANEOUS
Qty: 45 ML | Refills: 1 | Status: SHIPPED | OUTPATIENT
Start: 2023-12-18 | End: 2024-05-17

## 2023-12-18 RX ORDER — ISOSORBIDE MONONITRATE 30 MG/1
TABLET, EXTENDED RELEASE ORAL
Qty: 90 TABLET | Refills: 0 | Status: SHIPPED | OUTPATIENT
Start: 2023-12-18 | End: 2024-03-21

## 2024-01-07 ENCOUNTER — HEALTH MAINTENANCE LETTER (OUTPATIENT)
Age: 74
End: 2024-01-07

## 2024-01-11 DIAGNOSIS — K59.04 CHRONIC IDIOPATHIC CONSTIPATION: ICD-10-CM

## 2024-01-11 RX ORDER — DOCUSATE SODIUM AND SENNOSIDES 8.6; 5 MG/1; MG/1
1-2 TABLET, FILM COATED ORAL
Qty: 100 TABLET | Refills: 0 | Status: SHIPPED | OUTPATIENT
Start: 2024-01-11 | End: 2024-02-28

## 2024-02-09 ENCOUNTER — TELEPHONE (OUTPATIENT)
Dept: OPHTHALMOLOGY | Facility: CLINIC | Age: 74
End: 2024-02-09
Payer: MEDICARE

## 2024-02-09 NOTE — TELEPHONE ENCOUNTER
[facility] Call Center    Phone Message    May a detailed message be left on voicemail: yes     Reason for Call: Other: per pt he has had 2 appts with Dr Tinajero for  writers Next available appt is 8/5/24 pt is to Return in about 4 months (around 2/29/2024) for Follow Up-v/t, OCT RNFL., pt advised clinic has been cancelling his appts. Please contact pt to discuss Writer tried back line  pt is getting frustrated over this .       Action Taken: Message routed to:  [facility] ([facility] eye    Travel Screening: Not Applicable

## 2024-02-12 NOTE — TELEPHONE ENCOUNTER
Called and scheduled patient for 3/12/24 at 12:15 pm with Dr. Tinajero.    Joy Dimas, COT, 8:32 AM 02/12/2024

## 2024-02-14 ENCOUNTER — PATIENT OUTREACH (OUTPATIENT)
Dept: CARE COORDINATION | Facility: CLINIC | Age: 74
End: 2024-02-14
Payer: MEDICARE

## 2024-02-28 ENCOUNTER — PATIENT OUTREACH (OUTPATIENT)
Dept: CARE COORDINATION | Facility: CLINIC | Age: 74
End: 2024-02-28
Payer: MEDICARE

## 2024-02-28 DIAGNOSIS — Z79.4 TYPE 2 DIABETES MELLITUS WITH HYPERGLYCEMIA, WITH LONG-TERM CURRENT USE OF INSULIN (H): ICD-10-CM

## 2024-02-28 DIAGNOSIS — K59.04 CHRONIC IDIOPATHIC CONSTIPATION: ICD-10-CM

## 2024-02-28 DIAGNOSIS — E11.65 TYPE 2 DIABETES MELLITUS WITH HYPERGLYCEMIA, WITH LONG-TERM CURRENT USE OF INSULIN (H): ICD-10-CM

## 2024-02-28 RX ORDER — DOCUSATE SODIUM AND SENNOSIDES 8.6; 5 MG/1; MG/1
1-2 TABLET ORAL
Qty: 100 TABLET | Refills: 0 | Status: SHIPPED | OUTPATIENT
Start: 2024-02-28 | End: 2024-05-10

## 2024-02-28 RX ORDER — INSULIN ASPART 100 [IU]/ML
INJECTION, SOLUTION INTRAVENOUS; SUBCUTANEOUS
Qty: 15 ML | Refills: 0 | Status: SHIPPED | OUTPATIENT
Start: 2024-02-28 | End: 2024-05-10

## 2024-02-28 RX ORDER — BLOOD SUGAR DIAGNOSTIC
STRIP MISCELLANEOUS
Qty: 200 STRIP | Refills: 0 | Status: SHIPPED | OUTPATIENT
Start: 2024-02-28 | End: 2024-03-21

## 2024-03-12 ENCOUNTER — OFFICE VISIT (OUTPATIENT)
Dept: OPHTHALMOLOGY | Facility: CLINIC | Age: 74
End: 2024-03-12
Payer: MEDICARE

## 2024-03-12 DIAGNOSIS — H40.1212 LOW TENSION GLAUCOMA OF RIGHT EYE, MODERATE STAGE: Primary | ICD-10-CM

## 2024-03-12 DIAGNOSIS — H40.1221 LOW-TENSION GLAUCOMA, LEFT EYE, MILD STAGE: ICD-10-CM

## 2024-03-12 PROCEDURE — 92133 CPTRZD OPH DX IMG PST SGM ON: CPT | Performed by: OPHTHALMOLOGY

## 2024-03-12 PROCEDURE — 99213 OFFICE O/P EST LOW 20 MIN: CPT | Performed by: OPHTHALMOLOGY

## 2024-03-12 ASSESSMENT — REFRACTION_WEARINGRX
SPECS_TYPE: PAL
OS_SPHERE: -0.50
OD_CYLINDER: +1.25
OS_CYLINDER: +1.00
OS_AXIS: 029
OD_ADD: +2.50
OD_AXIS: 162
OS_ADD: +2.50
OD_SPHERE: -1.00

## 2024-03-12 ASSESSMENT — VISUAL ACUITY
CORRECTION_TYPE: GLASSES
OD_CC: 20/20
OS_CC: 20/25
METHOD: SNELLEN - LINEAR
OD_CC+: -1

## 2024-03-12 ASSESSMENT — TONOMETRY
OD_IOP_MMHG: 12
IOP_METHOD: TONOPEN
IOP_METHOD: APPLANATION
OD_IOP_MMHG: 17
OS_IOP_MMHG: 16
OS_IOP_MMHG: 14

## 2024-03-12 ASSESSMENT — CUP TO DISC RATIO
OS_RATIO: 0.7
OD_RATIO: 0.7

## 2024-03-12 ASSESSMENT — SLIT LAMP EXAM - LIDS
COMMENTS: NORMAL
COMMENTS: NORMAL
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
## 2024-03-12 NOTE — NURSING NOTE
Chief Complaints and History of Present Illnesses   Patient presents with    Follow Up     Low tension glaucoma of right eye, moderate stage      Chief Complaint(s) and History of Present Illness(es)       Follow Up              Comments: Low tension glaucoma of right eye, moderate stage               Comments    Pt states no change in VA since last visit  States floaters same as last visit   No flashes, eye pain or redness  Using:  Latanoprost at bedtime both eye   LBS: 150   Last A1C: 8.7  Lab Results       Component                Value               Date                       A1C                      8.7                 07/06/2023                 A1C                      9.1                 03/15/2023                 A1C                      7.7                 07/10/2022                 A1C                      7.6                 05/05/2022                 A1C                      8.9                 09/16/2021                 A1C                      8.8                 05/25/2021                 A1C                      8.6                 10/15/2019                 A1C                      8.3                 08/12/2019                 A1C                      8.2                 04/10/2019                 A1C                      7.5                 09/13/2018              Shannon Bellamy COT 12:17 PM March 12, 2024

## 2024-03-21 ENCOUNTER — OFFICE VISIT (OUTPATIENT)
Dept: INTERNAL MEDICINE | Facility: CLINIC | Age: 74
End: 2024-03-21
Payer: MEDICARE

## 2024-03-21 VITALS
OXYGEN SATURATION: 98 % | RESPIRATION RATE: 20 BRPM | WEIGHT: 215.8 LBS | HEIGHT: 66 IN | TEMPERATURE: 98.1 F | SYSTOLIC BLOOD PRESSURE: 136 MMHG | DIASTOLIC BLOOD PRESSURE: 70 MMHG | HEART RATE: 70 BPM | BODY MASS INDEX: 34.68 KG/M2

## 2024-03-21 DIAGNOSIS — G63 POLYNEUROPATHY ASSOCIATED WITH UNDERLYING DISEASE (H): Primary | ICD-10-CM

## 2024-03-21 DIAGNOSIS — I10 ESSENTIAL HYPERTENSION WITH GOAL BLOOD PRESSURE LESS THAN 140/90: ICD-10-CM

## 2024-03-21 DIAGNOSIS — H90.3 BILATERAL SENSORINEURAL HEARING LOSS: ICD-10-CM

## 2024-03-21 DIAGNOSIS — E11.65 TYPE 2 DIABETES MELLITUS WITH HYPERGLYCEMIA, WITH LONG-TERM CURRENT USE OF INSULIN (H): ICD-10-CM

## 2024-03-21 DIAGNOSIS — M17.0 PRIMARY OSTEOARTHRITIS OF BOTH KNEES: ICD-10-CM

## 2024-03-21 DIAGNOSIS — Z95.2 S/P AORTIC VALVE REPLACEMENT: ICD-10-CM

## 2024-03-21 DIAGNOSIS — Z79.4 TYPE 2 DIABETES MELLITUS WITH HYPERGLYCEMIA, WITH LONG-TERM CURRENT USE OF INSULIN (H): ICD-10-CM

## 2024-03-21 DIAGNOSIS — E66.01 MORBID OBESITY (H): ICD-10-CM

## 2024-03-21 DIAGNOSIS — Z95.1 S/P CABG (CORONARY ARTERY BYPASS GRAFT): ICD-10-CM

## 2024-03-21 DIAGNOSIS — I50.82 BIVENTRICULAR CONGESTIVE HEART FAILURE (H): ICD-10-CM

## 2024-03-21 DIAGNOSIS — E11.43 TYPE 2 DIABETES MELLITUS WITH DIABETIC AUTONOMIC NEUROPATHY, WITHOUT LONG-TERM CURRENT USE OF INSULIN (H): ICD-10-CM

## 2024-03-21 DIAGNOSIS — E78.5 HYPERLIPIDEMIA LDL GOAL <100: ICD-10-CM

## 2024-03-21 DIAGNOSIS — I73.9 PERIPHERAL VASCULAR DISEASE (H): ICD-10-CM

## 2024-03-21 DIAGNOSIS — R19.8 ALTERED BOWEL FUNCTION: ICD-10-CM

## 2024-03-21 LAB
ALBUMIN SERPL BCG-MCNC: 3.9 G/DL (ref 3.5–5.2)
ALP SERPL-CCNC: 99 U/L (ref 40–150)
ALT SERPL W P-5'-P-CCNC: 15 U/L (ref 0–70)
ANION GAP SERPL CALCULATED.3IONS-SCNC: 11 MMOL/L (ref 7–15)
AST SERPL W P-5'-P-CCNC: 22 U/L (ref 0–45)
BILIRUB DIRECT SERPL-MCNC: <0.2 MG/DL (ref 0–0.3)
BILIRUB SERPL-MCNC: 0.6 MG/DL
BUN SERPL-MCNC: 14.4 MG/DL (ref 8–23)
CALCIUM SERPL-MCNC: 9.1 MG/DL (ref 8.8–10.2)
CHLORIDE SERPL-SCNC: 101 MMOL/L (ref 98–107)
CREAT SERPL-MCNC: 0.93 MG/DL (ref 0.67–1.17)
CREAT UR-MCNC: 70.1 MG/DL
DEPRECATED HCO3 PLAS-SCNC: 27 MMOL/L (ref 22–29)
EGFRCR SERPLBLD CKD-EPI 2021: 86 ML/MIN/1.73M2
ERYTHROCYTE [DISTWIDTH] IN BLOOD BY AUTOMATED COUNT: 13.2 % (ref 10–15)
GLUCOSE SERPL-MCNC: 82 MG/DL (ref 70–99)
HBA1C MFR BLD: 7.4 %
HCT VFR BLD AUTO: 39.7 % (ref 40–53)
HGB BLD-MCNC: 13.2 G/DL (ref 13.3–17.7)
MCH RBC QN AUTO: 30.3 PG (ref 26.5–33)
MCHC RBC AUTO-ENTMCNC: 33.2 G/DL (ref 31.5–36.5)
MCV RBC AUTO: 91 FL (ref 78–100)
MICROALBUMIN UR-MCNC: <12 MG/L
MICROALBUMIN/CREAT UR: NORMAL MG/G{CREAT}
PLATELET # BLD AUTO: 231 10E3/UL (ref 150–450)
POTASSIUM SERPL-SCNC: 3.8 MMOL/L (ref 3.4–5.3)
PROT SERPL-MCNC: 7.6 G/DL (ref 6.4–8.3)
RBC # BLD AUTO: 4.36 10E6/UL (ref 4.4–5.9)
SODIUM SERPL-SCNC: 139 MMOL/L (ref 135–145)
WBC # BLD AUTO: 7.8 10E3/UL (ref 4–11)

## 2024-03-21 PROCEDURE — G0008 ADMIN INFLUENZA VIRUS VAC: HCPCS | Performed by: INTERNAL MEDICINE

## 2024-03-21 PROCEDURE — 80053 COMPREHEN METABOLIC PANEL: CPT | Performed by: INTERNAL MEDICINE

## 2024-03-21 PROCEDURE — 82043 UR ALBUMIN QUANTITATIVE: CPT | Performed by: INTERNAL MEDICINE

## 2024-03-21 PROCEDURE — 82248 BILIRUBIN DIRECT: CPT | Performed by: INTERNAL MEDICINE

## 2024-03-21 PROCEDURE — 90662 IIV NO PRSV INCREASED AG IM: CPT | Performed by: INTERNAL MEDICINE

## 2024-03-21 PROCEDURE — 82570 ASSAY OF URINE CREATININE: CPT | Performed by: INTERNAL MEDICINE

## 2024-03-21 PROCEDURE — 85027 COMPLETE CBC AUTOMATED: CPT | Performed by: INTERNAL MEDICINE

## 2024-03-21 PROCEDURE — 36415 COLL VENOUS BLD VENIPUNCTURE: CPT | Performed by: INTERNAL MEDICINE

## 2024-03-21 PROCEDURE — 83036 HEMOGLOBIN GLYCOSYLATED A1C: CPT | Performed by: INTERNAL MEDICINE

## 2024-03-21 PROCEDURE — 99214 OFFICE O/P EST MOD 30 MIN: CPT | Mod: 25 | Performed by: INTERNAL MEDICINE

## 2024-03-21 RX ORDER — RESPIRATORY SYNCYTIAL VIRUS VACCINE 120MCG/0.5
0.5 KIT INTRAMUSCULAR ONCE
Qty: 1 EACH | Refills: 0 | Status: CANCELLED | OUTPATIENT
Start: 2024-03-21 | End: 2024-03-21

## 2024-03-21 RX ORDER — BLOOD SUGAR DIAGNOSTIC
STRIP MISCELLANEOUS
Qty: 200 STRIP | Refills: 0 | Status: SHIPPED | OUTPATIENT
Start: 2024-03-21 | End: 2024-05-10

## 2024-03-21 RX ORDER — LISINOPRIL 5 MG/1
5 TABLET ORAL DAILY
Qty: 90 TABLET | Refills: 3 | Status: SHIPPED | OUTPATIENT
Start: 2024-03-21

## 2024-03-21 RX ORDER — ISOSORBIDE MONONITRATE 30 MG/1
TABLET, EXTENDED RELEASE ORAL
Qty: 90 TABLET | Refills: 3 | Status: SHIPPED | OUTPATIENT
Start: 2024-03-21

## 2024-03-21 RX ORDER — ATORVASTATIN CALCIUM 40 MG/1
40 TABLET, FILM COATED ORAL DAILY
Qty: 90 TABLET | Refills: 3 | Status: SHIPPED | OUTPATIENT
Start: 2024-03-21

## 2024-03-21 NOTE — PROGRESS NOTES
Patient I do not know    Subjective   Reinaldo is a 73 year old, presenting for the following health issues:  Diabetes      3/21/2024     7:37 AM   Additional Questions   Roomed by Myesha HALL     Patient is here for follow up diabetes, neck pain, heart issues. Patient declined to answer any of the tablet question or other rooming questions. Declined to do annual wellness. Was unable to complete rooming questions.              EMR reviewed including:             Complaint, History of Chief Complaint, Corresponding Review of Systems, and Complaint Specific Physical Examination.    #1   Follow-up on type 2 diabetes  Concerned regarding insurance changing insulin coverage  Checks blood sugar about 5 times daily.  Has intermittent hypoglycemia.  Is using sliding scale NovoLog over background Levemir.  Denies polyuria or polydipsia.  Denies significant hypoglycemia.  Has full awareness and corrects with meals.          Exam:   LUNGS: clear bilaterally, airflow is brisk, no intercostal retraction or stridor is noted. No coughing is noted during visit.   HEART:  regular without rubs, clicks, gallops, or murmurs. PMI is nondisplaced. Upstrokes are brisk. S1,S2 are heard.   NEURO: Pt is alert and appropriate. No neurologic lateralization is noted. Cranial nerves 2-12 are intact. Peripheral sensory function is decreased in the feet.      #2   Chronic coronary artery disease with subsequent biventricular congestive heart failure.  Status post coronary artery bypass grafting as well as bioprosthetic aortic valve.  Denies any recent embolic phenomenon/stroke.  Does have dyspnea with any exertion.  Resolves promptly with rest.  Not associated with chest pain.  Recent left ventricular ejection fraction under 40.  Continues Coreg, isosorbide.  Denies edema.  Currently not on diuretic.          Exam:  As above      #3   Bowel changes  Intermittent left lower quadrant discomfort  Denies any melena or hematochezia.  Persistently  "refuses colonoscopy.  Wishes to see a neurologist.          Exam:   GI: Abdomen is soft, without rebound, guarding or tenderness. Bowel sounds are appropriate. No renal bruits are heard.        Patient has been interviewed, applicable history and applied review of systems have been performed.    Vital Signs:   /70   Pulse 70   Temp 98.1  F (36.7  C) (Temporal)   Resp 20   Ht 1.68 m (5' 6.14\")   Wt 97.9 kg (215 lb 12.8 oz)   SpO2 98%   BMI 34.68 kg/m        Decision Making    Problem and Complexity     1. Type 2 diabetes mellitus with diabetic autonomic neuropathy, without long-term current use of insulin (H)  Will try to obtain continuous glucose monitor.  Will need to change brands of insulin upon conclusion of his current course.  Check A1c and lab work.  - HEMOGLOBIN A1C; Future  - ONETOUCH ULTRA test strip; USE TO TEST BLOOD SUGAR 5 TIMES A DAY  Dispense: 200 strip; Refill: 0  - Continuous Blood Gluc  (FREESTYLE ANNE 2 READER) ELADIO; Apply 1 each topically once for 1 dose Use to read blood sugars per 's instructions.  Dispense: 1 each; Refill: 0  - Continuous Blood Gluc Sensor (FREESTYLE ANNE 2 SENSOR) MISC; Apply 1 each topically every 14 days Use 1 sensor every 14 days. Use to read blood sugars per 's instructions.  Dispense: 6 each; Refill: 5  - HEMOGLOBIN A1C    2. Polyneuropathy associated with underlying disease (H24)  Chronic and stable.    3. Essential hypertension with goal blood pressure less than 140/90  Currently controlled.  Continue current medication   Continue low-fat diet  - BASIC METABOLIC PANEL; Future  - BASIC METABOLIC PANEL    4. Hyperlipidemia LDL goal <100  Continue current medication check lipid and liver.  Continue statin.  - Hepatic panel (Albumin, ALT, AST, Bili, Alk Phos, TP); Future  - atorvastatin (LIPITOR) 40 MG tablet; Take 1 tablet (40 mg) by mouth daily  Dispense: 90 tablet; Refill: 3  - Hepatic panel (Albumin, ALT, AST, Bili, Alk " Phos, TP)    5. Biventricular congestive heart failure (H)  Continue current meds.  Well-controlled  - isosorbide mononitrate (IMDUR) 30 MG 24 hr tablet; TAKE ONE TABLET (30 MG) BY MOUTH ONCE DAILY  Dispense: 90 tablet; Refill: 3  - lisinopril (ZESTRIL) 5 MG tablet; Take 1 tablet (5 mg) by mouth daily  Dispense: 90 tablet; Refill: 3    6. S/P CABG (coronary artery bypass graft)  No recurrence of chest pain    7. S/P aortic valve replacement  Follow yearly echocardiography    8. Primary osteoarthritis of both knees  Complains of knee pain bilaterally.  Obtain x-rays per patient request  - XR Knee AP Standing Bilateral; Future    9. Peripheral vascular disease (H24)  Stable.  Treat as above    10. Morbid obesity (H)  Recommend weight loss responsible caloric restriction    11. Bilateral sensorineural hearing loss  Hearing aids modest benefit.    12. Altered bowel function  Set up for GI evaluation as per patient's recurrent request  - Adult GI  Referral - Consult Only; Future    13. Type 2 diabetes mellitus with hyperglycemia, with long-term current use of insulin (H)  As above    - Albumin Random Urine Quantitative with Creat Ratio                                FOLLOW UP   I have asked the patient to make an appointment for followup with me in 1 month with blood sugar results    Regarding routine vaccinations:  I have reviewed the patient's vaccination schedule and discussed the benefits of prophylactic vaccination in detail.  I recommend the patient contact their pharmacist for vaccinations.  Discussed that most insurance companies now favor reimbursement to the pharmacies and it will financially behoove the patient to have vaccinations performed at their pharmacy.        I have carefully explained the diagnosis and treatment options to the patient.  The patient has displayed an understanding of the above, and all subsequent questions were answered.      DO RADHA Horn    Portions of this note  were produced using [employer]  Although every attempt at real-time proof reading has been made, occasional grammar/syntax errors may have been missed.

## 2024-03-26 ENCOUNTER — TELEPHONE (OUTPATIENT)
Dept: GASTROENTEROLOGY | Facility: CLINIC | Age: 74
End: 2024-03-26
Payer: MEDICARE

## 2024-03-26 NOTE — TELEPHONE ENCOUNTER
[facility] Call Center    Phone Message    May a detailed message be left on voicemail: Yes    Reason for Call: Other: Patient is currently scheduled on 5/1/24, as visit type New GI Urgent. This is outside the expected timeline for this referral. Patient has been added to the waitlist.      Pt declined next available ([facility] [place]) due to location. Pt wants [place] specifically.     Action Taken: Message routed to:  Other: GI REFERRAL TRIAGE POOL     Travel Screening: Not Applicable

## 2024-04-03 ENCOUNTER — HOSPITAL ENCOUNTER (OUTPATIENT)
Dept: GENERAL RADIOLOGY | Facility: CLINIC | Age: 74
Discharge: HOME | End: 2024-04-03
Attending: INTERNAL MEDICINE | Admitting: INTERNAL MEDICINE
Payer: MEDICARE

## 2024-04-03 DIAGNOSIS — M17.0 PRIMARY OSTEOARTHRITIS OF BOTH KNEES: ICD-10-CM

## 2024-04-03 PROCEDURE — 73565 X-RAY EXAM OF KNEES: CPT

## 2024-04-10 ENCOUNTER — OFFICE VISIT (OUTPATIENT)
Dept: INTERNAL MEDICINE | Facility: CLINIC | Age: 74
End: 2024-04-10
Payer: MEDICARE

## 2024-04-10 VITALS
DIASTOLIC BLOOD PRESSURE: 60 MMHG | BODY MASS INDEX: 34.78 KG/M2 | RESPIRATION RATE: 14 BRPM | HEIGHT: 67 IN | WEIGHT: 221.6 LBS | OXYGEN SATURATION: 98 % | HEART RATE: 68 BPM | TEMPERATURE: 98.1 F | SYSTOLIC BLOOD PRESSURE: 118 MMHG

## 2024-04-10 DIAGNOSIS — Z95.2 S/P AORTIC VALVE REPLACEMENT: ICD-10-CM

## 2024-04-10 DIAGNOSIS — Z95.1 S/P CABG (CORONARY ARTERY BYPASS GRAFT): ICD-10-CM

## 2024-04-10 DIAGNOSIS — E66.09 NON MORBID OBESITY DUE TO EXCESS CALORIES: ICD-10-CM

## 2024-04-10 DIAGNOSIS — I10 ESSENTIAL HYPERTENSION WITH GOAL BLOOD PRESSURE LESS THAN 140/90: ICD-10-CM

## 2024-04-10 DIAGNOSIS — E11.43 TYPE 2 DIABETES MELLITUS WITH DIABETIC AUTONOMIC NEUROPATHY, WITHOUT LONG-TERM CURRENT USE OF INSULIN (H): Primary | ICD-10-CM

## 2024-04-10 DIAGNOSIS — Z12.11 SCREEN FOR COLON CANCER: ICD-10-CM

## 2024-04-10 DIAGNOSIS — R94.30 CARDIAC LV EJECTION FRACTION 30-35%: ICD-10-CM

## 2024-04-10 DIAGNOSIS — Z79.01 LONG TERM CURRENT USE OF ANTICOAGULANT THERAPY: ICD-10-CM

## 2024-04-10 PROCEDURE — 99214 OFFICE O/P EST MOD 30 MIN: CPT | Performed by: INTERNAL MEDICINE

## 2024-04-10 RX ORDER — RESPIRATORY SYNCYTIAL VIRUS VACCINE 120MCG/0.5
0.5 KIT INTRAMUSCULAR ONCE
Qty: 1 EACH | Refills: 0 | Status: CANCELLED | OUTPATIENT
Start: 2024-04-10 | End: 2024-04-10

## 2024-04-10 NOTE — PROGRESS NOTES
Anali Najera is a 73 year old, presenting for the following health issues:  Recheck Medication        4/10/2024     8:02 AM   Additional Questions   Roomed by Gabriela HALL         EMR reviewed including:             Complaint, History of Chief Complaint, Corresponding Review of Systems, and Complaint Specific Physical Examination.    #1   Follow-up on type 2 diabetes  Last A1c was 7.4.  Patient taking background Levemir with preprandial NovoLog.  Patient concerned because his formulary has once again changed and he will now need to change his insulins.  Patient is going to investigate formulary and get that back to me.  Has occasional hypoglycemia.  Resolves with snack.  Follows blood sugars very closely  Did not want to pay for CGM.  Denies polyuria or polydipsia.  Denies vision changes.          Exam:   LUNGS: clear bilaterally, airflow is brisk, no intercostal retraction or stridor is noted. No coughing is noted during visit.   GI: Abdomen is soft, without rebound, guarding or tenderness. Bowel sounds are appropriate. No renal bruits are heard.  Abdomen is morbidly obese.   NEURO: Pt is alert and appropriate. No neurologic lateralization is noted. Cranial nerves 2-12 are intact. Peripheral sensory function is decreased in the feet.      #2   Follow-up on hypertension  Blood pressure 118/60  Continues lisinopril without cough or side effect.  Continues Coreg for congestive heart failure without side effect.  Denies bradycardia or near syncope.  Denies headaches, dyspnea at rest.          Exam:   HEART:  regular without rubs, gallops, or murmurs. PMI is nondisplaced. Upstrokes are brisk. S1,S2 are heard.  Valvular click is consistent with mechanical aortic valve.  No significant edema noted at this time.      #3   Follow-up on coronary artery disease  Acknowledges decreased exercise capability.  Ejection fraction 35 to 40% at last echo.  Denies chest pain.  Able to walk less than a block without  "stopping.  Patient questions anemia.  Heme within normal limits.          Exam:  As above        Patient has been interviewed, applicable history and applied review of systems have been performed.    Vital Signs:   /60   Pulse 68   Temp 98.1  F (36.7  C)   Resp 14   Ht 1.69 m (5' 6.54\")   Wt 100.5 kg (221 lb 9.6 oz)   SpO2 98%   BMI 35.19 kg/m        Decision Making    Problem and Complexity     1. Type 2 diabetes mellitus with diabetic autonomic neuropathy, without long-term current use of insulin (H)  Consider SGLT2 inhibitor.  Discussed the unlikely possibility of weaning/discontinuing insulin as he is currently taking 75 units daily  Recommend decreasing preprandial dose slightly if hypoglycemia continues.    2. Essential hypertension with goal blood pressure less than 140/90  Currently controlled.  Continue medication    3. S/P aortic valve replacement  Follow-up with regular echocardiography and cardiology follow-up  [facility]'s tissue valve in place.  No longer on anticoagulation    5. S/P CABG (coronary artery bypass graft)  Stable.  No chest pain.  Recent Lexiscan in 23 reviewed showing prior infarctions with no inducible ischemia.    6. Cardiac LV ejection fraction 30-35%  As noted    7. Non morbid obesity due to excess calories  Recommend weight loss responsible caloric restriction    8. Screen for colon cancer  Patient will schedule  - Colonoscopy Screening  Referral; Future                                FOLLOW UP   I have asked the patient to make an appointment for followup with me in 3 months    Regarding routine vaccinations:  I have reviewed the patient's vaccination schedule and discussed the benefits of prophylactic vaccination in detail.  I recommend the patient contact their pharmacist for vaccinations.  Discussed that most insurance companies now favor reimbursement to the pharmacies and it will financially behoove the patient to have vaccinations performed at their " pharmacy.        I have carefully explained the diagnosis and treatment options to the patient.  The patient has displayed an understanding of the above, and all subsequent questions were answered.      DO RADHA Horn    Portions of this note were produced using [employer] [place]  Although every attempt at real-time proof reading has been made, occasional grammar/syntax errors may have been missed.

## 2024-05-01 ENCOUNTER — OFFICE VISIT (OUTPATIENT)
Dept: GASTROENTEROLOGY | Facility: CLINIC | Age: 74
End: 2024-05-01
Attending: INTERNAL MEDICINE
Payer: MEDICARE

## 2024-05-01 ENCOUNTER — DOCUMENTATION ONLY (OUTPATIENT)
Dept: GASTROENTEROLOGY | Facility: CLINIC | Age: 74
End: 2024-05-01

## 2024-05-01 VITALS
DIASTOLIC BLOOD PRESSURE: 96 MMHG | OXYGEN SATURATION: 99 % | WEIGHT: 220 LBS | SYSTOLIC BLOOD PRESSURE: 157 MMHG | HEART RATE: 74 BPM | BODY MASS INDEX: 34.94 KG/M2

## 2024-05-01 DIAGNOSIS — R19.8 ALTERED BOWEL FUNCTION: ICD-10-CM

## 2024-05-01 DIAGNOSIS — R10.32 LLQ ABDOMINAL PAIN: Primary | ICD-10-CM

## 2024-05-01 PROCEDURE — 99203 OFFICE O/P NEW LOW 30 MIN: CPT | Performed by: PHYSICIAN ASSISTANT

## 2024-05-01 ASSESSMENT — PAIN SCALES - GENERAL: PAINLEVEL: NO PAIN (0)

## 2024-05-01 NOTE — PROGRESS NOTES
"Rapid Response Epic Documentation     Situation:   RRT requested to [facility] for a ground level fall.      Objective:    RRT arrived and observed adult male being pushed in wheelchair from front doors to [facility] area.   Pt accompanied by pt care coordinator who advised RRT that pt was immediately outside exterior doors when he tripped and fell.   Pt struck left portion of face and arm on ground, but had no positive LOC.   Pt was immediately able to get himself up, where he was assisted to a wheelchair and moved to [facility].       Assessment:        At pt side.  Pt is a 74 y/o male found sitting in wheelchair, conversing with pt care coordinator and witnesses.  Pt reports same HPI, stating, \"I tripped on the concrete and fell.\"  Pt denies LOC and c/o pn to left eyebrow and left cheek.  Primary assessment: airway-open; breathing-normal/non-labored; circulation-strong/regular; skin-warm/pink/dry; PERRLA; A&O x 4; CMS x 4.   Pt denied the following: headache/lightheadedness/dizziness; neck pn/no pn upon palpation/no pn with ROM; chest pn/SOB; abd pn; back pn/no pn upon palpation.      Pt reported cardiac hx, but denied use of blood thinners.      Vitals obtained as stated below:    BP:  183/85, 171/89, 171/79    Pulse: 72, 73, 72  Respiration: 18  SPO2: 99% R/A, 99% R/A  Glucose: NA  Mental Status: Alert  CMS: Intact  Stroke Scale: Negative  EKG: Not Performed      Treatment:    Bleeding Control: Gauze    Dried blood noted at pt's eyebrow, with two small lacerations noted.  Swelling noted to pt's left eyelid.  Bleeding controlled without intervention.  RRT RN cleaned and dressed with bandage.    Minor bleeding noted to pt's left cheekbone with small vertical laceration noted.   RRT RN cleaned and dressed with bandage/bleeding controlled.     Laceration to left forearm at wrist.  RRT RN cleaned and dressed with bandage/bleeding controlled.      Location:    Pt moved from [facility] to [facility] via wheelchair.   "     Disposition:      AMA.  Pt denied need for 911 transport for further evaluation and requested to continue with scheduled GI appointment.   RRT consulted with RN and pt care coordinator, who advised that they would transport pt to appointment via wheelchair.   Pt advised to request RRT paramedic after appointment for assistance with getting to vehicle.   Pt care coordinator advised RRT paramedic that she would inform pt's care team to request paramedic for assistance with getting pt to vehicle.  Pt advised to request RRT if pn increased or if he felt symptoms consistent with head injury and pt acknowledged that he would.     Protocol Used:     Other Trauma secondary to ground level fall

## 2024-05-01 NOTE — PROGRESS NOTES
GI CLINIC VISIT    CC/REFERRING MD:  Mark Constantino*  REASON FOR CONSULTATION: LLQ abdominal discomfort    ASSESSMENT/PLAN:  74 y/o M presents for evaluation of left lower abdominal discomfort.     #LLQ abdominal discomfort  #change in bowel pattern  Patient reports that he had a change in his bowel pattern about a year and a half ago and will generally have 3-6 bowel movements daily that can vary quite a bit in consistency.  Can be hard in nature, at other times can be formed and soft.  He also notes some mild left lower quadrant abdominal discomfort that is improved with passing gas as well as having a bowel movement.  He has never had a colonoscopy, and does have a strong family history of colorectal cancer as his father was diagnosed with colon cancer in his 70s.  We discussed obtaining a colonoscopy for further evaluation but patient definitely refuses.  We discussed risks of refusing colonoscopy at this time as this is the best test to diagnose colorectal cancer.  But he continues to refuse.  Discussed obtaining a CT of his abdomen and pelvis for further evaluation which she was agreeable to.  We can assess his stool burden at that time as well because I do wonder if he is constipated.  Of note had a fall just prior to arrival to our clinic, fall appears mechanical in nature as he states that he tripped.  He did hit his head but denies LOC.  Did have evidence of some left periorbital swelling and overlying ecchymosis with a few associated abrasions.  Was neurologically intact with no midline C, T or L-spine tenderness.  Given his age I did recommend that we obtain a CT of his head as well as C-spine to evaluate for any acute injuries but patient declined.  Again discussed risks and benefits and patient has capacity to make decisions.  Discussed warning signs to be seen in the emergency department.  --obtain CT abd/pelvis.   --if he changes his mind about colonoscopy he will notify me.      RTC  PRN    Thank you for this consultation.  It was a pleasure to participate in the care of this patient; please contact us with any further questions.       30 minutes spent on the date of the encounter doing chart review, review of outside records, patient visit, and documentation    This note was created with voice recognition software, and while reviewed for accuracy, typos may remain.    Lio Copeland PA-C  Division of Gastroenterology, Hepatology and Nutrition  [facility]  74 y/o M presents for evaluation of left lower abdominal discomfort.     He reports he developed left lower abdominal discomfort, which is present most days. He will have a bowel movement and then pain resolves. He will generally have a bowel movement 3-6x/day, stool consistency can vary between a bristol type 1-4. Every once in a while he will have a looser stool. Does notice at times stools can be thinner. He notes that prior to his stool pattern this being way, he would generally have a bowel movement once a day, described as a bristol type 4. Denies BRBPR currently, on a rare occasion will see some bright red blood on the toilet paper. At times can see mucous in his stool.  Denies nausea or vomiting.       Denies frequent NSAIDs or Tylenol. Denies use of OTC herbal supplements/weight loss products.      Denies use of ETOH.  Patient smokes a pipe for a few months in the year.  No recreational drug use.     Father has a hx of colon cancer, diagnosed in his 70's.  Denies family history of IBD/celiac disease.     Of note, a rapid response was called prior to our appointment as patient had a fall just outside of the building, he caught his toe on the concrete and fell forward onto his face. Denies LOC. Denies any preceding lightheadedness or dizziness or associated chest pain. He denies any headaches currently, denies any neck pain. Denies any numbness/tingling in his extremities. Denies any  focal weakness. Denies nausea or vomiting.  He is not on any blood thinner medications. On exam, he is each A&Ox 4, PERRLA. He has swelling over the left eyebrow with associated ecchymosis, and abrasions around the left eye, with some mild tenderness. EOMI. No evidence of hemotympanum. Strength and sensation equal and intact bilaterally. RRR, no m/r/g. No midline C/T/L spine tenderness. He has full ROM his neck. Cannot clear based on Citizen of [place] head/c spine rules -- we discussed having him obtain emergent CT head/C spine, but patient is refusing. We discussed risks and benefits, and patient has capacity to make this decision.       ROS:    No fevers or chills  No weight loss  No shortness of breath or wheezing  No chest pain or pressure  No odynophagia or dysphagia  No BRBPR, hematochezia, melena  No anxiety or depression    PROBLEM LIST  Patient Active Problem List    Diagnosis Date Noted    Peripheral vascular disease (H24) 03/21/2024     Priority: Medium    Polyneuropathy associated with underlying disease (H24) 05/05/2022     Priority: Medium    Obesity (BMI 35.0-39.9) with comorbidity (H) 04/10/2019     Priority: Medium    Type 2 diabetes mellitus with diabetic autonomic neuropathy, without long-term current use of insulin (H) 05/18/2018     Priority: Medium    Non morbid obesity due to excess calories 07/03/2017     Priority: Medium    Essential hypertension with goal blood pressure less than 140/90 05/22/2016     Priority: Medium    S/P aortic valve replacement 04/27/2016     Priority: Medium    Long term current use of anticoagulant therapy 03/16/2016     Priority: Medium    Heart valve replaced [Z95.2] 03/16/2016     Priority: Medium    Advanced directives, counseling/discussion 01/12/2016     Priority: Medium     declined      S/P CABG (coronary artery bypass graft) 12/04/2015     Priority: Medium    Aortic stenosis 11/07/2015     Priority: Medium    CHF (congestive heart failure) (H) 11/05/2015      "Priority: Medium    NSTEMI (non-ST elevated myocardial infarction) (H) 11/03/2015     Priority: Medium    ACS (acute coronary syndrome) (H) 11/03/2015     Priority: Medium    Aortic valve stenosis 11/02/2015     Priority: Medium    Personal history of tobacco use, presenting hazards to health 11/02/2015     Priority: Medium    Hyperlipidemia LDL goal <100 11/02/2015     Priority: Medium    Hiatal hernia 11/02/2015     Priority: Medium    Chest pain, unspecified chest pain type 11/02/2015     Priority: Medium    Cardiac LV ejection fraction 30-35% 11/02/2015     Priority: Medium       PERTINENT PAST MEDICAL HISTORY:    Past Medical History:   Diagnosis Date    Aortic valve stenosis 11/2/2015    Cardiac LV ejection fraction 30-35% 11/2/2015    Chest pain, unspecified chest pain type 11/2/2015    Hiatal hernia 11/2/2015    Hyperlipidemia LDL goal <100 11/2/2015    Personal history of tobacco use, presenting hazards to health 11/2/2015    Type 2 diabetes mellitus without complication (H) 11/2/2015       PREVIOUS SURGERIES:    Past Surgical History:   Procedure Laterality Date    BYPASS GRAFT ARTERY CORONARY N/A 11/7/2015    Procedure: BYPASS GRAFT ARTERY CORONARY;  Surgeon: Sia Caldera MD;  Location: [facility] OR    KNEE SURGERY  2009    REPLACE VALVE AORTIC N/A 11/7/2015    Procedure: REPLACE VALVE AORTIC;  Surgeon: Sia Caldera MD;  Location: [facility] OR       PREVIOUS ENDOSCOPY:  None.    ALLERGIES:     Allergies   Allergen Reactions    Sulfa Antibiotics     Perflutren Protein A Microsph Unknown     Pt states he \"felt like crap later\" after he was given Optison in 2016.       PERTINENT MEDICATIONS:    Current Outpatient Medications:     aspirin EC 81 MG EC tablet, Take 1 tablet (81 mg) by mouth daily (Patient taking differently: Take 81 mg by mouth daily), Disp: , Rfl:     atorvastatin (LIPITOR) 40 MG tablet, Take 1 tablet (40 mg) by mouth daily, Disp: 90 tablet, Rfl: 3    B-D U/F 31G X 8 MM insulin pen needle, USE " "THREE PEN NEEDLES DAILY OR AS DIRECTED., Disp: 100 each, Rfl: 3    carvedilol (COREG) 3.125 MG tablet, TAKE 1 TABLET (3.125 MG) BY MOUTH 2 TIMES DAILY (WITH MEALS), Disp: 180 tablet, Rfl: 0    clopidogrel (PLAVIX) 75 MG tablet, Take 1 tablet (75 mg) by mouth daily for 21 days, Disp: 21 tablet, Rfl: 0    Continuous Blood Gluc Sensor (FREESTYLE ANNE 2 SENSOR) [facility] [place], Apply 1 each topically every 14 days Use 1 sensor every 14 days. Use to read blood sugars per 's instructions., Disp: 6 each, Rfl: 5    insulin aspart (NOVOLOG FLEXPEN) 100 UNIT/ML pen, INJECT 10 - 12 UNITS UNDER THE SKIN TWICE A DAY, Disp: 15 mL, Rfl: 0    insulin syringe-needle U-100 (BD INSULIN SYRINGE ULTRAFINE) 31G X 5/16\" 0.5 ML miscellaneous, Use one syringe 3 daily or as directed., Disp: 100 each, Rfl: prn    isosorbide mononitrate (IMDUR) 30 MG 24 hr tablet, TAKE ONE TABLET (30 MG) BY MOUTH ONCE DAILY, Disp: 90 tablet, Rfl: 3    latanoprost (XALATAN) 0.005 % ophthalmic solution, Place 1 drop into both eyes daily, Disp: 2.5 mL, Rfl: 11    LEVEMIR FLEXPEN/FLEXTOUCH 100 UNIT/ML soln, INJECT 65 UNITS SUBCUTANEOUS EVERY MORNING, Disp: 45 mL, Rfl: 1    lisinopril (ZESTRIL) 5 MG tablet, Take 1 tablet (5 mg) by mouth daily, Disp: 90 tablet, Rfl: 3    LORazepam (ATIVAN) 0.5 MG tablet, Take 1 tablet (0.5 mg) by mouth every 6 hours as needed for anxiety, Disp: 2 tablet, Rfl: 0    ONETOUCH ULTRA test strip, USE TO TEST BLOOD SUGAR 5 TIMES A DAY, Disp: 200 strip, Rfl: 0    SENNA S 8.6-50 MG tablet, TAKE ONE TO TWO TABLETS BY MOUTH TWICE A DAY, Disp: 100 tablet, Rfl: 0    SOCIAL HISTORY:    Social History     Socioeconomic History    Marital status: Single     Spouse name: Not on file    Number of children: Not on file    Years of education: Not on file    Highest education level: Not on file   Occupational History    Not on file   Tobacco Use    Smoking status: Some Days     Types: Pipe     Last attempt to quit: 5/15/2016     Years since quitting: " 7.9    Smokeless tobacco: Never    Tobacco comments:     smokes a pipe a couple times a day   Vaping Use    Vaping status: Never Used   Substance and Sexual Activity    Alcohol use: Yes     Alcohol/week: 0.0 standard drinks of alcohol     Comment: rare    Drug use: No    Sexual activity: Not Currently     Partners: Female   Other Topics Concern    Parent/sibling w/ CABG, MI or angioplasty before 65F 55M? Not Asked   Social History Narrative    Not on file     Social Determinants of Health     Financial Resource Strain: Not on file   Food Insecurity: Not on file   Transportation Needs: Not on file   Physical Activity: Not on file   Stress: Not on file   Social Connections: Not on file   Interpersonal Safety: Unknown (3/21/2024)    Interpersonal Safety     Do you feel physically and emotionally safe where you currently live?: Patient declined     Within the past 12 months, have you been hit, slapped, kicked or otherwise physically hurt by someone?: Patient declined     Within the past 12 months, have you been humiliated or emotionally abused in other ways by your partner or ex-partner?: Patient declined   Housing Stability: Not on file       FAMILY HISTORY:  FH of CRC: father  FH of IBD: none  Family History   Problem Relation Age of Onset    Glaucoma Mother     Diabetes Mother     Diabetes Father     Macular Degeneration No family hx of        Past/family/social history reviewed and no changes    PHYSICAL EXAMINATION:  Constitutional: aaox3, cooperative, pleasant, not dyspneic/diaphoretic, no acute distress  Vitals reviewed: There were no vitals taken for this visit.  Wt:   Wt Readings from Last 2 Encounters:   04/10/24 100.5 kg (221 lb 9.6 oz)   03/21/24 97.9 kg (215 lb 12.8 oz)      Eyes: Sclera anicteric/injected, PERRL bilaterally, EOMI.   Ears/nose/mouth/throat: Normal oropharynx without ulcers or exudate, mucus membranes moist, no hemotympanum.   Neck: supple, thyroid normal size, no midline C spine  tenderness.  CV: No edema  Respiratory: Unlabored breathing  Abd: soft, nontender, Nondistended,   Skin: warm, perfused, no jaundice  Psych: Normal affect  MSK: Normal gait  Neuro: see exam in HPI.       PERTINENT STUDIES:    Office Visit on 03/21/2024   Component Date Value Ref Range Status    Creatinine Urine mg/dL 03/21/2024 70.1  mg/dL Final    Albumin Urine mg/L 03/21/2024 <12.0  mg/L Final    Albumin Urine mg/g Cr 03/21/2024    Final    WBC Count 03/21/2024 7.8  4.0 - 11.0 10e3/uL Final    RBC Count 03/21/2024 4.36 (L)  4.40 - 5.90 10e6/uL Final    Hemoglobin 03/21/2024 13.2 (L)  13.3 - 17.7 g/dL Final    Hematocrit 03/21/2024 39.7 (L)  40.0 - 53.0 % Final    MCV 03/21/2024 91  78 - 100 fL Final    MCH 03/21/2024 30.3  26.5 - 33.0 pg Final    MCHC 03/21/2024 33.2  31.5 - 36.5 g/dL Final    RDW 03/21/2024 13.2  10.0 - 15.0 % Final    Platelet Count 03/21/2024 231  150 - 450 10e3/uL Final    Hemoglobin A1C 03/21/2024 7.4 (H)  <5.7 % Final    Sodium 03/21/2024 139  135 - 145 mmol/L Final    Potassium 03/21/2024 3.8  3.4 - 5.3 mmol/L Final    Chloride 03/21/2024 101  98 - 107 mmol/L Final    Carbon Dioxide (CO2) 03/21/2024 27  22 - 29 mmol/L Final    Anion Gap 03/21/2024 11  7 - 15 mmol/L Final    Urea Nitrogen 03/21/2024 14.4  8.0 - 23.0 mg/dL Final    Creatinine 03/21/2024 0.93  0.67 - 1.17 mg/dL Final    GFR Estimate 03/21/2024 86  >60 mL/min/1.73m2 Final    Calcium 03/21/2024 9.1  8.8 - 10.2 mg/dL Final    Glucose 03/21/2024 82  70 - 99 mg/dL Final    Protein Total 03/21/2024 7.6  6.4 - 8.3 g/dL Final    Albumin 03/21/2024 3.9  3.5 - 5.2 g/dL Final    Bilirubin Total 03/21/2024 0.6  <=1.2 mg/dL Final    Alkaline Phosphatase 03/21/2024 99  40 - 150 U/L Final    AST 03/21/2024 22  0 - 45 U/L Final    ALT 03/21/2024 15  0 - 70 U/L Final    Bilirubin Direct 03/21/2024 <0.20  0.00 - 0.30 mg/dL Final

## 2024-05-01 NOTE — LETTER
5/1/2024         RE: Reinaldo Barrios  [address]  [facility] [address]        Dear Colleague,    Thank you for referring your patient, Reinaldo Barrios, to the [facility]. Please see a copy of my visit note below.    GI CLINIC VISIT    CC/REFERRING MD:  Mark Constantino*  REASON FOR CONSULTATION: LLQ abdominal discomfort    ASSESSMENT/PLAN:  74 y/o M presents for evaluation of left lower abdominal discomfort.     #LLQ abdominal discomfort  #change in bowel pattern  Patient reports that he had a change in his bowel pattern about a year and a half ago and will generally have 3-6 bowel movements daily that can vary quite a bit in consistency.  Can be hard in nature, at other times can be formed and soft.  He also notes some mild left lower quadrant abdominal discomfort that is improved with passing gas as well as having a bowel movement.  He has never had a colonoscopy, and does have a strong family history of colorectal cancer as his father was diagnosed with colon cancer in his 70s.  We discussed obtaining a colonoscopy for further evaluation but patient definitely refuses.  We discussed risks of refusing colonoscopy at this time as this is the best test to diagnose colorectal cancer.  But he continues to refuse.  Discussed obtaining a CT of his abdomen and pelvis for further evaluation which she was agreeable to.  We can assess his stool burden at that time as well because I do wonder if he is constipated.  Of note had a fall just prior to arrival to our clinic, fall appears mechanical in nature as he states that he tripped.  He did hit his head but denies LOC.  Did have evidence of some left periorbital swelling and overlying ecchymosis with a few associated abrasions.  Was neurologically intact with no midline C, T or L-spine tenderness.  Given his age I did recommend that we obtain a CT of his head as well as C-spine to evaluate for any acute injuries but patient  declined.  Again discussed risks and benefits and patient has capacity to make decisions.  Discussed warning signs to be seen in the emergency department.  --obtain CT abd/pelvis.   --if he changes his mind about colonoscopy he will notify me.      RTC PRN    Thank you for this consultation.  It was a pleasure to participate in the care of this patient; please contact us with any further questions.       30 minutes spent on the date of the encounter doing chart review, review of outside records, patient visit, and documentation    This note was created with voice recognition software, and while reviewed for accuracy, typos may remain.    Lio Copeland PA-C  Division of Gastroenterology, Hepatology and Nutrition  [facility]  74 y/o M presents for evaluation of left lower abdominal discomfort.     He reports he developed left lower abdominal discomfort, which is present most days. He will have a bowel movement and then pain resolves. He will generally have a bowel movement 3-6x/day, stool consistency can vary between a bristol type 1-4. Every once in a while he will have a looser stool. Does notice at times stools can be thinner. He notes that prior to his stool pattern this being way, he would generally have a bowel movement once a day, described as a bristol type 4. Denies BRBPR currently, on a rare occasion will see some bright red blood on the toilet paper. At times can see mucous in his stool.  Denies nausea or vomiting.       Denies frequent NSAIDs or Tylenol. Denies use of OTC herbal supplements/weight loss products.      Denies use of ETOH.  Patient smokes a pipe for a few months in the year.  No recreational drug use.     Father has a hx of colon cancer, diagnosed in his 70's.  Denies family history of IBD/celiac disease.     Of note, a rapid response was called prior to our appointment as patient had a fall just outside of the building, he caught his toe  on the concrete and fell forward onto his face. Denies LOC. Denies any preceding lightheadedness or dizziness or associated chest pain. He denies any headaches currently, denies any neck pain. Denies any numbness/tingling in his extremities. Denies any focal weakness. Denies nausea or vomiting.  He is not on any blood thinner medications. On exam, PERRLA. He has swelling over the left eyebrow with associated ecchymosis, and abrasions around the left eye, with some mild tenderness. EOMI. No evidence of hemotympanum. Strength and sensation equal and intact bilaterally. RRR, no m/r/g. No midline C/T/L spine tenderness. He has full ROM his neck. Cannot clear based on [place] head/c spine rules -- we discussed having him obtain emergent CT head/C spine, but patient is refusing. We discussed risks and benefits, and patient has capacity to make this decision.       ROS:    No fevers or chills  No weight loss  No shortness of breath or wheezing  No chest pain or pressure  No odynophagia or dysphagia  No BRBPR, hematochezia, melena  No anxiety or depression    PROBLEM LIST  Patient Active Problem List    Diagnosis Date Noted     Peripheral vascular disease (H24) 03/21/2024     Priority: Medium     Polyneuropathy associated with underlying disease (H24) 05/05/2022     Priority: Medium     Obesity (BMI 35.0-39.9) with comorbidity (H) 04/10/2019     Priority: Medium     Type 2 diabetes mellitus with diabetic autonomic neuropathy, without long-term current use of insulin (H) 05/18/2018     Priority: Medium     Non morbid obesity due to excess calories 07/03/2017     Priority: Medium     Essential hypertension with goal blood pressure less than 140/90 05/22/2016     Priority: Medium     S/P aortic valve replacement 04/27/2016     Priority: Medium     Long term current use of anticoagulant therapy 03/16/2016     Priority: Medium     Heart valve replaced [Z95.2] 03/16/2016     Priority: Medium     Advanced directives,  "counseling/discussion 01/12/2016     Priority: Medium     declined       S/P CABG (coronary artery bypass graft) 12/04/2015     Priority: Medium     Aortic stenosis 11/07/2015     Priority: Medium     CHF (congestive heart failure) (H) 11/05/2015     Priority: Medium     NSTEMI (non-ST elevated myocardial infarction) (H) 11/03/2015     Priority: Medium     ACS (acute coronary syndrome) (H) 11/03/2015     Priority: Medium     Aortic valve stenosis 11/02/2015     Priority: Medium     Personal history of tobacco use, presenting hazards to health 11/02/2015     Priority: Medium     Hyperlipidemia LDL goal <100 11/02/2015     Priority: Medium     Hiatal hernia 11/02/2015     Priority: Medium     Chest pain, unspecified chest pain type 11/02/2015     Priority: Medium     Cardiac LV ejection fraction 30-35% 11/02/2015     Priority: Medium       PERTINENT PAST MEDICAL HISTORY:    Past Medical History:   Diagnosis Date     Aortic valve stenosis 11/2/2015     Cardiac LV ejection fraction 30-35% 11/2/2015     Chest pain, unspecified chest pain type 11/2/2015     Hiatal hernia 11/2/2015     Hyperlipidemia LDL goal <100 11/2/2015     Personal history of tobacco use, presenting hazards to health 11/2/2015     Type 2 diabetes mellitus without complication (H) 11/2/2015       PREVIOUS SURGERIES:    Past Surgical History:   Procedure Laterality Date     BYPASS GRAFT ARTERY CORONARY N/A 11/7/2015    Procedure: BYPASS GRAFT ARTERY CORONARY;  Surgeon: Sia Caldera MD;  Location: [facility] OR     KNEE SURGERY  2009     REPLACE VALVE AORTIC N/A 11/7/2015    Procedure: REPLACE VALVE AORTIC;  Surgeon: Sia Caldera MD;  Location: [facility] OR       PREVIOUS ENDOSCOPY:  None.    ALLERGIES:     Allergies   Allergen Reactions     Sulfa Antibiotics      Perflutren Protein A Microsph Unknown     Pt states he \"felt like crap later\" after he was given Optison in 2016.       PERTINENT MEDICATIONS:    Current Outpatient Medications:      aspirin EC " "81 MG EC tablet, Take 1 tablet (81 mg) by mouth daily (Patient taking differently: Take 81 mg by mouth daily), Disp: , Rfl:      atorvastatin (LIPITOR) 40 MG tablet, Take 1 tablet (40 mg) by mouth daily, Disp: 90 tablet, Rfl: 3     B-D U/F 31G X 8 MM insulin pen needle, USE THREE PEN NEEDLES DAILY OR AS DIRECTED., Disp: 100 each, Rfl: 3     carvedilol (COREG) 3.125 MG tablet, TAKE 1 TABLET (3.125 MG) BY MOUTH 2 TIMES DAILY (WITH MEALS), Disp: 180 tablet, Rfl: 0     clopidogrel (PLAVIX) 75 MG tablet, Take 1 tablet (75 mg) by mouth daily for 21 days, Disp: 21 tablet, Rfl: 0     Continuous Blood Gluc Sensor (FREESTYLE ANNE 2 SENSOR) [facility], Apply 1 each topically every 14 days Use 1 sensor every 14 days. Use to read blood sugars per 's instructions., Disp: 6 each, Rfl: 5     insulin aspart (NOVOLOG FLEXPEN) 100 UNIT/ML pen, INJECT 10 - 12 UNITS UNDER THE SKIN TWICE A DAY, Disp: 15 mL, Rfl: 0     insulin syringe-needle U-100 (BD INSULIN SYRINGE ULTRAFINE) 31G X 5/16\" 0.5 ML miscellaneous, Use one syringe 3 daily or as directed., Disp: 100 each, Rfl: prn     isosorbide mononitrate (IMDUR) 30 MG 24 hr tablet, TAKE ONE TABLET (30 MG) BY MOUTH ONCE DAILY, Disp: 90 tablet, Rfl: 3     latanoprost (XALATAN) 0.005 % ophthalmic solution, Place 1 drop into both eyes daily, Disp: 2.5 mL, Rfl: 11     LEVEMIR FLEXPEN/FLEXTOUCH 100 UNIT/ML soln, INJECT 65 UNITS SUBCUTANEOUS EVERY MORNING, Disp: 45 mL, Rfl: 1     lisinopril (ZESTRIL) 5 MG tablet, Take 1 tablet (5 mg) by mouth daily, Disp: 90 tablet, Rfl: 3     LORazepam (ATIVAN) 0.5 MG tablet, Take 1 tablet (0.5 mg) by mouth every 6 hours as needed for anxiety, Disp: 2 tablet, Rfl: 0     ONETOUCH ULTRA test strip, USE TO TEST BLOOD SUGAR 5 TIMES A DAY, Disp: 200 strip, Rfl: 0     SENNA S 8.6-50 MG tablet, TAKE ONE TO TWO TABLETS BY MOUTH TWICE A DAY, Disp: 100 tablet, Rfl: 0    SOCIAL HISTORY:    Social History     Socioeconomic History     Marital status: Single     Spouse " name: Not on file     Number of children: Not on file     Years of education: Not on file     Highest education level: Not on file   Occupational History     Not on file   Tobacco Use     Smoking status: Some Days     Types: Pipe     Last attempt to quit: 5/15/2016     Years since quittin.9     Smokeless tobacco: Never     Tobacco comments:     smokes a pipe a couple times a day   Vaping Use     Vaping status: Never Used   Substance and Sexual Activity     Alcohol use: Yes     Alcohol/week: 0.0 standard drinks of alcohol     Comment: rare     Drug use: No     Sexual activity: Not Currently     Partners: Female   Other Topics Concern     Parent/sibling w/ CABG, MI or angioplasty before 65F 55M? Not Asked   Social History Narrative     Not on file     Social Determinants of Health     Financial Resource Strain: Not on file   Food Insecurity: Not on file   Transportation Needs: Not on file   Physical Activity: Not on file   Stress: Not on file   Social Connections: Not on file   Interpersonal Safety: Unknown (3/21/2024)    Interpersonal Safety      Do you feel physically and emotionally safe where you currently live?: Patient declined      Within the past 12 months, have you been hit, slapped, kicked or otherwise physically hurt by someone?: Patient declined      Within the past 12 months, have you been humiliated or emotionally abused in other ways by your partner or ex-partner?: Patient declined   Housing Stability: Not on file       FAMILY HISTORY:  FH of CRC: father  FH of IBD: none  Family History   Problem Relation Age of Onset     Glaucoma Mother      Diabetes Mother      Diabetes Father      Macular Degeneration No family hx of        Past/family/social history reviewed and no changes    PHYSICAL EXAMINATION:  Constitutional: aaox3, cooperative, pleasant, not dyspneic/diaphoretic, no acute distress  Vitals reviewed: There were no vitals taken for this visit.  Wt:   Wt Readings from Last 2 Encounters:    04/10/24 100.5 kg (221 lb 9.6 oz)   03/21/24 97.9 kg (215 lb 12.8 oz)      Eyes: Sclera anicteric/injected, PERRL bilaterally, EOMI.   Ears/nose/mouth/throat: Normal oropharynx without ulcers or exudate, mucus membranes moist, no hemotympanum.   Neck: supple, thyroid normal size, no midline C spine tenderness.  CV: No edema  Respiratory: Unlabored breathing  Abd: soft, nontender, Nondistended,   Skin: warm, perfused, no jaundice  Psych: Normal affect  MSK: Normal gait  Neuro: see exam in HPI.       PERTINENT STUDIES:    Office Visit on 03/21/2024   Component Date Value Ref Range Status     Creatinine Urine mg/dL 03/21/2024 70.1  mg/dL Final     Albumin Urine mg/L 03/21/2024 <12.0  mg/L Final     Albumin Urine mg/g Cr 03/21/2024    Final     WBC Count 03/21/2024 7.8  4.0 - 11.0 10e3/uL Final     RBC Count 03/21/2024 4.36 (L)  4.40 - 5.90 10e6/uL Final     Hemoglobin 03/21/2024 13.2 (L)  13.3 - 17.7 g/dL Final     Hematocrit 03/21/2024 39.7 (L)  40.0 - 53.0 % Final     MCV 03/21/2024 91  78 - 100 fL Final     MCH 03/21/2024 30.3  26.5 - 33.0 pg Final     MCHC 03/21/2024 33.2  31.5 - 36.5 g/dL Final     RDW 03/21/2024 13.2  10.0 - 15.0 % Final     Platelet Count 03/21/2024 231  150 - 450 10e3/uL Final     Hemoglobin A1C 03/21/2024 7.4 (H)  <5.7 % Final     Sodium 03/21/2024 139  135 - 145 mmol/L Final     Potassium 03/21/2024 3.8  3.4 - 5.3 mmol/L Final     Chloride 03/21/2024 101  98 - 107 mmol/L Final     Carbon Dioxide (CO2) 03/21/2024 27  22 - 29 mmol/L Final     Anion Gap 03/21/2024 11  7 - 15 mmol/L Final     Urea Nitrogen 03/21/2024 14.4  8.0 - 23.0 mg/dL Final     Creatinine 03/21/2024 0.93  0.67 - 1.17 mg/dL Final     GFR Estimate 03/21/2024 86  >60 mL/min/1.73m2 Final     Calcium 03/21/2024 9.1  8.8 - 10.2 mg/dL Final     Glucose 03/21/2024 82  70 - 99 mg/dL Final     Protein Total 03/21/2024 7.6  6.4 - 8.3 g/dL Final     Albumin 03/21/2024 3.9  3.5 - 5.2 g/dL Final     Bilirubin Total 03/21/2024 0.6  <=1.2  mg/dL Final     Alkaline Phosphatase 03/21/2024 99  40 - 150 U/L Final     AST 03/21/2024 22  0 - 45 U/L Final     ALT 03/21/2024 15  0 - 70 U/L Final     Bilirubin Direct 03/21/2024 <0.20  0.00 - 0.30 mg/dL Final     Again, thank you for allowing me to participate in the care of your patient.        Sincerely,        Lio Copeland PA-C

## 2024-05-01 NOTE — PATIENT INSTRUCTIONS
It was a pleasure taking care of you today.  I've included a brief summary of our discussion and care plan from today's visit below.  Please review this information with your primary care provider.  ______________________________________________________________________    My recommendations are summarized as follows:  --please schedule CT of your abdomen and pelvis - if there is evidence of constipation, we would plan to trial miralax for constipation management.   --please let me know if you change your mind if colonoscopy.   -- as we discussed a colonoscopy would be the best test to evaluate for colon cancer.     -- please see scheduling information provided below     Return to GI Clinic as needed.   ______________________________________________________________________    How do I schedule labs, imaging studies, or procedures that were ordered in clinic today?     Labs: To schedule lab appointment at the [facility], use my chart or call [phone]. If you have a [facility] closer to home where you are regularly seen you can give them a call.     Procedures: If a colonoscopy, upper endoscopy, breath test, esophageal manometry, or pH impedence was ordered today, our endoscopy team will call you to schedule this. If you have not heard from our endoscopy team within a week, please call [phone] to schedule.     Imaging Studies: If you were scheduled for a CT scan, X-ray, MRI, ultrasound, HIDA scan or other imaging study, please call [phone] to have this scheduled.     Referral: If a referral to another specialty was ordered, expect a phone call or follow instructions above. If you have not heard from anyone regarding your referral in a week, please call our clinic to check the status.     Who do I call with any questions after my visit?  Please be in touch if there are any further questions that arise following today's visit.  There are multiple ways to  contact your gastroenterology care team.      During business hours, you may reach a Gastroenterology nurse at [phone]    To schedule or reschedule an appointment, please call [phone].     You can always send a secure message through [employer].  PeopleCube messages are answered by your nurse or doctor typically within 24 hours.  Please allow extra time on weekends and holidays.      For urgent/emergent questions after business hours, you may reach the on-call GI Fellow by contacting the [facility]  at [phone].     How will I get the results of any tests ordered?    You will receive all of your results.  If you have signed up for [employer], any tests ordered at your visit will be available to you after your provider reviews them.  Typically this takes 1-2 weeks.  If there are urgent results that require a change in your care plan, your provider or nurse will call you to discuss the next steps.      What is PeopleCube?  PeopleCube is a secure way for you to access all of your healthcare records from the [facility].  It is a web based computer program, so you can sign on to it from any location.  It also allows you to send secure messages to your care team.  I recommend signing up for PeopleCube access if you have not already done so and are comfortable with using a computer.      How to I schedule a follow-up visit?  If you did not schedule a follow-up visit today, please call [phone] to schedule a follow-up office visit.      Sincerely,    Lio Copeland PA-C  Division of Gastroenterology, Hepatology & Nutrition  [facility]

## 2024-05-10 ENCOUNTER — MYC MEDICAL ADVICE (OUTPATIENT)
Dept: INTERNAL MEDICINE | Facility: CLINIC | Age: 74
End: 2024-05-10
Payer: MEDICARE

## 2024-05-10 ENCOUNTER — TELEPHONE (OUTPATIENT)
Dept: INTERNAL MEDICINE | Facility: CLINIC | Age: 74
End: 2024-05-10
Payer: MEDICARE

## 2024-05-10 DIAGNOSIS — E11.43 TYPE 2 DIABETES MELLITUS WITH DIABETIC AUTONOMIC NEUROPATHY, WITHOUT LONG-TERM CURRENT USE OF INSULIN (H): Primary | ICD-10-CM

## 2024-05-10 DIAGNOSIS — Z79.4 TYPE 2 DIABETES MELLITUS WITH HYPERGLYCEMIA, WITH LONG-TERM CURRENT USE OF INSULIN (H): ICD-10-CM

## 2024-05-10 DIAGNOSIS — E11.65 TYPE 2 DIABETES MELLITUS WITH HYPERGLYCEMIA, WITH LONG-TERM CURRENT USE OF INSULIN (H): ICD-10-CM

## 2024-05-10 DIAGNOSIS — E11.43 TYPE 2 DIABETES MELLITUS WITH DIABETIC AUTONOMIC NEUROPATHY, WITHOUT LONG-TERM CURRENT USE OF INSULIN (H): ICD-10-CM

## 2024-05-10 DIAGNOSIS — H40.1212 LOW TENSION GLAUCOMA OF RIGHT EYE, MODERATE STAGE: ICD-10-CM

## 2024-05-10 DIAGNOSIS — K59.04 CHRONIC IDIOPATHIC CONSTIPATION: ICD-10-CM

## 2024-05-10 RX ORDER — BLOOD SUGAR DIAGNOSTIC
STRIP MISCELLANEOUS
Qty: 200 STRIP | Refills: 0 | Status: SHIPPED | OUTPATIENT
Start: 2024-05-10 | End: 2024-07-01

## 2024-05-10 RX ORDER — INSULIN ASPART 100 [IU]/ML
INJECTION, SOLUTION INTRAVENOUS; SUBCUTANEOUS
Qty: 15 ML | Refills: 0 | Status: SHIPPED | OUTPATIENT
Start: 2024-05-10 | End: 2024-05-13

## 2024-05-10 RX ORDER — INSULIN DEGLUDEC 100 U/ML
60 INJECTION, SOLUTION SUBCUTANEOUS DAILY
Qty: 15 ML | Refills: 3 | Status: SHIPPED | OUTPATIENT
Start: 2024-05-10 | End: 2024-07-18

## 2024-05-10 RX ORDER — DOCUSATE SODIUM AND SENNOSIDES 50; 8.6 MG/1; MG/1
1-2 TABLET ORAL
Qty: 100 TABLET | Refills: 0 | Status: SHIPPED | OUTPATIENT
Start: 2024-05-10 | End: 2024-06-13

## 2024-05-10 NOTE — TELEPHONE ENCOUNTER
Insurance no longer covers Novolog flexpen- They prefer Ademlog solostar . If provider does not want to change to Preferred insulin please send for Prior auth

## 2024-05-13 RX ORDER — LATANOPROST 50 UG/ML
1 SOLUTION/ DROPS OPHTHALMIC DAILY
Qty: 2.5 ML | Refills: 11 | Status: SHIPPED | OUTPATIENT
Start: 2024-05-13

## 2024-05-13 RX ORDER — INSULIN ASPART 100 [IU]/ML
INJECTION, SOLUTION INTRAVENOUS; SUBCUTANEOUS
Qty: 15 ML | Refills: 0 | Status: SHIPPED | OUTPATIENT
Start: 2024-05-13 | End: 2024-05-17

## 2024-05-16 ENCOUNTER — TELEPHONE (OUTPATIENT)
Dept: INTERNAL MEDICINE | Facility: CLINIC | Age: 74
End: 2024-05-16

## 2024-05-16 NOTE — LETTER
February 11, 2025        To  Reinaldo Barrios  [address]   [facility] [address]          Your team at [facility] cares about your health. We have reviewed your chart and based on our findings; we are making the following recommendations to better manage your health.     You are in particular need of attention regarding the following:     PREVENTATIVE VISIT: Annual Medicare Wellness:Schedule an Annual Medicare Wellness Exam. Please call your [facility] clinic to set up your appointment.    If you have already completed these items, please contact the clinic via phone or   [employer] so your care team can review and update your records. Thank you for   choosing [facility] Clinics for your healthcare needs. For any questions,   concerns, or to schedule an appointment please contact our clinic.    Healthy Regards,      Your [facility] Care Team            Mark Blanchard DO        
February 11, 2025      To  Reinaldo HAY   [facility] [address]        Your team at [facility] cares about your health. We have reviewed your chart and based on our findings; we are making the following recommendations to better manage your health.     You are in particular need of attention regarding the following:     PREVENTATIVE VISIT: Annual Medicare Wellness:Schedule an Annual Medicare Wellness Exam. Please call your [facility] clinic to set up your appointment.    If you have already completed these items, please contact the clinic via phone or   MyChart so your care team can review and update your records. Thank you for   choosing [facility] Clinics for your healthcare needs. For any questions,   concerns, or to schedule an appointment please contact our clinic.    Healthy Regards,      Your [facility] Care Team                    
[phone]

## 2024-05-16 NOTE — TELEPHONE ENCOUNTER
Patient Quality Outreach    Patient is due for the following:   Diabetes -  Foot Exam  Hypertension -  BP check  Physical Annual Wellness Visit    Next Steps:       Type of outreach:    Sent [employer] message.    Next Steps:  Reach out within 90 days via [employer].    Max number of attempts reached: No. Will try again in 90 days if patient still on fail list.    Questions for provider review:    None           Jerrica Martin LPN

## 2024-05-17 ENCOUNTER — OFFICE VISIT (OUTPATIENT)
Dept: INTERNAL MEDICINE | Facility: CLINIC | Age: 74
End: 2024-05-17
Payer: MEDICARE

## 2024-05-17 VITALS
HEART RATE: 66 BPM | RESPIRATION RATE: 12 BRPM | BODY MASS INDEX: 33.99 KG/M2 | SYSTOLIC BLOOD PRESSURE: 130 MMHG | DIASTOLIC BLOOD PRESSURE: 70 MMHG | OXYGEN SATURATION: 98 % | WEIGHT: 214 LBS | TEMPERATURE: 98 F

## 2024-05-17 DIAGNOSIS — Z95.2 S/P AORTIC VALVE REPLACEMENT: ICD-10-CM

## 2024-05-17 DIAGNOSIS — Z79.4 TYPE 2 DIABETES MELLITUS WITH HYPERGLYCEMIA, WITH LONG-TERM CURRENT USE OF INSULIN (H): ICD-10-CM
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
## 2024-05-17 DIAGNOSIS — E11.43 TYPE 2 DIABETES MELLITUS WITH DIABETIC AUTONOMIC NEUROPATHY, WITHOUT LONG-TERM CURRENT USE OF INSULIN (H): Primary | ICD-10-CM

## 2024-05-17 DIAGNOSIS — I10 ESSENTIAL HYPERTENSION WITH GOAL BLOOD PRESSURE LESS THAN 140/90: ICD-10-CM

## 2024-05-17 DIAGNOSIS — E66.01 MORBID OBESITY (H): ICD-10-CM

## 2024-05-17 DIAGNOSIS — Z95.1 S/P CABG (CORONARY ARTERY BYPASS GRAFT): ICD-10-CM

## 2024-05-17 DIAGNOSIS — E11.65 TYPE 2 DIABETES MELLITUS WITH HYPERGLYCEMIA, WITH LONG-TERM CURRENT USE OF INSULIN (H): ICD-10-CM

## 2024-05-17 DIAGNOSIS — I50.82 BIVENTRICULAR CONGESTIVE HEART FAILURE (H): ICD-10-CM

## 2024-05-17 PROCEDURE — 99214 OFFICE O/P EST MOD 30 MIN: CPT | Performed by: INTERNAL MEDICINE

## 2024-05-17 RX ORDER — INSULIN ASPART INJECTION 100 [IU]/ML
INJECTION, SOLUTION SUBCUTANEOUS
Qty: 15 ML | Refills: 3 | Status: SHIPPED | OUTPATIENT
Start: 2024-05-17

## 2024-05-17 RX ORDER — RESPIRATORY SYNCYTIAL VIRUS VACCINE 120MCG/0.5
0.5 KIT INTRAMUSCULAR ONCE
Qty: 1 EACH | Refills: 0 | Status: CANCELLED | OUTPATIENT
Start: 2024-05-17 | End: 2024-05-17

## 2024-05-17 NOTE — PROGRESS NOTES
Anali Najera is a 73 year old, presenting for the following health issues:  Diabetes        5/17/2024     7:53 AM   Additional Questions   Roomed by Gabriela HALL       Diabetes Follow-up    How often are you checking your blood sugar? Four or more times daily  Blood sugar testing frequency justification:  Patient modifying lifestyle changes (diet, exercise) with blood sugars  What time of day are you checking your blood sugars (select all that apply)?  Before and after meals    BP Readings from Last 2 Encounters:   05/17/24 130/70   05/01/24 (!) 157/96     Hemoglobin A1C (%)   Date Value   03/21/2024 7.4 (H)   07/06/2023 8.7 (H)   05/25/2021 8.8 (H)   10/15/2019 8.6 (H)     LDL Cholesterol Calculated (mg/dL)   Date Value   07/06/2023 58   07/11/2022 66   05/25/2021 59   10/15/2019 55                  EMR reviewed including:             Complaint, History of Chief Complaint, Corresponding Review of Systems, and Complaint Specific Physical Examination.    #1   Follow-up on type 2 diabetes  Requires medication changes due to formulary issues.  Notes blood sugars have been well-controlled though diet has been somewhat less and compliant.  Patient checking blood sugar 5 times daily in order to maintain adequate glycemic control.  Has frequent hypoglycemic episodes and requires continuous glucose monitoring to maintain safe levels.  Last A1c 7.4.  Denies polyuria or polydipsia.  He is continuing to use background insulin with preprandial coverage when needed.  Requiring less coverage as of late.          Exam:   LUNGS: clear bilaterally, airflow is brisk, no intercostal retraction or stridor is noted. No coughing is noted during visit.   NEURO: Pt is alert and appropriate. No neurologic lateralization is noted. Cranial nerves 2-12 are intact. Peripheral sensory and motor function are grossly normal.       #2   Follow-up on hypertension  Recently had a fall at another clinic.  Blood pressure was high at  that time.  This was secondary to the fall.  Has now normalized.  Denies current chest pain.  Denies symptoms of congestive heart failure.  History of aortic valvuloplasty with tissue valve.  Anticoagulation not required.  Last ejection fraction up to 40%.  Will be due for echo in the near future.  Has dyspnea on exertion.  Continues Coreg, lisinopril, statin, aspirin and Plavix.          Exam:   HEART:  regular without rubs, clicks, gallops, or murmurs. PMI is diffuse. Upstrokes are palpable but slightly slow. S1,S2 are heard.  1+ edema noted at this time.   GI: Abdomen is soft, without rebound, guarding or tenderness. Bowel sounds are appropriate. No renal bruits are heard.        Patient has been interviewed, applicable history and applied review of systems have been performed.    Vital Signs:   /70   Pulse 66   Temp 98  F (36.7  C)   Resp 12   Wt 97.1 kg (214 lb)   SpO2 98%   BMI 33.99 kg/m        Decision Making    Problem and Complexity     1. Type 2 diabetes mellitus with diabetic autonomic neuropathy, without long-term current use of insulin (H)  Change insulin to the brand preferred by his insurance company.  - insulin aspart (FIASP FLEXTOUCH) 100 UNIT/ML pen-injector; INJECT 10 - 12 UNITS UNDER THE SKIN TWICE A DAY  Dispense: 15 mL; Refill: 3    2. Essential hypertension with goal blood pressure less than 140/90  Currently controlled.  Continue current medication    3. S/P CABG (coronary artery bypass graft)  Stable without recent chest pain    4. S/P aortic valve replacement  Bioprosthetic valve.  Recommend echocardiogram sometime after August    5. Biventricular congestive heart failure (H)  Stable on current meds      7. Fall, subsequent encounter  Recent minimally traumatic fall 3 weeks ago.  No follow-up needed    8. Type 2 diabetes mellitus with hyperglycemia, with long-term current use of insulin (H)  As above      9. Morbid obesity (H)  Recommend weight loss responsible caloric                                 FOLLOW UP   I have asked the patient to make an appointment for followup with Me in 4 months    Regarding routine vaccinations:  I have reviewed the patient's vaccination schedule and discussed the benefits of prophylactic vaccination in detail.  I recommend the patient contact their pharmacist for vaccinations.  Discussed that most insurance companies now favor reimbursement to the pharmacies and it will financially behoove the patient to have vaccinations performed at their pharmacy.        I have carefully explained the diagnosis and treatment options to the patient.  The patient has displayed an understanding of the above, and all subsequent questions were answered.      DO RADHA Horn    Portions of this note were produced using [employer]  Although every attempt at real-time proof reading has been made, occasional grammar/syntax errors may have been missed.

## 2024-05-26 ENCOUNTER — HEALTH MAINTENANCE LETTER (OUTPATIENT)
Age: 74
End: 2024-05-26

## 2024-06-13 DIAGNOSIS — K59.04 CHRONIC IDIOPATHIC CONSTIPATION: ICD-10-CM

## 2024-06-13 DIAGNOSIS — I10 ESSENTIAL HYPERTENSION WITH GOAL BLOOD PRESSURE LESS THAN 140/90: ICD-10-CM

## 2024-06-13 RX ORDER — CARVEDILOL 3.12 MG/1
3.12 TABLET ORAL 2 TIMES DAILY WITH MEALS
Qty: 180 TABLET | Refills: 0 | Status: SHIPPED | OUTPATIENT
Start: 2024-06-13

## 2024-06-13 RX ORDER — DOCUSATE SODIUM AND SENNOSIDES 50; 8.6 MG/1; MG/1
1-2 TABLET ORAL
Qty: 100 TABLET | Refills: 0 | Status: SHIPPED | OUTPATIENT
Start: 2024-06-13 | End: 2024-08-21

## 2024-06-17 ENCOUNTER — OFFICE VISIT (OUTPATIENT)
Dept: OPHTHALMOLOGY | Facility: CLINIC | Age: 74
End: 2024-06-17
Payer: MEDICARE

## 2024-06-17 ENCOUNTER — TRANSFERRED RECORDS (OUTPATIENT)
Dept: HEALTH INFORMATION MANAGEMENT | Facility: CLINIC | Age: 74
End: 2024-06-17

## 2024-06-17 DIAGNOSIS — H40.1212 LOW TENSION GLAUCOMA OF RIGHT EYE, MODERATE STAGE: Primary | ICD-10-CM

## 2024-06-17 DIAGNOSIS — H40.1221 LOW-TENSION GLAUCOMA, LEFT EYE, MILD STAGE: ICD-10-CM

## 2024-06-17 PROCEDURE — 92083 EXTENDED VISUAL FIELD XM: CPT | Performed by: OPHTHALMOLOGY

## 2024-06-17 PROCEDURE — 92015 DETERMINE REFRACTIVE STATE: CPT | Performed by: OPHTHALMOLOGY

## 2024-06-17 PROCEDURE — 92133 CPTRZD OPH DX IMG PST SGM ON: CPT | Performed by: OPHTHALMOLOGY

## 2024-06-17 PROCEDURE — 92014 COMPRE OPH EXAM EST PT 1/>: CPT | Performed by: OPHTHALMOLOGY

## 2024-06-17 ASSESSMENT — REFRACTION_WEARINGRX
OS_SPHERE: -0.50
OS_ADD: +2.50
SPECS_TYPE: PAL
OD_CYLINDER: +1.25
OS_CYLINDER: +1.00
OD_AXIS: 162
OS_AXIS: 029
OD_ADD: +2.50
OD_SPHERE: -1.00

## 2024-06-17 ASSESSMENT — CONF VISUAL FIELD
OS_INFERIOR_TEMPORAL_RESTRICTION: 0
OD_INFERIOR_TEMPORAL_RESTRICTION: 0
OS_SUPERIOR_TEMPORAL_RESTRICTION: 0
OD_NORMAL: 1
OD_INFERIOR_NASAL_RESTRICTION: 0
METHOD: COUNTING FINGERS
OS_SUPERIOR_NASAL_RESTRICTION: 0
OS_INFERIOR_NASAL_RESTRICTION: 0
OD_SUPERIOR_NASAL_RESTRICTION: 0
OS_NORMAL: 1
OD_SUPERIOR_TEMPORAL_RESTRICTION: 0

## 2024-06-17 ASSESSMENT — VISUAL ACUITY
METHOD: SNELLEN - LINEAR
CORRECTION_TYPE: GLASSES
OD_CC+: -2
OS_CC+: -1
OD_CC: 20/20
OS_CC: 20/25

## 2024-06-17 ASSESSMENT — REFRACTION_MANIFEST
OD_AXIS: 165
OS_AXIS: 175
OD_ADD: +2.75
OS_ADD: +2.75
OS_SPHERE: -0.75
OS_CYLINDER: +1.25
OD_SPHERE: -0.50
OD_CYLINDER: +1.25

## 2024-06-17 ASSESSMENT — SLIT LAMP EXAM - LIDS
COMMENTS: NORMAL
COMMENTS: NORMAL

## 2024-06-17 ASSESSMENT — CUP TO DISC RATIO
OD_RATIO: 0.8
OS_RATIO: 0.8

## 2024-06-17 ASSESSMENT — TONOMETRY
IOP_METHOD: TONOPEN
OD_IOP_MMHG: 12
OS_IOP_MMHG: 15
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
## 2024-06-27 ENCOUNTER — HOSPITAL ENCOUNTER (EMERGENCY)
Facility: CLINIC | Age: 74
Discharge: HOME | End: 2024-06-27
Attending: FAMILY MEDICINE | Admitting: FAMILY MEDICINE
Payer: MEDICARE

## 2024-06-27 VITALS
BODY MASS INDEX: 33.24 KG/M2 | HEART RATE: 67 BPM | SYSTOLIC BLOOD PRESSURE: 133 MMHG | HEIGHT: 67 IN | RESPIRATION RATE: 18 BRPM | WEIGHT: 211.8 LBS | DIASTOLIC BLOOD PRESSURE: 82 MMHG | OXYGEN SATURATION: 99 % | TEMPERATURE: 98.2 F

## 2024-06-27 DIAGNOSIS — T38.3X1A INSULIN OVERDOSE, ACCIDENTAL OR UNINTENTIONAL, INITIAL ENCOUNTER: ICD-10-CM

## 2024-06-27 LAB
GLUCOSE BLDC GLUCOMTR-MCNC: 71 MG/DL (ref 70–99)
GLUCOSE BLDC GLUCOMTR-MCNC: 79 MG/DL (ref 70–99)
GLUCOSE BLDC GLUCOMTR-MCNC: 85 MG/DL (ref 70–99)
GLUCOSE BLDC GLUCOMTR-MCNC: 90 MG/DL (ref 70–99)
GLUCOSE BLDC GLUCOMTR-MCNC: 95 MG/DL (ref 70–99)

## 2024-06-27 PROCEDURE — 99283 EMERGENCY DEPT VISIT LOW MDM: CPT | Performed by: FAMILY MEDICINE

## 2024-06-27 PROCEDURE — 82962 GLUCOSE BLOOD TEST: CPT

## 2024-06-27 ASSESSMENT — ACTIVITIES OF DAILY LIVING (ADL)
ADLS_ACUITY_SCORE: 39

## 2024-06-27 NOTE — DISCHARGE INSTRUCTIONS
Your blood sugars have been stabilizing now.  Please continue to monitor your blood sugars more closely this afternoon.  Be careful with which insulin you are administering.  Return to the emergency department at any time if you have any recurrent or worsening symptoms.

## 2024-06-27 NOTE — ED TRIAGE NOTES
Patient arrives via EMS for hypoglycemia. He accidentally gave himself 45 units of fast acting insulin at 0735 this morning instead of his long acting insulin. EMS gave him 15g of oral glucose and 100 mL of D10 for his hypoglycemia. Last sugar before arrival was 106.      Triage Assessment (Adult)       Row Name 06/27/24 0922          Triage Assessment    Airway WDL WDL        Respiratory WDL    Respiratory WDL WDL        Skin Circulation/Temperature WDL    Skin Circulation/Temperature WDL WDL        Cardiac WDL    Cardiac WDL WDL        Peripheral/Neurovascular WDL    Peripheral Neurovascular WDL WDL        Cognitive/Neuro/Behavioral WDL    Cognitive/Neuro/Behavioral WDL WDL

## 2024-06-27 NOTE — ED PROVIDER NOTES
[facility] ED Provider Note   Patient: Reinaldo Barrios  MRN #:  [id number]  Date of Visit: June 27, 2024    CC:     Chief Complaint   Patient presents with    Hypoglycemia     HPI:  Reinaldo Barrios is a 73 year old male with insulin requiring type 2 diabetes, hyperlipidemia, status post coronary bypass, obesity, peripheral vascular disease who presented to the emergency department by EMS with accidental injection of rapid acting insulin.  Patient is typically on Tresiba and then insulin aspartate as fast acting.  Patient was going to give himself 45 units of the Tresiba but instead injected himself with the rapid acting insulin.  This occurred around 7:30 AM.  He noticed right away when he pulled out the insulin pen that he had accidentally injected himself.  He started to eat a lot of high sugar foods including a sweet roll and a bag of candy.  His blood sugar was as low as 59 by EMS.  Patient has been given some glucose gels.  Last blood glucose was 101.  First glucose in the ED was 85.  Patient feels fine at the present time.  He typically feels better when his blood sugars are in the range of 100-200.  He will sometimes get shaky when his blood sugar drops below 100.     Problem List:  Patient Active Problem List    Diagnosis Date Noted    Peripheral vascular disease (H24) 03/21/2024     Priority: Medium    Polyneuropathy associated with underlying disease (H24) 05/05/2022     Priority: Medium    Obesity (BMI 35.0-39.9) with comorbidity (H) 04/10/2019     Priority: Medium    Type 2 diabetes mellitus with diabetic autonomic neuropathy, without long-term current use of insulin (H) 05/18/2018     Priority: Medium    Non morbid obesity due to excess calories 07/03/2017     Priority: Medium    Essential hypertension with goal blood pressure less than 140/90 05/22/2016     Priority: Medium    S/P aortic valve replacement 04/27/2016      "Priority: Medium    Long term current use of anticoagulant therapy 03/16/2016     Priority: Medium    Heart valve replaced [Z95.2] 03/16/2016     Priority: Medium    S/P CABG (coronary artery bypass graft) 12/04/2015     Priority: Medium    Aortic stenosis 11/07/2015     Priority: Medium    CHF (congestive heart failure) (H) 11/05/2015     Priority: Medium    NSTEMI (non-ST elevated myocardial infarction) (H) 11/03/2015     Priority: Medium    ACS (acute coronary syndrome) (H) 11/03/2015     Priority: Medium    Aortic valve stenosis 11/02/2015     Priority: Medium    Personal history of tobacco use, presenting hazards to health 11/02/2015     Priority: Medium    Hyperlipidemia LDL goal <100 11/02/2015     Priority: Medium    Hiatal hernia 11/02/2015     Priority: Medium    Chest pain, unspecified chest pain type 11/02/2015     Priority: Medium    Cardiac LV ejection fraction 30-35% 11/02/2015     Priority: Medium       Past Medical History:   Diagnosis Date    Aortic valve stenosis 11/2/2015    Cardiac LV ejection fraction 30-35% 11/2/2015    Chest pain, unspecified chest pain type 11/2/2015    Hiatal hernia 11/2/2015    Hyperlipidemia LDL goal <100 11/2/2015    Personal history of tobacco use, presenting hazards to health 11/2/2015    Type 2 diabetes mellitus without complication (H) 11/2/2015       MEDS: aspirin EC 81 MG EC tablet  atorvastatin (LIPITOR) 40 MG tablet  B-D U/F 31G X 8 MM insulin pen needle  carvedilol (COREG) 3.125 MG tablet  clopidogrel (PLAVIX) 75 MG tablet  Continuous Blood Gluc Sensor (FREESTYLE ANNE 2 SENSOR) MISC  FT STOOL SOFTENER 50-8.6 MG tablet  insulin aspart (FIASP FLEXTOUCH) 100 UNIT/ML pen-injector  insulin degludec (TRESIBA FLEXTOUCH) 100 UNIT/ML pen  insulin syringe-needle U-100 (BD INSULIN SYRINGE ULTRAFINE) 31G X 5/16\" 0.5 ML miscellaneous  isosorbide mononitrate (IMDUR) 30 MG 24 hr tablet  latanoprost (XALATAN) 0.005 % ophthalmic solution  lisinopril (ZESTRIL) 5 MG " "tablet  LORazepam (ATIVAN) 0.5 MG tablet  ONETOUCH ULTRA test strip        ALLERGIES:    Allergies   Allergen Reactions    Sulfa Antibiotics     Perflutren Protein A Microsph Unknown     Pt states he \"felt like crap later\" after he was given Optison in .       Past Surgical History:   Procedure Laterality Date    BYPASS GRAFT ARTERY CORONARY N/A 2015    Procedure: BYPASS GRAFT ARTERY CORONARY;  Surgeon: Sia Caldera MD;  Location: [facility] OR    KNEE SURGERY      REPLACE VALVE AORTIC N/A 2015    Procedure: REPLACE VALVE AORTIC;  Surgeon: Sia Caldera MD;  Location: [facility] OR       Social History     Tobacco Use    Smoking status: Some Days     Types: Pipe     Last attempt to quit: 5/15/2016     Years since quittin.1    Smokeless tobacco: Never    Tobacco comments:     smokes a pipe a couple times a day   Vaping Use    Vaping status: Never Used   Substance Use Topics    Alcohol use: Yes     Alcohol/week: 0.0 standard drinks of alcohol     Comment: rare    Drug use: No         Review of Systems   Except as noted in HPI, all other systems were reviewed and are negative    Physical Exam   Vitals were reviewed  Patient Vitals for the past 12 hrs:   BP Temp Temp src Pulse Resp SpO2 Height Weight   24 1341 133/82 -- -- 67 18 99 % -- --   24 1234 124/68 -- -- 59 -- 99 % -- --   24 0921 (!) 142/70 98.2  F (36.8  C) Oral 68 18 99 % 1.702 m (5' 7\") 96.1 kg (211 lb 12.8 oz)     GENERAL APPEARANCE: Alert and oriented x 3, no acute distress, pleasant male  FACE: normal facies; bearded  EYES: Pupils are equal  HENT: normal external exam  NECK: no adenopathy or asymmetry  RESP: normal respiratory effort; clear breath sounds bilaterally  CV: regular rate and rhythm; no significant murmurs, gallops or rubs  ABD: soft, obese, no tenderness; no rebound or guarding; bowel sounds are normal  MS: no gross deformities noted; normal muscle tone.  EXT: No calf tenderness or pitting edema  SKIN: no " worrisome rash  NEURO: no facial droop; no focal deficits, speech is normal  PSYCH: normal mood and affect      Available Lab/Imaging Results     Results for orders placed or performed during the hospital encounter of 06/27/24 (from the past 24 hour(s))   Glucose by meter   Result Value Ref Range    GLUCOSE BY METER POCT 85 70 - 99 mg/dL   Glucose by meter   Result Value Ref Range    GLUCOSE BY METER POCT 79 70 - 99 mg/dL   Glucose by meter   Result Value Ref Range    GLUCOSE BY METER POCT 90 70 - 99 mg/dL   Glucose by meter   Result Value Ref Range    GLUCOSE BY METER POCT 71 70 - 99 mg/dL   Glucose by meter   Result Value Ref Range    GLUCOSE BY METER POCT 95 70 - 99 mg/dL                Impression     Final diagnoses:   Insulin overdose, accidental         ED Course & Medical Decision Making   Reinaldo Barrios is a 73 year old male with insulin requiring type 2 diabetes who presented to the emergency department with accidental injection of rapid acting insulin of 45 units instead of his long-acting insulin of Tresiba.  This occurred around 7:30 AM.  Patient had a low blood sugar of 59 per EMS.  His last blood glucose was 101.    Vital signs reveal a temp of 98.2, blood pressure 142/70, heart rate of 60, respiration 18, 99% oxygen saturation.  Neurologic exam is normal.  Lung and heart exam is normal.  Abdomen is obese soft and nontender.    First blood sugar in the ED is 85.  Will recheck another glucose in half an hour.  Continue hourly glucose levels until we can determine that it is remaining stable.  We will treat with glucose gels and dextrose solutions as needed.    Patient had hourly glucose levels and they have been stable.  Blood sugars were 79, 90, 71, 95 on the last several readings.  Patient feels comfortable with going home.  He will hold off on any additional insulin for the rest of the day.  Resume his insulin tomorrow and when he promised to be more careful with differentiating his  long-acting versus short acting insulin.  Patient expressed understanding and agreement with discharge instructions below.  Will assist the patient in finding transportation back to his home in [place].          Written after-visit summary and instructions were given at the time of discharge.    Follow up Plan:   Mark Blanchard DO  [address] [place] [address]  [phone]    In 3 days  As needed    [facility] Emergency Dept  [address] [place] [id number] [phone]    If symptoms worsen      Discharge Instructions:   Your blood sugars have been stabilizing now.  Please continue to monitor your blood sugars more closely this afternoon.  Be careful with which insulin you are administering.  Return to the emergency department at any time if you have any recurrent or worsening symptoms.       Disclaimer: This note consists of words and symbols derived from keyboarding and dictation using voice recognition software.  As a result, there may be errors that have gone undetected.  Please consider this when interpreting information found in this note.       Carmela Steele MD  06/27/24 [id number]

## 2024-06-27 NOTE — ED NOTES
Called patient a cab ride home and patient agreeable to pay. Patient does not have a ride home otherwise.

## 2024-07-01 DIAGNOSIS — E11.43 TYPE 2 DIABETES MELLITUS WITH DIABETIC AUTONOMIC NEUROPATHY, WITHOUT LONG-TERM CURRENT USE OF INSULIN (H): ICD-10-CM

## 2024-07-01 RX ORDER — BLOOD SUGAR DIAGNOSTIC
STRIP MISCELLANEOUS
Qty: 500 STRIP | Refills: 2 | Status: SHIPPED | OUTPATIENT
Start: 2024-07-01

## 2024-07-18 ENCOUNTER — OFFICE VISIT (OUTPATIENT)
Dept: INTERNAL MEDICINE | Facility: CLINIC | Age: 74
End: 2024-07-18
Payer: MEDICARE

## 2024-07-18 VITALS
WEIGHT: 208.6 LBS | OXYGEN SATURATION: 99 % | SYSTOLIC BLOOD PRESSURE: 132 MMHG | RESPIRATION RATE: 12 BRPM | HEIGHT: 67 IN | HEART RATE: 70 BPM | TEMPERATURE: 97.8 F | DIASTOLIC BLOOD PRESSURE: 68 MMHG | BODY MASS INDEX: 32.74 KG/M2

## 2024-07-18 DIAGNOSIS — E11.65 TYPE 2 DIABETES MELLITUS WITH HYPERGLYCEMIA, WITH LONG-TERM CURRENT USE OF INSULIN (H): ICD-10-CM

## 2024-07-18 DIAGNOSIS — Z79.4 TYPE 2 DIABETES MELLITUS WITH HYPERGLYCEMIA, WITH LONG-TERM CURRENT USE OF INSULIN (H): ICD-10-CM

## 2024-07-18 DIAGNOSIS — M77.8 LEFT HAND TENDONITIS: ICD-10-CM

## 2024-07-18 DIAGNOSIS — Z95.2 S/P AORTIC VALVE REPLACEMENT: Primary | ICD-10-CM

## 2024-07-18 DIAGNOSIS — Z95.1 S/P CABG (CORONARY ARTERY BYPASS GRAFT): ICD-10-CM

## 2024-07-18 LAB
CHOLEST SERPL-MCNC: 121 MG/DL
FASTING STATUS PATIENT QL REPORTED: NO
HBA1C MFR BLD: 6.8 %
HDLC SERPL-MCNC: 41 MG/DL
LDLC SERPL CALC-MCNC: 55 MG/DL
NONHDLC SERPL-MCNC: 80 MG/DL
TRIGL SERPL-MCNC: 125 MG/DL

## 2024-07-18 PROCEDURE — 99214 OFFICE O/P EST MOD 30 MIN: CPT | Performed by: INTERNAL MEDICINE

## 2024-07-18 PROCEDURE — 36415 COLL VENOUS BLD VENIPUNCTURE: CPT | Performed by: INTERNAL MEDICINE

## 2024-07-18 PROCEDURE — 83036 HEMOGLOBIN GLYCOSYLATED A1C: CPT | Performed by: INTERNAL MEDICINE

## 2024-07-18 PROCEDURE — G2211 COMPLEX E/M VISIT ADD ON: HCPCS | Performed by: INTERNAL MEDICINE

## 2024-07-18 PROCEDURE — 80061 LIPID PANEL: CPT | Performed by: INTERNAL MEDICINE

## 2024-07-18 RX ORDER — INSULIN DEGLUDEC 100 U/ML
45 INJECTION, SOLUTION SUBCUTANEOUS DAILY
COMMUNITY
Start: 2024-07-18 | End: 2024-09-30

## 2024-07-18 ASSESSMENT — PAIN SCALES - GENERAL: PAINLEVEL: NO PAIN (0)

## 2024-07-18 NOTE — PROGRESS NOTES
Aanli Najera is a 73 year old, presenting for the following health issues:  Hospital F/U        7/18/2024     8:26 AM   Additional Questions   Roomed by Babs CANDELARIA     [facility]       ED/UC Followup:    Facility:  [facility]  Date of visit: 06/27/2024  Reason for visit: Insulin overdoes, accidental  Current Status: Improving             EMR reviewed including:             Complaint, History of Chief Complaint, Corresponding Review of Systems, and Complaint Specific Physical Examination.    #1   ER follow-up  Inadvertently took his short acting insulin instead of his background insulin.  Was concerned regarding potential for hypoglycemia and his family member called an ambulance.  Transferred to the hospital.  Oral glucose gel given in transit.  Serial blood sugars showed values briefly in the low 70s.  No loss of consciousness or other concerns.  Patient has been having some mild hypoglycemia and subsequently has been slowly decreasing the dosage of his Tresiba.  He has also discontinued the bedtime Fiasp.  Most recent hemoglobin A1c was markedly improved at 7.4.  This is down from 9.1-year prior.          Exam:   LUNGS: clear bilaterally, airflow is brisk, no intercostal retraction or stridor is noted. No coughing is noted during visit.   HEART:  regular without rubs, clicks, gallops, or murmurs. PMI is nondisplaced. Upstrokes are brisk. S1,S2 are heard.   GI: Abdomen is soft, without rebound, guarding or tenderness. Bowel sounds are appropriate. No renal bruits are heard.   NEURO: Pt is alert and appropriate. No neurologic lateralization is noted. Cranial nerves 2-12 are intact. Peripheral sensory and motor function are grossly normal.       #2   Pain in left hand.  Previously had a small fall and caught himself with his hand.  Has occasional and intermittent discomfort radiating from his lateral thumb up his forearm.  Worse with extension of the thumb.  Quite consistent with extensor tendinitis.   "No inflammation, occasionally uncomfortable.          Exam:  Essentially unremarkable.  Mild arthritis in the MP joint.  Very scant tendon inflammation noted in the extensor pollicis longus.  No evidence of carpal tunnel at this time.    #3   Follow-up on aortic valvuloplasty.  Bioprosthetic valve in place.  Denies symptoms.  Due for surveillance echocardiogram.  Has had no recent congestive heart failure.  Denies embolic phenomenon.          Exam:  As above        Patient has been interviewed, applicable history and applied review of systems have been performed.    Vital Signs:   /68   Pulse 70   Temp 97.8  F (36.6  C) (Temporal)   Resp 12   Ht 1.702 m (5' 7\")   Wt 94.6 kg (208 lb 9.6 oz)   SpO2 99%   BMI 32.67 kg/m        Decision Making    Problem and Complexity     1. Type 2 diabetes mellitus with diabetic autonomic neuropathy, without long-term current use of insulin (H)  I agree with decreasing the Tresiba dosage and discontinuing the prebedtime short acting.  Check A1c.  Patient \"is testing blood sugar 5 times daily due to frequent hypoglycemic events\"   - Lipid panel reflex to direct LDL Non-fasting; Future  - insulin degludec (TRESIBA FLEXTOUCH) 100 UNIT/ML pen; Inject 45 Units subcutaneously daily  - Hemoglobin A1c; Future    2. S/P aortic valve replacement  Schedule echo  - Echocardiogram Complete; Future    3. S/P CABG (coronary artery bypass graft)  Doing quite well without recent chest pain or symptoms.  Continue current medication    4. Left hand tendonitis  Recommend Voltaren topical cream to be used as directed  - diclofenac (VOLTAREN) 1 % topical gel; Apply 2 g topically 4 times daily  Dispense: 100 g; Refill: 0                                FOLLOW UP   I have asked the patient to make an appointment for followup with me in 4 months or as needed    Regarding routine vaccinations:  I have reviewed the patient's vaccination schedule and discussed the benefits of prophylactic vaccination " in detail.  I recommend the patient contact their pharmacist for vaccinations.  Discussed that most insurance companies now favor reimbursement to the pharmacies and it will financially behoove the patient to have vaccinations performed at their pharmacy.        I have carefully explained the diagnosis and treatment options to the patient.  The patient has displayed an understanding of the above, and all subsequent questions were answered.      DO RADHA Horn    Portions of this note were produced using [employer]  Although every attempt at real-time proof reading has been made, occasional grammar/syntax errors may have been missed.

## 2024-08-16 ENCOUNTER — HOSPITAL ENCOUNTER (OUTPATIENT)
Dept: CARDIOLOGY | Facility: CLINIC | Age: 74
Discharge: HOME | End: 2024-08-16
Attending: INTERNAL MEDICINE | Admitting: INTERNAL MEDICINE
Payer: MEDICARE

## 2024-08-16 DIAGNOSIS — Z95.2 S/P AORTIC VALVE REPLACEMENT: ICD-10-CM

## 2024-08-16 LAB — LVEF ECHO: NORMAL

## 2024-08-16 PROCEDURE — 93306 TTE W/DOPPLER COMPLETE: CPT | Mod: 26 | Performed by: INTERNAL MEDICINE

## 2024-08-16 PROCEDURE — 255N000002 HC RX 255 OP 636: Performed by: INTERNAL MEDICINE

## 2024-08-16 PROCEDURE — C8929 TTE W OR WO FOL WCON,DOPPLER: HCPCS

## 2024-08-16 RX ADMIN — PERFLUTREN 2 ML: 6.52 INJECTION, SUSPENSION INTRAVENOUS at 09:52

## 2024-08-21 DIAGNOSIS — K59.04 CHRONIC IDIOPATHIC CONSTIPATION: ICD-10-CM

## 2024-08-21 RX ORDER — AMOXICILLIN 250 MG
1-2 CAPSULE ORAL 2 TIMES DAILY
Qty: 120 TABLET | Refills: 0 | Status: SHIPPED | OUTPATIENT
Start: 2024-08-21

## 2024-09-11 ENCOUNTER — PATIENT OUTREACH (OUTPATIENT)
Dept: CARE COORDINATION | Facility: CLINIC | Age: 74
End: 2024-09-11
Payer: MEDICARE

## 2024-09-30 ENCOUNTER — OFFICE VISIT (OUTPATIENT)
Dept: INTERNAL MEDICINE | Facility: CLINIC | Age: 74
End: 2024-09-30
Payer: MEDICARE

## 2024-09-30 VITALS
HEIGHT: 66 IN | TEMPERATURE: 97.2 F | DIASTOLIC BLOOD PRESSURE: 72 MMHG | RESPIRATION RATE: 17 BRPM | BODY MASS INDEX: 32.53 KG/M2 | SYSTOLIC BLOOD PRESSURE: 131 MMHG | HEART RATE: 66 BPM | OXYGEN SATURATION: 99 % | WEIGHT: 202.4 LBS

## 2024-09-30 DIAGNOSIS — R94.30 CARDIAC LV EJECTION FRACTION 30-35%: ICD-10-CM

## 2024-09-30 DIAGNOSIS — R19.7 DIARRHEA OF PRESUMED INFECTIOUS ORIGIN: Primary | ICD-10-CM

## 2024-09-30 DIAGNOSIS — E11.65 TYPE 2 DIABETES MELLITUS WITH HYPERGLYCEMIA, WITH LONG-TERM CURRENT USE OF INSULIN (H): ICD-10-CM

## 2024-09-30 DIAGNOSIS — Z95.2 S/P AORTIC VALVE REPLACEMENT: ICD-10-CM

## 2024-09-30 DIAGNOSIS — Z79.4 TYPE 2 DIABETES MELLITUS WITH HYPERGLYCEMIA, WITH LONG-TERM CURRENT USE OF INSULIN (H): ICD-10-CM

## 2024-09-30 DIAGNOSIS — I10 ESSENTIAL HYPERTENSION WITH GOAL BLOOD PRESSURE LESS THAN 140/90: ICD-10-CM

## 2024-09-30 LAB
ALBUMIN SERPL BCG-MCNC: 3.5 G/DL (ref 3.5–5.2)
ALP SERPL-CCNC: 87 U/L (ref 40–150)
ALT SERPL W P-5'-P-CCNC: 24 U/L (ref 0–70)
ANION GAP SERPL CALCULATED.3IONS-SCNC: 13 MMOL/L (ref 7–15)
AST SERPL W P-5'-P-CCNC: 29 U/L (ref 0–45)
BILIRUB DIRECT SERPL-MCNC: <0.2 MG/DL (ref 0–0.3)
BILIRUB SERPL-MCNC: 0.5 MG/DL
BUN SERPL-MCNC: 9.4 MG/DL (ref 8–23)
CALCIUM SERPL-MCNC: 8.7 MG/DL (ref 8.8–10.4)
CHLORIDE SERPL-SCNC: 101 MMOL/L (ref 98–107)
CREAT SERPL-MCNC: 0.85 MG/DL (ref 0.67–1.17)
EGFRCR SERPLBLD CKD-EPI 2021: >90 ML/MIN/1.73M2
ERYTHROCYTE [DISTWIDTH] IN BLOOD BY AUTOMATED COUNT: 13.7 % (ref 10–15)
GLUCOSE SERPL-MCNC: 241 MG/DL (ref 70–99)
HCO3 SERPL-SCNC: 25 MMOL/L (ref 22–29)
HCT VFR BLD AUTO: 36.1 % (ref 40–53)
HGB BLD-MCNC: 11.9 G/DL (ref 13.3–17.7)
MCH RBC QN AUTO: 30.4 PG (ref 26.5–33)
MCHC RBC AUTO-ENTMCNC: 33 G/DL (ref 31.5–36.5)
MCV RBC AUTO: 92 FL (ref 78–100)
PLATELET # BLD AUTO: 208 10E3/UL (ref 150–450)
POTASSIUM SERPL-SCNC: 3.6 MMOL/L (ref 3.4–5.3)
PROT SERPL-MCNC: 6.7 G/DL (ref 6.4–8.3)
RBC # BLD AUTO: 3.92 10E6/UL (ref 4.4–5.9)
SODIUM SERPL-SCNC: 139 MMOL/L (ref 135–145)
WBC # BLD AUTO: 6.6 10E3/UL (ref 4–11)

## 2024-09-30 PROCEDURE — 85027 COMPLETE CBC AUTOMATED: CPT | Performed by: INTERNAL MEDICINE

## 2024-09-30 PROCEDURE — 80053 COMPREHEN METABOLIC PANEL: CPT | Performed by: INTERNAL MEDICINE

## 2024-09-30 PROCEDURE — 36415 COLL VENOUS BLD VENIPUNCTURE: CPT | Performed by: INTERNAL MEDICINE

## 2024-09-30 RX ORDER — INSULIN DEGLUDEC 100 U/ML
30 INJECTION, SOLUTION SUBCUTANEOUS DAILY
COMMUNITY
Start: 2024-09-30

## 2024-09-30 ASSESSMENT — PAIN SCALES - GENERAL: PAINLEVEL: NO PAIN (0)

## 2024-09-30 NOTE — PROGRESS NOTES
"      Anali Najera is a 73 year old, presenting for the following health issues:  Gastrointestinal Problem and Diabetes      9/30/2024     7:56 AM   Additional Questions   Roomed by Vanessa PARRY            EMR reviewed including:             Complaint, History of Chief Complaint, Corresponding Review of Systems, and Complaint Specific Physical Examination.    #1   Persistent diarrhea for over 1 month.  Several times a day.  No gross blood.  Patient has no indoor plumbing.  Uses \"a bucket\".  Patient buys water by the gallon.  Denies abdominal cramping.  No changes in diet.  Has lost about 10 pounds.  Decreased appetite.         Exam:   GI: Abdomen is soft, without rebound, guarding or tenderness. Bowel sounds are appropriate. No renal bruits are heard.      #2   Follow-up on type 2 diabetes  Blood sugars frequently hypoglycemic.  Patient has decreased insulin dosage by 5 units.  Has occasional hypoglycemia in the evenings.  Denies polyuria or polydipsia.  Has chronic neuropathy with decreased sensation to the feet.  Patient \"is testing blood sugar 5 times daily due to frequent hypoglycemic events\"            Exam:   LUNGS: clear bilaterally, airflow is brisk, no intercostal retraction or stridor is noted. No coughing is noted during visit.   HEART:  regular without rubs, clicks, gallops.  Slight systolic ejection murmur is heard consistent with his known prior valvuloplasty (porcine).. PMI is nondisplaced. Upstrokes are brisk. S1,S2 are heard.  No edema is noted at this time.      #3   Follow-up on hypertension  Blood pressure controlled at 131/72.  Continues Coreg, lisinopril daily.  Denies cough or side effect.          Exam:  As above        Patient has been interviewed, applicable history and applied review of systems have been performed.    Vital Signs:   /72   Pulse 66   Temp 97.2  F (36.2  C) (Temporal)   Resp 17   Ht 1.676 m (5' 6\")   Wt 91.8 kg (202 lb 6.4 oz)   SpO2 99%   BMI 32.67 kg/m  "       Decision Making    Problem and Complexity     1. Diarrhea of presumed infectious origin  Obtain stool cultures looking for ova and parasite as well as enteric infection.  Recommend he discontinues any laxatives.  Recommend fiber supplementation with Citrucel.  - C. difficile Toxin B PCR with reflex to C. difficile Antigen and Toxins A/B EIA; Future  - Enteric Bacteria and Virus Panel by [facility] Stool; Future  - Fecal Lactoferrin; Future  - CBC with platelets; Future  - C. difficile Toxin B PCR with reflex to C. difficile Antigen and Toxins A/B EIA  - Enteric Bacteria and Virus Panel by [facility]OLA Stool  - Fecal Lactoferrin  - CBC with platelets    2. Type 2 diabetes mellitus with diabetic autonomic neuropathy, without long-term current use of insulin (H)  Drop Tresiba to 30 units daily.  Recent A1c reviewed.  Continue current medication.  Consider the addition of an SGLT 2 inhibitor given his history of congestive heart failure  - insulin degludec (TRESIBA FLEXTOUCH) 100 UNIT/ML pen; Inject 30 Units subcutaneously daily.  - Hepatic panel (Albumin, ALT, AST, Bili, Alk Phos, TP); Future  - Hepatic panel (Albumin, ALT, AST, Bili, Alk Phos, TP)    3. Essential hypertension with goal blood pressure less than 140/90  Continue current meds  - BASIC METABOLIC PANEL; Future  - BASIC METABOLIC PANEL    4. S/P aortic valve replacement  Recent echocardiogram shows good function of the valve    5. Cardiac LV ejection fraction 30-35%  Stable to may be slightly improved.  Continue current medication                                FOLLOW UP   I have asked the patient to make an appointment for followup with me following results of the stool sample    Regarding routine vaccinations:  I have reviewed the patient's vaccination schedule and discussed the benefits of prophylactic vaccination in detail.  I recommend the patient contact their pharmacist for vaccinations.  Discussed that most insurance companies now favor reimbursement to the  pharmacies and it will financially behoove the patient to have vaccinations performed at their pharmacy.        I have carefully explained the diagnosis and treatment options to the patient.  The patient has displayed an understanding of the above, and all subsequent questions were answered.      DO RADHA Horn    Portions of this note were produced using [employer]  Although every attempt at real-time proof reading has been made, occasional grammar/syntax errors may have been missed.

## 2024-10-01 LAB
ADV 40+41 DNA STL QL NAA+NON-PROBE: NEGATIVE
ASTRO TYP 1-8 RNA STL QL NAA+NON-PROBE: NEGATIVE
C CAYETANENSIS DNA STL QL NAA+NON-PROBE: NEGATIVE
C DIFF GDH STL QL IA: NEGATIVE
C DIFF TOX A+B STL QL IA: NEGATIVE
C DIFF TOX B STL QL: POSITIVE
CAMPYLOBACTER DNA SPEC NAA+PROBE: NEGATIVE
CRYPTOSP DNA STL QL NAA+NON-PROBE: NEGATIVE
E COLI O157 DNA STL QL NAA+NON-PROBE: NORMAL
E HISTOLYT DNA STL QL NAA+NON-PROBE: NEGATIVE
EAEC ASTA GENE ISLT QL NAA+PROBE: NEGATIVE
EC STX1+STX2 GENES STL QL NAA+NON-PROBE: NEGATIVE
EPEC EAE GENE STL QL NAA+NON-PROBE: NEGATIVE
ETEC LTA+ST1A+ST1B TOX ST NAA+NON-PROBE: NEGATIVE
G LAMBLIA DNA STL QL NAA+NON-PROBE: NEGATIVE
LACTOFERRIN STL QL IA: POSITIVE
NOROVIRUS GI+II RNA STL QL NAA+NON-PROBE: NEGATIVE
P SHIGELLOIDES DNA STL QL NAA+NON-PROBE: NEGATIVE
RVA RNA STL QL NAA+NON-PROBE: NEGATIVE
SALMONELLA SP RPOD STL QL NAA+PROBE: NEGATIVE
SAPO I+II+IV+V RNA STL QL NAA+NON-PROBE: NEGATIVE
SHIGELLA SP+EIEC IPAH ST NAA+NON-PROBE: NEGATIVE
V CHOLERAE DNA SPEC QL NAA+PROBE: NEGATIVE
VIBRIO DNA SPEC NAA+PROBE: NEGATIVE
Y ENTEROCOL DNA STL QL NAA+PROBE: NEGATIVE

## 2024-10-03 ENCOUNTER — MYC MEDICAL ADVICE (OUTPATIENT)
Dept: INTERNAL MEDICINE | Facility: CLINIC | Age: 74
End: 2024-10-03
Payer: MEDICARE

## 2024-10-03 DIAGNOSIS — A04.72 C. DIFFICILE COLITIS: Primary | ICD-10-CM

## 2024-10-03 RX ORDER — METRONIDAZOLE 500 MG/1
500 TABLET ORAL 3 TIMES DAILY
Qty: 30 TABLET | Refills: 1 | Status: SHIPPED | OUTPATIENT
Start: 2024-10-03 | End: 2024-10-13

## 2024-10-07 ENCOUNTER — PATIENT OUTREACH (OUTPATIENT)
Dept: CARE COORDINATION | Facility: CLINIC | Age: 74
End: 2024-10-07
Payer: MEDICARE

## 2024-10-21 ENCOUNTER — OFFICE VISIT (OUTPATIENT)
Dept: INTERNAL MEDICINE | Facility: CLINIC | Age: 74
End: 2024-10-21
Payer: MEDICARE

## 2024-10-21 VITALS
TEMPERATURE: 97.4 F | HEIGHT: 67 IN | DIASTOLIC BLOOD PRESSURE: 72 MMHG | RESPIRATION RATE: 18 BRPM | OXYGEN SATURATION: 98 % | BODY MASS INDEX: 32.18 KG/M2 | WEIGHT: 205 LBS | HEART RATE: 69 BPM | SYSTOLIC BLOOD PRESSURE: 128 MMHG

## 2024-10-21 DIAGNOSIS — D64.9 ANEMIA, UNSPECIFIED TYPE: ICD-10-CM

## 2024-10-21 DIAGNOSIS — Z79.4 TYPE 2 DIABETES MELLITUS WITH HYPERGLYCEMIA, WITH LONG-TERM CURRENT USE OF INSULIN (H): ICD-10-CM

## 2024-10-21 DIAGNOSIS — Z95.2 S/P AORTIC VALVE REPLACEMENT: ICD-10-CM

## 2024-10-21 DIAGNOSIS — E11.65 TYPE 2 DIABETES MELLITUS WITH HYPERGLYCEMIA, WITH LONG-TERM CURRENT USE OF INSULIN (H): ICD-10-CM

## 2024-10-21 DIAGNOSIS — A04.72 C. DIFFICILE COLITIS: Primary | ICD-10-CM

## 2024-10-21 DIAGNOSIS — I10 ESSENTIAL HYPERTENSION WITH GOAL BLOOD PRESSURE LESS THAN 140/90: ICD-10-CM

## 2024-10-21 LAB
EST. AVERAGE GLUCOSE BLD GHB EST-MCNC: 146 MG/DL
HBA1C MFR BLD: 6.7 %
HGB BLD-MCNC: 11.9 G/DL (ref 13.3–17.7)

## 2024-10-21 PROCEDURE — G2211 COMPLEX E/M VISIT ADD ON: HCPCS | Performed by: INTERNAL MEDICINE

## 2024-10-21 PROCEDURE — 83036 HEMOGLOBIN GLYCOSYLATED A1C: CPT | Performed by: INTERNAL MEDICINE

## 2024-10-21 PROCEDURE — 36415 COLL VENOUS BLD VENIPUNCTURE: CPT | Performed by: INTERNAL MEDICINE

## 2024-10-21 PROCEDURE — 99214 OFFICE O/P EST MOD 30 MIN: CPT | Performed by: INTERNAL MEDICINE

## 2024-10-21 PROCEDURE — 85018 HEMOGLOBIN: CPT | Performed by: INTERNAL MEDICINE

## 2024-10-21 ASSESSMENT — PAIN SCALES - GENERAL: PAINLEVEL: NO PAIN (0)

## 2024-10-21 NOTE — PROGRESS NOTES
Anali Najera is a 74 year old, presenting for the following health issues:  RECHECK (Follow up on infection)      10/21/2024     7:55 AM   Additional Questions   Roomed by Heather KELLEY reviewed including:             Complaint, History of Chief Complaint, Corresponding Review of Systems, and Complaint Specific Physical Examination.    #1   Follow-up on Clostridium difficile diarrhea  Bowels have returned to essentially normal following the antibiotic course.  Patient had a little diarrhea today because he took laxatives last night.  Denies foul-smelling stools or excess flatulence.  Is back to eating a normal diet.  Denies any abdominal pain or cramping.          Exam:   Constitutional: The patient appears to be in no acute distress. The patient appears to be adequately hydrated. No acute respiratory or hemodynamic distress is noted at this time.   GI: Abdomen is soft, without rebound, guarding or tenderness. Bowel sounds are appropriate. No renal bruits are heard.      #2   Follow-up on hypertension  Blood pressure well-controlled at 128/72  Denies headache, chest pain, edema.  Taking medication compliantly.          Exam:   HEART:  regular without rubs, clicks, gallops, or murmurs. PMI is nondisplaced. Upstrokes are brisk. S1,S2 are heard.   LUNGS: clear bilaterally, airflow is brisk, no intercostal retraction or stridor is noted. No coughing is noted during visit.      #3   Follow-up on type 2 diabetes  Blood sugars have been a little bit higher with his recent infection.  Most values under 160.  Last hemoglobin A1c was 6.8 in July.  Denies polyuria or polydipsia.  Denies any foot or skin infections.  Does have neuropathy involving the feet which is unchanged.          Exam:   NEURO: Pt is alert and appropriate. No neurologic lateralization is noted. Cranial nerves 2-12 are intact. Peripheral sensory function is decreased in the feet.        Patient has been interviewed, applicable history  "and applied review of systems have been performed.    Vital Signs:   /72   Pulse 69   Temp 97.4  F (36.3  C) (Temporal)   Resp 18   Ht 1.702 m (5' 7\")   Wt 93 kg (205 lb)   SpO2 98%   BMI 32.11 kg/m        Decision Making    Problem and Complexity     1. C. difficile colitis  Stop the laxatives.  If the diarrhea resolves, no need for further testing.    2. Essential hypertension with goal blood pressure less than 140/90  Currently controlled.  Continue lisinopril and Coreg as current.      3. S/P aortic valve replacement  Status post porcine valve.  Follow-up with cardiology as scheduled    4. Type 2 diabetes mellitus with hyperglycemia, with long-term current use of insulin (H)  Check A1c.  Continue current medications and make adjustments as according  - Hemoglobin A1c; Future  - Hemoglobin A1c    5. Anemia, unspecified type  Mildly decreased previously.  Most likely secondary to the infection.  Will recheck hemoglobin  - Hemoglobin; Future  - Hemoglobin                                FOLLOW UP   I have asked the patient to make an appointment for followup with me in 4 months or as predicated by his lab results.    Regarding routine vaccinations:  I have reviewed the patient's vaccination schedule and discussed the benefits of prophylactic vaccination in detail.  I recommend the patient contact their pharmacist for vaccinations.  Discussed that most insurance companies now favor reimbursement to the pharmacies and it will financially behoove the patient to have vaccinations performed at their pharmacy.        I have carefully explained the diagnosis and treatment options to the patient.  The patient has displayed an understanding of the above, and all subsequent questions were answered.      DO RADHA Horn    Portions of this note were produced using [employer]  Although every attempt at real-time proof reading has been made, occasional grammar/syntax errors may have been " missed.

## 2024-10-28 DIAGNOSIS — E11.43 TYPE 2 DIABETES MELLITUS WITH DIABETIC AUTONOMIC NEUROPATHY, WITHOUT LONG-TERM CURRENT USE OF INSULIN (H): Primary | ICD-10-CM

## 2024-10-28 DIAGNOSIS — E11.43 TYPE 2 DIABETES MELLITUS WITH DIABETIC AUTONOMIC NEUROPATHY, WITHOUT LONG-TERM CURRENT USE OF INSULIN (H): ICD-10-CM

## 2024-10-28 RX ORDER — INSULIN DEGLUDEC 100 U/ML
30 INJECTION, SOLUTION SUBCUTANEOUS DAILY
Qty: 15 ML | Refills: 1 | Status: SHIPPED | OUTPATIENT
Start: 2024-10-28

## 2024-10-28 RX ORDER — BLOOD SUGAR DIAGNOSTIC
STRIP MISCELLANEOUS
Qty: 200 STRIP | Refills: 0 | Status: SHIPPED | OUTPATIENT
Start: 2024-10-28

## 2024-10-28 NOTE — TELEPHONE ENCOUNTER
Attempted contacting patient, no answer, left a message stating refill approval.    Paola Campbell, VF

## 2024-10-28 NOTE — TELEPHONE ENCOUNTER
Patient ONLY uses the Tresiba, needs NEW Rx sent to the pharmacy, was placed as historical.    Patient is OUT of medication    Lindsay RODRIGUEZO/

## 2024-11-05 NOTE — TELEPHONE ENCOUNTER
Patient Quality Outreach    Patient is due for the following:   Physical Annual Wellness Visit    Next Steps:       Type of outreach:    Sent [employer] message.      Questions for provider review:    None           Jerrica Martin LPN

## 2024-12-14 DIAGNOSIS — I10 ESSENTIAL HYPERTENSION WITH GOAL BLOOD PRESSURE LESS THAN 140/90: ICD-10-CM

## 2024-12-16 ENCOUNTER — HOSPITAL ENCOUNTER (OUTPATIENT)
Dept: GENERAL RADIOLOGY | Facility: CLINIC | Age: 74
Discharge: HOME | End: 2024-12-16
Attending: INTERNAL MEDICINE | Admitting: INTERNAL MEDICINE
Payer: MEDICARE

## 2024-12-16 ENCOUNTER — OFFICE VISIT (OUTPATIENT)
Dept: INTERNAL MEDICINE | Facility: CLINIC | Age: 74
End: 2024-12-16
Payer: MEDICARE

## 2024-12-16 VITALS
SYSTOLIC BLOOD PRESSURE: 122 MMHG | DIASTOLIC BLOOD PRESSURE: 78 MMHG | RESPIRATION RATE: 14 BRPM | HEART RATE: 62 BPM | WEIGHT: 206.1 LBS | HEIGHT: 67 IN | BODY MASS INDEX: 32.35 KG/M2 | OXYGEN SATURATION: 98 % | TEMPERATURE: 98 F

## 2024-12-16 DIAGNOSIS — G63 POLYNEUROPATHY ASSOCIATED WITH UNDERLYING DISEASE (H): ICD-10-CM

## 2024-12-16 DIAGNOSIS — R19.7 DIARRHEA OF PRESUMED INFECTIOUS ORIGIN: Primary | ICD-10-CM

## 2024-12-16 DIAGNOSIS — Z95.2 S/P AORTIC VALVE REPLACEMENT: ICD-10-CM

## 2024-12-16 DIAGNOSIS — I10 ESSENTIAL HYPERTENSION WITH GOAL BLOOD PRESSURE LESS THAN 140/90: ICD-10-CM

## 2024-12-16 DIAGNOSIS — E78.5 HYPERLIPIDEMIA LDL GOAL <100: ICD-10-CM

## 2024-12-16 DIAGNOSIS — R51.9 FACIAL PAIN: ICD-10-CM

## 2024-12-16 DIAGNOSIS — Z95.1 S/P CABG (CORONARY ARTERY BYPASS GRAFT): ICD-10-CM

## 2024-12-16 DIAGNOSIS — I50.82 BIVENTRICULAR CONGESTIVE HEART FAILURE (H): ICD-10-CM

## 2024-12-16 DIAGNOSIS — E11.43 TYPE 2 DIABETES MELLITUS WITH DIABETIC AUTONOMIC NEUROPATHY, WITHOUT LONG-TERM CURRENT USE OF INSULIN (H): ICD-10-CM

## 2024-12-16 DIAGNOSIS — Z79.01 LONG TERM CURRENT USE OF ANTICOAGULANT THERAPY: ICD-10-CM

## 2024-12-16 LAB
ANION GAP SERPL CALCULATED.3IONS-SCNC: 10 MMOL/L (ref 7–15)
BUN SERPL-MCNC: 15.7 MG/DL (ref 8–23)
CALCIUM SERPL-MCNC: 8.8 MG/DL (ref 8.8–10.4)
CHLORIDE SERPL-SCNC: 100 MMOL/L (ref 98–107)
CREAT SERPL-MCNC: 0.94 MG/DL (ref 0.67–1.17)
EGFRCR SERPLBLD CKD-EPI 2021: 84 ML/MIN/1.73M2
ERYTHROCYTE [DISTWIDTH] IN BLOOD BY AUTOMATED COUNT: 13.3 % (ref 10–15)
GLUCOSE SERPL-MCNC: 254 MG/DL (ref 70–99)
HCO3 SERPL-SCNC: 25 MMOL/L (ref 22–29)
HCT VFR BLD AUTO: 36.3 % (ref 40–53)
HGB BLD-MCNC: 12.1 G/DL (ref 13.3–17.7)
MCH RBC QN AUTO: 30.5 PG (ref 26.5–33)
MCHC RBC AUTO-ENTMCNC: 33.3 G/DL (ref 31.5–36.5)
MCV RBC AUTO: 91 FL (ref 78–100)
PLATELET # BLD AUTO: 190 10E3/UL (ref 150–450)
POTASSIUM SERPL-SCNC: 4.3 MMOL/L (ref 3.4–5.3)
RBC # BLD AUTO: 3.97 10E6/UL (ref 4.4–5.9)
SODIUM SERPL-SCNC: 135 MMOL/L (ref 135–145)
WBC # BLD AUTO: 6.7 10E3/UL (ref 4–11)

## 2024-12-16 PROCEDURE — 70150 X-RAY EXAM OF FACIAL BONES: CPT

## 2024-12-16 PROCEDURE — 85027 COMPLETE CBC AUTOMATED: CPT | Performed by: INTERNAL MEDICINE

## 2024-12-16 PROCEDURE — G2211 COMPLEX E/M VISIT ADD ON: HCPCS | Performed by: INTERNAL MEDICINE

## 2024-12-16 PROCEDURE — 36415 COLL VENOUS BLD VENIPUNCTURE: CPT | Performed by: INTERNAL MEDICINE

## 2024-12-16 PROCEDURE — 80048 BASIC METABOLIC PNL TOTAL CA: CPT | Performed by: INTERNAL MEDICINE

## 2024-12-16 PROCEDURE — 99214 OFFICE O/P EST MOD 30 MIN: CPT | Performed by: INTERNAL MEDICINE

## 2024-12-16 RX ORDER — CARVEDILOL 3.12 MG/1
3.12 TABLET ORAL 2 TIMES DAILY WITH MEALS
Qty: 180 TABLET | Refills: 3 | Status: SHIPPED | OUTPATIENT
Start: 2024-12-16

## 2024-12-16 RX ORDER — CARVEDILOL 3.12 MG/1
3.12 TABLET ORAL 2 TIMES DAILY WITH MEALS
Qty: 180 TABLET | Refills: 0 | OUTPATIENT
Start: 2024-12-16

## 2024-12-16 ASSESSMENT — PAIN SCALES - GENERAL: PAINLEVEL_OUTOF10: MODERATE PAIN (5)

## 2024-12-16 NOTE — PROGRESS NOTES
Anali Najera is a 74 year old, presenting for the following health issues:  Gastrointestinal Problem (C diff)      12/16/2024     8:37 AM   Additional Questions   Roomed by Vanessa PARRY                EMR reviewed including:             Complaint, History of Chief Complaint, Corresponding Review of Systems, and Complaint Specific Physical Examination.    #1   Continued diarrhea.  Usually has formed bowel movement in the morning followed by several episodes of diarrhea through the day.  Prior history of C. difficile.  Notes no melena or hematochezia.  Denies any abdominal pain.  Notes that he does have intermittent days without diarrhea.          Exam:   Constitutional: The patient appears to be in no acute distress. The patient appears to be adequately hydrated. No acute respiratory or hemodynamic distress is noted at this time.   GI: Abdomen is soft, without rebound, guarding or tenderness. Bowel sounds are appropriate. No renal bruits are heard.      #2   Facial pain  Involves the left periorbital area.  Did have a fall with facial trauma earlier in the year.  Denies any diplopia.  Notes that the pain is intermittent.  Rates it at a maximum of 3/10.          Exam:  No significant formative face is noted.  No evidence of step-off noted.  Extraocular muscles appear grossly intact.  No subcutaneous air noted.      #3   Follow-up on type 2 diabetes.  Last hemoglobin A1c under 7.  Watching diet closely.  Taking medications as directed.  Continues 30 units of Tresiba daily.  Is on ACE inhibitor, aspirin, statin.  Does have some neuropathy involving the feet.  Complains of some tingling but no burning.          Exam:   LUNGS: clear bilaterally, airflow is brisk, no intercostal retraction or stridor is noted. No coughing is noted during visit.   HEART:  regular without rubs, gallops, or murmurs. PMI is nondisplaced. Upstrokes are brisk. S1,S2 are heard.     NEURO: Pt is alert and appropriate. No neurologic  "lateralization is noted. Cranial nerves 2-12 are intact. Peripheral sensory function is decreased in the feet.        Patient has been interviewed, applicable history and applied review of systems have been performed.    Vital Signs:   /78   Pulse 62   Temp 98  F (36.7  C) (Temporal)   Resp 14   Ht 1.695 m (5' 6.75\")   Wt 93.5 kg (206 lb 1.6 oz)   SpO2 98%   BMI 32.52 kg/m        Decision Making    Problem and Complexity     1. Diarrhea of presumed infectious origin (Primary)  Recheck for C. difficile.  If positive, will need to reinitiate therapy.  - C. difficile Toxin B PCR with reflex to C. difficile Antigen and Toxins A/B EIA; Future    2. Facial pain  Obtain x-rays to rule out potential posttrauma fracture  - XR Facial Bone G/E 3 Views; Future    3. Type 2 diabetes mellitus with diabetic autonomic neuropathy, without long-term current use of insulin (H)  Continue current medication.  Check blood sugar and renal function  - Basic metabolic panel  (Ca, Cl, CO2, Creat, Gluc, K, Na, BUN); Future    4. Polyneuropathy associated with underlying disease (H)  Stable    5. Essential hypertension with goal blood pressure less than 140/90  Controlled.  Continue current medication  - carvedilol (COREG) 3.125 MG tablet; Take 1 tablet (3.125 mg) by mouth 2 times daily (with meals).  Dispense: 180 tablet; Refill: 3    6. Hyperlipidemia LDL goal <100  Continue current statin therapy and low-fat diet    7. S/P aortic valve replacement  Continue anticoagulation.  Check CBC for anemia  - CBC with platelets; Future      9. S/P CABG (coronary artery bypass graft)  Stable.  No chest pain    10. Biventricular congestive heart failure (H)  Markedly improved without signs of recurrence                                FOLLOW UP   I have asked the patient to make an appointment for followup with me in 2 months or as needed for his ongoing and longitudinal medical care of his multiple medical problems.    Regarding routine " vaccinations:  I have reviewed the patient's vaccination schedule and discussed the benefits of prophylactic vaccination in detail.  I recommend the patient contact their pharmacist for vaccinations.  Discussed that most insurance companies now favor reimbursement to the pharmacies and it will financially behoove the patient to have vaccinations performed at their pharmacy.        I have carefully explained the diagnosis and treatment options to the patient.  The patient has displayed an understanding of the above, and all subsequent questions were answered.      DO RADHA Horn    Portions of this note were produced using [employer]  Although every attempt at real-time proof reading has been made, occasional grammar/syntax errors may have been missed.

## 2025-01-06 ENCOUNTER — OFFICE VISIT (OUTPATIENT)
Dept: OPHTHALMOLOGY | Facility: CLINIC | Age: 75
End: 2025-01-06
Payer: MEDICARE

## 2025-01-06 DIAGNOSIS — H40.1221 LOW-TENSION GLAUCOMA, LEFT EYE, MILD STAGE: ICD-10-CM

## 2025-01-06 DIAGNOSIS — H40.1212 LOW TENSION GLAUCOMA OF RIGHT EYE, MODERATE STAGE: Primary | ICD-10-CM

## 2025-01-06 ASSESSMENT — SLIT LAMP EXAM - LIDS
COMMENTS: 1+ BLEPHARITIS
COMMENTS: 1+ BLEPHARITIS

## 2025-01-06 ASSESSMENT — VISUAL ACUITY
CORRECTION_TYPE: GLASSES
OS_CC+: -2
OS_CC: 20/25
METHOD: SNELLEN - LINEAR
OD_CC: 20/25

## 2025-01-06 ASSESSMENT — CUP TO DISC RATIO
OS_RATIO: 0.8
OD_RATIO: 0.8

## 2025-01-06 ASSESSMENT — CONF VISUAL FIELD
COMMENTS: 24-2 VISUAL FIELD WAS PERFORMED TODAY
OD_INFERIOR_NASAL_RESTRICTION: 0
OD_SUPERIOR_TEMPORAL_RESTRICTION: 0
OD_SUPERIOR_NASAL_RESTRICTION: 0
OD_INFERIOR_TEMPORAL_RESTRICTION: 0

## 2025-01-06 ASSESSMENT — TONOMETRY
IOP_METHOD: APPLANATION
OS_IOP_MMHG: 14
OD_IOP_MMHG: 16

## 2025-01-06 NOTE — PROGRESS NOTES
HPI       Glaucoma Follow Up    In both eyes.             Comments    Pt returns for low tension glaucoma follow up. He continues on the latanoprost drops at bedtime every night. He states he is compliant and has no trouble tolerating this medication. Last drop around 10:00 pm last night. He denies significant vision changes each eye.  Lab Results       Component                Value               Date                       A1C                      6.7                 10/21/2024                 A1C                      6.8                 07/18/2024                 A1C                      7.4                 03/21/2024                 A1C                      8.7                 07/06/2023                 A1C                      9.1                 03/15/2023                 A1C                      8.8                 05/25/2021                 A1C                      8.6                 10/15/2019                 A1C                      8.3                 08/12/2019                 A1C                      8.2                 04/10/2019                 A1C                      7.5                 09/13/2018                     Last edited by Kristine Urrutia on 1/6/2025 10:29 AM.         Review of systems for the eyes was negative other than the pertinent positives/negatives listed in the HPI.      Assessment & Plan    HPI:  Reinaldo Barrios is a 74 year old male with history of T2DM, CAD, HTN, HLD, myopia with astigmatism and presbyopia who presents for glaucoma followup. Still using latanoprost at bedtime both eyes     POHx: myopia with astigmatism and presbyopia  PMHx: T2DM, CAD, HTN, HLD  Current Medications:   Current Outpatient Medications   Medication Sig Dispense Refill    aspirin EC 81 MG EC tablet Take 1 tablet (81 mg) by mouth daily      atorvastatin (LIPITOR) 40 MG tablet Take 1 tablet (40 mg) by mouth daily 90 tablet 3    B-D U/F 31G X 8 MM insulin pen needle USE THREE PEN NEEDLES DAILY OR  "AS DIRECTED. 100 each 3    carvedilol (COREG) 3.125 MG tablet Take 1 tablet (3.125 mg) by mouth 2 times daily (with meals). 180 tablet 3    Continuous Blood Gluc Sensor (FREESTYLE ANNE 2 SENSOR) MISC Apply 1 each topically every 14 days Use 1 sensor every 14 days. Use to read blood sugars per 's instructions. 6 each 5    diclofenac (VOLTAREN) 1 % topical gel Apply 2 g topically 4 times daily 100 g 0    insulin aspart (FIASP FLEXTOUCH) 100 UNIT/ML pen-injector INJECT 10 - 12 UNITS UNDER THE SKIN TWICE A DAY 15 mL 3    insulin degludec (TRESIBA FLEXTOUCH) 100 UNIT/ML pen Inject 30 Units subcutaneously daily. 15 mL 1    insulin syringe-needle U-100 (BD INSULIN SYRINGE ULTRAFINE) 31G X 5/16\" 0.5 ML miscellaneous Use one syringe 3 daily or as directed. 100 each prn    isosorbide mononitrate (IMDUR) 30 MG 24 hr tablet TAKE ONE TABLET (30 MG) BY MOUTH ONCE DAILY 90 tablet 3    latanoprost (XALATAN) 0.005 % ophthalmic solution PLACE 1 DROP INTO BOTH EYES DAILY 2.5 mL 11    lisinopril (ZESTRIL) 5 MG tablet Take 1 tablet (5 mg) by mouth daily 90 tablet 3    LORazepam (ATIVAN) 0.5 MG tablet Take 1 tablet (0.5 mg) by mouth every 6 hours as needed for anxiety 2 tablet 0    ONETOUCH ULTRA test strip USE TO TEST BLOOD SUGAR 5 TIMES A  strip 0    senna-docusate (FT STOOL SOFTENER) 8.6-50 MG tablet Take 1-2 tablets by mouth 2 times daily. 120 tablet 0    clopidogrel (PLAVIX) 75 MG tablet Take 1 tablet (75 mg) by mouth daily for 21 days (Patient not taking: Reported on 1/6/2025) 21 tablet 0     No current facility-administered medications for this visit.     FHx: mom-glaucoma  PSHx: denies history of ocular surgeries       Current Eye Medications:  Latanoprost at bedtime both eyes     Assessment & Plan:  (H40.1212) Low tension glaucoma of right eye, moderate stage   (H40.1221) Low-tension glaucoma, left eye, mild stage  Based on IOP and c/d ratio  IOP today 16/14  Maximum intraocular pressure 22/22  Family " history: Yes  Trauma history: No-hit in the face with baseball right eye 1970  Gonioscopy: extremely deep, ?angle recession  Pachmetry: 597/598    Treatment History:   Latanoprost initiated 9/18/23    Today's testing:  Suarez visual field 24-2 01/06/25   Right eye superior cecocentral scotoma, VFI 87%, MD -5.06, PSD 7.31  Left eye normal, VFI 98%, MD -0.72, PSD 1.92  Visual field 24-2 06/17/24     Right eye central, cecocentral scotoma, VFI 83%, MD -5.97,  PSD 7.14, FL 4/12, false positives 8%, false negatives 3%  Left eye normal, VFi 97%, MD -1.59, PSD 2.18, FL 7/10, false positives 2%, false negatives 3%  Visual field 24-2 09/18/23    Right eye central, cecocentral scotoma down to 0 in many spots, VFI 80%, MD -5.19,  PSd 8.50  Visual field 24-2 07/31/23   Right eye central, superocentral scotoma down to 0 in many spots, VFI 77%, MD -6.34,  PSd 9.41  Left eye normal, reliable, VFI 97%, MD -0.34, PSD 2.03     OCT nerve fiber layer 01/06/25:   Right eye - G(r) 61 NI (g) 70 TI (r) 26 NS (g) 91 TS (y) 93     Left eye - G(r) 65 NI (g) 83 TI (r) 91 NS (g) 77 TS (r) 88  OCT nerve fiber layer 06/17/24:   Right eye - G(r) 60 NI (g) 70 TI (r) 28 NS (g) 92 TS (y) 94      Left eye - G(r) 67 NI (g) 84 TI (y) 92 NS (g) 82 TS (r) 90  OCT nerve fiber layer 03/12/24:   Right eye - G(r) 60 NI (g) 69 TI (r) 29 NS (g) 91 TS (y) 94      Left eye - G(r) 66 NI (g) 84 TI (r) 91 NS (g) 79 TS (y) 92  Thin inferotemporally vs 2023  OCT nerve fiber layer 05/22/23:   Right eye - G(r) 61 NI (g) 70 TI (r) 31 NS (g) 91 TS (y) 94      Left eye - G(r) 67 NI (g) 84 TI (y) 98 NS (g) 79 TS (r) 91      IOP goal: mid teens  Stable visual field and oct retinal nerve fiber layer both eyes today  Continue present management     (E11.9) Type 2 diabetes mellitus without retinopathy (H)  (primary encounter diagnosis)  (E11.65,  Z79.4) Type 2 diabetes mellitus with hyperglycemia, with long-term current use of insulin (H)  Diagnosed ~2000  Most recent HgBA1c  6.7 on 10/21/24  No background diabetic retinopathy or neovascularization noted on today's exam.  Discussed ocular and systemic benefits of blood pressure and blood sugar control.  Return in 6 months for full exam with dilation or sooner if changes to vision.      (H52.13,  H52.203,  H52.4) Myopia of both eyes with astigmatism and presbyopia  Recheck next visit    (H25.813) Combined forms of age-related cataract of both eyes  Mild cataracts are present and may account for some of the patient's visual complaints. May consider surgery if desired. The patient will monitor for vision changes and contact us with any decrease in vision. Recheck next visit.       Return in about 6 months (around 7/6/2025) for Annual Visit-v/t/d/MRx, OCT RNFL.        Jd Tinajero MD     Attending Physician Attestation:  Complete documentation of historical and exam elements from today's encounter can be found in the full encounter summary report (not reduplicated in this progress note).  I personally obtained the chief complaint(s) and history of present illness.  I confirmed and edited as necessary the review of systems, past medical/surgical history, family history, social history, and examination findings as documented by others; and I examined the patient myself.  I personally reviewed the relevant tests, images, and reports as documented above.  I formulated and edited as necessary the assessment and plan and discussed the findings and management plan with the patient and family. - Jd Tinajero MD

## 2025-01-06 NOTE — NURSING NOTE
Chief Complaints and History of Present Illnesses   Patient presents with    Glaucoma Follow Up     Chief Complaint(s) and History of Present Illness(es)       Glaucoma Follow Up              Laterality: both eyes              Comments    Pt returns for low tension glaucoma follow up. He continues on the latanoprost drops at bedtime every night. He states he is compliant and has no trouble tolerating this medication. Last drop around 10:00 pm last night. He denies significant vision changes each eye.  Lab Results       Component                Value               Date                       A1C                      6.7                 10/21/2024                 A1C                      6.8                 07/18/2024                 A1C                      7.4                 03/21/2024                 A1C                      8.7                 07/06/2023                 A1C                      9.1                 03/15/2023                 A1C                      8.8                 05/25/2021                 A1C                      8.6                 10/15/2019                 A1C                      8.3                 08/12/2019                 A1C                      8.2                 04/10/2019                 A1C                      7.5                 09/13/2018

## 2025-01-22 ENCOUNTER — OFFICE VISIT (OUTPATIENT)
Dept: INTERNAL MEDICINE | Facility: CLINIC | Age: 75
End: 2025-01-22
Payer: MEDICARE

## 2025-01-22 ENCOUNTER — HOSPITAL ENCOUNTER (OUTPATIENT)
Dept: GENERAL RADIOLOGY | Facility: CLINIC | Age: 75
Discharge: HOME | End: 2025-01-22
Attending: INTERNAL MEDICINE
Payer: MEDICARE

## 2025-01-22 VITALS
HEART RATE: 74 BPM | TEMPERATURE: 98.9 F | RESPIRATION RATE: 18 BRPM | DIASTOLIC BLOOD PRESSURE: 72 MMHG | OXYGEN SATURATION: 97 % | SYSTOLIC BLOOD PRESSURE: 111 MMHG | BODY MASS INDEX: 31.64 KG/M2 | WEIGHT: 200.5 LBS

## 2025-01-22 DIAGNOSIS — Z95.1 S/P CABG (CORONARY ARTERY BYPASS GRAFT): ICD-10-CM

## 2025-01-22 DIAGNOSIS — G63 POLYNEUROPATHY ASSOCIATED WITH UNDERLYING DISEASE: ICD-10-CM

## 2025-01-22 DIAGNOSIS — E11.43 TYPE 2 DIABETES MELLITUS WITH DIABETIC AUTONOMIC NEUROPATHY, WITHOUT LONG-TERM CURRENT USE OF INSULIN (H): ICD-10-CM

## 2025-01-22 DIAGNOSIS — M62.81 GENERALIZED MUSCLE WEAKNESS: Primary | ICD-10-CM

## 2025-01-22 DIAGNOSIS — I50.22 CHRONIC SYSTOLIC CONGESTIVE HEART FAILURE (H): ICD-10-CM

## 2025-01-22 DIAGNOSIS — Z95.2 S/P AORTIC VALVE REPLACEMENT: ICD-10-CM

## 2025-01-22 DIAGNOSIS — A04.72 C. DIFFICILE COLITIS: ICD-10-CM

## 2025-01-22 DIAGNOSIS — I10 ESSENTIAL HYPERTENSION WITH GOAL BLOOD PRESSURE LESS THAN 140/90: ICD-10-CM

## 2025-01-22 DIAGNOSIS — B37.2 YEAST DERMATITIS: ICD-10-CM

## 2025-01-22 DIAGNOSIS — E66.01 MORBID OBESITY (H): ICD-10-CM

## 2025-01-22 LAB
ALBUMIN SERPL BCG-MCNC: 3.9 G/DL (ref 3.5–5.2)
ALBUMIN UR-MCNC: NEGATIVE MG/DL
ALP SERPL-CCNC: 74 U/L (ref 40–150)
ALT SERPL W P-5'-P-CCNC: 23 U/L (ref 0–70)
ANION GAP SERPL CALCULATED.3IONS-SCNC: 12 MMOL/L (ref 7–15)
APPEARANCE UR: CLEAR
AST SERPL W P-5'-P-CCNC: 33 U/L (ref 0–45)
BILIRUB DIRECT SERPL-MCNC: <0.2 MG/DL (ref 0–0.3)
BILIRUB SERPL-MCNC: 0.5 MG/DL
BILIRUB UR QL STRIP: NEGATIVE
BUN SERPL-MCNC: 16.3 MG/DL (ref 8–23)
CALCIUM SERPL-MCNC: 9.3 MG/DL (ref 8.8–10.4)
CHLORIDE SERPL-SCNC: 101 MMOL/L (ref 98–107)
COLOR UR AUTO: YELLOW
CREAT SERPL-MCNC: 0.81 MG/DL (ref 0.67–1.17)
EGFRCR SERPLBLD CKD-EPI 2021: >90 ML/MIN/1.73M2
ERYTHROCYTE [DISTWIDTH] IN BLOOD BY AUTOMATED COUNT: 12.8 % (ref 10–15)
ERYTHROCYTE [SEDIMENTATION RATE] IN BLOOD BY WESTERGREN METHOD: 25 MM/HR (ref 0–20)
EST. AVERAGE GLUCOSE BLD GHB EST-MCNC: 220 MG/DL
GLUCOSE SERPL-MCNC: 240 MG/DL (ref 70–99)
GLUCOSE UR STRIP-MCNC: >1000 MG/DL
HBA1C MFR BLD: 9.3 %
HCO3 SERPL-SCNC: 27 MMOL/L (ref 22–29)
HCT VFR BLD AUTO: 36.7 % (ref 40–53)
HGB BLD-MCNC: 12.4 G/DL (ref 13.3–17.7)
HGB UR QL STRIP: NEGATIVE
KETONES UR STRIP-MCNC: ABNORMAL MG/DL
LEUKOCYTE ESTERASE UR QL STRIP: NEGATIVE
MCH RBC QN AUTO: 30.5 PG (ref 26.5–33)
MCHC RBC AUTO-ENTMCNC: 33.8 G/DL (ref 31.5–36.5)
MCV RBC AUTO: 90 FL (ref 78–100)
NITRATE UR QL: NEGATIVE
PH UR STRIP: 5.5 [PH] (ref 5–7)
PLATELET # BLD AUTO: 294 10E3/UL (ref 150–450)
POTASSIUM SERPL-SCNC: 4.3 MMOL/L (ref 3.4–5.3)
PROT SERPL-MCNC: 7.1 G/DL (ref 6.4–8.3)
RBC # BLD AUTO: 4.06 10E6/UL (ref 4.4–5.9)
SODIUM SERPL-SCNC: 140 MMOL/L (ref 135–145)
SP GR UR STRIP: 1.03 (ref 1–1.03)
UROBILINOGEN UR STRIP-MCNC: NORMAL MG/DL
WBC # BLD AUTO: 7.1 10E3/UL (ref 4–11)

## 2025-01-22 PROCEDURE — 36415 COLL VENOUS BLD VENIPUNCTURE: CPT | Performed by: INTERNAL MEDICINE

## 2025-01-22 PROCEDURE — 85027 COMPLETE CBC AUTOMATED: CPT | Performed by: INTERNAL MEDICINE

## 2025-01-22 PROCEDURE — 80053 COMPREHEN METABOLIC PANEL: CPT | Performed by: INTERNAL MEDICINE

## 2025-01-22 PROCEDURE — 81003 URINALYSIS AUTO W/O SCOPE: CPT | Performed by: INTERNAL MEDICINE

## 2025-01-22 PROCEDURE — 83036 HEMOGLOBIN GLYCOSYLATED A1C: CPT | Performed by: INTERNAL MEDICINE

## 2025-01-22 PROCEDURE — 71046 X-RAY EXAM CHEST 2 VIEWS: CPT

## 2025-01-22 PROCEDURE — 82248 BILIRUBIN DIRECT: CPT | Performed by: INTERNAL MEDICINE

## 2025-01-22 PROCEDURE — G2211 COMPLEX E/M VISIT ADD ON: HCPCS | Performed by: INTERNAL MEDICINE

## 2025-01-22 PROCEDURE — 99214 OFFICE O/P EST MOD 30 MIN: CPT | Performed by: INTERNAL MEDICINE

## 2025-01-22 PROCEDURE — 85652 RBC SED RATE AUTOMATED: CPT | Performed by: INTERNAL MEDICINE

## 2025-01-22 RX ORDER — CLOTRIMAZOLE 1 %
CREAM (GRAM) TOPICAL 2 TIMES DAILY
Qty: 60 G | Refills: 1 | Status: SHIPPED | OUTPATIENT
Start: 2025-01-22

## 2025-01-22 ASSESSMENT — ENCOUNTER SYMPTOMS
FATIGUE: 1
WEAKNESS: 1

## 2025-01-22 ASSESSMENT — PAIN SCALES - GENERAL: PAINLEVEL_OUTOF10: NO PAIN (0)

## 2025-01-22 NOTE — PROGRESS NOTES
Anali Najera is a 74 year old, presenting for the following health issues:  Fatigue      1/22/2025     2:54 PM   Additional Questions   Roomed by Vanessa PARRY     Fatigue  This is a new problem. The current episode started 1 to 4 weeks ago. The problem occurs daily. The problem has been gradually improving. Associated symptoms include fatigue and weakness.                 EMR reviewed including:             Complaint, History of Chief Complaint, Corresponding Review of Systems, and Complaint Specific Physical Examination.    #1   Recent episode of severe weakness.  Lasted several days.  Associated with fever, chills.  Associated with frequency of urination and some urinary incontinence.  Doing better now.  States he was unable to even get out of a chair.          Exam:   HEART:  regular without rubs, gallops, or murmurs. PMI is nondisplaced. Upstrokes are brisk. S1,S2 are heard.   LUNGS: Minor dependent rales are noted.  Bilaterally, airflow is symmetric, no intercostal retraction or stridor is noted. No coughing is noted during visit.   NEURO: Pt is alert and appropriate. No neurologic lateralization is noted. Cranial nerves 2-12 are intact. Peripheral sensory function is decreased in the feet.   ENT: Pharynx is non-erythemous, minimal PND, no significant nasal obstruction, TM's not red or retracted, hearing intact bilaterally. No carotid bruits are heard. No JVD seen. Thyroid is not nodular or enlarged.        #2   Follow-up on recent diarrhea with positive C. difficile toxin.  PCA for toxin was positive.  Bacterial testing was negative.  Patient notes a diarrhea is sporadic and maybe once or twice every couple days.  Denies nausea or vomiting.  Denies fever or chills.          Exam:     GI: Abdomen is soft, without rebound, guarding or tenderness. Bowel sounds are appropriate. No renal bruits are heard.      #3   Rash in groin area.  Pruritic, intermittent.  Sometimes associated with erythema.  Has had  off-and-on for several weeks.  History of type 2 diabetes.  A1c in October was 6.7.          Exam:  Typical candidal rash in the groin and intertriginous area.      #4   Follow-up on hypertension  Blood pressure currently 111/72.  Currently taking carvedilol, lisinopril and isosorbide.  Denies any syncope or near syncope.  Recent echocardiogram shows decreased ejection fraction 40%.  This is stable.  Denies neurologic symptoms including headache currently.  Did have a headache a few days ago when symptoms were worse.          Exam:  As above      #5   Follow-up on type 2 diabetes.  Taking Tresiba daily.  With preprandial coverage.  Is on aspirin, statin, ACE inhibitor.  Not on GLP-1 or SGLT2 antagonist.  Patient has financial concerns.          Exam:   SKIN:  warm and dry. No erythema, or rashes are noted. No specific lesions of concern are noted.         Patient has been interviewed, applicable history and applied review of systems have been performed.    Vital Signs:   /72   Pulse 74   Temp 98.9  F (37.2  C) (Temporal)   Resp 18   Wt 90.9 kg (200 lb 8 oz)   SpO2 97%   BMI 31.64 kg/m        Decision Making    Problem and Complexity     1. Generalized muscle weakness (Primary)  Suspect he recently had an episode of urinary tract infection with possible early urosepsis.  This is based on his symptoms, urinary continence etc.  - ESR: Erythrocyte sedimentation rate; Future  - Hepatic panel (Albumin, ALT, AST, Bili, Alk Phos, TP); Future  - ESR: Erythrocyte sedimentation rate  - Hepatic panel (Albumin, ALT, AST, Bili, Alk Phos, TP)    2. Chronic systolic congestive heart failure (H)  Check chest x-ray to evaluate extent of congestive heart failure to rule out pneumonia as possible cause for his recent symptoms.  - XR Chest 2 Views; Future    3. C. difficile colitis  Given the disparity between his toxin titer and bacterial antigen titer, will repeat C. difficile.  - C. difficile Toxin B PCR with reflex to C.  difficile Antigen and Toxins A/B EIA; Future  - C. difficile Toxin B PCR with reflex to C. difficile Antigen and Toxins A/B EIA    4. Morbid obesity (H)  Recommend weight loss responsible caloric restriction    5. Type 2 diabetes mellitus with diabetic autonomic neuropathy, without long-term current use of insulin (H)  Check renal function and electrolytes.  Check A1c, continue current insulin program  - Basic metabolic panel  (Ca, Cl, CO2, Creat, Gluc, K, Na, BUN); Future  - Hemoglobin A1c; Future  - UA Macroscopic with reflex to Microscopic and Culture - Lab Collect; Future  - Basic metabolic panel  (Ca, Cl, CO2, Creat, Gluc, K, Na, BUN)  - Hemoglobin A1c  - UA Macroscopic with reflex to Microscopic and Culture - Lab Collect    6. Yeast dermatitis  Topical Lotrimin to be used twice daily as needed  - clotrimazole (LOTRIMIN) 1 % external cream; Apply topically 2 times daily.  Dispense: 60 g; Refill: 1    7. Polyneuropathy associated with underlying disease  Continue adequate blood sugar management.  Rule out microcytosis of B12 deficiency  - CBC with platelets; Future  - CBC with platelets    8. S/P CABG (coronary artery bypass graft)  Stable without chest pain.  Continue current medications including statin, aspirin.    9. Essential hypertension with goal blood pressure less than 140/90  Controlled on current medication.  Continue ACE inhibitor and carvedilol    10. S/P aortic valve replacement  Tissue valve in place.  Anticoagulation not necessary.  Follow echoes annually                                 FOLLOW UP   I have asked the patient to make an appointment for followup with with me virtually in 3 to 4 days to discuss results and make further recommendations    Regarding routine vaccinations:  I have reviewed the patient's vaccination schedule and discussed the benefits of prophylactic vaccination in detail.  I recommend the patient contact their pharmacist for vaccinations.  Discussed that most insurance  companies now favor reimbursement to the pharmacies and it will financially behoove the patient to have vaccinations performed at their pharmacy.        I have carefully explained the diagnosis and treatment options to the patient.  The patient has displayed an understanding of the above, and all subsequent questions were answered.      DO RADHA Horn    Portions of this note were produced using [employer]  Although every attempt at real-time proof reading has been made, occasional grammar/syntax errors may have been missed.

## 2025-01-27 DIAGNOSIS — H40.1212 LOW TENSION GLAUCOMA OF RIGHT EYE, MODERATE STAGE: ICD-10-CM

## 2025-01-27 RX ORDER — LATANOPROST 50 UG/ML
1 SOLUTION/ DROPS OPHTHALMIC DAILY
Qty: 2.5 ML | Refills: 11 | Status: SHIPPED | OUTPATIENT
Start: 2025-01-27

## 2025-02-04 ENCOUNTER — HOSPITAL ENCOUNTER (EMERGENCY)
Facility: CLINIC | Age: 75
Discharge: HOME | End: 2025-02-04
Attending: STUDENT IN AN ORGANIZED HEALTH CARE EDUCATION/TRAINING PROGRAM | Admitting: STUDENT IN AN ORGANIZED HEALTH CARE EDUCATION/TRAINING PROGRAM
Payer: MEDICARE

## 2025-02-04 VITALS
RESPIRATION RATE: 18 BRPM | OXYGEN SATURATION: 99 % | DIASTOLIC BLOOD PRESSURE: 145 MMHG | TEMPERATURE: 98 F | SYSTOLIC BLOOD PRESSURE: 177 MMHG | HEART RATE: 77 BPM

## 2025-02-04 DIAGNOSIS — S61.209A AVULSION OF SKIN OF FINGER, INITIAL ENCOUNTER: ICD-10-CM

## 2025-02-04 PROCEDURE — 250N000013 HC RX MED GY IP 250 OP 250 PS 637: Performed by: STUDENT IN AN ORGANIZED HEALTH CARE EDUCATION/TRAINING PROGRAM

## 2025-02-04 PROCEDURE — 99283 EMERGENCY DEPT VISIT LOW MDM: CPT | Performed by: STUDENT IN AN ORGANIZED HEALTH CARE EDUCATION/TRAINING PROGRAM

## 2025-02-04 RX ORDER — ACETAMINOPHEN 500 MG
1000 TABLET ORAL ONCE
Status: COMPLETED | OUTPATIENT
Start: 2025-02-04 | End: 2025-02-04

## 2025-02-04 RX ADMIN — ACETAMINOPHEN 1000 MG: 500 TABLET, FILM COATED ORAL at 21:41

## 2025-02-04 ASSESSMENT — COLUMBIA-SUICIDE SEVERITY RATING SCALE - C-SSRS
2. HAVE YOU ACTUALLY HAD ANY THOUGHTS OF KILLING YOURSELF IN THE PAST MONTH?: NO
1. IN THE PAST MONTH, HAVE YOU WISHED YOU WERE DEAD OR WISHED YOU COULD GO TO SLEEP AND NOT WAKE UP?: NO
6. HAVE YOU EVER DONE ANYTHING, STARTED TO DO ANYTHING, OR PREPARED TO DO ANYTHING TO END YOUR LIFE?: NO

## 2025-02-04 ASSESSMENT — ACTIVITIES OF DAILY LIVING (ADL): ADLS_ACUITY_SCORE: 43

## 2025-02-05 NOTE — ED PROVIDER NOTES
"  History     Chief Complaint   Patient presents with    Laceration     HPI  Reinaldo Barrios is a 74 year old male who presents to the emergency department with a laceration to the right hand fifth digit.  Roughly 3 and half hours prior to arrival he was using a mandolin to cut potatoes, sliced the outside edge of his pinky finger.  Last tetanus was in 2019.  Did not take any medications prior to arrival.  He wrapped it up and finished cooking and came to the department.  He takes a daily baby aspirin, no other antiplatelet or anticoagulant medications.  Denies any other injuries.    Allergies:  Allergies   Allergen Reactions    Sulfa Antibiotics     Perflutren Protein A Microsph Unknown     Pt states he \"felt like crap later\" after he was given Optison in 2016.       Problem List:    Patient Active Problem List    Diagnosis Date Noted    Peripheral vascular disease 03/21/2024     Priority: Medium    Polyneuropathy associated with underlying disease 05/05/2022     Priority: Medium    Obesity (BMI 35.0-39.9) with comorbidity (H) 04/10/2019     Priority: Medium    Type 2 diabetes mellitus with diabetic autonomic neuropathy, without long-term current use of insulin (H) 05/18/2018     Priority: Medium    Non morbid obesity due to excess calories 07/03/2017     Priority: Medium    Essential hypertension with goal blood pressure less than 140/90 05/22/2016     Priority: Medium    S/P aortic valve replacement 04/27/2016     Priority: Medium    Long term current use of anticoagulant therapy 03/16/2016     Priority: Medium    Heart valve replaced [Z95.2] 03/16/2016     Priority: Medium    S/P CABG (coronary artery bypass graft) 12/04/2015     Priority: Medium    Aortic stenosis 11/07/2015     Priority: Medium    CHF (congestive heart failure) (H) 11/05/2015     Priority: Medium    NSTEMI (non-ST elevated myocardial infarction) (H) 11/03/2015     Priority: Medium    ACS (acute coronary syndrome) (H) 11/03/2015     " Priority: Medium    Aortic valve stenosis 2015     Priority: Medium    Personal history of tobacco use, presenting hazards to health 2015     Priority: Medium    Hyperlipidemia LDL goal <100 2015     Priority: Medium    Hiatal hernia 2015     Priority: Medium    Chest pain, unspecified chest pain type 2015     Priority: Medium    Cardiac LV ejection fraction 30-35% 2015     Priority: Medium        Past Medical History:    Past Medical History:   Diagnosis Date    Aortic valve stenosis 2015    Cardiac LV ejection fraction 30-35% 2015    Chest pain, unspecified chest pain type 2015    Hiatal hernia 2015    Hyperlipidemia LDL goal <100 2015    Personal history of tobacco use, presenting hazards to health 2015    Type 2 diabetes mellitus without complication (H) 2015       Past Surgical History:    Past Surgical History:   Procedure Laterality Date    BYPASS GRAFT ARTERY CORONARY N/A 2015    Procedure: BYPASS GRAFT ARTERY CORONARY;  Surgeon: Sia Caldera MD;  Location:  OR    KNEE SURGERY      REPLACE VALVE AORTIC N/A 2015    Procedure: REPLACE VALVE AORTIC;  Surgeon: Sia Caldera MD;  Location:  OR       Family History:    Family History   Problem Relation Age of Onset    Glaucoma Mother     Diabetes Mother     Diabetes Father     Macular Degeneration No family hx of        Social History:  Marital Status:  Single [1]  Social History     Tobacco Use    Smoking status: Some Days     Types: Pipe     Last attempt to quit: 5/15/2016     Years since quittin.7    Smokeless tobacco: Never    Tobacco comments:     smokes a pipe a couple times a day   Vaping Use    Vaping status: Never Used   Substance Use Topics    Alcohol use: Yes     Alcohol/week: 0.0 standard drinks of alcohol     Comment: rare    Drug use: No        Medications:    aspirin EC 81 MG EC tablet  atorvastatin (LIPITOR) 40 MG tablet  B-D U/F 31G X 8 MM  "insulin pen needle  carvedilol (COREG) 3.125 MG tablet  clopidogrel (PLAVIX) 75 MG tablet  clotrimazole (LOTRIMIN) 1 % external cream  Continuous Blood Gluc Sensor (FREESTYLE ANNE 2 SENSOR) MISC  diclofenac (VOLTAREN) 1 % topical gel  insulin aspart (FIASP FLEXTOUCH) 100 UNIT/ML pen-injector  insulin syringe-needle U-100 (BD INSULIN SYRINGE ULTRAFINE) 31G X 5/16\" 0.5 ML miscellaneous  isosorbide mononitrate (IMDUR) 30 MG 24 hr tablet  latanoprost (XALATAN) 0.005 % ophthalmic solution  lisinopril (ZESTRIL) 5 MG tablet  LORazepam (ATIVAN) 0.5 MG tablet  ONETOUCH ULTRA test strip  senna-docusate (FT STOOL SOFTENER) 8.6-50 MG tablet  TRESIBA FLEXTOUCH 100 UNIT/ML pen          Review of Systems  Gen: No fevers, no unintentional weight changes  Head and neck: No headache, no neck pain  Eye: No eye pain, no vision changes  Respiratory: No shortness of breath, no cough  Cardiovascular: No chest pain, no palpitations  Abdominal: No vomiting, no constipation, no diarrhea  Urinary: No dysuria, no hematuria  Musculoskeletal: No joint redness  Neurologic: No confusion, no weakness, no sensation changes  Psychiatric: No suicidal ideation, no homicidal ideation    Physical Exam   BP: (!) 177/145  Pulse: 77  Temp: 98  F (36.7  C)  Resp: 16  SpO2: 99 %      Physical Exam  General: Alert, awake, no distress  Head: Normocephalic, atraumatic  Eyes: Grossly intact extraocular movements, conjunctiva clear, pupils equal   Mouth: Mucous membranes moist  Neck: Supple, grossly full range of motion, no observable masses  Respiratory: No distress,  clear to auscultation bilaterally  Cardiac: S1 and S2 cardiac sounds appreciated, normal rate, no edema  Abdominal: Soft, not distended, no tenderness appreciated  Neurologic: Normal level of consciousness, speech is clear and appropriate, moving all extremities grossly normal and symmetric  Musculoskeletal: 1 cm area of skin avulsion to the distal phalanx lateral aspect fifth digit right hand, no " involvement of the nail, intact neurovascular exam of the right upper extremity  Skin: No gross rashes or lesions  Psych: Normal mood and appropriate for situation        ED Course        Procedures                  No results found for this or any previous visit (from the past 24 hours).    Medications   acetaminophen (TYLENOL) tablet 1,000 mg (1,000 mg Oral $Given 2/4/25 6777)       Assessments & Plan (with Medical Decision Making)     Mildly hypertensive however rest of vitals are reassuring.  With a tourniquet we were able to stop the bleeding, this was cleaned, no repair indicated given the skin avulsion.  We will have him soak his finger in lidocaine with epinephrine.  Will give him a dose of Tylenol for discomfort.    He still had some minor oozing after the finger was soaked in lidocaine with epinephrine.  Subsequent Surgicel was put on with a wrap of gauze.  This seemed to achieve hemostasis.  He was monitored for roughly half hour afterwards that did not have any soaking through gauze.  He appears stable to be discharge from the ER setting.  He has a follow-up with appointment with his primary doctor in 2 days, they can check to make sure it is healing well.      I have reviewed the nursing notes.    I have reviewed the findings, diagnosis, plan and need for follow up with the patient.          New Prescriptions    No medications on file       Final diagnoses:   Avulsion of skin of finger, initial encounter       2/4/2025   [facility] EMERGENCY DEPT       Go Blancas MD  02/04/25 0140

## 2025-02-05 NOTE — DISCHARGE INSTRUCTIONS
The skin was removed by the apparatus at home, there was nothing to repair here.  We were able to get it to stop bleeding with some special gauze and pressure.  It is starts oozing at home you can put pressure on with your other hand for 1/2-hour.  If you cannot get set bleeding come back to the ER.  We are glad that you have a primary care visit in a few days, they can make sure it is healing well.

## 2025-02-05 NOTE — ED TRIAGE NOTES
Pt presents with laceration to right 5th finger. Pt states has been bleeding since 1730 when occurred. Large pressure dressing applied at triage to slow the bleed. Cut with Mandelin knife.      Triage Assessment (Adult)       Row Name 02/04/25 2059          Triage Assessment    Airway WDL WDL        Respiratory WDL    Respiratory WDL WDL        Skin Circulation/Temperature WDL    Skin Circulation/Temperature WDL WDL        Cardiac WDL    Cardiac WDL WDL        Peripheral/Neurovascular WDL    Peripheral Neurovascular WDL WDL        Cognitive/Neuro/Behavioral WDL    Cognitive/Neuro/Behavioral WDL WDL

## 2025-02-11 NOTE — TELEPHONE ENCOUNTER
Patient Quality Outreach    Patient is due for the following:   Physical Annual Wellness Visit      Topic Date Due    Zoster (Shingles) Vaccine (1 of 2) Never done    Pneumococcal Vaccine (2 of 2 - PPSV23) 12/08/2016    Flu Vaccine (1) 09/01/2024    COVID-19 Vaccine (3 - 2024-25 season) 09/01/2024       Action(s) Taken:       Type of outreach:    Sent letter.    Questions for provider review:    None           Jerrica Martin LPN

## 2025-02-20 ENCOUNTER — OFFICE VISIT (OUTPATIENT)
Dept: INTERNAL MEDICINE | Facility: CLINIC | Age: 75
End: 2025-02-20
Payer: MEDICARE

## 2025-02-20 VITALS
SYSTOLIC BLOOD PRESSURE: 124 MMHG | TEMPERATURE: 98.6 F | RESPIRATION RATE: 22 BRPM | HEART RATE: 72 BPM | HEIGHT: 67 IN | WEIGHT: 201.8 LBS | OXYGEN SATURATION: 98 % | DIASTOLIC BLOOD PRESSURE: 78 MMHG | BODY MASS INDEX: 31.67 KG/M2

## 2025-02-20 DIAGNOSIS — I50.82 BIVENTRICULAR CONGESTIVE HEART FAILURE (H): ICD-10-CM

## 2025-02-20 DIAGNOSIS — E11.43 TYPE 2 DIABETES MELLITUS WITH DIABETIC AUTONOMIC NEUROPATHY, WITHOUT LONG-TERM CURRENT USE OF INSULIN (H): ICD-10-CM

## 2025-02-20 DIAGNOSIS — A04.72 C. DIFFICILE COLITIS: Primary | ICD-10-CM

## 2025-02-20 RX ORDER — VANCOMYCIN HYDROCHLORIDE 250 MG/1
500 CAPSULE ORAL 4 TIMES DAILY
Qty: 56 CAPSULE | Refills: 0 | Status: SHIPPED | OUTPATIENT
Start: 2025-02-20

## 2025-02-20 ASSESSMENT — PAIN SCALES - GENERAL: PAINLEVEL_OUTOF10: MODERATE PAIN (5)

## 2025-02-20 NOTE — PROGRESS NOTES
Anali Najera is a 74 year old, presenting for the following health issues:  Follow Up        2/20/2025     8:16 AM   Additional Questions   Roomed by Cortney     History of Present Illness       Diabetes:   He presents for follow up of diabetes.  He is checking home blood glucose four or more times daily.   He checks blood glucose before meals.  Blood glucose is never over 200 and never under 70.  When his blood glucose is low, the patient is asymptomatic for confusion, blurred vision, lethargy and reports not feeling dizzy, shaky, or weak.   He has no concerns regarding his diabetes at this time.   He is not experiencing numbness or burning in feet, excessive thirst, blurry vision, weight changes or redness, sores or blisters on feet.           Reason for visit:  Follow up    He eats 0-1 servings of fruits and vegetables daily.He consumes 0 sweetened beverage(s) daily.He exercises with enough effort to increase his heart rate 9 or less minutes per day.  He exercises with enough effort to increase his heart rate 3 or less days per week.   He is taking medications regularly.           EMR reviewed including:             Complaint, History of Chief Complaint, Corresponding Review of Systems, and Complaint Specific Physical Examination.    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  Persistent diarrhea several times daily.  Recent testing returned as positive again.  Patient was scheduled to see GI for his recalcitrant C. difficile but his earliest appointment is sometime in mid summer.  Taking food and fluid adequately.  No significant weight loss.          Exam:   GI: Abdomen is soft, without rebound, guarding or tenderness. Bowel sounds are appropriate. No renal bruits are heard.   Constitutional: The patient appears to be in no acute distress. The patient appears to be adequately hydrated. No acute respiratory or hemodynamic distress is noted at this time.      #2   Follow-up on biventricular  "heart failure.  Edema markedly improved with Lasix.  Patient elevating feet.  Denies significant orthopnea.  Notes that his breathing has improved significantly.  Denies any chest pain.          Exam:   HEART:  regular without rubs, clicks, gallops, or murmurs. PMI is nondisplaced. Upstrokes are brisk. S1,S2 are heard.   LUNGS: clear with decreased breath sounds noted bilaterally.  Airflow is symmetric no intercostal retraction or stridor is noted. No coughing is noted during visit.   NEURO: Pt is alert and appropriate. No neurologic lateralization is noted. Cranial nerves 2-12 are intact. Peripheral sensory and motor function are grossly normal.         Patient has been interviewed, applicable history and applied review of systems have been performed.    Vital Signs:   /78 (BP Location: Right arm, Patient Position: Sitting, Cuff Size: Adult Large)   Pulse 72   Temp 98.6  F (37  C) (Temporal)   Resp 22   Ht 1.695 m (5' 6.73\")   Wt 91.5 kg (201 lb 12.8 oz)   SpO2 98%   BMI 31.86 kg/m        Decision Making    Problem and Complexity     1. C. difficile colitis (Primary)  Will start on vancomycin if cost effective/cost possible.    - vancomycin (VANCOCIN) 250 MG capsule; Take 2 capsules (500 mg) by mouth 4 times daily.  Dispense: 56 capsule; Refill: 0    2. Biventricular congestive heart failure (H)  Markedly improved.  Continue diuretic at current course.  Check lab work in about 2 weeks with specific attention towards renal function.    3. Type 2 diabetes mellitus with diabetic autonomic neuropathy, without long-term current use of insulin (H)  Continue current insulin program.  Recommend continued dietary moderation.                                FOLLOW UP   I have asked the patient to make an appointment for followup with me in 2 weeks for lab work and follow-up    Regarding routine vaccinations:  I have reviewed the patient's vaccination schedule and discussed the benefits of prophylactic vaccination " in detail.  I recommend the patient contact their pharmacist for vaccinations.  Discussed that most insurance companies now favor reimbursement to the pharmacies and it will financially behoove the patient to have vaccinations performed at their pharmacy.        I have carefully explained the diagnosis and treatment options to the patient.  The patient has displayed an understanding of the above, and all subsequent questions were answered.      DO RADHA Horn    Portions of this note were produced using [employer]  Although every attempt at real-time proof reading has been made, occasional grammar/syntax errors may have been missed.

## 2025-02-21 ENCOUNTER — TELEPHONE (OUTPATIENT)
Dept: INTERNAL MEDICINE | Facility: CLINIC | Age: 75
End: 2025-02-21
Payer: MEDICARE

## 2025-03-06 ENCOUNTER — OFFICE VISIT (OUTPATIENT)
Dept: INTERNAL MEDICINE | Facility: CLINIC | Age: 75
End: 2025-03-06
Payer: MEDICARE

## 2025-03-06 VITALS
BODY MASS INDEX: 32.05 KG/M2 | HEIGHT: 67 IN | WEIGHT: 204.2 LBS | SYSTOLIC BLOOD PRESSURE: 127 MMHG | DIASTOLIC BLOOD PRESSURE: 77 MMHG | HEART RATE: 69 BPM | OXYGEN SATURATION: 98 % | TEMPERATURE: 97.8 F | RESPIRATION RATE: 12 BRPM

## 2025-03-06 DIAGNOSIS — E11.43 TYPE 2 DIABETES MELLITUS WITH DIABETIC AUTONOMIC NEUROPATHY, WITHOUT LONG-TERM CURRENT USE OF INSULIN (H): Primary | ICD-10-CM

## 2025-03-06 DIAGNOSIS — A04.72 C. DIFFICILE COLITIS: ICD-10-CM

## 2025-03-06 RX ORDER — VANCOMYCIN HYDROCHLORIDE 250 MG/1
250 CAPSULE ORAL 4 TIMES DAILY
Qty: 28 CAPSULE | Refills: 0 | Status: SHIPPED | OUTPATIENT
Start: 2025-03-06

## 2025-03-06 RX ORDER — VANCOMYCIN HYDROCHLORIDE 250 MG/1
250 CAPSULE ORAL 3 TIMES DAILY
Qty: 21 CAPSULE | Refills: 0 | Status: SHIPPED | OUTPATIENT
Start: 2025-03-06

## 2025-03-06 RX ORDER — VANCOMYCIN HYDROCHLORIDE 125 MG/1
125 CAPSULE ORAL 3 TIMES DAILY
Qty: 21 CAPSULE | Refills: 0 | Status: SHIPPED | OUTPATIENT
Start: 2025-03-06

## 2025-03-06 RX ORDER — VANCOMYCIN HYDROCHLORIDE 250 MG/1
500 CAPSULE ORAL 4 TIMES DAILY
Qty: 56 CAPSULE | Refills: 0 | Status: SHIPPED | OUTPATIENT
Start: 2025-03-06

## 2025-03-06 ASSESSMENT — PAIN SCALES - GENERAL: PAINLEVEL_OUTOF10: NO PAIN (0)

## 2025-03-06 NOTE — PROGRESS NOTES
"      Anali Najera is a 74 year old, presenting for the following health issues:  Recheck Medication and Follow Up      3/6/2025     7:58 AM   Additional Questions   Roomed by Cortney     History of Present Illness       Reason for visit:  Follow up/ Med check   He is taking medications regularly.                 EMR reviewed including:             Complaint, History of Chief Complaint, Corresponding Review of Systems, and Complaint Specific Physical Examination.    #1   Follow-up on C. difficile colitis.  Patient started step 1 of vancomycin therapy.  Patient did not follow-up in time and subsequently ran out of medication and the diarrhea recurred.  Notes that he had temporary complete resolution of diarrhea while on the vancomycin.  Denies any melena or hematochezia.  Taking food and fluid.       Exam:   GI: Abdomen is soft, without rebound, guarding or tenderness. Bowel sounds are appropriate. No renal bruits are heard.      #2   Follow-up on type 2 diabetes.  Currently taking 30 units of Tresiba with preprandial Fiasp coverage  Blood sugars have been well-controlled.  Denies polyuria or polydipsia.  Continues aspirin, statin, ACE inhibitor.  Has had no hypoglycemic episodes.          Exam:   LUNGS: clear bilaterally, airflow is brisk, no intercostal retraction or stridor is noted. No coughing is noted during visit.   HEART:  regular without rubs, clicks, gallops, or murmurs. PMI is nondisplaced. Upstrokes are brisk. S1,S2 are heard.   NEURO: Pt is alert and appropriate. No neurologic lateralization is noted. Cranial nerves 2-12 are intact. Peripheral sensory and motor function are grossly normal.         Patient has been interviewed, applicable history and applied review of systems have been performed.    Vital Signs:   /77 (BP Location: Right arm, Patient Position: Sitting, Cuff Size: Adult Large)   Pulse 69   Temp 97.8  F (36.6  C) (Skin)   Resp 12   Ht 1.695 m (5' 6.73\")   Wt 92.6 kg (204 " lb 3.2 oz)   SpO2 98%   BMI 32.24 kg/m        Decision Making    Problem and Complexity     1. C. difficile colitis  Will give the patient the entire vancomycin course in advance with very specific instructions such as not to run out or conclude the course prematurely.    - vancomycin (VANCOCIN) 250 MG capsule; Take 2 capsules (500 mg) by mouth 4 times daily. Take first week  Dispense: 56 capsule; Refill: 0  - vancomycin (VANCOCIN) 250 MG capsule; Take 1 capsule (250 mg) by mouth 4 times daily. Take second week  Dispense: 28 capsule; Refill: 0  - vancomycin (VANCOCIN) 250 MG capsule; Take 1 capsule (250 mg) by mouth 3 times daily. Take third week  Dispense: 21 capsule; Refill: 0  - vancomycin (VANCOCIN) 125 MG capsule; Take 1 capsule (125 mg) by mouth 3 times daily. Take fourth week  Dispense: 21 capsule; Refill: 0    2. Type 2 diabetes mellitus with diabetic autonomic neuropathy, without long-term current use of insulin (H) (Primary)  Continue current diabetic management and medications.                                FOLLOW UP   I have asked the patient to make an appointment for followup with me in 1 month for follow-up of his C. difficile colitis as well as his ongoing and longitudinal medical care of his multiple, multiple medical problems.    Regarding routine vaccinations:  I have reviewed the patient's vaccination schedule and discussed the benefits of prophylactic vaccination in detail.  I recommend the patient contact their pharmacist for vaccinations.  Discussed that most insurance companies now favor reimbursement to the pharmacies and it will financially behoove the patient to have vaccinations performed at their pharmacy.        I have carefully explained the diagnosis and treatment options to the patient.  The patient has displayed an understanding of the above, and all subsequent questions were answered.      DO RADHA Horn    Portions of this note were produced using [employer]  Although every attempt at real-time proof reading has been made, occasional grammar/syntax errors may have been missed.

## 2025-03-08 ENCOUNTER — TELEPHONE (OUTPATIENT)
Dept: INTERNAL MEDICINE | Facility: CLINIC | Age: 75
End: 2025-03-08
Payer: MEDICARE

## 2025-03-08 NOTE — TELEPHONE ENCOUNTER
Pt seems to be very confused by the vancomycin dosing sent to the pharmacy.  He thinks he should be getting more capsules.      He thought he was told to take 500mg qid for wk 1 (#56 of 250mg caps), then 500mg tid for wk 2 (#42), then 500mg bid for wk 3 (#28). Then 125mg capsules for wk 4.     We received 500mg qid for wk 1, 250mg qid for wk 2 and 250mg tid for week 3. 125mg TID wk 4.    Can we get some clarification on the directions?  He seems anxious about getting enough capsules.  It might be helpful to call [facility] and clarify the directions you want with him so he believes the pharmacy.    Thanks,  [facility]    [phone]

## 2025-03-10 NOTE — TELEPHONE ENCOUNTER
I would like to proceed just as you described.      We received 500mg qid for wk 1, 250mg qid for wk 2 and 250mg tid for week 3. 125mg TID wk 4.       Thank you.  Santo

## 2025-03-21 DIAGNOSIS — E78.5 HYPERLIPIDEMIA LDL GOAL <100: ICD-10-CM

## 2025-03-21 DIAGNOSIS — K59.04 CHRONIC IDIOPATHIC CONSTIPATION: ICD-10-CM

## 2025-03-21 DIAGNOSIS — I50.82 BIVENTRICULAR CONGESTIVE HEART FAILURE (H): ICD-10-CM

## 2025-03-24 RX ORDER — ISOSORBIDE MONONITRATE 30 MG/1
30 TABLET, EXTENDED RELEASE ORAL
Qty: 90 TABLET | Refills: 3 | Status: SHIPPED | OUTPATIENT
Start: 2025-03-24

## 2025-03-24 RX ORDER — ATORVASTATIN CALCIUM 40 MG/1
40 TABLET, FILM COATED ORAL DAILY
Qty: 90 TABLET | Refills: 2 | Status: SHIPPED | OUTPATIENT
Start: 2025-03-24

## 2025-03-24 RX ORDER — LISINOPRIL 5 MG/1
5 TABLET ORAL DAILY
Qty: 90 TABLET | Refills: 2 | Status: SHIPPED | OUTPATIENT
Start: 2025-03-24

## 2025-03-24 RX ORDER — DOCUSATE SODIUM AND SENNOSIDES 8.6; 5 MG/1; MG/1
1-2 TABLET, FILM COATED ORAL 2 TIMES DAILY
Qty: 120 TABLET | Refills: 0 | Status: SHIPPED | OUTPATIENT
Start: 2025-03-24

## 2025-04-07 ENCOUNTER — OFFICE VISIT (OUTPATIENT)
Dept: INTERNAL MEDICINE | Facility: CLINIC | Age: 75
End: 2025-04-07
Payer: MEDICARE

## 2025-04-07 VITALS
HEART RATE: 63 BPM | BODY MASS INDEX: 32.39 KG/M2 | TEMPERATURE: 97.9 F | HEIGHT: 67 IN | RESPIRATION RATE: 13 BRPM | OXYGEN SATURATION: 99 % | SYSTOLIC BLOOD PRESSURE: 121 MMHG | WEIGHT: 206.4 LBS | DIASTOLIC BLOOD PRESSURE: 73 MMHG

## 2025-04-07 DIAGNOSIS — H81.13 BENIGN PAROXYSMAL POSITIONAL VERTIGO DUE TO BILATERAL VESTIBULAR DISORDER: ICD-10-CM

## 2025-04-07 DIAGNOSIS — A04.72 C. DIFFICILE COLITIS: Primary | ICD-10-CM

## 2025-04-07 DIAGNOSIS — E11.43 TYPE 2 DIABETES MELLITUS WITH DIABETIC AUTONOMIC NEUROPATHY, WITHOUT LONG-TERM CURRENT USE OF INSULIN (H): ICD-10-CM

## 2025-04-07 PROCEDURE — G2211 COMPLEX E/M VISIT ADD ON: HCPCS | Performed by: INTERNAL MEDICINE

## 2025-04-07 PROCEDURE — 3074F SYST BP LT 130 MM HG: CPT | Performed by: INTERNAL MEDICINE

## 2025-04-07 PROCEDURE — 1125F AMNT PAIN NOTED PAIN PRSNT: CPT | Performed by: INTERNAL MEDICINE

## 2025-04-07 PROCEDURE — 99214 OFFICE O/P EST MOD 30 MIN: CPT | Performed by: INTERNAL MEDICINE

## 2025-04-07 PROCEDURE — 3078F DIAST BP <80 MM HG: CPT | Performed by: INTERNAL MEDICINE

## 2025-04-07 RX ORDER — VANCOMYCIN HYDROCHLORIDE 125 MG/1
125 CAPSULE ORAL 3 TIMES DAILY
Qty: 21 CAPSULE | Refills: 0 | Status: SHIPPED | OUTPATIENT
Start: 2025-04-07

## 2025-04-07 ASSESSMENT — PAIN SCALES - GENERAL: PAINLEVEL_OUTOF10: MODERATE PAIN (5)

## 2025-04-07 NOTE — PROGRESS NOTES
Anali Najera is a 74 year old, presenting for the following health issues:  Recheck Medication      4/7/2025     8:58 AM   Additional Questions   Roomed by Vanessa PARRY     History of Present Illness       Reason for visit:  C diff    He eats 0-1 servings of fruits and vegetables daily.He consumes 0 sweetened beverage(s) daily.He exercises with enough effort to increase his heart rate 9 or less minutes per day.  He exercises with enough effort to increase his heart rate 3 or less days per week.   He is taking medications regularly.                 EMR reviewed including:             Complaint, History of Chief Complaint, Corresponding Review of Systems, and Complaint Specific Physical Examination.    #1   Follow-up on C. difficile.  Continues with vancomycin taper.  Stools are now formed but ribbonlike.  Still has several stools per day.  Notes the odor has improved.  Has appointment with gastroenterology in 2 weeks.  Taking food and fluid adequately.  Denies fevers, chills or systemic signs of infection.          Exam:   LUNGS: clear bilaterally, airflow is brisk, no intercostal retraction or stridor is noted. No coughing is noted during visit.   HEART:  regular without rubs, clicks, gallops, or murmurs. PMI is nondisplaced. Upstrokes are brisk. S1,S2 are heard.   GI: Abdomen is soft, without rebound, guarding or tenderness. Bowel sounds are appropriate. No renal bruits are heard.      #2   Intermittent vertigo.  Longstanding history of poor hearing.  Prior competition shooter.  (Many, many rounds)  Uses hearing aids.  Denies ear pain.  Symptoms are brief and fleeting.  Generally associated with head motion.          Exam:   NEURO: Pt is alert and appropriate. No neurologic lateralization is noted. Cranial nerves 2-12 are intact. Peripheral sensory and motor function are grossly normal.    PSYCH: The patient appears grossly appropriate. Maintains good eye contact, does not have any jittery or atypical  "motion. Displays appropriate affect.      #3   Follow-up on type 2 diabetes  Recent A1c up to 9.3.  Patient aggressively working on diet.  Compliant with medication.  Currently has continuous glucose monitor.  30 units of Tresiba.  Titration underway.  On aspirin, statin, ACE inhibitor.  Denies polyuria or polydipsia.  Mild neuropathy in the feet.          Exam:  As above        Patient has been interviewed, applicable history and applied review of systems have been performed.    Vital Signs:   /73   Pulse 63   Temp 97.9  F (36.6  C) (Temporal)   Resp 13   Ht 1.689 m (5' 6.5\")   Wt 93.6 kg (206 lb 6.4 oz)   SpO2 99%   BMI 32.81 kg/m        Decision Making    Problem and Complexity     1. C. difficile colitis (Primary)  Continue the vancomycin taper 1 additional week.  Discontinue 1 week prior to GI consult.  Recheck stool if possible prior to consult so results are available for the gastroenterologist.  - vancomycin (VANCOCIN) 125 MG capsule; Take 1 capsule (125 mg) by mouth 3 times daily. Take fifth week  Dispense: 21 capsule; Refill: 0  - C. difficile Toxin B PCR with reflex to C. difficile EIA; Future  - C. difficile Toxin B PCR with reflex to C. difficile EIA    2. Benign paroxysmal positional vertigo due to bilateral vestibular disorder  Continue observation.  Recommend continued hearing protection as needed    3. Type 2 diabetes mellitus with diabetic autonomic neuropathy, without long-term current use of insulin (H)  Continue current diet and medications.  Will recheck A1c in the upcoming visit  - Albumin Random Urine Quantitative with Creat Ratio; Future  - Albumin Random Urine Quantitative with Creat Ratio                                FOLLOW UP   I have asked the patient to make an appointment for followup with me in 1 month or sooner as needed for his ongoing longitudinal medical care of his multiple medical problems.    Regarding routine vaccinations:  I have reviewed the patient's " vaccination schedule and discussed the benefits of prophylactic vaccination in detail.  I recommend the patient contact their pharmacist for vaccinations.  Discussed that most insurance companies now favor reimbursement to the pharmacies and it will financially behoove the patient to have vaccinations performed at their pharmacy.        I have carefully explained the diagnosis and treatment options to the patient.  The patient has displayed an understanding of the above, and all subsequent questions were answered.      DO RADHA Horn    Portions of this note were produced using [employer]  Although every attempt at real-time proof reading has been made, occasional grammar/syntax errors may have been missed.

## 2025-04-17 LAB
C DIFF TOX B STL QL: NEGATIVE
CREAT UR-MCNC: 98.6 MG/DL
MICROALBUMIN UR-MCNC: <12 MG/L
MICROALBUMIN/CREAT UR: NORMAL MG/G{CREAT}

## 2025-04-21 ENCOUNTER — OFFICE VISIT (OUTPATIENT)
Dept: GASTROENTEROLOGY | Facility: CLINIC | Age: 75
End: 2025-04-21
Payer: MEDICARE

## 2025-04-21 VITALS
OXYGEN SATURATION: 98 % | RESPIRATION RATE: 24 BRPM | SYSTOLIC BLOOD PRESSURE: 165 MMHG | BODY MASS INDEX: 32.59 KG/M2 | WEIGHT: 205 LBS | DIASTOLIC BLOOD PRESSURE: 80 MMHG | HEART RATE: 64 BPM

## 2025-04-21 DIAGNOSIS — R19.7 DIARRHEA OF PRESUMED INFECTIOUS ORIGIN: Primary | ICD-10-CM

## 2025-04-21 PROCEDURE — 99215 OFFICE O/P EST HI 40 MIN: CPT | Performed by: INTERNAL MEDICINE

## 2025-04-21 PROCEDURE — 3077F SYST BP >= 140 MM HG: CPT | Performed by: INTERNAL MEDICINE

## 2025-04-21 PROCEDURE — 3079F DIAST BP 80-89 MM HG: CPT | Performed by: INTERNAL MEDICINE

## 2025-04-21 NOTE — NURSING NOTE
Chief Complaint   Patient presents with    New Patient     C. difficile colitis       Vitals:    04/21/25 1054 04/21/25 1057   BP: (!) 162/88 (!) 165/80   BP Location: Left arm    Patient Position: Sitting    Cuff Size: Adult Large    Pulse: 64    Resp: 24    SpO2: 98%    Weight: 93 kg (205 lb)        Body mass index is 32.59 kg/m .

## 2025-04-21 NOTE — LETTER
4/21/2025      Reinaldo Barrios  [address]  [facility] [address]      Dear Colleague,    Thank you for referring your patient, Reinaldo Barrios, to the [facility] [place]. Please see a copy of my visit note below.    GI CLINIC VISIT    CC/REFERRING MD:  Mark Blanchard   REASON FOR CONSULTATION: Clostridium difficile infection      ASSESSMENT/PLAN:  The pt is a 74 year old male with ongoing diarrhea and prior positive C. difficile PCR, now negative.     Had a long discussion about his symptoms.  Although he is insistent his symptoms started suddenly in January, there is chart evidence of him having diarrhea back to September.  It is difficult to tease out his response to vancomycin.  Clearly now off vancomycin and C. difficile PCR negative, he is still having ongoing loose stools.  Although this does sound better than at its worst previously.    Given his multiple positive C. difficile PCR's without any detectable GDH or toxin and the lack of leukocytosis at any point, I suspect he was colonized with C. difficile but not infected.  He has had occult blood in his stool since 2023 as well as a positive lactoferrin and an ongoing anemia.  I am concerned for another inflammatory process in his colon such as inflammatory bowel disease, ischemic colitis, or less likely malignancy.  I think another intestinal infection is unlikely as he has had 1 negative PCR panel in the fall, however other infection such as Giardia are also possible.    We had a long discussion about a colonoscopy.  I believe a colonoscopy would be the most helpful test and we talked about the indications for this.  He is adamant about not having a colonoscopy for multiple reasons.  In lieu of a colonoscopy we will repeat stool infection testing and obtain a fecal calprotectin as well as a CT enterography.  I hope to keep the dialogue open about a colonoscopy in the future, but I am skeptical that he will change his  "mind at this point.    -- Repeat stool infection testing including C. difficile  -- Fecal calprotectin  -- CT enterography     RTC 4-6 months    Thank you for this consultation.  It was a pleasure to participate in the care of this patient; please contact us with any further questions.  A total of 47 minutes, face to face, was spent with this patient, >50% of which was counseling regarding the above delineated issues.    This note was created with voice recognition software, and while reviewed for accuracy, typos may remain.     Victorino So MD MS  Associate Professor of Medicine  Division of Gastroenterology, Hepatology and Nutrition  [facility]  Pager: [phone]      HPI  Here today to discuss positive C difficile testing.     Reports middle of Jan he woke up with diarrhea. He says he had no motor skills and could not get up. After 48 hours this improved. He has testing and report of diarrhea as far back as September.     Completed vancomycin last Thursday. Currently reports 4-5 stools per day. These are soft stools.     Notes from PCP suggest a complete resolution of diarrhea on vancomycin. However, at this time, he is still having soft/loose stools.     Recurrent positive discordant testing outlined below.     Fecal lactoferrin positive in October 2024. Also elevated sed rate in Jan 2025.  He was doing fecal occult blood testing for colon cancer screening. This turned positive in 2023 and was again positive in 2024.     Notably he has never had an elevated WBC with this. He has a chronic anemia.     Never had a colonoscopy. He says that he does not want a colonoscopy. He does not trust them. Since his heart attack, he has developed a number of \"strange things\".     C diff laboratory testing history:  Test --> treatment  10/1/2024: PCR+ / GDH- / Tox -    --> Vancomycin 10 days  1/7/2025: PCR+ / GDH- / Tox -      --> Vancomycin 10 days  1/24/2025: PCR+ / GDH- / Tox -    --> Vancomycin taper  4/17/2025: " "PCR -      Last colonoscopy: Never    ROS:    Constitutional, HEENT, cardiovascular, pulmonary, GI, , musculoskeletal, neuro, skin, endocrine and psych systems are negative, except as otherwise noted.     PERTINENT PAST MEDICAL HISTORY:  Past Medical History:   Diagnosis Date     Aortic valve stenosis 11/2/2015     Cardiac LV ejection fraction 30-35% 11/2/2015     Chest pain, unspecified chest pain type 11/2/2015     Hiatal hernia 11/2/2015     Hyperlipidemia LDL goal <100 11/2/2015     Personal history of tobacco use, presenting hazards to health 11/2/2015     Type 2 diabetes mellitus without complication (H) 11/2/2015       PREVIOUS SURGERIES:  Past Surgical History:   Procedure Laterality Date     BYPASS GRAFT ARTERY CORONARY N/A 11/7/2015    Procedure: BYPASS GRAFT ARTERY CORONARY;  Surgeon: Sia Caldera MD;  Location: [facility] OR     KNEE SURGERY  2009     REPLACE VALVE AORTIC N/A 11/7/2015    Procedure: REPLACE VALVE AORTIC;  Surgeon: Sia Caldera MD;  Location: [facility] OR       ALLERGIES:     Allergies   Allergen Reactions     Sulfa Antibiotics      Perflutren Protein A Microsph Unknown     Pt states he \"felt like crap later\" after he was given Optison in 2016.       PERTINENT MEDICATIONS:    Current Outpatient Medications:      aspirin EC 81 MG EC tablet, Take 1 tablet (81 mg) by mouth daily, Disp: , Rfl:      atorvastatin (LIPITOR) 40 MG tablet, TAKE 1 TABLET (40 MG) BY MOUTH DAILY, Disp: 90 tablet, Rfl: 2     B-D U/F 31G X 8 MM insulin pen needle, USE THREE PEN NEEDLES DAILY OR AS DIRECTED., Disp: 100 each, Rfl: 3     carvedilol (COREG) 3.125 MG tablet, Take 1 tablet (3.125 mg) by mouth 2 times daily (with meals)., Disp: 180 tablet, Rfl: 3     clopidogrel (PLAVIX) 75 MG tablet, Take 1 tablet (75 mg) by mouth daily for 21 days (Patient not taking: Reported on 1/6/2025), Disp: 21 tablet, Rfl: 0     clotrimazole (LOTRIMIN) 1 % external cream, Apply topically 2 times daily., Disp: 60 g, Rfl: 1     " "Continuous Blood Gluc Sensor (FREESTYLE ANNE 2 SENSOR) [facility], Apply 1 each topically every 14 days Use 1 sensor every 14 days. Use to read blood sugars per 's instructions., Disp: 6 each, Rfl: 5     diclofenac (VOLTAREN) 1 % topical gel, Apply 2 g topically 4 times daily, Disp: 100 g, Rfl: 0     furosemide (LASIX) 40 MG tablet, Take 1 tablet (40 mg) by mouth daily., Disp: 30 tablet, Rfl: 0     insulin aspart (FIASP FLEXTOUCH) 100 UNIT/ML pen-injector, INJECT 10 - 12 UNITS UNDER THE SKIN TWICE A DAY, Disp: 15 mL, Rfl: 3     insulin degludec (TRESIBA FLEXTOUCH) 100 UNIT/ML pen, Inject 30 Units subcutaneously daily., Disp: 15 mL, Rfl: 3     insulin syringe-needle U-100 (BD INSULIN SYRINGE ULTRAFINE) 31G X 5/16\" 0.5 ML miscellaneous, Use one syringe 3 daily or as directed., Disp: 100 each, Rfl: prn     isosorbide mononitrate (IMDUR) 30 MG 24 hr tablet, TAKE ONE TABLET (30 MG) BY MOUTH ONCE DAILY, Disp: 90 tablet, Rfl: 3     latanoprost (XALATAN) 0.005 % ophthalmic solution, PLACE 1 DROP INTO BOTH EYES DAILY, Disp: 2.5 mL, Rfl: 11     lisinopril (ZESTRIL) 5 MG tablet, TAKE 1 TABLET (5 MG) BY MOUTH DAILY, Disp: 90 tablet, Rfl: 2     LORazepam (ATIVAN) 0.5 MG tablet, Take 1 tablet (0.5 mg) by mouth every 6 hours as needed for anxiety, Disp: 2 tablet, Rfl: 0     ONETOUCH ULTRA test strip, USE TO TEST BLOOD SUGAR 5 TIMES A DAY, Disp: 200 strip, Rfl: 2     STOOL SOFTENER/LAXATIVE 50-8.6 MG tablet, TAKE 1-2 TABLETS BY MOUTH 2 TIMES DAILY., Disp: 120 tablet, Rfl: 0     vancomycin (VANCOCIN) 125 MG capsule, Take 1 capsule (125 mg) by mouth 3 times daily. Take fifth week, Disp: 21 capsule, Rfl: 0     vancomycin (VANCOCIN) 250 MG capsule, Take 2 capsules (500 mg) by mouth 4 times daily. Take first week, Disp: 56 capsule, Rfl: 0     vancomycin (VANCOCIN) 250 MG capsule, Take 1 capsule (250 mg) by mouth 4 times daily. Take second week, Disp: 28 capsule, Rfl: 0     vancomycin (VANCOCIN) 250 MG capsule, Take 1 capsule (250 " mg) by mouth 3 times daily. Take third week, Disp: 21 capsule, Rfl: 0     vancomycin (VANCOCIN) 250 MG capsule, Take 2 capsules (500 mg) by mouth 4 times daily., Disp: 56 capsule, Rfl: 0    SOCIAL HISTORY:  Social History     Socioeconomic History     Marital status: Single     Spouse name: Not on file     Number of children: Not on file     Years of education: Not on file     Highest education level: Not on file   Occupational History     Not on file   Tobacco Use     Smoking status: Some Days     Types: Pipe     Last attempt to quit: 5/15/2016     Years since quittin.9     Smokeless tobacco: Never     Tobacco comments:     smokes a pipe a couple times a day   Vaping Use     Vaping status: Never Used   Substance and Sexual Activity     Alcohol use: Yes     Alcohol/week: 0.0 standard drinks of alcohol     Comment: rare     Drug use: No     Sexual activity: Not Currently     Partners: Female   Other Topics Concern     Parent/sibling w/ CABG, MI or angioplasty before 65F 55M? Not Asked   Social History Narrative     Not on file     Social Drivers of Health     Financial Resource Strain: Not on file   Food Insecurity: Not on file   Transportation Needs: Not on file   Physical Activity: Not on file   Stress: Not on file   Social Connections: Not on file   Interpersonal Safety: Low Risk  (2025)    Interpersonal Safety      Do you feel physically and emotionally safe where you currently live?: Yes      Within the past 12 months, have you been hit, slapped, kicked or otherwise physically hurt by someone?: No      Within the past 12 months, have you been humiliated or emotionally abused in other ways by your partner or ex-partner?: No   Housing Stability: Not on file       FAMILY HISTORY:  Family History   Problem Relation Age of Onset     Glaucoma Mother      Diabetes Mother      Diabetes Father      Macular Degeneration No family hx of        Past/family/social history reviewed and no changes    PHYSICAL  EXAMINATION:  Vitals reviewed, AFVSS   Wt   Wt Readings from Last 2 Encounters:   04/21/25 93 kg (205 lb)   04/07/25 93.6 kg (206 lb 6.4 oz)      Constitutional - general appearance is well and in no acute distress. Body habitus normal  Eyes - No redness or discharge  Respiratory - No cough, unlabored breathing  Musculoskeletal - range of motion intact: Neck and arms  Skin - No discoloration or lesions  Neurological - No tremors, headaches  Psychiatric - No anxiety, alert & oriented        Again, thank you for allowing me to participate in the care of your patient.        Sincerely,        Victorino So MD    Electronically signed

## 2025-04-21 NOTE — PATIENT INSTRUCTIONS
It was a pleasure meeting with you today and discussing your healthcare plan. Below is a summary of what we covered:    You have had partial positive C. Diff testing. I don't think that C. Diff is the major problem in your colon at this time. You might have inflammatory bowel disease, ischemic colitis (low blood flow to the colon) or colon cancer.     I recommend a colonoscopy to look for why you are having diarrhea. I recommend that you talk to you daughters and other people in your life about a colonoscopy.    -- Stool testing for other infections and inflammation    -- CT scan of your bowels    -- Continue to think about a colonoscopy      Please see below for any additional questions and scheduling guidelines.    Sign up for [employer]: [employer] patient portal serves as a secure platform for accessing your medical records from the [facility]. Additionally, [employer] facilitates easy, timely, and secure messaging with your care team. If you have not signed up, you may do so by using the provided code or calling [phone].    Coordinating your care after your visit:  There are multiple options for scheduling your follow-up care based on your provider's recommendation.    How do I schedule a follow-up clinic appointment:   After your appointment, you may receive scheduling assistance with the Clinic Coordinators by having a seat in the waiting room and a Clinic Coordinator will call you up to schedule.  Virtual visits or after you leave the clinic:  Your provider has placed a follow-up order in the [employer] portal for scheduling your return appointment. A member of the scheduling team will contact you to schedule.  [employer] Scheduling: Timely scheduling through [employer] is advised to ensure appointment availability.   Call to schedule: You may schedule your follow-up appointment(s) by calling [phone], option 1.    How do I schedule my endoscopy or colonoscopy procedure:  If a procedure, such as a  colonoscopy or upper endoscopy was ordered by your provider, the scheduling team will contact you to schedule this procedure. Or you may choose to call to schedule at   [phone], option 2.  Please allow 20-30 minutes when scheduling a procedure.    How do I get my blood work done? To get your blood work done, you need to schedule a lab appointment at an [facility]. There are multiple ways to schedule:   At the clinic: The Clinic Coordinator you meet after your visit can help you schedule a lab appointment.   [employer] scheduling: [employer] offers online lab scheduling at all [facility] laboratory locations.   Call to schedule: You can call [phone] to schedule your lab appointment.    How do I schedule my imaging study: To schedule imaging studies, such as CT scans, ultrasounds, MRIs, or X-rays, contact Imaging Services at [phone].    How do I schedule a referral to another doctor: If your provider recommended a referral to another specialist(s), the referral order was placed by your provider. You will receive a phone call to schedule this referral, or you may choose to call the number attached to the referral to self-schedule.    For Post-Visit Question(s):  For any inquiries following today's visit:  Please utilize [employer] messaging and allow 48 hours for reply or contact the Call Center during normal business hours at [phone], option 3.  For Emergent After-hours questions, contact the On-Call GI Fellow through the [facility]  at [phone].  In addition, you may contact your Nurse directly using the provided contact information.    Test Results:  Test results will be accessible via [employer] in compliance with the 21st Century Cures Act. This means that your results will be available to you at the same time as your provider. Often you may see your results before your provider does. Results are reviewed by staff within two weeks with communication  follow-up. Results may be released in the patient portal prior to your care team review.    Prescription Refill(s):  Medication prescribed by your provider will be addressed during your visit. For future refills, please coordinate with your pharmacy. If you have not had a recent clinic visit or routine labs, for your safety, your provider may not be able to refill your prescription.

## 2025-04-21 NOTE — PROGRESS NOTES
GI CLINIC VISIT    CC/REFERRING MD:  Mark Blanchard   REASON FOR CONSULTATION: Clostridium difficile infection      ASSESSMENT/PLAN:  The pt is a 74 year old male with ongoing diarrhea and prior positive C. difficile PCR, now negative.     Had a long discussion about his symptoms.  Although he is insistent his symptoms started suddenly in January, there is chart evidence of him having diarrhea back to September.  It is difficult to tease out his response to vancomycin.  Clearly now off vancomycin and C. difficile PCR negative, he is still having ongoing loose stools.  Although this does sound better than at its worst previously.    Given his multiple positive C. difficile PCR's without any detectable GDH or toxin and the lack of leukocytosis at any point, I suspect he was colonized with C. difficile but not infected.  He has had occult blood in his stool since 2023 as well as a positive lactoferrin and an ongoing anemia.  I am concerned for another inflammatory process in his colon such as inflammatory bowel disease, ischemic colitis, or less likely malignancy.  I think another intestinal infection is unlikely as he has had 1 negative PCR panel in the fall, however other infection such as Giardia are also possible.    We had a long discussion about a colonoscopy.  I believe a colonoscopy would be the most helpful test and we talked about the indications for this.  He is adamant about not having a colonoscopy for multiple reasons.  In lieu of a colonoscopy we will repeat stool infection testing and obtain a fecal calprotectin as well as a CT enterography.  I hope to keep the dialogue open about a colonoscopy in the future, but I am skeptical that he will change his mind at this point.    -- Repeat stool infection testing including C. difficile  -- Fecal calprotectin  -- CT enterography     RTC 4-6 months    Thank you for this consultation.  It was a pleasure to participate in the care of this patient;  "please contact us with any further questions.  A total of 47 minutes, face to face, was spent with this patient, >50% of which was counseling regarding the above delineated issues.    This note was created with voice recognition software, and while reviewed for accuracy, typos may remain.     Victorino So MD MS  Associate Professor of Medicine  Division of Gastroenterology, Hepatology and Nutrition  [facility]  Pager: [phone]      HPI  Here today to discuss positive C difficile testing.     Reports middle of Jan he woke up with diarrhea. He says he had no motor skills and could not get up. After 48 hours this improved. He has testing and report of diarrhea as far back as September.     Completed vancomycin last Thursday. Currently reports 4-5 stools per day. These are soft stools.     Notes from PCP suggest a complete resolution of diarrhea on vancomycin. However, at this time, he is still having soft/loose stools.     Recurrent positive discordant testing outlined below.     Fecal lactoferrin positive in October 2024. Also elevated sed rate in Jan 2025.  He was doing fecal occult blood testing for colon cancer screening. This turned positive in 2023 and was again positive in 2024.     Notably he has never had an elevated WBC with this. He has a chronic anemia.     Never had a colonoscopy. He says that he does not want a colonoscopy. He does not trust them. Since his heart attack, he has developed a number of \"strange things\".     C diff laboratory testing history:  Test --> treatment  10/1/2024: PCR+ / GDH- / Tox -    --> Vancomycin 10 days  1/7/2025: PCR+ / GDH- / Tox -      --> Vancomycin 10 days  1/24/2025: PCR+ / GDH- / Tox -    --> Vancomycin taper  4/17/2025: PCR -      Last colonoscopy: Never    ROS:    Constitutional, HEENT, cardiovascular, pulmonary, GI, , musculoskeletal, neuro, skin, endocrine and psych systems are negative, except as otherwise noted.     PERTINENT PAST MEDICAL " "HISTORY:  Past Medical History:   Diagnosis Date    Aortic valve stenosis 11/2/2015    Cardiac LV ejection fraction 30-35% 11/2/2015    Chest pain, unspecified chest pain type 11/2/2015    Hiatal hernia 11/2/2015    Hyperlipidemia LDL goal <100 11/2/2015    Personal history of tobacco use, presenting hazards to health 11/2/2015    Type 2 diabetes mellitus without complication (H) 11/2/2015       PREVIOUS SURGERIES:  Past Surgical History:   Procedure Laterality Date    BYPASS GRAFT ARTERY CORONARY N/A 11/7/2015    Procedure: BYPASS GRAFT ARTERY CORONARY;  Surgeon: Sia Caldera MD;  Location: [facility] OR    KNEE SURGERY  2009    REPLACE VALVE AORTIC N/A 11/7/2015    Procedure: REPLACE VALVE AORTIC;  Surgeon: Sia Caldera MD;  [facility]       ALLERGIES:     Allergies   Allergen Reactions    Sulfa Antibiotics     Perflutren Protein A Microsph Unknown     Pt states he \"felt like crap later\" after he was given Optison in 2016.       PERTINENT MEDICATIONS:    Current Outpatient Medications:     aspirin EC 81 MG EC tablet, Take 1 tablet (81 mg) by mouth daily, Disp: , Rfl:     atorvastatin (LIPITOR) 40 MG tablet, TAKE 1 TABLET (40 MG) BY MOUTH DAILY, Disp: 90 tablet, Rfl: 2    B-D U/F 31G X 8 MM insulin pen needle, USE THREE PEN NEEDLES DAILY OR AS DIRECTED., Disp: 100 each, Rfl: 3    carvedilol (COREG) 3.125 MG tablet, Take 1 tablet (3.125 mg) by mouth 2 times daily (with meals)., Disp: 180 tablet, Rfl: 3    clopidogrel (PLAVIX) 75 MG tablet, Take 1 tablet (75 mg) by mouth daily for 21 days (Patient not taking: Reported on 1/6/2025), Disp: 21 tablet, Rfl: 0    clotrimazole (LOTRIMIN) 1 % external cream, Apply topically 2 times daily., Disp: 60 g, Rfl: 1    Continuous Blood Gluc Sensor (FREESTYLE ANNE 2 SENSOR) [facility], Apply 1 each topically every 14 days Use 1 sensor every 14 days. Use to read blood sugars per 's instructions., Disp: 6 each, Rfl: 5    diclofenac (VOLTAREN) 1 % topical gel, Apply 2 " "g topically 4 times daily, Disp: 100 g, Rfl: 0    furosemide (LASIX) 40 MG tablet, Take 1 tablet (40 mg) by mouth daily., Disp: 30 tablet, Rfl: 0    insulin aspart (FIASP FLEXTOUCH) 100 UNIT/ML pen-injector, INJECT 10 - 12 UNITS UNDER THE SKIN TWICE A DAY, Disp: 15 mL, Rfl: 3    insulin degludec (TRESIBA FLEXTOUCH) 100 UNIT/ML pen, Inject 30 Units subcutaneously daily., Disp: 15 mL, Rfl: 3    insulin syringe-needle U-100 (BD INSULIN SYRINGE ULTRAFINE) 31G X 5/16\" 0.5 ML miscellaneous, Use one syringe 3 daily or as directed., Disp: 100 each, Rfl: prn    isosorbide mononitrate (IMDUR) 30 MG 24 hr tablet, TAKE ONE TABLET (30 MG) BY MOUTH ONCE DAILY, Disp: 90 tablet, Rfl: 3    latanoprost (XALATAN) 0.005 % ophthalmic solution, PLACE 1 DROP INTO BOTH EYES DAILY, Disp: 2.5 mL, Rfl: 11    lisinopril (ZESTRIL) 5 MG tablet, TAKE 1 TABLET (5 MG) BY MOUTH DAILY, Disp: 90 tablet, Rfl: 2    LORazepam (ATIVAN) 0.5 MG tablet, Take 1 tablet (0.5 mg) by mouth every 6 hours as needed for anxiety, Disp: 2 tablet, Rfl: 0    ONETOUCH ULTRA test strip, USE TO TEST BLOOD SUGAR 5 TIMES A DAY, Disp: 200 strip, Rfl: 2    STOOL SOFTENER/LAXATIVE 50-8.6 MG tablet, TAKE 1-2 TABLETS BY MOUTH 2 TIMES DAILY., Disp: 120 tablet, Rfl: 0    vancomycin (VANCOCIN) 125 MG capsule, Take 1 capsule (125 mg) by mouth 3 times daily. Take fifth week, Disp: 21 capsule, Rfl: 0    vancomycin (VANCOCIN) 250 MG capsule, Take 2 capsules (500 mg) by mouth 4 times daily. Take first week, Disp: 56 capsule, Rfl: 0    vancomycin (VANCOCIN) 250 MG capsule, Take 1 capsule (250 mg) by mouth 4 times daily. Take second week, Disp: 28 capsule, Rfl: 0    vancomycin (VANCOCIN) 250 MG capsule, Take 1 capsule (250 mg) by mouth 3 times daily. Take third week, Disp: 21 capsule, Rfl: 0    vancomycin (VANCOCIN) 250 MG capsule, Take 2 capsules (500 mg) by mouth 4 times daily., Disp: 56 capsule, Rfl: 0    SOCIAL HISTORY:  Social History     Socioeconomic History    Marital status: " Single     Spouse name: Not on file    Number of children: Not on file    Years of education: Not on file    Highest education level: Not on file   Occupational History    Not on file   Tobacco Use    Smoking status: Some Days     Types: Pipe     Last attempt to quit: 5/15/2016     Years since quittin.9    Smokeless tobacco: Never    Tobacco comments:     smokes a pipe a couple times a day   Vaping Use    Vaping status: Never Used   Substance and Sexual Activity    Alcohol use: Yes     Alcohol/week: 0.0 standard drinks of alcohol     Comment: rare    Drug use: No    Sexual activity: Not Currently     Partners: Female   Other Topics Concern    Parent/sibling w/ CABG, MI or angioplasty before 65F 55M? Not Asked   Social History Narrative    Not on file     Social Drivers of Health     Financial Resource Strain: Not on file   Food Insecurity: Not on file   Transportation Needs: Not on file   Physical Activity: Not on file   Stress: Not on file   Social Connections: Not on file   Interpersonal Safety: Low Risk  (2025)    Interpersonal Safety     Do you feel physically and emotionally safe where you currently live?: Yes     Within the past 12 months, have you been hit, slapped, kicked or otherwise physically hurt by someone?: No     Within the past 12 months, have you been humiliated or emotionally abused in other ways by your partner or ex-partner?: No   Housing Stability: Not on file       FAMILY HISTORY:  Family History   Problem Relation Age of Onset    Glaucoma Mother     Diabetes Mother     Diabetes Father     Macular Degeneration No family hx of        Past/family/social history reviewed and no changes    PHYSICAL EXAMINATION:  Vitals reviewed, AFVSS   Wt   Wt Readings from Last 2 Encounters:   25 93 kg (205 lb)   25 93.6 kg (206 lb 6.4 oz)      Constitutional - general appearance is well and in no acute distress. Body habitus normal  Eyes - No redness or discharge  Respiratory - No cough,  unlabored breathing  Musculoskeletal - range of motion intact: Neck and arms  Skin - No discoloration or lesions  Neurological - No tremors, headaches  Psychiatric - No anxiety, alert & oriented

## 2025-04-23 ENCOUNTER — LAB (OUTPATIENT)
Dept: LAB | Facility: CLINIC | Age: 75
End: 2025-04-23
Payer: MEDICARE

## 2025-04-23 DIAGNOSIS — R19.7 DIARRHEA OF PRESUMED INFECTIOUS ORIGIN: ICD-10-CM

## 2025-04-24 LAB — C DIFF TOX B STL QL: NEGATIVE

## 2025-04-30 ENCOUNTER — TELEPHONE (OUTPATIENT)
Dept: INTERNAL MEDICINE | Facility: CLINIC | Age: 75
End: 2025-04-30

## 2025-04-30 ENCOUNTER — OFFICE VISIT (OUTPATIENT)
Dept: INTERNAL MEDICINE | Facility: CLINIC | Age: 75
End: 2025-04-30
Payer: MEDICARE

## 2025-04-30 VITALS
RESPIRATION RATE: 16 BRPM | WEIGHT: 206 LBS | HEIGHT: 66 IN | HEART RATE: 61 BPM | DIASTOLIC BLOOD PRESSURE: 66 MMHG | TEMPERATURE: 97.8 F | SYSTOLIC BLOOD PRESSURE: 138 MMHG | OXYGEN SATURATION: 99 % | BODY MASS INDEX: 33.11 KG/M2

## 2025-04-30 DIAGNOSIS — Z79.4 TYPE 2 DIABETES MELLITUS TREATED WITH INSULIN (H): Primary | ICD-10-CM

## 2025-04-30 DIAGNOSIS — I10 ESSENTIAL HYPERTENSION WITH GOAL BLOOD PRESSURE LESS THAN 140/90: ICD-10-CM

## 2025-04-30 DIAGNOSIS — Z79.4 TYPE 2 DIABETES MELLITUS TREATED WITH INSULIN (H): ICD-10-CM

## 2025-04-30 DIAGNOSIS — E11.9 TYPE 2 DIABETES MELLITUS TREATED WITH INSULIN (H): Primary | ICD-10-CM

## 2025-04-30 DIAGNOSIS — K52.9 CHRONIC DIARRHEA: Primary | ICD-10-CM

## 2025-04-30 DIAGNOSIS — E11.9 TYPE 2 DIABETES MELLITUS TREATED WITH INSULIN (H): ICD-10-CM

## 2025-04-30 DIAGNOSIS — I50.82 BIVENTRICULAR CONGESTIVE HEART FAILURE (H): ICD-10-CM

## 2025-04-30 LAB
ANION GAP SERPL CALCULATED.3IONS-SCNC: 5 MMOL/L (ref 7–15)
BUN SERPL-MCNC: 11.5 MG/DL (ref 8–23)
CALCIUM SERPL-MCNC: 9.1 MG/DL (ref 8.8–10.4)
CHLORIDE SERPL-SCNC: 100 MMOL/L (ref 98–107)
CREAT SERPL-MCNC: 0.84 MG/DL (ref 0.67–1.17)
EGFRCR SERPLBLD CKD-EPI 2021: 90 ML/MIN/1.73M2
EST. AVERAGE GLUCOSE BLD GHB EST-MCNC: 192 MG/DL
GLUCOSE SERPL-MCNC: 190 MG/DL (ref 70–99)
HBA1C MFR BLD: 8.3 %
HCO3 SERPL-SCNC: 31 MMOL/L (ref 22–29)
POTASSIUM SERPL-SCNC: 4.3 MMOL/L (ref 3.4–5.3)
SODIUM SERPL-SCNC: 136 MMOL/L (ref 135–145)

## 2025-04-30 PROCEDURE — 80048 BASIC METABOLIC PNL TOTAL CA: CPT | Performed by: INTERNAL MEDICINE

## 2025-04-30 PROCEDURE — 36415 COLL VENOUS BLD VENIPUNCTURE: CPT | Performed by: INTERNAL MEDICINE

## 2025-04-30 PROCEDURE — 1126F AMNT PAIN NOTED NONE PRSNT: CPT | Performed by: INTERNAL MEDICINE

## 2025-04-30 PROCEDURE — 99214 OFFICE O/P EST MOD 30 MIN: CPT | Performed by: INTERNAL MEDICINE

## 2025-04-30 PROCEDURE — 3075F SYST BP GE 130 - 139MM HG: CPT | Performed by: INTERNAL MEDICINE

## 2025-04-30 PROCEDURE — 3078F DIAST BP <80 MM HG: CPT | Performed by: INTERNAL MEDICINE

## 2025-04-30 PROCEDURE — 83036 HEMOGLOBIN GLYCOSYLATED A1C: CPT | Performed by: INTERNAL MEDICINE

## 2025-04-30 PROCEDURE — G2211 COMPLEX E/M VISIT ADD ON: HCPCS | Performed by: INTERNAL MEDICINE

## 2025-04-30 ASSESSMENT — PAIN SCALES - GENERAL: PAINLEVEL_OUTOF10: NO PAIN (0)

## 2025-04-30 NOTE — TELEPHONE ENCOUNTER
Can you please send a new script for a freestyle jamee 3 plus sensors as they are more likely to be covered under medicare part B as they are not covered under part D insurance. They also need to be sent to our specialty pharmacy in the [facility].    Thank you,   Dayami Sepulveda, Pharmacy [facility]

## 2025-04-30 NOTE — TELEPHONE ENCOUNTER
Please send prescription for Freestyle Thang Plus 3 sensor and Freestyle Thang 3 reader for [facility].  They should be covered on Medicare, but pt may need to wait to fill because he just received a 2 month supply of test strips.    Thank you  Briana Membreno [facility]  [phone]

## 2025-04-30 NOTE — PROGRESS NOTES
Anali Najera is a 74 year old, presenting for the following health issues:  Follow Up      4/30/2025     8:56 AM   Additional Questions   Roomed by Yuliana HALL                 EMR reviewed including:             Complaint, History of Chief Complaint, Corresponding Review of Systems, and Complaint Specific Physical Examination.    #1   Follow-up on chronic diarrhea.  Patient still having several bowel movements per day.  Gastroenterology report appreciated and reviewed in detail with the patient.  Suspected prior C. difficile colonization and not infection.  Eradication has not led to resumption of normal bowel function.  Colonoscopy strongly recommended.  Colonoscopy even more strongly refused  Patient scheduled for CT enterography later in the week.  Denies melena or hematochezia.          Exam:   HEART:  regular without rubs, clicks, gallops, or murmurs. PMI is nondisplaced. Upstrokes are brisk. S1,S2 are heard.   GI: Abdomen is soft, without rebound, guarding or tenderness. Bowel sounds are appropriate. No renal bruits are heard.   NEURO: Pt is alert and appropriate. No neurologic lateralization is noted. Cranial nerves 2-12 are intact. Peripheral sensory and motor function are grossly normal.    LUNGS: Clear with decreased breath sounds bilaterally, airflow is symmetric, no intercostal retraction or stridor is noted. No coughing is noted during visit.      #2   Follow-up on type 2 diabetes  Patient has been increasing his Tresiba on his own.  Is now up to 45 units daily.  Is not taking the preprandial short acting insulin.  Never started using the continuous glucose monitor.  Patient states the sensors were too expensive.  He will investigate possible options.  Has frequent episodes of hypoglycemia and follows these with glucose syrup.  Has frequent episodes of blood sugar greater than 300.  Randomly checks blood sugars using finger prick technology.          Exam:  As above      #3   Follow-up on  "hypertension  Patient continues carvedilol, lisinopril.  Blood pressure today is 138/66.  Denies any chest pain, shortness of breath at rest.  Denies significant edema.  His chronic congestive heart failure is stable.  Denies neurologic symptoms.  Has occasional muscle tension headache.          Exam:  As above        Patient has been interviewed, applicable history and applied review of systems have been performed.    Vital Signs:   /66   Pulse 61   Temp 97.8  F (36.6  C) (Temporal)   Resp 16   Ht 1.664 m (5' 5.5\")   Wt 93.4 kg (206 lb)   SpO2 99%   BMI 33.76 kg/m        Decision Making    Problem and Complexity     1. Chronic diarrhea (Primary)  Await results of enterography and further gastroenterology opinion.    2. Type 2 diabetes mellitus treated with insulin (H)  Patient will discuss continuous glucose monitoring with his pharmacist.  Prescriptions have already been sent.  He will check and see if there is a cost effective alternative.  Improved monitoring would certainly lead to improved management.  Would recommend restarting/starting short acting preprandial insulin which would allow us to decrease his self increased long-acting insulin and substantially cut down on his hypoglycemic episodes.  - Hemoglobin A1c; Future  - Basic metabolic panel  (Ca, Cl, CO2, Creat, Gluc, K, Na, BUN); Future  - Hemoglobin A1c  - Basic metabolic panel  (Ca, Cl, CO2, Creat, Gluc, K, Na, BUN)    3. Essential hypertension with goal blood pressure less than 140/90  Continue current medications    4. Biventricular congestive heart failure (H)  Stable.  Continue current medications                                FOLLOW UP   I have asked the patient to make an appointment for followup with me in the next month or sooner as predicated by his results and progress    Regarding routine vaccinations:  I have reviewed the patient's vaccination schedule and discussed the benefits of prophylactic vaccination in detail.  I " recommend the patient contact their pharmacist for vaccinations.  Discussed that most insurance companies now favor reimbursement to the pharmacies and it will financially behoove the patient to have vaccinations performed at their pharmacy.        I have carefully explained the diagnosis and treatment options to the patient.  The patient has displayed an understanding of the above, and all subsequent questions were answered.      DO RADHA Horn    Portions of this note were produced using [employer]  Although every attempt at real-time proof reading has been made, occasional grammar/syntax errors may have been missed.

## 2025-05-01 RX ORDER — KETOROLAC TROMETHAMINE 30 MG/ML
1 INJECTION, SOLUTION INTRAMUSCULAR; INTRAVENOUS ONCE
Qty: 1 EACH | Refills: 0 | Status: SHIPPED | OUTPATIENT
Start: 2025-05-01 | End: 2025-05-01

## 2025-05-01 RX ORDER — HYDROCHLOROTHIAZIDE 12.5 MG/1
CAPSULE ORAL
Qty: 6 EACH | Refills: 3 | Status: SHIPPED | OUTPATIENT
Start: 2025-05-01

## 2025-05-02 ENCOUNTER — HOSPITAL ENCOUNTER (OUTPATIENT)
Dept: CT IMAGING | Facility: CLINIC | Age: 75
Discharge: HOME | End: 2025-05-02
Attending: INTERNAL MEDICINE | Admitting: INTERNAL MEDICINE
Payer: MEDICARE

## 2025-05-02 DIAGNOSIS — R19.7 DIARRHEA OF PRESUMED INFECTIOUS ORIGIN: ICD-10-CM

## 2025-05-02 PROCEDURE — 250N000011 HC RX IP 250 OP 636: Performed by: INTERNAL MEDICINE

## 2025-05-02 PROCEDURE — 74177 CT ABD & PELVIS W/CONTRAST: CPT

## 2025-05-02 PROCEDURE — 250N000009 HC RX 250: Performed by: INTERNAL MEDICINE

## 2025-05-02 RX ORDER — IOPAMIDOL 755 MG/ML
500 INJECTION, SOLUTION INTRAVASCULAR ONCE
Status: COMPLETED | OUTPATIENT
Start: 2025-05-02 | End: 2025-05-02

## 2025-05-02 RX ADMIN — SODIUM CHLORIDE 80 ML: 9 INJECTION, SOLUTION INTRAVENOUS at 09:00

## 2025-05-02 RX ADMIN — IOPAMIDOL 100 ML: 755 INJECTION, SOLUTION INTRAVENOUS at 09:00

## 2025-05-06 ENCOUNTER — TELEPHONE (OUTPATIENT)
Dept: INTERNAL MEDICINE | Facility: CLINIC | Age: 75
End: 2025-05-06
Payer: MEDICARE

## 2025-05-06 NOTE — TELEPHONE ENCOUNTER
Hello,  This is [facility] requesting a few things for this patient's Medicare order of the FREESTYLE ANNE 3 PLUS SENSORS  We are requesting an addendum to the clinic notes from 03/06/25 to include CGM use per Medicare's guidelines below:  VISIT NOTE REQUIREMENTS: (must state the following)  Patient must be seen/clinical notes must be written in the last 6 months by the prescribing provider.  Must state that the patient is injecting insulin 1 time daily or more (Can be written that patient is injecting with insulin pump) We cannot accept medication list as insulin regimen.  Must state that the patient is a type 1 or type 2 diabetic.  Must state that patient is using CGM    As a reminder, Medicare requires patients to be seen every 3 months for insulin supplies and every 6 months for CGMs. The provider who sees the patient must be the provider on the prescription, must be written on the day of or after office visit date and office visit must include notes about diabetes and insulin regimen. The Diabetes Care Services team at [facility] may request new prescriptions before renewal dates of the prescription is filled over the allowable amount. Writing the order to match what is above will help ensure we will only need to request every 3 or 6 months.     If you have any questions regarding these requests please call us at [phone] or send a message to the [employer]  Thank you!  [facility]  Diabetes Care Services Team  [address] [place] [address]  Provider Phone: [phone]  Patient Line: [phone]  Fax: [phone]  E-mail: [email or web address]

## 2025-05-08 NOTE — TELEPHONE ENCOUNTER
Hello,  This is [facility] requesting a few things for this patient's Medicare order of the FREESTYLE ANNE 3 PLUS SENSORS  We are requesting an addendum to the clinic notes from 03/06/25 to include CGM use per Medicare's guidelines below:  VISIT NOTE REQUIREMENTS: (must state the following)  Patient must be seen/clinical notes must be written in the last 6 months by the prescribing provider.  Must state that the patient is injecting insulin 1 time daily or more (Can be written that patient is injecting with insulin pump) We cannot accept medication list as insulin regimen.  Must state that the patient is a type 1 or type 2 diabetic.  Must state that patient is using CGM     If you have any questions regarding these requests please call us at [phone] or send a message to the [employer]  Thank you!  [facility]  Diabetes Care Services Team  [address] [place] [address]  Provider Phone: [phone]  Patient Line: [phone]  Fax: [phone]  E-mail: [email or web address]

## 2025-05-09 ENCOUNTER — RESULTS FOLLOW-UP (OUTPATIENT)
Dept: FAMILY MEDICINE | Facility: CLINIC | Age: 75
End: 2025-05-09
Payer: MEDICARE

## 2025-05-12 ENCOUNTER — TELEPHONE (OUTPATIENT)
Dept: INTERNAL MEDICINE | Facility: CLINIC | Age: 75
End: 2025-05-12
Payer: MEDICARE

## 2025-05-12 NOTE — TELEPHONE ENCOUNTER
Need CGM usage added on to the clinic notes from office visit 3/6/2025 for Freestyle Thang 3+ sensors.     Team, call pharmacy back when notes have been updated.     Melissa Zuñiga, RN, BSN

## 2025-05-22 DIAGNOSIS — Z79.4 TYPE 2 DIABETES MELLITUS TREATED WITH INSULIN (H): ICD-10-CM

## 2025-05-22 DIAGNOSIS — E11.9 TYPE 2 DIABETES MELLITUS TREATED WITH INSULIN (H): ICD-10-CM

## 2025-05-22 RX ORDER — INSULIN DEGLUDEC 100 U/ML
50 INJECTION, SOLUTION SUBCUTANEOUS DAILY
Qty: 15 ML | Refills: 3 | Status: SHIPPED | OUTPATIENT
Start: 2025-05-22

## 2025-05-22 NOTE — TELEPHONE ENCOUNTER
Patient is taking upwards of 50 units daily, so is needing a new Rx with this information so that he can refill.    Lindsay Bryson XRO/

## 2025-05-28 ENCOUNTER — OFFICE VISIT (OUTPATIENT)
Dept: INTERNAL MEDICINE | Facility: CLINIC | Age: 75
End: 2025-05-28
Payer: MEDICARE

## 2025-05-28 VITALS
WEIGHT: 204 LBS | RESPIRATION RATE: 16 BRPM | HEART RATE: 70 BPM | OXYGEN SATURATION: 98 % | DIASTOLIC BLOOD PRESSURE: 70 MMHG | TEMPERATURE: 98.3 F | BODY MASS INDEX: 32.78 KG/M2 | HEIGHT: 66 IN | SYSTOLIC BLOOD PRESSURE: 146 MMHG

## 2025-05-28 DIAGNOSIS — Z95.2 S/P AORTIC VALVE REPLACEMENT: ICD-10-CM

## 2025-05-28 DIAGNOSIS — I10 ESSENTIAL HYPERTENSION WITH GOAL BLOOD PRESSURE LESS THAN 140/90: ICD-10-CM

## 2025-05-28 DIAGNOSIS — E11.43 TYPE 2 DIABETES MELLITUS WITH DIABETIC AUTONOMIC NEUROPATHY, WITHOUT LONG-TERM CURRENT USE OF INSULIN (H): Primary | ICD-10-CM

## 2025-05-28 DIAGNOSIS — Z95.1 S/P CABG (CORONARY ARTERY BYPASS GRAFT): ICD-10-CM

## 2025-05-28 DIAGNOSIS — K52.9 CHRONIC DIARRHEA: ICD-10-CM

## 2025-05-28 PROCEDURE — 99214 OFFICE O/P EST MOD 30 MIN: CPT | Performed by: INTERNAL MEDICINE

## 2025-05-28 PROCEDURE — 3078F DIAST BP <80 MM HG: CPT | Performed by: INTERNAL MEDICINE

## 2025-05-28 PROCEDURE — 1126F AMNT PAIN NOTED NONE PRSNT: CPT | Performed by: INTERNAL MEDICINE

## 2025-05-28 PROCEDURE — G2211 COMPLEX E/M VISIT ADD ON: HCPCS | Performed by: INTERNAL MEDICINE

## 2025-05-28 PROCEDURE — 3077F SYST BP >= 140 MM HG: CPT | Performed by: INTERNAL MEDICINE

## 2025-05-28 ASSESSMENT — PAIN SCALES - GENERAL: PAINLEVEL_OUTOF10: NO PAIN (0)

## 2025-05-28 NOTE — PROGRESS NOTES
"      Anali Najera is a 74 year old, presenting for the following health issues:  Diabetes (Here to discuss insulin)      5/28/2025     2:01 PM   Additional Questions   Roomed by Yuliana KELLEY reviewed including:             Complaint, History of Chief Complaint, Corresponding Review of Systems, and Complaint Specific Physical Examination.    #1   Follow-up on type 2 diabetes.  Patient notes blood sugars are quite labile with values between 70 and 300.  Notes that he just ate a giant chocolate chip cookie.  Also notes that he has been chasing his blood sugars with Tresiba based on his single morning blood sugar value.  Is not taking the Fiasp at this time.  Has had no severe hypoglycemic episodes.  Denies polyuria or polydipsia.  Does not have continuous glucose monitoring equipment yet.   \"The pharmacy does not know how to bill it properly\".   He will investigate this further.           Exam:   LUNGS: clear bilaterally, airflow is brisk, no intercostal retraction or stridor is noted. No coughing is noted during visit.   HEART:  regular without rubs, clicks, gallops, or murmurs. PMI is nondisplaced. Upstrokes are brisk. S1,S2 are heard.   GI: Abdomen is soft, without rebound, guarding or tenderness. Bowel sounds are appropriate. No renal bruits are heard.   NEURO: Pt is alert and appropriate. No neurologic lateralization is noted. Cranial nerves 2-12 are intact. Peripheral sensory and motor function are grossly normal.       #2   Recurrent diarrhea.  Up to 5 or 6 times per day.  Patient notes that it might be getting better.  Previously treated for C. difficile.  GI referral/consult appreciated.  Denies any bleeding.  Denies abdominal pain or cramping.    CT enterography shows questionable lesion.  Patient has been adamant and refusing colonoscopy.   Now notes that he has no indoor plumbing and will be unable to do the preparation.   Wants to be admitted a day before the procedure so he can do the " "colonoscopy prep in the hospital.   Explained to patient that this is less than likely as it will not be covered by his or any other insurance plan.        Patient has been interviewed, applicable history and applied review of systems have been performed.    Vital Signs:   BP (!) 146/70   Pulse 70   Temp 98.3  F (36.8  C) (Temporal)   Resp 16   Ht 1.664 m (5' 5.5\")   Wt 92.5 kg (204 lb)   SpO2 98%   BMI 33.43 kg/m        Decision Making    Problem and Complexity     1. Type 2 diabetes mellitus with diabetic autonomic neuropathy, without long-term current use of insulin (H) (Primary)  Recommend that he moves his Tresiba to bedtime.  Recommend 45 units without change.   Educated patient on the risks of sliding scale ultralong acting insulin   Will consider restarting Fiasp depended upon response to above changes.   Recommend against \"giant cookies\" and other frequent dietary indiscretion    2. Chronic diarrhea  Urged the patient to consider colonoscopy for evaluation of possible inflammatory disease, infectious disease, or malignancy.    3. Essential hypertension with goal blood pressure less than 140/90  Continue current medication    4. S/P aortic valve replacement  Stable    5. S/P CABG (coronary artery bypass graft)  Stable                                FOLLOW UP   I have asked the patient to make an appointment for followup with me in the upcoming 2 weeks    Regarding routine vaccinations:  I have reviewed the patient's vaccination schedule and discussed the benefits of prophylactic vaccination in detail.  I recommend the patient contact their pharmacist for vaccinations.  Discussed that most insurance companies now favor reimbursement to the pharmacies and it will financially behoove the patient to have vaccinations performed at their pharmacy.        I have carefully explained the diagnosis and treatment options to the patient.  The patient has displayed an understanding of the above, and all subsequent " questions were answered.      DO RADHA Horn    Portions of this note were produced using [employer]  Although every attempt at real-time proof reading has been made, occasional grammar/syntax errors may have been missed.

## 2025-06-04 ENCOUNTER — MYC MEDICAL ADVICE (OUTPATIENT)
Dept: INTERNAL MEDICINE | Facility: CLINIC | Age: 75
End: 2025-06-04

## 2025-06-04 DIAGNOSIS — E11.9 TYPE 2 DIABETES MELLITUS TREATED WITH INSULIN (H): ICD-10-CM

## 2025-06-04 DIAGNOSIS — Z79.4 TYPE 2 DIABETES MELLITUS TREATED WITH INSULIN (H): ICD-10-CM

## 2025-06-04 DIAGNOSIS — E11.43 TYPE 2 DIABETES MELLITUS WITH DIABETIC AUTONOMIC NEUROPATHY, WITHOUT LONG-TERM CURRENT USE OF INSULIN (H): ICD-10-CM

## 2025-06-04 RX ORDER — BLOOD SUGAR DIAGNOSTIC
STRIP MISCELLANEOUS
Qty: 400 STRIP | Refills: 2 | Status: SHIPPED | OUTPATIENT
Start: 2025-06-04

## 2025-06-04 RX ORDER — HYDROCHLOROTHIAZIDE 12.5 MG/1
CAPSULE ORAL
Qty: 6 EACH | Refills: 3 | Status: SHIPPED | OUTPATIENT
Start: 2025-06-04

## 2025-06-04 NOTE — TELEPHONE ENCOUNTER
Patient is still having diarrhea since his last visit.  He states it has been up to 6 times before 12 today.    He is wondering if he has another infection.    He states his blood sugar was down to 62 in the middle of the night.    He is wondering what he should do?    Geneva Goodman RN on 6/4/2025 at 4:47 PM

## 2025-06-04 NOTE — TELEPHONE ENCOUNTER
Refill for test strips sent to patients pharmacy.  Sent [employer] message to patient.     Misa Londono RN on 6/4/2025 at 1:38 PM

## 2025-06-04 NOTE — TELEPHONE ENCOUNTER
RN Triage    Patient Contact    Attempt # 1    Was call answered?  No.  Left message on voicemail with information to call me back.for initial triage.  Geneva Goodman RN on 6/4/2025 at 3:49 PM

## 2025-06-05 RX ORDER — INSULIN DEGLUDEC 100 U/ML
45 INJECTION, SOLUTION SUBCUTANEOUS DAILY
COMMUNITY
Start: 2025-06-05

## 2025-06-05 NOTE — TELEPHONE ENCOUNTER
"Patient calling upset because he cannot fill his strips until 6/11 according to pharmacy. RN explained that a script was sent over, so there is nothing more we can do regarding a script, and explained that this is due to insurance. Patient was still very frustrated and confused. RN advised patient that an option would be to pay out of pocket, but patient stated \"why should I pay out of pocket for something insurance should pay for?\"     RN was able to call pharmacy to see if they can explain this to patient. They noted that they have already talked to patient today and already explained this. Pharmacy did say that this is not medicare compliant  because there is nothing noted in chart or OV notes that state a reasoning for him testing as much as patient is. The last documentation was from the 4/30 visit that states patient is testing 3 times per day, so that is all Medicare will pay for.     Is this something provider would like to try to add into the last visit notes as to why patient is needing to test more and exactly how much he should be testing? Pharmacy stated that Myesha the manager there is in at 12:30pm and she would know more about the specific documentation needed.     TERI HallN, RN     "

## 2025-06-05 NOTE — TELEPHONE ENCOUNTER
I am a bit confused.  In addition to having continuous glucose monitoring, I see in his chart that he was given a prescription (it was sent over) for One Touch ultra strips to be used up to 5 times a day.  Additionally, I see that that prescription went out yesterday.    His blood pressure has been somewhat labile as a result of inappropriate insulin usage.  He has now been educated.    Santo

## 2025-06-11 ENCOUNTER — OFFICE VISIT (OUTPATIENT)
Dept: INTERNAL MEDICINE | Facility: CLINIC | Age: 75
End: 2025-06-11
Payer: MEDICARE

## 2025-06-11 VITALS
DIASTOLIC BLOOD PRESSURE: 70 MMHG | WEIGHT: 200 LBS | HEART RATE: 71 BPM | RESPIRATION RATE: 24 BRPM | HEIGHT: 66 IN | TEMPERATURE: 98.8 F | SYSTOLIC BLOOD PRESSURE: 150 MMHG | OXYGEN SATURATION: 99 % | BODY MASS INDEX: 32.14 KG/M2

## 2025-06-11 DIAGNOSIS — E11.43 TYPE 2 DIABETES MELLITUS WITH DIABETIC AUTONOMIC NEUROPATHY, WITHOUT LONG-TERM CURRENT USE OF INSULIN (H): Primary | ICD-10-CM

## 2025-06-11 DIAGNOSIS — K52.9 CHRONIC DIARRHEA: ICD-10-CM

## 2025-06-11 PROCEDURE — 99214 OFFICE O/P EST MOD 30 MIN: CPT | Performed by: INTERNAL MEDICINE

## 2025-06-11 PROCEDURE — 3077F SYST BP >= 140 MM HG: CPT | Performed by: INTERNAL MEDICINE

## 2025-06-11 PROCEDURE — 1126F AMNT PAIN NOTED NONE PRSNT: CPT | Performed by: INTERNAL MEDICINE

## 2025-06-11 PROCEDURE — G2211 COMPLEX E/M VISIT ADD ON: HCPCS | Performed by: INTERNAL MEDICINE

## 2025-06-11 PROCEDURE — 3078F DIAST BP <80 MM HG: CPT | Performed by: INTERNAL MEDICINE

## 2025-06-11 ASSESSMENT — PAIN SCALES - GENERAL: PAINLEVEL_OUTOF10: NO PAIN (0)

## 2025-06-11 ASSESSMENT — ENCOUNTER SYMPTOMS: DIARRHEA: 1

## 2025-06-11 NOTE — PROGRESS NOTES
"      Anali Najera is a 74 year old, presenting for the following health issues:  Diarrhea      6/11/2025     2:37 PM   Additional Questions   Roomed by Yuliana     Diarrhea    History of Present Illness       Reason for visit:  Follow upHe exercises with enough effort to increase his heart rate 9 or less minutes per day.  He exercises with enough effort to increase his heart rate 3 or less days per week.   He is taking medications regularly.                 EMR reviewed including:             Complaint, History of Chief Complaint, Corresponding Review of Systems, and Complaint Specific Physical Examination.    #1   Persistent and continual diarrhea.  History of C. difficile.  History of possible colonization.  Has concluded several courses of multiple antibiotics with intermittent results.  GI consultation appreciated.  Patient is now willing to consent to colonoscopy to rule out inflammatory disease and to obtain proper cultures/biopsies.  Notes he is having diarrhea greater than 10 times a day.  Rarely has anything that resembles a formed stool.  Maintaining adequate fluid intake.  No signs of dehydration or volume depletion at this time.          Exam:   LUNGS: clear bilaterally, airflow is brisk, no intercostal retraction or stridor is noted. No coughing is noted during visit.   HEART:  regular without rubs, clicks, gallops, or murmurs. PMI is nondisplaced. Upstrokes are brisk. S1,S2 are heard.   GI: Abdomen is soft, without rebound, guarding or tenderness. Bowel sounds are appropriate. No renal bruits are heard.   NEURO: Pt is alert and appropriate. No neurologic lateralization is noted. Cranial nerves 2-12 are intact. Peripheral sensory function is decreased in the lower extremities.      #2   Labile type 2 diabetes.  Patient \"auto adjusts\" insulin frequently.  Currently having difficulty with supplies.  Insurance company believes that he is overutilizing test strips etc.  Was unable to obtain continuous " "glucose monitoring.  Occasional polyuria.  Occasional hypoglycemia with values dropping into the 50s.          Exam:  As above        Patient has been interviewed, applicable history and applied review of systems have been performed.    Vital Signs:   BP (!) 150/70   Pulse 71   Temp 98.8  F (37.1  C) (Temporal)   Resp 24   Ht 1.664 m (5' 5.5\")   Wt 90.7 kg (200 lb)   SpO2 99%   BMI 32.78 kg/m        Decision Making    Problem and Complexity     1. Chronic diarrhea  Would like to set up for colonoscopy.  Prefer to have this performed by his previously seen gastroenterologist for continuity purposes.  Anticipate biopsy to rule out inflammatory bowel disease etc.  Will not reinitiate antibiotics at this time based on prior history of lack of response  - Adult GI  Referral - Procedure Only; Future    2. Type 2 diabetes mellitus with diabetic autonomic neuropathy, without long-term current use of insulin (H) (Primary)  Will request MTM involvement.  Urge reasonable control of dosages.  Discussed dietary control.  Treatment severely limited by finances.  - Med Therapy Management Referral                                FOLLOW UP   I have asked the patient to make an appointment for followup with me as predicated by the results, progress, consultations.    Regarding routine vaccinations:  I have reviewed the patient's vaccination schedule and discussed the benefits of prophylactic vaccination in detail.  I recommend the patient contact their pharmacist for vaccinations.  Discussed that most insurance companies now favor reimbursement to the pharmacies and it will financially behoove the patient to have vaccinations performed at their pharmacy.        I have carefully explained the diagnosis and treatment options to the patient.  The patient has displayed an understanding of the above, and all subsequent questions were answered.      DO RADHA Horn    Portions of this note were produced using " [employer]  Although every attempt at real-time proof reading has been made, occasional grammar/syntax errors may have been missed.

## 2025-06-14 ENCOUNTER — HEALTH MAINTENANCE LETTER (OUTPATIENT)
Age: 75
End: 2025-06-14

## 2025-06-16 ENCOUNTER — TELEPHONE (OUTPATIENT)
Dept: INTERNAL MEDICINE | Facility: CLINIC | Age: 75
End: 2025-06-16
Payer: MEDICARE

## 2025-06-16 NOTE — TELEPHONE ENCOUNTER
Patient Quality Outreach    Patient is due for the following:   Diabetes -  Foot Exam  Physical Annual Wellness Visit    Action(s) Taken:   Schedule a Annual Wellness Visit    Type of outreach:    Sent [employer] message.    Questions for provider review:    None         Yuliana Murray MA  Chart routed to None.

## 2025-07-07 ENCOUNTER — OFFICE VISIT (OUTPATIENT)
Dept: OPHTHALMOLOGY | Facility: CLINIC | Age: 75
End: 2025-07-07
Payer: MEDICARE

## 2025-07-07 DIAGNOSIS — H40.1212 LOW TENSION GLAUCOMA OF RIGHT EYE, MODERATE STAGE: Primary | ICD-10-CM

## 2025-07-07 DIAGNOSIS — E11.9 TYPE 2 DIABETES MELLITUS WITHOUT RETINOPATHY (H): ICD-10-CM

## 2025-07-07 DIAGNOSIS — H25.813 COMBINED FORMS OF AGE-RELATED CATARACT OF BOTH EYES: ICD-10-CM

## 2025-07-07 DIAGNOSIS — H52.203 MYOPIA OF BOTH EYES WITH ASTIGMATISM AND PRESBYOPIA: ICD-10-CM

## 2025-07-07 DIAGNOSIS — H52.13 MYOPIA OF BOTH EYES WITH ASTIGMATISM AND PRESBYOPIA: ICD-10-CM

## 2025-07-07 DIAGNOSIS — H40.1221 LOW-TENSION GLAUCOMA, LEFT EYE, MILD STAGE: ICD-10-CM

## 2025-07-07 DIAGNOSIS — H52.4 MYOPIA OF BOTH EYES WITH ASTIGMATISM AND PRESBYOPIA: ICD-10-CM

## 2025-07-07 PROCEDURE — 92133 CPTRZD OPH DX IMG PST SGM ON: CPT | Performed by: OPHTHALMOLOGY

## 2025-07-07 PROCEDURE — 92014 COMPRE OPH EXAM EST PT 1/>: CPT | Performed by: OPHTHALMOLOGY

## 2025-07-07 PROCEDURE — 92015 DETERMINE REFRACTIVE STATE: CPT | Performed by: OPHTHALMOLOGY

## 2025-07-07 PROCEDURE — 2023F DILAT RTA XM W/O RTNOPTHY: CPT | Performed by: OPHTHALMOLOGY

## 2025-07-07 RX ORDER — LATANOPROST 50 UG/ML
1 SOLUTION/ DROPS OPHTHALMIC DAILY
Qty: 2.5 ML | Refills: 11 | Status: SHIPPED | OUTPATIENT
Start: 2025-07-07

## 2025-07-07 ASSESSMENT — CONF VISUAL FIELD
OD_SUPERIOR_TEMPORAL_RESTRICTION: 0
OS_NORMAL: 1
OD_INFERIOR_TEMPORAL_RESTRICTION: 0
OS_SUPERIOR_TEMPORAL_RESTRICTION: 0
OS_INFERIOR_NASAL_RESTRICTION: 0
OD_INFERIOR_NASAL_RESTRICTION: 0
OD_NORMAL: 1
OD_SUPERIOR_NASAL_RESTRICTION: 0
OS_SUPERIOR_NASAL_RESTRICTION: 0
OS_INFERIOR_TEMPORAL_RESTRICTION: 0
METHOD: COUNTING FINGERS

## 2025-07-07 ASSESSMENT — VISUAL ACUITY
OD_CC+: -2
OS_CC+: -2
OD_CC: 20/20
CORRECTION_TYPE: GLASSES
OS_CC: 20/25
METHOD: SNELLEN - LINEAR

## 2025-07-07 ASSESSMENT — REFRACTION_WEARINGRX
OS_SPHERE: -0.75
OD_SPHERE: -0.50
OS_ADD: +2.50
OS_AXIS: 179
OS_CYLINDER: +1.25
SPECS_TYPE: PAL
OD_AXIS: 167
OD_ADD: +2.50
OD_CYLINDER: +1.25

## 2025-07-07 ASSESSMENT — SLIT LAMP EXAM - LIDS
COMMENTS: NORMAL
COMMENTS: NORMAL

## 2025-07-07 ASSESSMENT — REFRACTION_MANIFEST
OS_AXIS: 010
OD_AXIS: 175
OS_ADD: +2.50
OS_CYLINDER: +1.00
OD_ADD: +2.50
OD_SPHERE: -0.50
OD_CYLINDER: +1.25
OS_SPHERE: -0.75

## 2025-07-07 ASSESSMENT — TONOMETRY
OD_IOP_MMHG: 12
OD_IOP_MMHG: 17
OS_IOP_MMHG: 17
IOP_METHOD: TONOPEN
IOP_METHOD: APPLANATION
OS_IOP_MMHG: 12

## 2025-07-07 ASSESSMENT — CUP TO DISC RATIO
OS_RATIO: 0.8
OD_RATIO: 0.8
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
## 2025-07-28 ENCOUNTER — PATIENT OUTREACH (OUTPATIENT)
Dept: CARE COORDINATION | Facility: CLINIC | Age: 75
End: 2025-07-28
Payer: MEDICARE

## 2025-08-11 ENCOUNTER — ALLIED HEALTH/NURSE VISIT (OUTPATIENT)
Dept: EDUCATION SERVICES | Facility: CLINIC | Age: 75
End: 2025-08-11
Attending: INTERNAL MEDICINE
Payer: MEDICARE

## 2025-08-11 DIAGNOSIS — E11.65 TYPE 2 DIABETES MELLITUS WITH HYPERGLYCEMIA, WITH LONG-TERM CURRENT USE OF INSULIN (H): ICD-10-CM

## 2025-08-11 DIAGNOSIS — Z79.4 TYPE 2 DIABETES MELLITUS WITH HYPERGLYCEMIA, WITH LONG-TERM CURRENT USE OF INSULIN (H): ICD-10-CM

## 2025-08-21 ENCOUNTER — ALLIED HEALTH/NURSE VISIT (OUTPATIENT)
Dept: EDUCATION SERVICES | Facility: CLINIC | Age: 75
End: 2025-08-21
Payer: MEDICARE

## 2025-08-21 DIAGNOSIS — E11.9 TYPE 2 DIABETES MELLITUS TREATED WITH INSULIN (H): ICD-10-CM

## 2025-08-21 DIAGNOSIS — Z79.4 TYPE 2 DIABETES MELLITUS TREATED WITH INSULIN (H): ICD-10-CM

## 2025-08-21 RX ORDER — INSULIN DEGLUDEC 100 U/ML
32 INJECTION, SOLUTION SUBCUTANEOUS DAILY
Qty: 45 ML | Refills: 0 | Status: SHIPPED | OUTPATIENT
Start: 2025-08-21

## (undated) LAB
ADV 40+41 DNA STL QL NAA+NON-PROBE: NEGATIVE
ASTRO TYP 1-8 RNA STL QL NAA+NON-PROBE: NEGATIVE
C CAYETANENSIS DNA STL QL NAA+NON-PROBE: NEGATIVE
CAMPYLOBACTER DNA SPEC NAA+PROBE: NEGATIVE
CRYPTOSP DNA STL QL NAA+NON-PROBE: NEGATIVE
E COLI O157 DNA STL QL NAA+NON-PROBE: NORMAL
E HISTOLYT DNA STL QL NAA+NON-PROBE: NEGATIVE
EAEC ASTA GENE ISLT QL NAA+PROBE: NEGATIVE
EC STX1+STX2 GENES STL QL NAA+NON-PROBE: NEGATIVE
EPEC EAE GENE STL QL NAA+NON-PROBE: NEGATIVE
ETEC LTA+ST1A+ST1B TOX ST NAA+NON-PROBE: NEGATIVE
G LAMBLIA DNA STL QL NAA+NON-PROBE: NEGATIVE
NOROVIRUS GI+II RNA STL QL NAA+NON-PROBE: NEGATIVE
P SHIGELLOIDES DNA STL QL NAA+NON-PROBE: NEGATIVE
RVA RNA STL QL NAA+NON-PROBE: NEGATIVE
SALMONELLA SP RPOD STL QL NAA+PROBE: NEGATIVE
SAPO I+II+IV+V RNA STL QL NAA+NON-PROBE: NEGATIVE
SHIGELLA SP+EIEC IPAH ST NAA+NON-PROBE: NEGATIVE
V CHOLERAE DNA SPEC QL NAA+PROBE: NEGATIVE
VIBRIO DNA SPEC NAA+PROBE: NEGATIVE
Y ENTEROCOL DNA STL QL NAA+PROBE: NEGATIVE